# Patient Record
Sex: MALE | Race: WHITE | NOT HISPANIC OR LATINO | Employment: OTHER | URBAN - METROPOLITAN AREA
[De-identification: names, ages, dates, MRNs, and addresses within clinical notes are randomized per-mention and may not be internally consistent; named-entity substitution may affect disease eponyms.]

---

## 2017-05-12 ENCOUNTER — LAB VISIT (OUTPATIENT)
Dept: LAB | Facility: OTHER | Age: 62
End: 2017-05-12
Attending: FAMILY MEDICINE
Payer: COMMERCIAL

## 2017-05-12 ENCOUNTER — OFFICE VISIT (OUTPATIENT)
Dept: INTERNAL MEDICINE | Facility: CLINIC | Age: 62
End: 2017-05-12
Attending: FAMILY MEDICINE
Payer: COMMERCIAL

## 2017-05-12 VITALS
SYSTOLIC BLOOD PRESSURE: 124 MMHG | HEART RATE: 59 BPM | BODY MASS INDEX: 30.46 KG/M2 | DIASTOLIC BLOOD PRESSURE: 74 MMHG | WEIGHT: 224.88 LBS | HEIGHT: 72 IN

## 2017-05-12 DIAGNOSIS — Z00.00 PREVENTATIVE HEALTH CARE: ICD-10-CM

## 2017-05-12 DIAGNOSIS — Z00.00 PREVENTATIVE HEALTH CARE: Primary | ICD-10-CM

## 2017-05-12 DIAGNOSIS — R97.20 ELEVATED PSA: ICD-10-CM

## 2017-05-12 LAB
ALBUMIN SERPL BCP-MCNC: 4.1 G/DL
ALP SERPL-CCNC: 58 U/L
ALT SERPL W/O P-5'-P-CCNC: 24 U/L
ANION GAP SERPL CALC-SCNC: 7 MMOL/L
AST SERPL-CCNC: 15 U/L
BASOPHILS # BLD AUTO: 0.04 K/UL
BASOPHILS NFR BLD: 0.5 %
BILIRUB SERPL-MCNC: 0.4 MG/DL
BUN SERPL-MCNC: 17 MG/DL
CALCIUM SERPL-MCNC: 9.3 MG/DL
CHLORIDE SERPL-SCNC: 107 MMOL/L
CHOLEST/HDLC SERPL: 4.4 {RATIO}
CO2 SERPL-SCNC: 26 MMOL/L
COMPLEXED PSA SERPL-MCNC: 3.1 NG/ML
CREAT SERPL-MCNC: 0.8 MG/DL
DIFFERENTIAL METHOD: ABNORMAL
EOSINOPHIL # BLD AUTO: 0.5 K/UL
EOSINOPHIL NFR BLD: 6 %
ERYTHROCYTE [DISTWIDTH] IN BLOOD BY AUTOMATED COUNT: 14 %
EST. GFR  (AFRICAN AMERICAN): >60 ML/MIN/1.73 M^2
EST. GFR  (NON AFRICAN AMERICAN): >60 ML/MIN/1.73 M^2
GLUCOSE SERPL-MCNC: 88 MG/DL
HCT VFR BLD AUTO: 39.8 %
HDL/CHOLESTEROL RATIO: 22.7 %
HDLC SERPL-MCNC: 207 MG/DL
HDLC SERPL-MCNC: 47 MG/DL
HGB BLD-MCNC: 13 G/DL
LDLC SERPL CALC-MCNC: 131.2 MG/DL
LYMPHOCYTES # BLD AUTO: 3.2 K/UL
LYMPHOCYTES NFR BLD: 38.3 %
MCH RBC QN AUTO: 30 PG
MCHC RBC AUTO-ENTMCNC: 32.7 %
MCV RBC AUTO: 92 FL
MONOCYTES # BLD AUTO: 0.5 K/UL
MONOCYTES NFR BLD: 6.1 %
NEUTROPHILS # BLD AUTO: 4.1 K/UL
NEUTROPHILS NFR BLD: 48.6 %
NONHDLC SERPL-MCNC: 160 MG/DL
PLATELET # BLD AUTO: 272 K/UL
PMV BLD AUTO: 11.7 FL
POTASSIUM SERPL-SCNC: 4 MMOL/L
PROT SERPL-MCNC: 7.6 G/DL
RBC # BLD AUTO: 4.33 M/UL
SODIUM SERPL-SCNC: 140 MMOL/L
TRIGL SERPL-MCNC: 144 MG/DL
TSH SERPL DL<=0.005 MIU/L-ACNC: 1.28 UIU/ML
WBC # BLD AUTO: 8.36 K/UL

## 2017-05-12 PROCEDURE — 85025 COMPLETE CBC W/AUTO DIFF WBC: CPT

## 2017-05-12 PROCEDURE — 36415 COLL VENOUS BLD VENIPUNCTURE: CPT

## 2017-05-12 PROCEDURE — 84443 ASSAY THYROID STIM HORMONE: CPT

## 2017-05-12 PROCEDURE — 84153 ASSAY OF PSA TOTAL: CPT

## 2017-05-12 PROCEDURE — 80061 LIPID PANEL: CPT

## 2017-05-12 PROCEDURE — 83036 HEMOGLOBIN GLYCOSYLATED A1C: CPT

## 2017-05-12 PROCEDURE — 80053 COMPREHEN METABOLIC PANEL: CPT

## 2017-05-12 PROCEDURE — 99999 PR PBB SHADOW E&M-EST. PATIENT-LVL III: CPT | Mod: PBBFAC,,, | Performed by: FAMILY MEDICINE

## 2017-05-12 PROCEDURE — 99396 PREV VISIT EST AGE 40-64: CPT | Mod: S$GLB,,, | Performed by: FAMILY MEDICINE

## 2017-05-12 NOTE — PROGRESS NOTES
CHIEF COMPLAINT: Annual exam    HISTORY OF PRESENT ILLNESS: The patient is a perfectly healthy 61 year-old WM.  The patient has no specific complaints today.  It has been a while since the patient has been seen a primary care physician.  The patient wishes to establish a primary care physician.  The patient would also like to get some basic blood work done.    The patient used to see Dr. Tai.      REVIEW OF SYSTEMS:  GENERAL: No fever, chills, fatigability or weight loss.  SKIN: No rashes, itching or changes in color or texture of skin.  HEAD: No headaches or recent head trauma.  EYES: Visual acuity fine. No photophobia, ocular pain or diplopia.  EARS: Denies ear pain, discharge or vertigo.  NOSE: No loss of smell, no epistaxis or postnasal drip.  MOUTH & THROAT: No hoarseness or change in voice. No excessive gum bleeding.  NODES: Denies swollen glands.  CHEST: Denies JACKSON, cyanosis, wheezing, cough and sputum production.  CARDIOVASCULAR: Denies chest pain, PND, orthopnea or reduced exercise tolerance.  ABDOMEN: Appetite fine. No weight loss. Denies diarrhea, abdominal pain, hematemesis or blood in stool.  URINARY: No flank pain, dysuria or hematuria.  PERIPHERAL VASCULAR: No claudication or cyanosis.  MUSCULOSKELETAL: No joint stiffness or swelling. Denies back pain.  NEUROLOGIC: No history of seizures, paralysis, alteration of gait or coordination.    SOCIAL HISTORY: The patient does not smoke.  The patient consumes alcohol socially.  The patient is .  He works for the [employer].    PHYSICAL EXAMINATION:     Blood pressure 124/74, pulse (!) 59, height 6' (1.829 m), weight 102 kg (224 lb 13.9 oz).    APPEARANCE: Well nourished, well developed, in no acute distress.    HEAD: Normocephalic, atraumatic.  EYES: PERRL. EOMI.  Conjunctivae without injection and  anicteric  EARS: TM's intact. Light reflex normal. No retraction or perforation.    NOSE: Mucosa pink. Airway clear.  MOUTH & THROAT: No  tonsillar enlargement. No pharyngeal erythema or exudate. No stridor.  NECK: Supple.   NODES: No cervical, axillary or inguinal lymph node enlargement.  CHEST: Lungs clear to auscultation.  No retractions are noted.  No rales or rhonchi are present.  CARDIOVASCULAR: Normal S1, S2. No rubs, murmurs or gallops.  ABDOMEN: Bowel sounds normal. Not distended. Soft. No tenderness or masses.  No ascites is noted.  MUSCULOSKELETAL:  There is no clubbing, cyanosis, or edema of the extremities x4.  There is full range of motion of the lumbar spine.  There is full range of motion of the extremities x4.  There is no deformity noted.    NEUROLOGIC:       Normal speech development.      Hearing normal.      Normal gait.      Motor and sensory exams grossly normal.      DTR's normal.  PSYCHIATRIC: Patient is alert and oriented x3.  Thought processes are all normal.  There is no homicidality.  There is no suicidality.  There is no evidence of psychosis.    LABORATORY/RADIOLOGY:   Chart reviewed.  We will update blood work today.    ASSESSMENT:   Normal physical exam in an apparently healthy individual  Elevated PSA     PLAN:  Update HM:  Stress echo  Cscope due in 2019  We will follow-up blood work which we expect to be normal.    Follow up with urology  Return to clinic in one year.

## 2017-05-12 NOTE — MR AVS SNAPSHOT
[facility]  [address]  [place] [address]  Phone: [phone]  Fax: [phone]                  Med Palma   2017 1:40 PM   Office Visit    Description:  Male : 1955   Provider:  Jah Wilson MD   Department:  [facility]           Reason for Visit     Annual Exam           Diagnoses this Visit        Comments    Preventative health care    -  Primary     Elevated PSA                To Do List           Future Appointments        Provider Department Dept Phone    2017 2:15 PM LAB, SAME DAY [facility] [phone]    2017 7:00 AM CARDIOLOGY, TESTS [facility] [phone]    2017 1:00 PM Cecilia Campbell MD [facility] [phone]      Goals (5 Years of Data)     None      [facility]     [facility] Nurse Care Line -  Assistance  Unless otherwise directed by your provider, please contact [facility], our nurse care line that is available for  assistance.     Registered nurses in the [facility] Center provide: appointment scheduling, clinical advisement, health education, and other advisory services.  Call: [phone] (toll free)               Medications           Message regarding Medications     Verify the changes and/or additions to your medication regime listed below are the same as discussed with your clinician today.  If any of these changes or additions are incorrect, please notify your healthcare provider.             Verify that the below list of medications is an accurate representation of the medications you are currently taking.  If none reported, the list may be blank. If incorrect, please contact your healthcare provider. Carry this list with you in case of emergency.                Clinical Reference Information           Your Vitals Were     BP Pulse Height Weight BMI    124/74 59 6' (1.829 m) 102 kg (224  lb 13.9 oz) 30.5 kg/m2      Blood Pressure          Most Recent Value    BP  124/74      Allergies as of 5/12/2017     No Known Allergies      Immunizations Administered on Date of Encounter - 5/12/2017     None      Orders Placed During Today's Visit     Future Labs/Procedures Expected by Expires    CBC auto differential  5/12/2017 8/10/2017    Comprehensive metabolic panel  5/12/2017 7/11/2017    Hemoglobin A1c  5/12/2017 8/10/2017    Lipid panel  5/12/2017 7/11/2017    PROSTATE SPECIFIC ANTIGEN, DIAGNOSTIC  5/12/2017 7/11/2018    TSH  5/12/2017 8/10/2017    Exercise stress echo  As directed 5/12/2018      [facility] Sign-Up     Activating your [facility] account is as easy as 1-2-3!     1) Visit [email or web address], select Sign Up Now, enter this activation code and your date of birth, then select Next.  Activation code not generated  Current Patient Portal Status: Account disabled      2) Create a username and password to use when you visit [facility] in the future and select a security question in case you lose your password and select Next.    3) Enter your e-mail address and click Sign Up!    Additional Information  If you have questions, please e-mail [email or web address] or call [phone] to talk to our [facility] staff. Remember, [facility] is NOT to be used for urgent needs. For medical emergencies, dial 911.         Instructions    Your test results will be communicated to you via: [facility], Telephone or Letter.  If you have not received your test results within one week. Please contact the clinic.           Smoking Cessation     If you would like to quit smoking:   You may be eligible for free services if you are a [place] resident and started smoking cigarettes before September 1, 1988.  Call the Smoking Cessation Trust (SCT) toll free at (810) 560-9830 or (907) 866-8263.   Call 4-800-QUIT-NOW if you do not meet the above criteria.   Contact us via email: [email or web address]   View our website  for more information: [email or web address]        Language Assistance Services     ATTENTION: Language assistance services are available, free of charge. Please call [phone].      ATENCIÓN: Si habstuart marte, tiene a huntley disposición servicios gratuitos de asistencia lingüística. Llame al [phone].     CHÚ Ý: N?u b?n nói Ti?ng Vi?t, có các d?ch v? h? tr? ngôn ng? mi?n phí dành cho b?n. G?i s? [phone].         [facility] complies with applicable Federal civil rights laws and does not discriminate on the basis of race, color, national origin, age, disability, or sex.

## 2017-05-13 LAB
ESTIMATED AVG GLUCOSE: 123 MG/DL
HBA1C MFR BLD HPLC: 5.9 %

## 2017-05-16 ENCOUNTER — TELEPHONE (OUTPATIENT)
Dept: INTERNAL MEDICINE | Facility: CLINIC | Age: 62
End: 2017-05-16

## 2017-05-16 ENCOUNTER — HOSPITAL ENCOUNTER (OUTPATIENT)
Dept: CARDIOLOGY | Facility: OTHER | Age: 62
Discharge: HOME | End: 2017-05-16
Attending: FAMILY MEDICINE
Payer: COMMERCIAL

## 2017-05-16 DIAGNOSIS — Z00.00 PREVENTATIVE HEALTH CARE: ICD-10-CM

## 2017-05-16 LAB
DIASTOLIC DYSFUNCTION: NO
RETIRED EF AND QEF - SEE NOTES: 68 (ref 55–65)

## 2017-05-16 PROCEDURE — 93351 STRESS TTE COMPLETE: CPT

## 2017-05-16 NOTE — TELEPHONE ENCOUNTER
----- Message from Jah Wilson MD sent at 5/16/2017  1:21 PM CDT -----  Laboratory is normal.  PSA down to 3.1.  Stress echo looked good.  Followup as scheduled in about a year.

## 2017-06-21 ENCOUNTER — OFFICE VISIT (OUTPATIENT)
Dept: UROLOGY | Facility: CLINIC | Age: 62
End: 2017-06-21
Attending: UROLOGY
Payer: COMMERCIAL

## 2017-06-21 VITALS
WEIGHT: 224 LBS | HEART RATE: 73 BPM | SYSTOLIC BLOOD PRESSURE: 133 MMHG | BODY MASS INDEX: 30.34 KG/M2 | HEIGHT: 72 IN | DIASTOLIC BLOOD PRESSURE: 80 MMHG

## 2017-06-21 DIAGNOSIS — R97.20 ELEVATED PSA: Primary | ICD-10-CM

## 2017-06-21 LAB
BILIRUB SERPL-MCNC: NORMAL MG/DL
BLOOD URINE, POC: NORMAL
COLOR, POC UA: YELLOW
GLUCOSE UR QL STRIP: NORMAL
KETONES UR QL STRIP: NORMAL
LEUKOCYTE ESTERASE URINE, POC: NORMAL
NITRITE, POC UA: NORMAL
PH, POC UA: 5
POC RESIDUAL URINE VOLUME: 31 ML (ref 0–100)
PROTEIN, POC: NORMAL
SPECIFIC GRAVITY, POC UA: 1
UROBILINOGEN, POC UA: NORMAL

## 2017-06-21 PROCEDURE — 81002 URINALYSIS NONAUTO W/O SCOPE: CPT | Mod: S$GLB,,, | Performed by: UROLOGY

## 2017-06-21 PROCEDURE — 51798 US URINE CAPACITY MEASURE: CPT | Mod: S$GLB,,, | Performed by: UROLOGY

## 2017-06-21 PROCEDURE — 99999 PR PBB SHADOW E&M-EST. PATIENT-LVL III: CPT | Mod: PBBFAC,,, | Performed by: UROLOGY

## 2017-06-21 PROCEDURE — 99214 OFFICE O/P EST MOD 30 MIN: CPT | Mod: 25,S$GLB,, | Performed by: UROLOGY

## 2017-06-21 NOTE — PROGRESS NOTES
Subjective:       Med Palma is a 61 y.o. male who is an established pt who was referred by Dr Wilson for evaluation of elevated PSA.      He was seen previously with a elevated PSA of 8.4 but had a UTI at the time of PSA check. For this reason, we decided to hold on PBx and plan for recheck of PSA when no UTI present. His recheck showed a PSA of 5.9 in 11/14. Previous free PSA was 7%. He was counseled on PBx but he declined and opted for another recheck of his prostate.     No prostate cancer in the family. Brother with possible BPH or prostatitis. Denies LUTS at this time, rare nocturia. Not other UTI symptoms.     He is now s/p TRUS PBx on 1/26/15. All 12 cores from the biopsy were negative for malignancy. He denied any post-procedure issues such as fever or bleeding. Prostate volume 29g.     TRUS also revealed a large median lobe that could be causing issues with voiding that may have led to UTI. Due to imaging, he would be a candidate for TURP in the future if he desired. He denies current LUTS. He had not been on any treatment for BPH in the past. He was given rx for Flomax but did not like SE of retrograde ejaculation therefore he was switched to Uroxatral. He stopped this medication and has been doing well.    He does note some weakened stream. He has nocturia x 1-2 only after drinking a beer.    PVR (bladder scan) last visits - 135cc, 0cc, 26cc, 14cc. PVR (bladder scan) today - 31cc.     PSA (9/24/14) - 8.4   PSA (1/12/15) - 5.3, free 10.75%  PSA (6/15/15) - 5.4   PSA (12/11/15) - 5.9  PSA (6/3/16) - 3.2, free 15%  PSA (5/17) - 3.1      The following portions of the patient's history were reviewed and updated as appropriate: allergies, current medications, past family history, past medical history, past social history, past surgical history and problem list.    Review of Systems  Constitutional: no fever or chills  ENT: no nasal congestion or sore throat  Respiratory: no cough or shortness of  breath  Cardiovascular: no chest pain or palpitations  Gastrointestinal: no nausea or vomiting, tolerating diet  Genitourinary: as per HPI  Hematologic/Lymphatic: no easy bruising or lymphadenopathy  Musculoskeletal: no arthralgias or myalgias  Skin: no rashes or lesions  Neurological: no seizures or tremors  Behavioral/Psych: no auditory or visual hallucinations       Objective:    Vitals:   /80 (BP Location: Right arm, Patient Position: Sitting, BP Method: Automatic)   Pulse 73   Ht 6' (1.829 m)   Wt 101.6 kg (224 lb)   BMI 30.38 kg/m²     Physical Exam   General: well developed, well nourished in no acute distress  Head: normocephalic, atraumatic  Neck: supple, trachea midline  HEENT: EOMI, mucus membranes moist, sclera anicteric, no hearing impairment  Lungs: symmetric expansion, non-labored breathing  Cardiovascular: regular rate and rhythm, normal pulses  Abdomen: soft, non tender, non distended, no palpable masses, no hernias, bladder nonpalpable  Musculoskeletal: no peripheral edema, normal ROM  Lymphatics: no cervical or inguinal lymphadenopathy  Skin: no rashes or lesions  Neuro: alert and oriented x 3, no gross deficits  Psych: normal judgment and insight, normal mood/affect and non-anxious  Genitourinary:   patient declined exam   ELSA: symmetric 45g prostate without nodularity or tenderness, seminal vesicles non palpable, normal sphincter tone without hemorrhoids or rectal masses      Lab Review   Urine analysis shows  - negative    Lab Results   Component Value Date    WBC 8.36 05/12/2017    HGB 13.0 (L) 05/12/2017    HCT 39.8 (L) 05/12/2017    MCV 92 05/12/2017     05/12/2017     Lab Results   Component Value Date    CREATININE 0.8 05/12/2017    BUN 17 05/12/2017     Lab Results   Component Value Date    PSA 0.96 10/10/2011        Imaging  TRUS images reviewed       Assessment/Plan:      1. Elevated PSA    - Negative PBx on 1/26/15.    - PSA actually now decreasing without  intervention   - Thoroughly discussed option related to his elevated PSA with negative PBx. Discussed continued PSA monitoring, repeat standard 12-core biopsy, saturation biopsy, and prostatic MRI. He would like to continue with PSA surveillance.   - Continue vitamin D supplementation     2. BPH   - Large median lobe noted on TRUS   - May be the reason for UTI due to LUCINA   - Consider for TURP if symptomatic/recurrent UTI   - Alpha-blockers may help with LUCINA if present though median lobe will not be dramatically improved by pharmacotherapy   - PVR acceptable   - Doing well without medication for BPH       Follow up in 12 months with free and total PSA & PVR - may f/u at [facility].

## 2017-08-10 ENCOUNTER — CLINICAL SUPPORT (OUTPATIENT)
Dept: OCCUPATIONAL MEDICINE | Facility: CLINIC | Age: 62
End: 2017-08-10

## 2017-08-10 DIAGNOSIS — Z02.1 PRE-EMPLOYMENT EXAMINATION: ICD-10-CM

## 2017-08-10 LAB
BILIRUB UR QL STRIP: NORMAL
GLUCOSE UR QL STRIP: NORMAL
KETONES UR QL STRIP: NORMAL
LEUKOCYTE ESTERASE UR QL STRIP: NORMAL
PH, POC UA: NORMAL (ref 5–8)
POC BLOOD, URINE: NORMAL
POC BREATH ALCOHOL: NEGATIVE
POC NITRATES, URINE: NORMAL
PROT UR QL STRIP: NORMAL
SP GR UR STRIP: NORMAL (ref 1–1.03)
UROBILINOGEN UR STRIP-ACNC: NORMAL (ref 0.3–2.2)

## 2017-08-10 PROCEDURE — 92552 PURE TONE AUDIOMETRY AIR: CPT | Mod: S$GLB,,, | Performed by: PREVENTIVE MEDICINE

## 2017-08-10 PROCEDURE — 82075 ASSAY OF BREATH ETHANOL: CPT | Mod: S$GLB,,, | Performed by: PREVENTIVE MEDICINE

## 2017-08-10 PROCEDURE — 80306 DRUG TEST PRSMV INSTRMNT: CPT | Mod: S$GLB,,, | Performed by: PREVENTIVE MEDICINE

## 2017-08-10 PROCEDURE — 99172 OCULAR FUNCTION SCREEN: CPT | Mod: S$GLB,,, | Performed by: PREVENTIVE MEDICINE

## 2017-08-10 PROCEDURE — 99499 UNLISTED E&M SERVICE: CPT | Mod: S$GLB,,, | Performed by: PREVENTIVE MEDICINE

## 2018-06-27 ENCOUNTER — OFFICE VISIT (OUTPATIENT)
Dept: UROLOGY | Facility: CLINIC | Age: 63
End: 2018-06-27
Attending: UROLOGY
Payer: COMMERCIAL

## 2018-06-27 VITALS
DIASTOLIC BLOOD PRESSURE: 68 MMHG | HEART RATE: 79 BPM | SYSTOLIC BLOOD PRESSURE: 104 MMHG | BODY MASS INDEX: 30.34 KG/M2 | HEIGHT: 72 IN | WEIGHT: 224 LBS

## 2018-06-27 DIAGNOSIS — N40.1 BENIGN NON-NODULAR PROSTATIC HYPERPLASIA WITH LOWER URINARY TRACT SYMPTOMS: ICD-10-CM

## 2018-06-27 DIAGNOSIS — R97.20 ELEVATED PSA: Primary | ICD-10-CM

## 2018-06-27 LAB
BILIRUB SERPL-MCNC: ABNORMAL MG/DL
BLOOD URINE, POC: ABNORMAL
COLOR, POC UA: YELLOW
GLUCOSE UR QL STRIP: ABNORMAL
KETONES UR QL STRIP: ABNORMAL
LEUKOCYTE ESTERASE URINE, POC: ABNORMAL
NITRITE, POC UA: ABNORMAL
PH, POC UA: 5
PROTEIN, POC: ABNORMAL
SPECIFIC GRAVITY, POC UA: 1.01
UROBILINOGEN, POC UA: ABNORMAL

## 2018-06-27 PROCEDURE — 3008F BODY MASS INDEX DOCD: CPT | Mod: CPTII,S$GLB,, | Performed by: UROLOGY

## 2018-06-27 PROCEDURE — 81002 URINALYSIS NONAUTO W/O SCOPE: CPT | Mod: S$GLB,,, | Performed by: UROLOGY

## 2018-06-27 PROCEDURE — 99214 OFFICE O/P EST MOD 30 MIN: CPT | Mod: 25,S$GLB,, | Performed by: UROLOGY

## 2018-06-27 NOTE — PROGRESS NOTES
Subjective:       Med Palma is a 62 y.o. male who is an established pt who was referred by Dr Wilson for evaluation of elevated PSA.      He was seen previously with a elevated PSA of 8.4 but had a UTI at the time of PSA check. For this reason, we decided to hold on PBx and plan for recheck of PSA when no UTI present. His recheck showed a PSA of 5.9 in 11/14. Previous free PSA was 7%. He was counseled on PBx but he declined and opted for another recheck of his prostate.     No prostate cancer in the family. Brother with possible BPH or prostatitis. Denies LUTS at this time, rare nocturia. Not other UTI symptoms.     He is now s/p TRUS PBx on 1/26/15. All 12 cores from the biopsy were negative for malignancy. He denied any post-procedure issues such as fever or bleeding. Prostate volume 29g.     TRUS also revealed a large median lobe that could be causing issues with voiding that may have led to UTI. Due to imaging, he would be a candidate for TURP in the future if he desired. He denies current LUTS. He had not been on any treatment for BPH in the past. He was given rx for Flomax but did not like SE of retrograde ejaculation therefore he was switched to Uroxatral. He stopped this medication and has been doing well.    He does note some weakened stream. He has nocturia x 1-2 only after drinking a beer. He continues to decline meds or surgery.     PVR (bladder scan) last visits - 135cc, 0cc, 26cc, 14cc, 31cc.     PSA (9/24/14) - 8.4   PSA (1/12/15) - 5.3, free 10.75%  PSA (6/15/15) - 5.4   PSA (12/11/15) - 5.9  PSA (6/3/16) - 3.2, free 15%  PSA (5/17) - 3.1      The following portions of the patient's history were reviewed and updated as appropriate: allergies, current medications, past family history, past medical history, past social history, past surgical history and problem list.    Review of Systems  Constitutional: no fever or chills  ENT: no nasal congestion or sore throat  Respiratory: no cough or  shortness of breath  Cardiovascular: no chest pain or palpitations  Gastrointestinal: no nausea or vomiting, tolerating diet  Genitourinary: as per HPI  Hematologic/Lymphatic: no easy bruising or lymphadenopathy  Musculoskeletal: no arthralgias or myalgias  Skin: no rashes or lesions  Neurological: no seizures or tremors  Behavioral/Psych: no auditory or visual hallucinations       Objective:    Vitals:   /68 (BP Location: Left arm, Patient Position: Sitting, BP Method: Large (Automatic))   Pulse 79   Ht 6' (1.829 m)   Wt 101.6 kg (223 lb 15.8 oz)   BMI 30.38 kg/m²     Physical Exam   General: well developed, well nourished in no acute distress  Head: normocephalic, atraumatic  Neck: supple, trachea midline  HEENT: EOMI, mucus membranes moist, sclera anicteric, no hearing impairment  Lungs: symmetric expansion, non-labored breathing  Cardiovascular: regular rate and rhythm, normal pulses  Abdomen: soft, non tender, non distended, no palpable masses, no hernias, bladder nonpalpable  Musculoskeletal: no peripheral edema, normal ROM  Lymphatics: no cervical or inguinal lymphadenopathy  Skin: no rashes or lesions  Neuro: alert and oriented x 3, no gross deficits  Psych: normal judgment and insight, normal mood/affect and non-anxious  Genitourinary:   patient declined exam   ELSA: symmetric 55g prostate without nodularity or tenderness, seminal vesicles non palpable, normal sphincter tone without hemorrhoids or rectal masses      Lab Review   Urine analysis shows  - negative    Lab Results   Component Value Date    WBC 8.36 05/12/2017    HGB 13.0 (L) 05/12/2017    HCT 39.8 (L) 05/12/2017    MCV 92 05/12/2017     05/12/2017     Lab Results   Component Value Date    CREATININE 0.8 05/12/2017    BUN 17 05/12/2017     Lab Results   Component Value Date    PSA 0.96 10/10/2011      Imaging  TRUS images reviewed       Assessment/Plan:      1. Elevated PSA    - Negative PBx on 1/26/15.    - PSA actually now  decreasing without intervention   - Thoroughly discussed option related to his elevated PSA with negative PBx. Discussed continued PSA monitoring, repeat standard 12-core biopsy, saturation biopsy, and prostatic MRI. He would like to continue with PSA surveillance.   - Continue vitamin D supplementation   - PSA now and 1 year     2. BPH   - Large median lobe noted on TRUS   - May be the reason for UTI due to LUCINA   - Consider for TURP if symptomatic/recurrent UTI   - Alpha-blockers may help with LUCINA if present though median lobe will not be dramatically improved by pharmacotherapy   - PVR acceptable   - Doing well without medication for BPH - again discussed Flomax and Proscar as options.      Follow up in 12 months with free and total PSA & PVR

## 2019-05-09 ENCOUNTER — PATIENT MESSAGE (OUTPATIENT)
Dept: UROLOGY | Facility: CLINIC | Age: 64
End: 2019-05-09

## 2019-06-11 ENCOUNTER — LAB VISIT (OUTPATIENT)
Dept: LAB | Facility: OTHER | Age: 64
End: 2019-06-11
Attending: UROLOGY
Payer: COMMERCIAL

## 2019-06-11 DIAGNOSIS — R97.20 ELEVATED PSA: ICD-10-CM

## 2019-06-11 LAB
PROSTATE SPECIFIC ANTIGEN, TOTAL: 2.3 NG/ML (ref 0–4)
PSA FREE MFR SERPL: 20 %
PSA FREE SERPL-MCNC: 0.46 NG/ML (ref 0.01–1.5)

## 2019-06-11 PROCEDURE — 36415 COLL VENOUS BLD VENIPUNCTURE: CPT

## 2019-06-11 PROCEDURE — 84154 ASSAY OF PSA FREE: CPT

## 2019-06-12 ENCOUNTER — OFFICE VISIT (OUTPATIENT)
Dept: UROLOGY | Facility: CLINIC | Age: 64
End: 2019-06-12
Attending: UROLOGY
Payer: COMMERCIAL

## 2019-06-12 VITALS
HEIGHT: 72 IN | BODY MASS INDEX: 28.16 KG/M2 | WEIGHT: 207.88 LBS | SYSTOLIC BLOOD PRESSURE: 115 MMHG | DIASTOLIC BLOOD PRESSURE: 70 MMHG | HEART RATE: 68 BPM

## 2019-06-12 DIAGNOSIS — N40.1 BENIGN NON-NODULAR PROSTATIC HYPERPLASIA WITH LOWER URINARY TRACT SYMPTOMS: ICD-10-CM

## 2019-06-12 DIAGNOSIS — R97.20 ELEVATED PSA: Primary | ICD-10-CM

## 2019-06-12 LAB
BILIRUB SERPL-MCNC: ABNORMAL MG/DL
BLOOD URINE, POC: ABNORMAL
COLOR, POC UA: ABNORMAL
GLUCOSE UR QL STRIP: NORMAL
KETONES UR QL STRIP: ABNORMAL
LEUKOCYTE ESTERASE URINE, POC: ABNORMAL
NITRITE, POC UA: ABNORMAL
PH, POC UA: 5
POC RESIDUAL URINE VOLUME: 100 ML (ref 0–100)
PROTEIN, POC: ABNORMAL
SPECIFIC GRAVITY, POC UA: 1.01
UROBILINOGEN, POC UA: NORMAL

## 2019-06-12 PROCEDURE — 81002 POCT URINE DIPSTICK WITHOUT MICROSCOPE: ICD-10-PCS | Mod: S$GLB,,, | Performed by: UROLOGY

## 2019-06-12 PROCEDURE — 51798 POCT BLADDER SCAN: ICD-10-PCS | Mod: S$GLB,,, | Performed by: UROLOGY

## 2019-06-12 PROCEDURE — 99214 PR OFFICE/OUTPT VISIT, EST, LEVL IV, 30-39 MIN: ICD-10-PCS | Mod: 25,S$GLB,, | Performed by: UROLOGY

## 2019-06-12 PROCEDURE — 99214 OFFICE O/P EST MOD 30 MIN: CPT | Mod: 25,S$GLB,, | Performed by: UROLOGY

## 2019-06-12 PROCEDURE — 51798 US URINE CAPACITY MEASURE: CPT | Mod: S$GLB,,, | Performed by: UROLOGY

## 2019-06-12 PROCEDURE — 3008F BODY MASS INDEX DOCD: CPT | Mod: CPTII,S$GLB,, | Performed by: UROLOGY

## 2019-06-12 PROCEDURE — 81002 URINALYSIS NONAUTO W/O SCOPE: CPT | Mod: S$GLB,,, | Performed by: UROLOGY

## 2019-06-12 PROCEDURE — 3008F PR BODY MASS INDEX (BMI) DOCUMENTED: ICD-10-PCS | Mod: CPTII,S$GLB,, | Performed by: UROLOGY

## 2019-06-12 NOTE — PROGRESS NOTES
Subjective:       Med Palma is a 63 y.o. male who is an established pt who was referred by Dr Wilson for evaluation of elevated PSA.      He was seen previously with a elevated PSA of 8.4 but had a UTI at the time of PSA check. For this reason, we decided to hold on PBx and plan for recheck of PSA when no UTI present. His recheck showed a PSA of 5.9 in 11/14. Previous free PSA was 7%. He was counseled on PBx but he declined and opted for another recheck of his prostate.     No prostate cancer in the family. Brother with possible BPH or prostatitis. Denies LUTS at this time, rare nocturia. Not other UTI symptoms.     He is now s/p TRUS PBx on 1/26/15. All 12 cores from the biopsy were negative for malignancy. He denied any post-procedure issues such as fever or bleeding. Prostate volume 29g.     TRUS also revealed a large median lobe that could be causing issues with voiding that may have led to UTI. Due to imaging, he would be a candidate for TURP in the future if he desired. He denies current LUTS. He had not been on any treatment for BPH in the past. He was given rx for Flomax but did not like SE of retrograde ejaculation therefore he was switched to Uroxatral. He stopped this medication and has been doing well.    He does note some weakened stream. He has nocturia x 1-2 only after drinking a beer. He continues to decline meds or surgery.     PVR (bladder scan) last visits - 135cc, 0cc, 26cc, 14cc, 31cc.     PSA (9/24/14) - 8.4   PSA (1/12/15) - 5.3, free 10.75%  PSA (6/15/15) - 5.4   PSA (12/11/15) - 5.9  PSA (6/3/16) - 3.2, free 15%  PSA (5/17) - 3.1   PSA (6/18) - 2.2  PSA (6/19) - 2.3, 20%    6/12/2019  Doing well - still with slow stream and nocturia. Known median lobe from TRUS. PSA stable.   PVR (bladder scan) today - 100cc      The following portions of the patient's history were reviewed and updated as appropriate: allergies, current medications, past family history, past medical history,  past social history, past surgical history and problem list.    Review of Systems  Constitutional: no fever or chills  ENT: no nasal congestion or sore throat  Respiratory: no cough or shortness of breath  Cardiovascular: no chest pain or palpitations  Gastrointestinal: no nausea or vomiting, tolerating diet  Genitourinary: as per HPI  Hematologic/Lymphatic: no easy bruising or lymphadenopathy  Musculoskeletal: no arthralgias or myalgias  Skin: no rashes or lesions  Neurological: no seizures or tremors  Behavioral/Psych: no auditory or visual hallucinations       Objective:    Vitals:   /70 (BP Location: Right arm, Patient Position: Sitting, BP Method: Medium (Automatic))   Pulse 68   Ht 6' (1.829 m)   Wt 94.3 kg (207 lb 14.3 oz)   BMI 28.20 kg/m²     Physical Exam   General: well developed, well nourished in no acute distress  Head: normocephalic, atraumatic  Neck: supple, trachea midline  HEENT: EOMI, mucus membranes moist, sclera anicteric, no hearing impairment  Lungs: symmetric expansion, non-labored breathing  Cardiovascular: regular rate and rhythm, normal pulses  Abdomen: soft, non tender, non distended, no palpable masses, no hernias, bladder nonpalpable  Musculoskeletal: no peripheral edema, normal ROM  Lymphatics: no cervical or inguinal lymphadenopathy  Skin: no rashes or lesions  Neuro: alert and oriented x 3, no gross deficits  Psych: normal judgment and insight, normal mood/affect and non-anxious  Genitourinary:   patient declined exam   ELSA: symmetric 60g prostate without nodularity or tenderness, seminal vesicles non palpable, normal sphincter tone without hemorrhoids or rectal masses      Lab Review   Urine analysis shows  - negative    Lab Results   Component Value Date    WBC 8.36 05/12/2017    HGB 13.0 (L) 05/12/2017    HCT 39.8 (L) 05/12/2017    MCV 92 05/12/2017     05/12/2017     Lab Results   Component Value Date    CREATININE 0.8 05/12/2017    BUN 17 05/12/2017     Lab  Results   Component Value Date    PSA 0.96 10/10/2011      Imaging  TRUS images reviewed       Assessment/Plan:      1. Elevated PSA    - Negative PBx on 1/26/15.    - PSA actually now decreasing without intervention   - Thoroughly discussed option related to his elevated PSA with negative PBx. Discussed continued PSA monitoring, repeat standard 12-core biopsy, saturation biopsy, and prostatic MRI. He would like to continue with PSA surveillance.   - Continue vitamin D supplementation   - PSA 1 year     2. BPH   - Large median lobe noted on TRUS   - May be the reason for UTI due to LUCINA   - Consider for TURP if symptomatic/recurrent UTI   - Alpha-blockers may help with LUCINA if present though median lobe will not be dramatically improved by pharmacotherapy   - PVR acceptable   - Doing well without medication for BPH - again discussed Flomax and Proscar as options.      Follow up in 12 months with free and total PSA & PVR / ELSA

## 2019-06-17 ENCOUNTER — PATIENT MESSAGE (OUTPATIENT)
Dept: UROLOGY | Facility: CLINIC | Age: 64
End: 2019-06-17

## 2019-06-17 ENCOUNTER — PATIENT MESSAGE (OUTPATIENT)
Dept: INTERNAL MEDICINE | Facility: CLINIC | Age: 64
End: 2019-06-17

## 2019-06-18 ENCOUNTER — PATIENT MESSAGE (OUTPATIENT)
Dept: UROLOGY | Facility: CLINIC | Age: 64
End: 2019-06-18

## 2019-06-19 ENCOUNTER — PATIENT MESSAGE (OUTPATIENT)
Dept: INTERNAL MEDICINE | Facility: CLINIC | Age: 64
End: 2019-06-19

## 2019-06-20 ENCOUNTER — PATIENT MESSAGE (OUTPATIENT)
Dept: INTERNAL MEDICINE | Facility: CLINIC | Age: 64
End: 2019-06-20

## 2019-06-24 ENCOUNTER — OFFICE VISIT (OUTPATIENT)
Dept: INTERNAL MEDICINE | Facility: CLINIC | Age: 64
End: 2019-06-24
Attending: FAMILY MEDICINE
Payer: COMMERCIAL

## 2019-06-24 VITALS
DIASTOLIC BLOOD PRESSURE: 76 MMHG | HEART RATE: 61 BPM | BODY MASS INDEX: 28.4 KG/M2 | OXYGEN SATURATION: 95 % | HEIGHT: 72 IN | WEIGHT: 209.69 LBS | SYSTOLIC BLOOD PRESSURE: 112 MMHG

## 2019-06-24 DIAGNOSIS — M10.9 GOUTY ARTHRITIS OF RIGHT GREAT TOE: Primary | ICD-10-CM

## 2019-06-24 DIAGNOSIS — F40.243 ANXIETY WITH FLYING: ICD-10-CM

## 2019-06-24 PROCEDURE — 3008F PR BODY MASS INDEX (BMI) DOCUMENTED: ICD-10-PCS | Mod: CPTII,S$GLB,, | Performed by: FAMILY MEDICINE

## 2019-06-24 PROCEDURE — 3008F BODY MASS INDEX DOCD: CPT | Mod: CPTII,S$GLB,, | Performed by: FAMILY MEDICINE

## 2019-06-24 PROCEDURE — 99999 PR PBB SHADOW E&M-EST. PATIENT-LVL III: ICD-10-PCS | Mod: PBBFAC,,, | Performed by: FAMILY MEDICINE

## 2019-06-24 PROCEDURE — 99214 OFFICE O/P EST MOD 30 MIN: CPT | Mod: S$GLB,,, | Performed by: FAMILY MEDICINE

## 2019-06-24 PROCEDURE — 99999 PR PBB SHADOW E&M-EST. PATIENT-LVL III: CPT | Mod: PBBFAC,,, | Performed by: FAMILY MEDICINE

## 2019-06-24 PROCEDURE — 99214 PR OFFICE/OUTPT VISIT, EST, LEVL IV, 30-39 MIN: ICD-10-PCS | Mod: S$GLB,,, | Performed by: FAMILY MEDICINE

## 2019-06-24 RX ORDER — ALPRAZOLAM 1 MG/1
1 TABLET ORAL 2 TIMES DAILY PRN
Qty: 10 TABLET | Refills: 0 | Status: SHIPPED | OUTPATIENT
Start: 2019-06-24 | End: 2021-03-25

## 2019-06-24 RX ORDER — COLCHICINE 0.6 MG/1
0.6 CAPSULE ORAL 2 TIMES DAILY
Qty: 30 CAPSULE | Refills: 2 | Status: SHIPPED | OUTPATIENT
Start: 2019-06-24 | End: 2019-07-09

## 2019-06-24 RX ORDER — INDOMETHACIN 50 MG/1
50 CAPSULE ORAL 2 TIMES DAILY PRN
Qty: 20 CAPSULE | Refills: 1 | Status: SHIPPED | OUTPATIENT
Start: 2019-06-24 | End: 2021-03-25

## 2019-06-24 NOTE — PROGRESS NOTES
CHIEF COMPLAINT: Great toe pain    HISTORY OF PRESENT ILLNESS: The patient is a perfectly healthy 63-year-old white male presents with several day history of right-sided great toe pain.  The patient has a long history of gout episodes although he has not previously been formally diagnosed.  He typically takes large doses of ibuprofen and the symptoms go away.  There is no fever or chills associated with this.  There is no history of trauma.    He is about to travel to [place].  This is the 1st time that he will be outside of the country.  He is afflicted by flight anxiety.    REVIEW OF SYSTEMS:  GENERAL: No fever, chills, fatigability or weight loss.  SKIN: No rashes, itching or changes in color or texture of skin.  HEAD: No headaches or recent head trauma.  EYES: Visual acuity fine. No photophobia, ocular pain or diplopia.  EARS: Denies ear pain, discharge or vertigo.  NOSE: No loss of smell, no epistaxis or postnasal drip.  MOUTH & THROAT: No hoarseness or change in voice. No excessive gum bleeding.  NODES: Denies swollen glands.  CHEST: Denies JACKSON, cyanosis, wheezing, cough and sputum production.  CARDIOVASCULAR: Denies chest pain, PND, orthopnea or reduced exercise tolerance.  ABDOMEN: Appetite fine. No weight loss. Denies diarrhea, abdominal pain, hematemesis or blood in stool.  URINARY: No flank pain, dysuria or hematuria.  PERIPHERAL VASCULAR: No claudication or cyanosis.  MUSCULOSKELETAL: There is great toe joint stiffness and swelling. Denies back pain.  NEUROLOGIC: No history of seizures, paralysis, alteration of gait or coordination.    MEDICATIONS: The patient's MedCard has been reviewed and reconciled.     ALLERGIES: The patients' Allergy Card has been reviewed and reconciled.    PAST MEDICAL HISTORY: The patient has a history of gout.    SOCIAL HISTORY: Unchanged since recent note    PHYSICAL EXAMINATION:   Blood pressure 112/76, pulse 61, height 6' (1.829 m), weight 95.1 kg (209 lb 10.5 oz), SpO2 95  %.    APPEARANCE: Well nourished, well developed, in no acute distress.    HEAD: Normocephalic, atraumatic.  EYES: PERRL. EOMI.  Conjunctivae without injection and  anicteric  EARS: TM's intact. Light reflex normal. No retraction or perforation.    NOSE: Mucosa pink. Airway clear.  MOUTH & THROAT: No tonsillar enlargement. No pharyngeal erythema or exudate. No stridor.  NECK: Supple.   NODES: No cervical, axillary or inguinal lymph node enlargement.  MUSCULOSKELETAL:  There is no clubbing, cyanosis, or edema of the extremities x4.  There is full range of motion of the lumbar spine.  There is full range of motion of the extremities x4.  There is marked tenderness of the right great toe.  There is erythema and edema of the great toe as well.    NEUROLOGIC:       Normal speech development.      Hearing normal.      Antalgic gait.      Motor and sensory exams grossly normal.  PSYCHIATRIC: Patient is alert and oriented x3.  Thought processes are all normal.  There is no homicidality.  There is no suicidality.  There is no evidence of psychosis.    LABORATORY/RADIOLOGY: Chart reviewed.    ASSESSMENT:   Acute gouty arthritic flare  Flight anxiety    PLAN:  Indocin, colchicine  Medrol or prednisone if symptoms do not improve.  Small amount of Xanax prescribed for Flight anxiety  Patient will call with failure to improve.    Answers for HPI/ROS submitted by the patient on 6/23/2019   activity change: No  unexpected weight change: No  neck pain: No  hearing loss: No  rhinorrhea: No  trouble swallowing: No  eye discharge: No  visual disturbance: No  chest tightness: No  wheezing: No  chest pain: No  palpitations: No  blood in stool: No  constipation: No  vomiting: No  diarrhea: No  polydipsia: No  polyuria: No  difficulty urinating: No  urgency: No  hematuria: No  joint swelling: Yes  arthralgias: Yes  headaches: No  weakness: No  confusion: No  dysphoric mood: No

## 2019-12-12 ENCOUNTER — OFFICE VISIT (OUTPATIENT)
Dept: OPTOMETRY | Facility: CLINIC | Age: 64
End: 2019-12-12
Payer: COMMERCIAL

## 2019-12-12 DIAGNOSIS — Z01.00 EYE EXAM, ROUTINE: Primary | ICD-10-CM

## 2019-12-12 DIAGNOSIS — H52.4 HYPEROPIA OF BOTH EYES WITH REGULAR ASTIGMATISM AND PRESBYOPIA: ICD-10-CM

## 2019-12-12 DIAGNOSIS — H52.223 HYPEROPIA OF BOTH EYES WITH REGULAR ASTIGMATISM AND PRESBYOPIA: ICD-10-CM

## 2019-12-12 DIAGNOSIS — H52.03 HYPEROPIA OF BOTH EYES WITH REGULAR ASTIGMATISM AND PRESBYOPIA: ICD-10-CM

## 2019-12-12 PROCEDURE — 92015 PR REFRACTION: ICD-10-PCS | Mod: S$GLB,,, | Performed by: OPTOMETRIST

## 2019-12-12 PROCEDURE — 92015 DETERMINE REFRACTIVE STATE: CPT | Mod: S$GLB,,, | Performed by: OPTOMETRIST

## 2019-12-12 PROCEDURE — 92004 COMPRE OPH EXAM NEW PT 1/>: CPT | Mod: S$GLB,,, | Performed by: OPTOMETRIST

## 2019-12-12 PROCEDURE — 99999 PR PBB SHADOW E&M-EST. PATIENT-LVL II: ICD-10-PCS | Mod: PBBFAC,,, | Performed by: OPTOMETRIST

## 2019-12-12 PROCEDURE — 92004 PR EYE EXAM, NEW PATIENT,COMPREHESV: ICD-10-PCS | Mod: S$GLB,,, | Performed by: OPTOMETRIST

## 2019-12-12 PROCEDURE — 99999 PR PBB SHADOW E&M-EST. PATIENT-LVL II: CPT | Mod: PBBFAC,,, | Performed by: OPTOMETRIST

## 2019-12-12 NOTE — PROGRESS NOTES
HPI     Annual [facility] eye exam Blurry vision distance and near  OTC Readers +2.00    Last edited by Taras Shine, OD on 12/12/2019  8:13 AM. (History)            Assessment /Plan     For exam results, see Encounter Report.    Eye exam, routine  -[facility]  -DED, Cataracts    Hyperopia of both eyes with regular astigmatism and presbyopia  Eyeglass Final Rx     Eyeglass Final Rx       Sphere Cylinder Axis Dist VA Add    Right +0.75 Sphere  20/20 +2.25    Left +0.50 +0.75 180 20/30- +2.25    Type:  PAL    Expiration Date:  12/12/2020                  RTC 1 yr

## 2020-08-19 ENCOUNTER — OFFICE VISIT (OUTPATIENT)
Dept: UROLOGY | Facility: CLINIC | Age: 65
End: 2020-08-19
Attending: UROLOGY
Payer: COMMERCIAL

## 2020-08-19 VITALS
HEIGHT: 72 IN | WEIGHT: 209 LBS | SYSTOLIC BLOOD PRESSURE: 120 MMHG | BODY MASS INDEX: 28.31 KG/M2 | HEART RATE: 64 BPM | DIASTOLIC BLOOD PRESSURE: 68 MMHG

## 2020-08-19 DIAGNOSIS — R97.20 ELEVATED PSA: Primary | ICD-10-CM

## 2020-08-19 DIAGNOSIS — N40.1 BENIGN NON-NODULAR PROSTATIC HYPERPLASIA WITH LOWER URINARY TRACT SYMPTOMS: ICD-10-CM

## 2020-08-19 DIAGNOSIS — R31.0 GROSS HEMATURIA: ICD-10-CM

## 2020-08-19 LAB
BILIRUB SERPL-MCNC: ABNORMAL MG/DL
BLOOD URINE, POC: ABNORMAL
CLARITY, POC UA: CLEAR
COLOR, POC UA: YELLOW
GLUCOSE UR QL STRIP: NORMAL
KETONES UR QL STRIP: ABNORMAL
LEUKOCYTE ESTERASE URINE, POC: ABNORMAL
NITRITE, POC UA: ABNORMAL
PH, POC UA: 6
POC RESIDUAL URINE VOLUME: 11 ML (ref 0–100)
PROTEIN, POC: ABNORMAL
SPECIFIC GRAVITY, POC UA: 1.01
UROBILINOGEN, POC UA: NORMAL

## 2020-08-19 PROCEDURE — 51798 US URINE CAPACITY MEASURE: CPT | Mod: S$GLB,,, | Performed by: UROLOGY

## 2020-08-19 PROCEDURE — 99214 PR OFFICE/OUTPT VISIT, EST, LEVL IV, 30-39 MIN: ICD-10-PCS | Mod: 25,S$GLB,, | Performed by: UROLOGY

## 2020-08-19 PROCEDURE — 3008F PR BODY MASS INDEX (BMI) DOCUMENTED: ICD-10-PCS | Mod: CPTII,S$GLB,, | Performed by: UROLOGY

## 2020-08-19 PROCEDURE — 81002 POCT URINE DIPSTICK WITHOUT MICROSCOPE: ICD-10-PCS | Mod: S$GLB,,, | Performed by: UROLOGY

## 2020-08-19 PROCEDURE — 3008F BODY MASS INDEX DOCD: CPT | Mod: CPTII,S$GLB,, | Performed by: UROLOGY

## 2020-08-19 PROCEDURE — 99214 OFFICE O/P EST MOD 30 MIN: CPT | Mod: 25,S$GLB,, | Performed by: UROLOGY

## 2020-08-19 PROCEDURE — 51798 POCT BLADDER SCAN: ICD-10-PCS | Mod: S$GLB,,, | Performed by: UROLOGY

## 2020-08-19 PROCEDURE — 81002 URINALYSIS NONAUTO W/O SCOPE: CPT | Mod: S$GLB,,, | Performed by: UROLOGY

## 2020-08-19 NOTE — PROGRESS NOTES
Subjective:       Med Palma is a 65 y.o. male who is an established pt who was referred by Dr Wilson for evaluation of elevated PSA.      He was seen previously with a elevated PSA of 8.4 but had a UTI at the time of PSA check. For this reason, we decided to hold on PBx and plan for recheck of PSA when no UTI present. His recheck showed a PSA of 5.9 in 11/14. Previous free PSA was 7%. He was counseled on PBx but he declined and opted for another recheck of his prostate.     No prostate cancer in the family. Brother with possible BPH or prostatitis. Denies LUTS at this time, rare nocturia. Not other UTI symptoms.     He is now s/p TRUS PBx on 1/26/15. All 12 cores from the biopsy were negative for malignancy. He denied any post-procedure issues such as fever or bleeding. Prostate volume 29g.     TRUS also revealed a large median lobe that could be causing issues with voiding that may have led to UTI. Due to imaging, he would be a candidate for TURP in the future if he desired. He denies current LUTS. He had not been on any treatment for BPH in the past. He was given rx for Flomax but did not like SE of retrograde ejaculation therefore he was switched to Uroxatral. He stopped this medication and has been doing well.    He does note some weakened stream. He has nocturia x 1-2 only after drinking a beer. He continues to decline meds or surgery.     PVR (bladder scan) last visits - 135cc, 0cc, 26cc, 14cc, 31cc.     PSA (9/24/14) - 8.4   PSA (1/12/15) - 5.3, free 10.75%  PSA (6/15/15) - 5.4   PSA (12/11/15) - 5.9  PSA (6/3/16) - 3.2, free 15%  PSA (5/17) - 3.1   PSA (6/18) - 2.2  PSA (6/19) - 2.3, 20%    6/12/2019  Doing well - still with slow stream and nocturia. Known median lobe from TRUS. PSA stable.   PVR (bladder scan) today - 100cc    8/19/2020  PSA not done. LUTS worse with rare times drinking beer (nocturia x 3-4). Weak stream in AM occasionally. Stream improves throughout day. Minimally bothersome.  Mild gross hematuria with heavy exercise once - UA clear today.   PVR (bladder scan) today - 11cc      The following portions of the patient's history were reviewed and updated as appropriate: allergies, current medications, past family history, past medical history, past social history, past surgical history and problem list.    Review of Systems  Constitutional: no fever or chills  ENT: no nasal congestion or sore throat  Respiratory: no cough or shortness of breath  Cardiovascular: no chest pain or palpitations  Gastrointestinal: no nausea or vomiting, tolerating diet  Genitourinary: as per HPI  Hematologic/Lymphatic: no easy bruising or lymphadenopathy  Musculoskeletal: no arthralgias or myalgias  Skin: no rashes or lesions  Neurological: no seizures or tremors  Behavioral/Psych: no auditory or visual hallucinations       Objective:    Vitals:   /68 (BP Location: Right arm, Patient Position: Sitting, BP Method: Large (Automatic))   Pulse 64   Ht 6' (1.829 m)   Wt 94.8 kg (209 lb)   BMI 28.35 kg/m²     Physical Exam   General: well developed, well nourished in no acute distress  Head: normocephalic, atraumatic  Neck: supple, trachea midline  HEENT: EOMI, mucus membranes moist, sclera anicteric, no hearing impairment  Lungs: symmetric expansion, non-labored breathing  Cardiovascular: regular rate and rhythm, normal pulses  Abdomen: soft, non tender, non distended, no palpable masses, no hernias, bladder nonpalpable  Musculoskeletal: no peripheral edema, normal ROM  Lymphatics: no cervical or inguinal lymphadenopathy  Skin: no rashes or lesions  Neuro: alert and oriented x 3, no gross deficits  Psych: normal judgment and insight, normal mood/affect and non-anxious  Genitourinary:   patient declined exam   ELSA: symmetric 60g prostate without nodularity or tenderness, seminal vesicles non palpable, normal sphincter tone without hemorrhoids or rectal masses. Rubbery gland.       Lab Review   Urine analysis  shows  - negative    Lab Results   Component Value Date    WBC 8.36 05/12/2017    HGB 13.0 (L) 05/12/2017    HCT 39.8 (L) 05/12/2017    MCV 92 05/12/2017     05/12/2017     Lab Results   Component Value Date    CREATININE 0.8 05/12/2017    BUN 17 05/12/2017     Lab Results   Component Value Date    PSA 0.96 10/10/2011      Imaging  TRUS images reviewed       Assessment/Plan:      1. Elevated PSA    - Negative PBx on 1/26/15.    - PSA actually now decreasing without intervention   - Thoroughly discussed option related to his elevated PSA with negative PBx. Discussed continued PSA monitoring, repeat standard 12-core biopsy, saturation biopsy, and prostatic MRI. He would like to continue with PSA surveillance.   - Continue vitamin D supplementation   - PSA now, 1 year     2. BPH   - Large median lobe noted on TRUS   - May be the reason for UTI due to LUCINA   - Consider for TURP if symptomatic/recurrent UTI   - Alpha-blockers may help with LUICNA if present though median lobe will not be dramatically improved by pharmacotherapy   - PVR acceptable   - Doing well without medication for BPH - again discussed Flomax and Proscar as options.    3. Gross hematuria   - One episode at time of heavy strenuous exercise   - Discussed workup. Will hold for now as probable etiology known. If recurrent, will need full workup   - UA clear today, reassuring      Follow up in 12 months with free and total PSA & PVR / ELSA

## 2020-10-05 ENCOUNTER — PATIENT MESSAGE (OUTPATIENT)
Dept: ADMINISTRATIVE | Facility: HOSPITAL | Age: 65
End: 2020-10-05

## 2021-01-05 ENCOUNTER — PATIENT MESSAGE (OUTPATIENT)
Dept: ADMINISTRATIVE | Facility: HOSPITAL | Age: 66
End: 2021-01-05

## 2021-03-25 ENCOUNTER — NURSE TRIAGE (OUTPATIENT)
Dept: ADMINISTRATIVE | Facility: CLINIC | Age: 66
End: 2021-03-25

## 2021-03-25 ENCOUNTER — OFFICE VISIT (OUTPATIENT)
Dept: INTERNAL MEDICINE | Facility: CLINIC | Age: 66
End: 2021-03-25
Attending: FAMILY MEDICINE
Payer: COMMERCIAL

## 2021-03-25 VITALS
BODY MASS INDEX: 27 KG/M2 | DIASTOLIC BLOOD PRESSURE: 80 MMHG | HEART RATE: 63 BPM | OXYGEN SATURATION: 96 % | HEIGHT: 72 IN | SYSTOLIC BLOOD PRESSURE: 110 MMHG | WEIGHT: 199.31 LBS

## 2021-03-25 DIAGNOSIS — Z12.11 SCREEN FOR COLON CANCER: ICD-10-CM

## 2021-03-25 DIAGNOSIS — R07.89 ATYPICAL CHEST PAIN: ICD-10-CM

## 2021-03-25 DIAGNOSIS — Z00.00 PREVENTATIVE HEALTH CARE: Primary | ICD-10-CM

## 2021-03-25 PROCEDURE — 99396 PR PREVENTIVE VISIT,EST,40-64: ICD-10-PCS | Mod: S$GLB,,, | Performed by: FAMILY MEDICINE

## 2021-03-25 PROCEDURE — 99999 PR PBB SHADOW E&M-EST. PATIENT-LVL III: CPT | Mod: PBBFAC,,, | Performed by: FAMILY MEDICINE

## 2021-03-25 PROCEDURE — 99396 PREV VISIT EST AGE 40-64: CPT | Mod: S$GLB,,, | Performed by: FAMILY MEDICINE

## 2021-03-25 PROCEDURE — 99999 PR PBB SHADOW E&M-EST. PATIENT-LVL III: ICD-10-PCS | Mod: PBBFAC,,, | Performed by: FAMILY MEDICINE

## 2021-03-25 PROCEDURE — 1126F AMNT PAIN NOTED NONE PRSNT: CPT | Mod: S$GLB,,, | Performed by: FAMILY MEDICINE

## 2021-03-25 PROCEDURE — 1126F PR PAIN SEVERITY QUANTIFIED, NO PAIN PRESENT: ICD-10-PCS | Mod: S$GLB,,, | Performed by: FAMILY MEDICINE

## 2021-03-25 PROCEDURE — 3008F BODY MASS INDEX DOCD: CPT | Mod: CPTII,S$GLB,, | Performed by: FAMILY MEDICINE

## 2021-03-25 PROCEDURE — 3008F PR BODY MASS INDEX (BMI) DOCUMENTED: ICD-10-PCS | Mod: CPTII,S$GLB,, | Performed by: FAMILY MEDICINE

## 2021-03-25 RX ORDER — FLUTICASONE PROPIONATE 50 MCG
1 SPRAY, SUSPENSION (ML) NASAL
COMMUNITY
End: 2023-09-21 | Stop reason: SDUPTHER

## 2021-03-25 RX ORDER — GUAIFENESIN 200 MG/1
400 TABLET ORAL EVERY 4 HOURS PRN
COMMUNITY
End: 2023-10-24

## 2021-03-29 ENCOUNTER — HOSPITAL ENCOUNTER (OUTPATIENT)
Dept: CARDIOLOGY | Facility: HOSPITAL | Age: 66
Discharge: HOME | End: 2021-03-29
Attending: FAMILY MEDICINE
Payer: COMMERCIAL

## 2021-03-29 VITALS — WEIGHT: 193 LBS | HEIGHT: 72 IN | BODY MASS INDEX: 26.14 KG/M2

## 2021-03-29 DIAGNOSIS — R07.89 ATYPICAL CHEST PAIN: ICD-10-CM

## 2021-03-29 LAB
ASCENDING AORTA: 2.94 CM
BSA FOR ECHO PROCEDURE: 2.11 M2
CV ECHO LV RWT: 0.28 CM
CV STRESS BASE HR: 61 BPM
DIASTOLIC BLOOD PRESSURE: 62 MMHG
DOP CALC LVOT AREA: 3.9 CM2
DOP CALC LVOT DIAMETER: 2.23 CM
DOP CALC LVOT PEAK VEL: 1.31 M/S
DOP CALC LVOT STROKE VOLUME: 89.94 CM3
DOP CALCLVOT PEAK VEL VTI: 23.04 CM
E WAVE DECELERATION TIME: 315.57 MSEC
E/A RATIO: 1.42
E/E' RATIO: 8.84 M/S
ECHO LV POSTERIOR WALL: 0.75 CM (ref 0.6–1.1)
FRACTIONAL SHORTENING: 34 % (ref 28–44)
INTERVENTRICULAR SEPTUM: 0.72 CM (ref 0.6–1.1)
IVRT: 94.2 MSEC
LA MAJOR: 4.62 CM
LA MINOR: 4.78 CM
LA WIDTH: 3.76 CM
LEFT ATRIUM SIZE: 3.29 CM
LEFT ATRIUM VOLUME INDEX: 23.5 ML/M2
LEFT ATRIUM VOLUME: 49.41 CM3
LEFT INTERNAL DIMENSION IN SYSTOLE: 3.61 CM (ref 2.1–4)
LEFT VENTRICLE DIASTOLIC VOLUME INDEX: 68.1 ML/M2
LEFT VENTRICLE DIASTOLIC VOLUME: 143.01 ML
LEFT VENTRICLE MASS INDEX: 67 G/M2
LEFT VENTRICLE SYSTOLIC VOLUME INDEX: 26.1 ML/M2
LEFT VENTRICLE SYSTOLIC VOLUME: 54.73 ML
LEFT VENTRICULAR INTERNAL DIMENSION IN DIASTOLE: 5.43 CM (ref 3.5–6)
LEFT VENTRICULAR MASS: 140.71 G
LV LATERAL E/E' RATIO: 7.64 M/S
LV SEPTAL E/E' RATIO: 10.5 M/S
MV A" WAVE DURATION": 11.99 MSEC
MV PEAK A VEL: 0.59 M/S
MV PEAK E VEL: 0.84 M/S
MV STENOSIS PRESSURE HALF TIME: 91.51 MS
MV VALVE AREA P 1/2 METHOD: 2.4 CM2
OHS CV CPX 1 MINUTE RECOVERY HEART RATE: 116 BPM
OHS CV CPX 85 PERCENT MAX PREDICTED HEART RATE MALE: 133
OHS CV CPX ESTIMATED METS: 15
OHS CV CPX MAX PREDICTED HEART RATE: 156
OHS CV CPX PATIENT IS FEMALE: 0
OHS CV CPX PATIENT IS MALE: 1
OHS CV CPX PEAK DIASTOLIC BLOOD PRESSURE: 66 MMHG
OHS CV CPX PEAK HEAR RATE: 157 BPM
OHS CV CPX PEAK RATE PRESSURE PRODUCT: NORMAL
OHS CV CPX PEAK SYSTOLIC BLOOD PRESSURE: 167 MMHG
OHS CV CPX PERCENT MAX PREDICTED HEART RATE ACHIEVED: 101
OHS CV CPX RATE PRESSURE PRODUCT PRESENTING: 6283
PISA TR MAX VEL: 1.97 M/S
PULM VEIN S/D RATIO: 1.15
PV PEAK D VEL: 0.62 M/S
PV PEAK S VEL: 0.71 M/S
RA MAJOR: 4.57 CM
RA PRESSURE: 3 MMHG
RA WIDTH: 3.33 CM
RIGHT VENTRICULAR END-DIASTOLIC DIMENSION: 4.52 CM
RV TISSUE DOPPLER FREE WALL SYSTOLIC VELOCITY 1 (APICAL 4 CHAMBER VIEW): 11.65 CM/S
SINUS: 3.19 CM
STJ: 2.58 CM
STRESS ECHO POST EXERCISE DUR MIN: 9 MINUTES
STRESS ECHO POST EXERCISE DUR SEC: 0 SECONDS
SYSTOLIC BLOOD PRESSURE: 103 MMHG
TDI LATERAL: 0.11 M/S
TDI SEPTAL: 0.08 M/S
TDI: 0.1 M/S
TR MAX PG: 16 MMHG
TRICUSPID ANNULAR PLANE SYSTOLIC EXCURSION: 2.34 CM
TV REST PULMONARY ARTERY PRESSURE: 19 MMHG

## 2021-03-29 PROCEDURE — 93351 STRESS TTE COMPLETE: CPT

## 2021-03-29 PROCEDURE — 93351 STRESS TTE COMPLETE: CPT | Mod: 26,,, | Performed by: INTERNAL MEDICINE

## 2021-03-29 PROCEDURE — 93351 STRESS ECHO (CUPID ONLY): ICD-10-PCS | Mod: 26,,, | Performed by: INTERNAL MEDICINE

## 2021-03-31 ENCOUNTER — PATIENT MESSAGE (OUTPATIENT)
Dept: INTERNAL MEDICINE | Facility: CLINIC | Age: 66
End: 2021-03-31

## 2021-04-16 ENCOUNTER — PATIENT MESSAGE (OUTPATIENT)
Dept: RESEARCH | Facility: HOSPITAL | Age: 66
End: 2021-04-16

## 2021-07-07 ENCOUNTER — OFFICE VISIT (OUTPATIENT)
Dept: UROLOGY | Facility: CLINIC | Age: 66
End: 2021-07-07
Attending: UROLOGY
Payer: MEDICARE

## 2021-07-07 ENCOUNTER — LAB VISIT (OUTPATIENT)
Dept: LAB | Facility: OTHER | Age: 66
End: 2021-07-07
Attending: UROLOGY
Payer: COMMERCIAL

## 2021-07-07 ENCOUNTER — TELEPHONE (OUTPATIENT)
Dept: UROLOGY | Facility: CLINIC | Age: 66
End: 2021-07-07

## 2021-07-07 VITALS
BODY MASS INDEX: 26.12 KG/M2 | DIASTOLIC BLOOD PRESSURE: 72 MMHG | HEIGHT: 72 IN | SYSTOLIC BLOOD PRESSURE: 106 MMHG | WEIGHT: 192.88 LBS | HEART RATE: 74 BPM

## 2021-07-07 DIAGNOSIS — R97.20 ELEVATED PSA: Primary | ICD-10-CM

## 2021-07-07 DIAGNOSIS — N40.1 BENIGN NON-NODULAR PROSTATIC HYPERPLASIA WITH LOWER URINARY TRACT SYMPTOMS: ICD-10-CM

## 2021-07-07 DIAGNOSIS — N52.01 ERECTILE DYSFUNCTION DUE TO ARTERIAL INSUFFICIENCY: ICD-10-CM

## 2021-07-07 DIAGNOSIS — R97.20 ELEVATED PSA: ICD-10-CM

## 2021-07-07 DIAGNOSIS — R31.0 GROSS HEMATURIA: ICD-10-CM

## 2021-07-07 LAB — COMPLEXED PSA SERPL-MCNC: 5.9 NG/ML (ref 0–4)

## 2021-07-07 PROCEDURE — 99214 PR OFFICE/OUTPT VISIT, EST, LEVL IV, 30-39 MIN: ICD-10-PCS | Mod: S$GLB,,, | Performed by: UROLOGY

## 2021-07-07 PROCEDURE — 99214 OFFICE O/P EST MOD 30 MIN: CPT | Mod: S$GLB,,, | Performed by: UROLOGY

## 2021-07-07 PROCEDURE — 3008F PR BODY MASS INDEX (BMI) DOCUMENTED: ICD-10-PCS | Mod: CPTII,S$GLB,, | Performed by: UROLOGY

## 2021-07-07 PROCEDURE — 3008F BODY MASS INDEX DOCD: CPT | Mod: CPTII,S$GLB,, | Performed by: UROLOGY

## 2021-07-07 PROCEDURE — 36415 COLL VENOUS BLD VENIPUNCTURE: CPT | Performed by: UROLOGY

## 2021-07-07 PROCEDURE — 1126F AMNT PAIN NOTED NONE PRSNT: CPT | Mod: S$GLB,,, | Performed by: UROLOGY

## 2021-07-07 PROCEDURE — 1126F PR PAIN SEVERITY QUANTIFIED, NO PAIN PRESENT: ICD-10-PCS | Mod: S$GLB,,, | Performed by: UROLOGY

## 2021-07-07 PROCEDURE — 84153 ASSAY OF PSA TOTAL: CPT | Performed by: UROLOGY

## 2021-07-07 RX ORDER — TADALAFIL 20 MG/1
20 TABLET ORAL DAILY PRN
Qty: 30 TABLET | Refills: 11 | Status: SHIPPED | OUTPATIENT
Start: 2021-07-07 | End: 2022-02-16 | Stop reason: SDUPTHER

## 2021-08-02 ENCOUNTER — PATIENT MESSAGE (OUTPATIENT)
Dept: INTERNAL MEDICINE | Facility: CLINIC | Age: 66
End: 2021-08-02

## 2021-08-03 ENCOUNTER — PATIENT MESSAGE (OUTPATIENT)
Dept: INTERNAL MEDICINE | Facility: CLINIC | Age: 66
End: 2021-08-03

## 2021-08-03 ENCOUNTER — LAB VISIT (OUTPATIENT)
Dept: INTERNAL MEDICINE | Facility: CLINIC | Age: 66
End: 2021-08-03
Payer: COMMERCIAL

## 2021-08-03 ENCOUNTER — OFFICE VISIT (OUTPATIENT)
Dept: INTERNAL MEDICINE | Facility: CLINIC | Age: 66
End: 2021-08-03
Attending: FAMILY MEDICINE
Payer: MEDICARE

## 2021-08-03 VITALS — WEIGHT: 192.88 LBS | BODY MASS INDEX: 26.12 KG/M2 | HEIGHT: 72 IN

## 2021-08-03 DIAGNOSIS — M79.10 MUSCLE PAIN: ICD-10-CM

## 2021-08-03 DIAGNOSIS — J06.9 VIRAL UPPER RESPIRATORY TRACT INFECTION: Primary | ICD-10-CM

## 2021-08-03 DIAGNOSIS — Z20.822 CLOSE EXPOSURE TO COVID-19 VIRUS: ICD-10-CM

## 2021-08-03 DIAGNOSIS — R50.9 FEVER: ICD-10-CM

## 2021-08-03 DIAGNOSIS — J06.9 VIRAL UPPER RESPIRATORY TRACT INFECTION: ICD-10-CM

## 2021-08-03 PROCEDURE — 99214 OFFICE O/P EST MOD 30 MIN: CPT | Mod: 95,,, | Performed by: FAMILY MEDICINE

## 2021-08-03 PROCEDURE — 1101F PT FALLS ASSESS-DOCD LE1/YR: CPT | Mod: CPTII,95,, | Performed by: FAMILY MEDICINE

## 2021-08-03 PROCEDURE — 1101F PR PT FALLS ASSESS DOC 0-1 FALLS W/OUT INJ PAST YR: ICD-10-PCS | Mod: CPTII,95,, | Performed by: FAMILY MEDICINE

## 2021-08-03 PROCEDURE — 1126F PR PAIN SEVERITY QUANTIFIED, NO PAIN PRESENT: ICD-10-PCS | Mod: CPTII,95,, | Performed by: FAMILY MEDICINE

## 2021-08-03 PROCEDURE — U0005 INFEC AGEN DETEC AMPLI PROBE: HCPCS | Performed by: FAMILY MEDICINE

## 2021-08-03 PROCEDURE — 3288F FALL RISK ASSESSMENT DOCD: CPT | Mod: CPTII,95,, | Performed by: FAMILY MEDICINE

## 2021-08-03 PROCEDURE — 1159F PR MEDICATION LIST DOCUMENTED IN MEDICAL RECORD: ICD-10-PCS | Mod: CPTII,95,, | Performed by: FAMILY MEDICINE

## 2021-08-03 PROCEDURE — 1160F RVW MEDS BY RX/DR IN RCRD: CPT | Mod: CPTII,95,, | Performed by: FAMILY MEDICINE

## 2021-08-03 PROCEDURE — 3008F PR BODY MASS INDEX (BMI) DOCUMENTED: ICD-10-PCS | Mod: CPTII,95,, | Performed by: FAMILY MEDICINE

## 2021-08-03 PROCEDURE — U0003 INFECTIOUS AGENT DETECTION BY NUCLEIC ACID (DNA OR RNA); SEVERE ACUTE RESPIRATORY SYNDROME CORONAVIRUS 2 (SARS-COV-2) (CORONAVIRUS DISEASE [COVID-19]), AMPLIFIED PROBE TECHNIQUE, MAKING USE OF HIGH THROUGHPUT TECHNOLOGIES AS DESCRIBED BY CMS-2020-01-R: HCPCS | Performed by: FAMILY MEDICINE

## 2021-08-03 PROCEDURE — 3008F BODY MASS INDEX DOCD: CPT | Mod: CPTII,95,, | Performed by: FAMILY MEDICINE

## 2021-08-03 PROCEDURE — 99214 PR OFFICE/OUTPT VISIT, EST, LEVL IV, 30-39 MIN: ICD-10-PCS | Mod: 95,,, | Performed by: FAMILY MEDICINE

## 2021-08-03 PROCEDURE — 1159F MED LIST DOCD IN RCRD: CPT | Mod: CPTII,95,, | Performed by: FAMILY MEDICINE

## 2021-08-03 PROCEDURE — 1126F AMNT PAIN NOTED NONE PRSNT: CPT | Mod: CPTII,95,, | Performed by: FAMILY MEDICINE

## 2021-08-03 PROCEDURE — 1160F PR REVIEW ALL MEDS BY PRESCRIBER/CLIN PHARMACIST DOCUMENTED: ICD-10-PCS | Mod: CPTII,95,, | Performed by: FAMILY MEDICINE

## 2021-08-03 PROCEDURE — 3288F PR FALLS RISK ASSESSMENT DOCUMENTED: ICD-10-PCS | Mod: CPTII,95,, | Performed by: FAMILY MEDICINE

## 2021-08-04 DIAGNOSIS — U07.1 COVID-19 VIRUS DETECTED: ICD-10-CM

## 2021-08-04 LAB
SARS-COV-2 RNA RESP QL NAA+PROBE: DETECTED
SARS-COV-2- CYCLE NUMBER: 23.77

## 2021-08-05 ENCOUNTER — TELEPHONE (OUTPATIENT)
Dept: INTERNAL MEDICINE | Facility: CLINIC | Age: 66
End: 2021-08-05

## 2021-08-06 ENCOUNTER — PATIENT MESSAGE (OUTPATIENT)
Dept: INTERNAL MEDICINE | Facility: CLINIC | Age: 66
End: 2021-08-06

## 2021-08-06 ENCOUNTER — NURSE TRIAGE (OUTPATIENT)
Dept: ADMINISTRATIVE | Facility: CLINIC | Age: 66
End: 2021-08-06

## 2021-08-06 RX ORDER — BENZONATATE 200 MG/1
200 CAPSULE ORAL 3 TIMES DAILY PRN
Qty: 30 CAPSULE | Refills: 0 | Status: SHIPPED | OUTPATIENT
Start: 2021-08-06 | End: 2021-08-06

## 2021-08-06 RX ORDER — ALBUTEROL SULFATE 90 UG/1
2 AEROSOL, METERED RESPIRATORY (INHALATION) EVERY 6 HOURS PRN
Qty: 8 G | Refills: 0 | Status: ON HOLD | OUTPATIENT
Start: 2021-08-06 | End: 2021-08-09

## 2021-08-06 RX ORDER — BENZONATATE 200 MG/1
200 CAPSULE ORAL 3 TIMES DAILY PRN
Qty: 30 CAPSULE | Refills: 0 | Status: SHIPPED | OUTPATIENT
Start: 2021-08-06 | End: 2021-08-16

## 2021-08-06 RX ORDER — ALBUTEROL SULFATE 90 UG/1
2 AEROSOL, METERED RESPIRATORY (INHALATION) EVERY 6 HOURS PRN
Qty: 8 G | Refills: 0 | Status: SHIPPED | OUTPATIENT
Start: 2021-08-06 | End: 2021-08-06

## 2021-08-08 ENCOUNTER — HOSPITAL ENCOUNTER (INPATIENT)
Facility: HOSPITAL | Age: 66
LOS: 3 days | Discharge: HOME | DRG: 177 | End: 2021-08-11
Attending: EMERGENCY MEDICINE | Admitting: EMERGENCY MEDICINE
Payer: MEDICARE

## 2021-08-08 ENCOUNTER — NURSE TRIAGE (OUTPATIENT)
Dept: ADMINISTRATIVE | Facility: CLINIC | Age: 66
End: 2021-08-08

## 2021-08-08 DIAGNOSIS — U07.1 PNEUMONIA DUE TO COVID-19 VIRUS: Primary | ICD-10-CM

## 2021-08-08 DIAGNOSIS — Z20.822 SUSPECTED COVID-19 VIRUS INFECTION: ICD-10-CM

## 2021-08-08 DIAGNOSIS — J06.9 ACUTE RESPIRATORY DISEASE DUE TO COVID-19 VIRUS: ICD-10-CM

## 2021-08-08 DIAGNOSIS — U07.1 ACUTE RESPIRATORY DISEASE DUE TO COVID-19 VIRUS: ICD-10-CM

## 2021-08-08 DIAGNOSIS — J12.82 PNEUMONIA DUE TO COVID-19 VIRUS: Primary | ICD-10-CM

## 2021-08-08 PROBLEM — J18.9 MULTIFOCAL PNEUMONIA: Status: ACTIVE | Noted: 2021-08-08

## 2021-08-08 PROBLEM — E87.6 HYPOKALEMIA: Status: ACTIVE | Noted: 2021-08-08

## 2021-08-08 LAB
ALBUMIN SERPL BCP-MCNC: 2.1 G/DL (ref 3.5–5.2)
ALP SERPL-CCNC: 63 U/L (ref 55–135)
ALT SERPL W/O P-5'-P-CCNC: 22 U/L (ref 10–44)
ANION GAP SERPL CALC-SCNC: 13 MMOL/L (ref 8–16)
AST SERPL-CCNC: 27 U/L (ref 10–40)
BASOPHILS # BLD AUTO: 0.02 K/UL (ref 0–0.2)
BASOPHILS NFR BLD: 0.2 % (ref 0–1.9)
BILIRUB SERPL-MCNC: 0.8 MG/DL (ref 0.1–1)
BUN SERPL-MCNC: 12 MG/DL (ref 8–23)
CALCIUM SERPL-MCNC: 8.3 MG/DL (ref 8.7–10.5)
CHLORIDE SERPL-SCNC: 103 MMOL/L (ref 95–110)
CK SERPL-CCNC: 123 U/L (ref 20–200)
CO2 SERPL-SCNC: 22 MMOL/L (ref 23–29)
CREAT SERPL-MCNC: 0.8 MG/DL (ref 0.5–1.4)
CRP SERPL-MCNC: 272.7 MG/L (ref 0–8.2)
DIFFERENTIAL METHOD: ABNORMAL
EOSINOPHIL # BLD AUTO: 0 K/UL (ref 0–0.5)
EOSINOPHIL NFR BLD: 0.1 % (ref 0–8)
ERYTHROCYTE [DISTWIDTH] IN BLOOD BY AUTOMATED COUNT: 13.5 % (ref 11.5–14.5)
EST. GFR  (AFRICAN AMERICAN): >60 ML/MIN/1.73 M^2
EST. GFR  (NON AFRICAN AMERICAN): >60 ML/MIN/1.73 M^2
FERRITIN SERPL-MCNC: 1576 NG/ML (ref 20–300)
GLUCOSE SERPL-MCNC: 112 MG/DL (ref 70–110)
HCT VFR BLD AUTO: 32.9 % (ref 40–54)
HGB BLD-MCNC: 12.2 G/DL (ref 14–18)
IMM GRANULOCYTES # BLD AUTO: 0.09 K/UL (ref 0–0.04)
IMM GRANULOCYTES NFR BLD AUTO: 0.8 % (ref 0–0.5)
LACTATE SERPL-SCNC: 2.4 MMOL/L (ref 0.5–2.2)
LDH SERPL L TO P-CCNC: 637 U/L (ref 110–260)
LYMPHOCYTES # BLD AUTO: 0.9 K/UL (ref 1–4.8)
LYMPHOCYTES NFR BLD: 7.9 % (ref 18–48)
MCH RBC QN AUTO: 34.2 PG (ref 27–31)
MCHC RBC AUTO-ENTMCNC: 37.1 G/DL (ref 32–36)
MCV RBC AUTO: 92 FL (ref 82–98)
MONOCYTES # BLD AUTO: 0.4 K/UL (ref 0.3–1)
MONOCYTES NFR BLD: 3.4 % (ref 4–15)
NEUTROPHILS # BLD AUTO: 10.2 K/UL (ref 1.8–7.7)
NEUTROPHILS NFR BLD: 87.6 % (ref 38–73)
NRBC BLD-RTO: 0 /100 WBC
PLATELET # BLD AUTO: 344 K/UL (ref 150–450)
PLATELET BLD QL SMEAR: ABNORMAL
PMV BLD AUTO: 11.4 FL (ref 9.2–12.9)
POTASSIUM SERPL-SCNC: 3 MMOL/L (ref 3.5–5.1)
PROT SERPL-MCNC: 6.5 G/DL (ref 6–8.4)
RBC # BLD AUTO: 3.57 M/UL (ref 4.6–6.2)
SODIUM SERPL-SCNC: 138 MMOL/L (ref 136–145)
TROPONIN I SERPL DL<=0.01 NG/ML-MCNC: 0.01 NG/ML (ref 0–0.03)
WBC # BLD AUTO: 11.66 K/UL (ref 3.9–12.7)

## 2021-08-08 PROCEDURE — 99291 PR CRITICAL CARE, E/M 30-74 MINUTES: ICD-10-PCS | Mod: CR,,, | Performed by: EMERGENCY MEDICINE

## 2021-08-08 PROCEDURE — 25000003 PHARM REV CODE 250: Performed by: HOSPITALIST

## 2021-08-08 PROCEDURE — 99291 CRITICAL CARE FIRST HOUR: CPT | Mod: CR,,, | Performed by: EMERGENCY MEDICINE

## 2021-08-08 PROCEDURE — 27000221 HC OXYGEN, UP TO 24 HOURS

## 2021-08-08 PROCEDURE — 82728 ASSAY OF FERRITIN: CPT | Performed by: EMERGENCY MEDICINE

## 2021-08-08 PROCEDURE — 25000003 PHARM REV CODE 250: Performed by: NEUROLOGICAL SURGERY

## 2021-08-08 PROCEDURE — 85025 COMPLETE CBC W/AUTO DIFF WBC: CPT | Performed by: EMERGENCY MEDICINE

## 2021-08-08 PROCEDURE — 87070 CULTURE OTHR SPECIMN AEROBIC: CPT | Performed by: HOSPITALIST

## 2021-08-08 PROCEDURE — 99223 PR INITIAL HOSPITAL CARE,LEVL III: ICD-10-PCS | Mod: CR,AI,, | Performed by: HOSPITALIST

## 2021-08-08 PROCEDURE — 25000003 PHARM REV CODE 250: Performed by: EMERGENCY MEDICINE

## 2021-08-08 PROCEDURE — 84484 ASSAY OF TROPONIN QUANT: CPT | Performed by: EMERGENCY MEDICINE

## 2021-08-08 PROCEDURE — 99223 1ST HOSP IP/OBS HIGH 75: CPT | Mod: CR,AI,, | Performed by: HOSPITALIST

## 2021-08-08 PROCEDURE — 63600175 PHARM REV CODE 636 W HCPCS: Performed by: EMERGENCY MEDICINE

## 2021-08-08 PROCEDURE — 93010 ELECTROCARDIOGRAM REPORT: CPT | Mod: ,,, | Performed by: INTERNAL MEDICINE

## 2021-08-08 PROCEDURE — 82550 ASSAY OF CK (CPK): CPT | Performed by: EMERGENCY MEDICINE

## 2021-08-08 PROCEDURE — 27100171 HC OXYGEN HIGH FLOW UP TO 24 HOURS

## 2021-08-08 PROCEDURE — 93010 EKG 12-LEAD: ICD-10-PCS | Mod: ,,, | Performed by: INTERNAL MEDICINE

## 2021-08-08 PROCEDURE — 83605 ASSAY OF LACTIC ACID: CPT | Performed by: EMERGENCY MEDICINE

## 2021-08-08 PROCEDURE — 96374 THER/PROPH/DIAG INJ IV PUSH: CPT

## 2021-08-08 PROCEDURE — 99291 CRITICAL CARE FIRST HOUR: CPT | Mod: 25

## 2021-08-08 PROCEDURE — 87205 SMEAR GRAM STAIN: CPT | Performed by: HOSPITALIST

## 2021-08-08 PROCEDURE — 86803 HEPATITIS C AB TEST: CPT | Performed by: EMERGENCY MEDICINE

## 2021-08-08 PROCEDURE — 93005 ELECTROCARDIOGRAM TRACING: CPT

## 2021-08-08 PROCEDURE — 63600175 PHARM REV CODE 636 W HCPCS: Performed by: HOSPITALIST

## 2021-08-08 PROCEDURE — 94761 N-INVAS EAR/PLS OXIMETRY MLT: CPT

## 2021-08-08 PROCEDURE — 86140 C-REACTIVE PROTEIN: CPT | Performed by: EMERGENCY MEDICINE

## 2021-08-08 PROCEDURE — 27000207 HC ISOLATION

## 2021-08-08 PROCEDURE — 25000003 PHARM REV CODE 250: Performed by: INTERNAL MEDICINE

## 2021-08-08 PROCEDURE — 83615 LACTATE (LD) (LDH) ENZYME: CPT | Performed by: EMERGENCY MEDICINE

## 2021-08-08 PROCEDURE — 80053 COMPREHEN METABOLIC PANEL: CPT | Performed by: EMERGENCY MEDICINE

## 2021-08-08 PROCEDURE — 20600001 HC STEP DOWN PRIVATE ROOM

## 2021-08-08 RX ORDER — CHOLECALCIFEROL (VITAMIN D3) 25 MCG
1000 TABLET ORAL DAILY
Status: DISCONTINUED | OUTPATIENT
Start: 2021-08-09 | End: 2021-08-11 | Stop reason: HOSPADM

## 2021-08-08 RX ORDER — ACETAMINOPHEN 325 MG/1
650 TABLET ORAL EVERY 4 HOURS PRN
Status: DISCONTINUED | OUTPATIENT
Start: 2021-08-08 | End: 2021-08-11 | Stop reason: HOSPADM

## 2021-08-08 RX ORDER — MUPIROCIN 20 MG/G
OINTMENT TOPICAL 2 TIMES DAILY
Status: DISCONTINUED | OUTPATIENT
Start: 2021-08-08 | End: 2021-08-11 | Stop reason: HOSPADM

## 2021-08-08 RX ORDER — GUAIFENESIN/DEXTROMETHORPHAN 100-10MG/5
10 SYRUP ORAL EVERY 4 HOURS PRN
Status: DISCONTINUED | OUTPATIENT
Start: 2021-08-08 | End: 2021-08-11 | Stop reason: HOSPADM

## 2021-08-08 RX ORDER — DEXAMETHASONE SODIUM PHOSPHATE 4 MG/ML
6 INJECTION, SOLUTION INTRA-ARTICULAR; INTRALESIONAL; INTRAMUSCULAR; INTRAVENOUS; SOFT TISSUE
Status: COMPLETED | OUTPATIENT
Start: 2021-08-08 | End: 2021-08-08

## 2021-08-08 RX ORDER — ACETAMINOPHEN 500 MG
1000 TABLET ORAL
Status: COMPLETED | OUTPATIENT
Start: 2021-08-08 | End: 2021-08-08

## 2021-08-08 RX ORDER — ONDANSETRON 2 MG/ML
4 INJECTION INTRAMUSCULAR; INTRAVENOUS EVERY 8 HOURS PRN
Status: DISCONTINUED | OUTPATIENT
Start: 2021-08-08 | End: 2021-08-11 | Stop reason: HOSPADM

## 2021-08-08 RX ORDER — ALBUTEROL SULFATE 90 UG/1
2 AEROSOL, METERED RESPIRATORY (INHALATION) EVERY 6 HOURS
Status: DISCONTINUED | OUTPATIENT
Start: 2021-08-09 | End: 2021-08-09

## 2021-08-08 RX ORDER — ASCORBIC ACID 500 MG
500 TABLET ORAL 2 TIMES DAILY
Status: DISCONTINUED | OUTPATIENT
Start: 2021-08-08 | End: 2021-08-11 | Stop reason: HOSPADM

## 2021-08-08 RX ORDER — LOPERAMIDE HYDROCHLORIDE 2 MG/1
2 CAPSULE ORAL EVERY 6 HOURS PRN
Status: DISCONTINUED | OUTPATIENT
Start: 2021-08-08 | End: 2021-08-11 | Stop reason: HOSPADM

## 2021-08-08 RX ORDER — SODIUM CHLORIDE 0.9 % (FLUSH) 0.9 %
10 SYRINGE (ML) INJECTION EVERY 8 HOURS PRN
Status: DISCONTINUED | OUTPATIENT
Start: 2021-08-08 | End: 2021-08-11 | Stop reason: HOSPADM

## 2021-08-08 RX ORDER — GLUCAGON 1 MG
1 KIT INJECTION
Status: DISCONTINUED | OUTPATIENT
Start: 2021-08-08 | End: 2021-08-11 | Stop reason: HOSPADM

## 2021-08-08 RX ORDER — IBUPROFEN 200 MG
16 TABLET ORAL
Status: DISCONTINUED | OUTPATIENT
Start: 2021-08-08 | End: 2021-08-11 | Stop reason: HOSPADM

## 2021-08-08 RX ORDER — ENOXAPARIN SODIUM 100 MG/ML
1 INJECTION SUBCUTANEOUS EVERY 12 HOURS
Status: DISCONTINUED | OUTPATIENT
Start: 2021-08-08 | End: 2021-08-11 | Stop reason: HOSPADM

## 2021-08-08 RX ORDER — TALC
6 POWDER (GRAM) TOPICAL NIGHTLY PRN
Status: DISCONTINUED | OUTPATIENT
Start: 2021-08-08 | End: 2021-08-11 | Stop reason: HOSPADM

## 2021-08-08 RX ORDER — GUAIFENESIN 100 MG/5ML
200 SOLUTION ORAL EVERY 4 HOURS PRN
Status: DISCONTINUED | OUTPATIENT
Start: 2021-08-08 | End: 2021-08-11 | Stop reason: HOSPADM

## 2021-08-08 RX ORDER — POTASSIUM CHLORIDE 20 MEQ/1
40 TABLET, EXTENDED RELEASE ORAL ONCE
Status: COMPLETED | OUTPATIENT
Start: 2021-08-08 | End: 2021-08-08

## 2021-08-08 RX ORDER — IBUPROFEN 200 MG
24 TABLET ORAL
Status: DISCONTINUED | OUTPATIENT
Start: 2021-08-08 | End: 2021-08-11 | Stop reason: HOSPADM

## 2021-08-08 RX ADMIN — DEXAMETHASONE SODIUM PHOSPHATE 6 MG: 4 INJECTION INTRA-ARTICULAR; INTRALESIONAL; INTRAMUSCULAR; INTRAVENOUS; SOFT TISSUE at 10:08

## 2021-08-08 RX ADMIN — GUAIFENESIN 200 MG: 200 SOLUTION ORAL at 06:08

## 2021-08-08 RX ADMIN — REMDESIVIR 200 MG: 100 INJECTION, POWDER, LYOPHILIZED, FOR SOLUTION INTRAVENOUS at 09:08

## 2021-08-08 RX ADMIN — ACETAMINOPHEN 1000 MG: 500 TABLET ORAL at 10:08

## 2021-08-08 RX ADMIN — MUPIROCIN: 20 OINTMENT TOPICAL at 09:08

## 2021-08-08 RX ADMIN — ENOXAPARIN SODIUM 80 MG: 100 INJECTION SUBCUTANEOUS at 09:08

## 2021-08-08 RX ADMIN — POTASSIUM CHLORIDE 40 MEQ: 1500 TABLET, EXTENDED RELEASE ORAL at 09:08

## 2021-08-08 RX ADMIN — OXYCODONE HYDROCHLORIDE AND ACETAMINOPHEN 500 MG: 500 TABLET ORAL at 09:08

## 2021-08-09 LAB
ALBUMIN SERPL BCP-MCNC: 2.1 G/DL (ref 3.5–5.2)
ALBUMIN SERPL BCP-MCNC: 2.2 G/DL (ref 3.5–5.2)
ALP SERPL-CCNC: 65 U/L (ref 55–135)
ALP SERPL-CCNC: 68 U/L (ref 55–135)
ALT SERPL W/O P-5'-P-CCNC: 21 U/L (ref 10–44)
ALT SERPL W/O P-5'-P-CCNC: 22 U/L (ref 10–44)
ANION GAP SERPL CALC-SCNC: 12 MMOL/L (ref 8–16)
ANION GAP SERPL CALC-SCNC: 13 MMOL/L (ref 8–16)
AST SERPL-CCNC: 22 U/L (ref 10–40)
AST SERPL-CCNC: 23 U/L (ref 10–40)
BASOPHILS # BLD AUTO: 0 K/UL (ref 0–0.2)
BASOPHILS # BLD AUTO: 0.02 K/UL (ref 0–0.2)
BASOPHILS NFR BLD: 0 % (ref 0–1.9)
BASOPHILS NFR BLD: 0.2 % (ref 0–1.9)
BILIRUB SERPL-MCNC: 0.4 MG/DL (ref 0.1–1)
BILIRUB SERPL-MCNC: 0.4 MG/DL (ref 0.1–1)
BUN SERPL-MCNC: 19 MG/DL (ref 8–23)
BUN SERPL-MCNC: 20 MG/DL (ref 8–23)
CALCIUM SERPL-MCNC: 9.2 MG/DL (ref 8.7–10.5)
CALCIUM SERPL-MCNC: 9.2 MG/DL (ref 8.7–10.5)
CHLORIDE SERPL-SCNC: 102 MMOL/L (ref 95–110)
CHLORIDE SERPL-SCNC: 103 MMOL/L (ref 95–110)
CO2 SERPL-SCNC: 23 MMOL/L (ref 23–29)
CO2 SERPL-SCNC: 25 MMOL/L (ref 23–29)
CREAT SERPL-MCNC: 0.7 MG/DL (ref 0.5–1.4)
CREAT SERPL-MCNC: 0.7 MG/DL (ref 0.5–1.4)
DIFFERENTIAL METHOD: ABNORMAL
DIFFERENTIAL METHOD: ABNORMAL
EOSINOPHIL # BLD AUTO: 0 K/UL (ref 0–0.5)
EOSINOPHIL # BLD AUTO: 0 K/UL (ref 0–0.5)
EOSINOPHIL NFR BLD: 0 % (ref 0–8)
EOSINOPHIL NFR BLD: 0 % (ref 0–8)
ERYTHROCYTE [DISTWIDTH] IN BLOOD BY AUTOMATED COUNT: 13.2 % (ref 11.5–14.5)
ERYTHROCYTE [DISTWIDTH] IN BLOOD BY AUTOMATED COUNT: 13.2 % (ref 11.5–14.5)
EST. GFR  (AFRICAN AMERICAN): >60 ML/MIN/1.73 M^2
EST. GFR  (AFRICAN AMERICAN): >60 ML/MIN/1.73 M^2
EST. GFR  (NON AFRICAN AMERICAN): >60 ML/MIN/1.73 M^2
EST. GFR  (NON AFRICAN AMERICAN): >60 ML/MIN/1.73 M^2
GLUCOSE SERPL-MCNC: 148 MG/DL (ref 70–110)
GLUCOSE SERPL-MCNC: 148 MG/DL (ref 70–110)
HCT VFR BLD AUTO: 32 % (ref 40–54)
HCT VFR BLD AUTO: 32.7 % (ref 40–54)
HCV AB SERPL QL IA: NEGATIVE
HGB BLD-MCNC: 11.2 G/DL (ref 14–18)
HGB BLD-MCNC: 11.5 G/DL (ref 14–18)
IMM GRANULOCYTES # BLD AUTO: 0.1 K/UL (ref 0–0.04)
IMM GRANULOCYTES # BLD AUTO: 0.15 K/UL (ref 0–0.04)
IMM GRANULOCYTES NFR BLD AUTO: 0.8 % (ref 0–0.5)
IMM GRANULOCYTES NFR BLD AUTO: 1.2 % (ref 0–0.5)
LYMPHOCYTES # BLD AUTO: 0.8 K/UL (ref 1–4.8)
LYMPHOCYTES # BLD AUTO: 0.9 K/UL (ref 1–4.8)
LYMPHOCYTES NFR BLD: 6.5 % (ref 18–48)
LYMPHOCYTES NFR BLD: 6.9 % (ref 18–48)
MAGNESIUM SERPL-MCNC: 2.1 MG/DL (ref 1.6–2.6)
MAGNESIUM SERPL-MCNC: 2.2 MG/DL (ref 1.6–2.6)
MCH RBC QN AUTO: 31.9 PG (ref 27–31)
MCH RBC QN AUTO: 31.9 PG (ref 27–31)
MCHC RBC AUTO-ENTMCNC: 35 G/DL (ref 32–36)
MCHC RBC AUTO-ENTMCNC: 35.2 G/DL (ref 32–36)
MCV RBC AUTO: 91 FL (ref 82–98)
MCV RBC AUTO: 91 FL (ref 82–98)
MONOCYTES # BLD AUTO: 0.3 K/UL (ref 0.3–1)
MONOCYTES # BLD AUTO: 0.3 K/UL (ref 0.3–1)
MONOCYTES NFR BLD: 2.2 % (ref 4–15)
MONOCYTES NFR BLD: 2.2 % (ref 4–15)
NEUTROPHILS # BLD AUTO: 11.6 K/UL (ref 1.8–7.7)
NEUTROPHILS # BLD AUTO: 11.7 K/UL (ref 1.8–7.7)
NEUTROPHILS NFR BLD: 89.7 % (ref 38–73)
NEUTROPHILS NFR BLD: 90.3 % (ref 38–73)
NRBC BLD-RTO: 0 /100 WBC
NRBC BLD-RTO: 0 /100 WBC
PHOSPHATE SERPL-MCNC: 1.6 MG/DL (ref 2.7–4.5)
PHOSPHATE SERPL-MCNC: 1.6 MG/DL (ref 2.7–4.5)
PLATELET # BLD AUTO: 431 K/UL (ref 150–450)
PLATELET # BLD AUTO: 447 K/UL (ref 150–450)
PMV BLD AUTO: 11.7 FL (ref 9.2–12.9)
PMV BLD AUTO: 11.8 FL (ref 9.2–12.9)
POTASSIUM SERPL-SCNC: 3.5 MMOL/L (ref 3.5–5.1)
POTASSIUM SERPL-SCNC: 3.5 MMOL/L (ref 3.5–5.1)
PROT SERPL-MCNC: 6.9 G/DL (ref 6–8.4)
PROT SERPL-MCNC: 6.9 G/DL (ref 6–8.4)
RBC # BLD AUTO: 3.51 M/UL (ref 4.6–6.2)
RBC # BLD AUTO: 3.61 M/UL (ref 4.6–6.2)
SODIUM SERPL-SCNC: 139 MMOL/L (ref 136–145)
SODIUM SERPL-SCNC: 139 MMOL/L (ref 136–145)
WBC # BLD AUTO: 12.9 K/UL (ref 3.9–12.7)
WBC # BLD AUTO: 12.92 K/UL (ref 3.9–12.7)

## 2021-08-09 PROCEDURE — 94761 N-INVAS EAR/PLS OXIMETRY MLT: CPT

## 2021-08-09 PROCEDURE — 99900035 HC TECH TIME PER 15 MIN (STAT)

## 2021-08-09 PROCEDURE — 83735 ASSAY OF MAGNESIUM: CPT | Mod: 91 | Performed by: HOSPITALIST

## 2021-08-09 PROCEDURE — 80053 COMPREHEN METABOLIC PANEL: CPT | Mod: 91 | Performed by: HOSPITALIST

## 2021-08-09 PROCEDURE — 25000003 PHARM REV CODE 250: Performed by: HOSPITALIST

## 2021-08-09 PROCEDURE — 84100 ASSAY OF PHOSPHORUS: CPT | Mod: 91 | Performed by: HOSPITALIST

## 2021-08-09 PROCEDURE — 94640 AIRWAY INHALATION TREATMENT: CPT

## 2021-08-09 PROCEDURE — 63600175 PHARM REV CODE 636 W HCPCS: Performed by: HOSPITALIST

## 2021-08-09 PROCEDURE — 25000242 PHARM REV CODE 250 ALT 637 W/ HCPCS: Performed by: HOSPITALIST

## 2021-08-09 PROCEDURE — 85025 COMPLETE CBC W/AUTO DIFF WBC: CPT | Performed by: HOSPITALIST

## 2021-08-09 PROCEDURE — 27000207 HC ISOLATION

## 2021-08-09 PROCEDURE — 27000221 HC OXYGEN, UP TO 24 HOURS

## 2021-08-09 PROCEDURE — 20600001 HC STEP DOWN PRIVATE ROOM

## 2021-08-09 PROCEDURE — 36415 COLL VENOUS BLD VENIPUNCTURE: CPT | Performed by: HOSPITALIST

## 2021-08-09 RX ORDER — ALBUTEROL SULFATE 90 UG/1
2 AEROSOL, METERED RESPIRATORY (INHALATION) EVERY 6 HOURS
Status: DISCONTINUED | OUTPATIENT
Start: 2021-08-09 | End: 2021-08-11 | Stop reason: HOSPADM

## 2021-08-09 RX ORDER — ALBUTEROL SULFATE 90 UG/1
AEROSOL, METERED RESPIRATORY (INHALATION)
Qty: 25.5 G | Refills: 2 | Status: SHIPPED | OUTPATIENT
Start: 2021-08-09 | End: 2023-07-05

## 2021-08-09 RX ADMIN — OXYCODONE HYDROCHLORIDE AND ACETAMINOPHEN 500 MG: 500 TABLET ORAL at 09:08

## 2021-08-09 RX ADMIN — ALBUTEROL SULFATE 2 PUFF: 90 AEROSOL, METERED RESPIRATORY (INHALATION) at 07:08

## 2021-08-09 RX ADMIN — ALBUTEROL SULFATE 2 PUFF: 90 AEROSOL, METERED RESPIRATORY (INHALATION) at 06:08

## 2021-08-09 RX ADMIN — GUAIFENESIN SYRUP AND DEXTROMETHORPHAN 10 ML: 100; 10 SYRUP ORAL at 09:08

## 2021-08-09 RX ADMIN — MUPIROCIN: 20 OINTMENT TOPICAL at 09:08

## 2021-08-09 RX ADMIN — ALBUTEROL SULFATE 2 PUFF: 90 AEROSOL, METERED RESPIRATORY (INHALATION) at 01:08

## 2021-08-09 RX ADMIN — ENOXAPARIN SODIUM 80 MG: 100 INJECTION SUBCUTANEOUS at 09:08

## 2021-08-09 RX ADMIN — Medication 1 TABLET: at 09:08

## 2021-08-09 RX ADMIN — Medication 1000 UNITS: at 09:08

## 2021-08-09 RX ADMIN — REMDESIVIR 100 MG: 100 INJECTION, POWDER, LYOPHILIZED, FOR SOLUTION INTRAVENOUS at 09:08

## 2021-08-09 RX ADMIN — DEXAMETHASONE 6 MG: 4 TABLET ORAL at 09:08

## 2021-08-09 RX ADMIN — ALBUTEROL SULFATE 2 PUFF: 108 INHALANT RESPIRATORY (INHALATION) at 12:08

## 2021-08-10 LAB
ALBUMIN SERPL BCP-MCNC: 2.1 G/DL (ref 3.5–5.2)
ALP SERPL-CCNC: 82 U/L (ref 55–135)
ALT SERPL W/O P-5'-P-CCNC: 30 U/L (ref 10–44)
ANION GAP SERPL CALC-SCNC: 10 MMOL/L (ref 8–16)
ANISOCYTOSIS BLD QL SMEAR: SLIGHT
AST SERPL-CCNC: 26 U/L (ref 10–40)
BASOPHILS # BLD AUTO: 0.06 K/UL (ref 0–0.2)
BASOPHILS NFR BLD: 0.2 % (ref 0–1.9)
BILIRUB SERPL-MCNC: 0.3 MG/DL (ref 0.1–1)
BUN SERPL-MCNC: 22 MG/DL (ref 8–23)
CALCIUM SERPL-MCNC: 9.2 MG/DL (ref 8.7–10.5)
CHLORIDE SERPL-SCNC: 105 MMOL/L (ref 95–110)
CO2 SERPL-SCNC: 25 MMOL/L (ref 23–29)
CREAT SERPL-MCNC: 0.7 MG/DL (ref 0.5–1.4)
CRP SERPL-MCNC: 140.1 MG/L (ref 0–8.2)
D DIMER PPP IA.FEU-MCNC: 2.27 MG/L FEU
DIFFERENTIAL METHOD: ABNORMAL
EOSINOPHIL # BLD AUTO: 0 K/UL (ref 0–0.5)
EOSINOPHIL NFR BLD: 0 % (ref 0–8)
ERYTHROCYTE [DISTWIDTH] IN BLOOD BY AUTOMATED COUNT: 13.4 % (ref 11.5–14.5)
EST. GFR  (AFRICAN AMERICAN): >60 ML/MIN/1.73 M^2
EST. GFR  (NON AFRICAN AMERICAN): >60 ML/MIN/1.73 M^2
FERRITIN SERPL-MCNC: 1872 NG/ML (ref 20–300)
GLUCOSE SERPL-MCNC: 147 MG/DL (ref 70–110)
HCT VFR BLD AUTO: 32.1 % (ref 40–54)
HGB BLD-MCNC: 10.9 G/DL (ref 14–18)
IMM GRANULOCYTES # BLD AUTO: 0.69 K/UL (ref 0–0.04)
IMM GRANULOCYTES NFR BLD AUTO: 2.8 % (ref 0–0.5)
LDH SERPL L TO P-CCNC: 476 U/L (ref 110–260)
LYMPHOCYTES # BLD AUTO: 1.4 K/UL (ref 1–4.8)
LYMPHOCYTES NFR BLD: 5.8 % (ref 18–48)
MAGNESIUM SERPL-MCNC: 2.2 MG/DL (ref 1.6–2.6)
MCH RBC QN AUTO: 30.5 PG (ref 27–31)
MCHC RBC AUTO-ENTMCNC: 34 G/DL (ref 32–36)
MCV RBC AUTO: 90 FL (ref 82–98)
MONOCYTES # BLD AUTO: 0.8 K/UL (ref 0.3–1)
MONOCYTES NFR BLD: 3.3 % (ref 4–15)
NEUTROPHILS # BLD AUTO: 21.6 K/UL (ref 1.8–7.7)
NEUTROPHILS NFR BLD: 87.9 % (ref 38–73)
NRBC BLD-RTO: 0 /100 WBC
PHOSPHATE SERPL-MCNC: 2.5 MG/DL (ref 2.7–4.5)
PLATELET # BLD AUTO: 527 K/UL (ref 150–450)
PLATELET BLD QL SMEAR: ABNORMAL
PMV BLD AUTO: 11.5 FL (ref 9.2–12.9)
POIKILOCYTOSIS BLD QL SMEAR: SLIGHT
POTASSIUM SERPL-SCNC: 3.4 MMOL/L (ref 3.5–5.1)
PROT SERPL-MCNC: 6.5 G/DL (ref 6–8.4)
RBC # BLD AUTO: 3.57 M/UL (ref 4.6–6.2)
SODIUM SERPL-SCNC: 140 MMOL/L (ref 136–145)
TOXIC GRANULES BLD QL SMEAR: PRESENT
WBC # BLD AUTO: 24.59 K/UL (ref 3.9–12.7)

## 2021-08-10 PROCEDURE — 83615 LACTATE (LD) (LDH) ENZYME: CPT | Performed by: HOSPITALIST

## 2021-08-10 PROCEDURE — 80053 COMPREHEN METABOLIC PANEL: CPT | Performed by: HOSPITALIST

## 2021-08-10 PROCEDURE — 94761 N-INVAS EAR/PLS OXIMETRY MLT: CPT

## 2021-08-10 PROCEDURE — 20600001 HC STEP DOWN PRIVATE ROOM

## 2021-08-10 PROCEDURE — 94640 AIRWAY INHALATION TREATMENT: CPT

## 2021-08-10 PROCEDURE — 84100 ASSAY OF PHOSPHORUS: CPT | Performed by: HOSPITALIST

## 2021-08-10 PROCEDURE — 25000003 PHARM REV CODE 250: Performed by: HOSPITALIST

## 2021-08-10 PROCEDURE — 85379 FIBRIN DEGRADATION QUANT: CPT | Performed by: HOSPITALIST

## 2021-08-10 PROCEDURE — 36415 COLL VENOUS BLD VENIPUNCTURE: CPT | Performed by: HOSPITALIST

## 2021-08-10 PROCEDURE — 63600175 PHARM REV CODE 636 W HCPCS: Performed by: HOSPITALIST

## 2021-08-10 PROCEDURE — 27000207 HC ISOLATION

## 2021-08-10 PROCEDURE — 86140 C-REACTIVE PROTEIN: CPT | Performed by: HOSPITALIST

## 2021-08-10 PROCEDURE — 82728 ASSAY OF FERRITIN: CPT | Performed by: HOSPITALIST

## 2021-08-10 PROCEDURE — 83735 ASSAY OF MAGNESIUM: CPT | Performed by: HOSPITALIST

## 2021-08-10 PROCEDURE — 99900035 HC TECH TIME PER 15 MIN (STAT)

## 2021-08-10 PROCEDURE — 85025 COMPLETE CBC W/AUTO DIFF WBC: CPT | Performed by: HOSPITALIST

## 2021-08-10 PROCEDURE — 27000221 HC OXYGEN, UP TO 24 HOURS

## 2021-08-10 RX ORDER — SODIUM CHLORIDE 0.9 % (FLUSH) 0.9 %
.1-1 SYRINGE (ML) INJECTION
Status: DISCONTINUED | OUTPATIENT
Start: 2021-08-10 | End: 2021-08-11 | Stop reason: HOSPADM

## 2021-08-10 RX ORDER — SODIUM CHLORIDE 0.9 % (FLUSH) 0.9 %
3 SYRINGE (ML) INJECTION
Status: DISCONTINUED | OUTPATIENT
Start: 2021-08-10 | End: 2021-08-11 | Stop reason: HOSPADM

## 2021-08-10 RX ORDER — SODIUM CHLORIDE 9 MG/ML
INJECTION, SOLUTION INTRAVENOUS
Status: DISCONTINUED | OUTPATIENT
Start: 2021-08-10 | End: 2021-08-11 | Stop reason: HOSPADM

## 2021-08-10 RX ADMIN — ALBUTEROL SULFATE 2 PUFF: 90 AEROSOL, METERED RESPIRATORY (INHALATION) at 01:08

## 2021-08-10 RX ADMIN — MUPIROCIN: 20 OINTMENT TOPICAL at 10:08

## 2021-08-10 RX ADMIN — OXYCODONE HYDROCHLORIDE AND ACETAMINOPHEN 500 MG: 500 TABLET ORAL at 09:08

## 2021-08-10 RX ADMIN — OXYCODONE HYDROCHLORIDE AND ACETAMINOPHEN 500 MG: 500 TABLET ORAL at 10:08

## 2021-08-10 RX ADMIN — ENOXAPARIN SODIUM 80 MG: 100 INJECTION SUBCUTANEOUS at 09:08

## 2021-08-10 RX ADMIN — GUAIFENESIN SYRUP AND DEXTROMETHORPHAN 10 ML: 100; 10 SYRUP ORAL at 09:08

## 2021-08-10 RX ADMIN — DEXAMETHASONE 6 MG: 4 TABLET ORAL at 10:08

## 2021-08-10 RX ADMIN — REMDESIVIR 100 MG: 100 INJECTION, POWDER, LYOPHILIZED, FOR SOLUTION INTRAVENOUS at 10:08

## 2021-08-10 RX ADMIN — MUPIROCIN: 20 OINTMENT TOPICAL at 09:08

## 2021-08-10 RX ADMIN — Medication 1 TABLET: at 10:08

## 2021-08-10 RX ADMIN — ALBUTEROL SULFATE 2 PUFF: 90 AEROSOL, METERED RESPIRATORY (INHALATION) at 07:08

## 2021-08-10 RX ADMIN — ENOXAPARIN SODIUM 80 MG: 100 INJECTION SUBCUTANEOUS at 10:08

## 2021-08-10 RX ADMIN — Medication 1000 UNITS: at 10:08

## 2021-08-11 VITALS
TEMPERATURE: 98 F | HEIGHT: 72 IN | WEIGHT: 185.63 LBS | DIASTOLIC BLOOD PRESSURE: 58 MMHG | OXYGEN SATURATION: 97 % | RESPIRATION RATE: 18 BRPM | SYSTOLIC BLOOD PRESSURE: 111 MMHG | BODY MASS INDEX: 25.14 KG/M2 | HEART RATE: 65 BPM

## 2021-08-11 LAB
ALBUMIN SERPL BCP-MCNC: 2 G/DL (ref 3.5–5.2)
ALP SERPL-CCNC: 71 U/L (ref 55–135)
ALT SERPL W/O P-5'-P-CCNC: 30 U/L (ref 10–44)
ANION GAP SERPL CALC-SCNC: 8 MMOL/L (ref 8–16)
AST SERPL-CCNC: 27 U/L (ref 10–40)
BACTERIA SPEC AEROBE CULT: NORMAL
BACTERIA SPEC AEROBE CULT: NORMAL
BASOPHILS # BLD AUTO: 0.04 K/UL (ref 0–0.2)
BASOPHILS NFR BLD: 0.2 % (ref 0–1.9)
BILIRUB SERPL-MCNC: 0.3 MG/DL (ref 0.1–1)
BUN SERPL-MCNC: 18 MG/DL (ref 8–23)
CALCIUM SERPL-MCNC: 8.5 MG/DL (ref 8.7–10.5)
CHLORIDE SERPL-SCNC: 105 MMOL/L (ref 95–110)
CO2 SERPL-SCNC: 26 MMOL/L (ref 23–29)
CREAT SERPL-MCNC: 0.6 MG/DL (ref 0.5–1.4)
DIFFERENTIAL METHOD: ABNORMAL
EOSINOPHIL # BLD AUTO: 0 K/UL (ref 0–0.5)
EOSINOPHIL NFR BLD: 0 % (ref 0–8)
ERYTHROCYTE [DISTWIDTH] IN BLOOD BY AUTOMATED COUNT: 13.5 % (ref 11.5–14.5)
EST. GFR  (AFRICAN AMERICAN): >60 ML/MIN/1.73 M^2
EST. GFR  (NON AFRICAN AMERICAN): >60 ML/MIN/1.73 M^2
GLUCOSE SERPL-MCNC: 123 MG/DL (ref 70–110)
GRAM STN SPEC: NORMAL
HCT VFR BLD AUTO: 29.5 % (ref 40–54)
HGB BLD-MCNC: 10.1 G/DL (ref 14–18)
IMM GRANULOCYTES # BLD AUTO: 0.24 K/UL (ref 0–0.04)
IMM GRANULOCYTES NFR BLD AUTO: 1.3 % (ref 0–0.5)
LYMPHOCYTES # BLD AUTO: 1.6 K/UL (ref 1–4.8)
LYMPHOCYTES NFR BLD: 8.4 % (ref 18–48)
MAGNESIUM SERPL-MCNC: 2.1 MG/DL (ref 1.6–2.6)
MCH RBC QN AUTO: 31.3 PG (ref 27–31)
MCHC RBC AUTO-ENTMCNC: 34.2 G/DL (ref 32–36)
MCV RBC AUTO: 91 FL (ref 82–98)
MONOCYTES # BLD AUTO: 0.9 K/UL (ref 0.3–1)
MONOCYTES NFR BLD: 4.6 % (ref 4–15)
NEUTROPHILS # BLD AUTO: 16.4 K/UL (ref 1.8–7.7)
NEUTROPHILS NFR BLD: 85.5 % (ref 38–73)
NRBC BLD-RTO: 0 /100 WBC
PHOSPHATE SERPL-MCNC: 3.6 MG/DL (ref 2.7–4.5)
PLATELET # BLD AUTO: 538 K/UL (ref 150–450)
PMV BLD AUTO: 11.6 FL (ref 9.2–12.9)
POTASSIUM SERPL-SCNC: 3.2 MMOL/L (ref 3.5–5.1)
PROT SERPL-MCNC: 5.7 G/DL (ref 6–8.4)
RBC # BLD AUTO: 3.23 M/UL (ref 4.6–6.2)
SODIUM SERPL-SCNC: 139 MMOL/L (ref 136–145)
WBC # BLD AUTO: 19.18 K/UL (ref 3.9–12.7)

## 2021-08-11 PROCEDURE — 63600175 PHARM REV CODE 636 W HCPCS: Performed by: HOSPITALIST

## 2021-08-11 PROCEDURE — 99900035 HC TECH TIME PER 15 MIN (STAT)

## 2021-08-11 PROCEDURE — 25000003 PHARM REV CODE 250: Performed by: HOSPITALIST

## 2021-08-11 PROCEDURE — 94761 N-INVAS EAR/PLS OXIMETRY MLT: CPT

## 2021-08-11 PROCEDURE — 80053 COMPREHEN METABOLIC PANEL: CPT | Performed by: HOSPITALIST

## 2021-08-11 PROCEDURE — 36415 COLL VENOUS BLD VENIPUNCTURE: CPT | Performed by: HOSPITALIST

## 2021-08-11 PROCEDURE — 94640 AIRWAY INHALATION TREATMENT: CPT

## 2021-08-11 PROCEDURE — 84100 ASSAY OF PHOSPHORUS: CPT | Performed by: HOSPITALIST

## 2021-08-11 PROCEDURE — 85025 COMPLETE CBC W/AUTO DIFF WBC: CPT | Performed by: HOSPITALIST

## 2021-08-11 PROCEDURE — 83735 ASSAY OF MAGNESIUM: CPT | Performed by: HOSPITALIST

## 2021-08-11 RX ADMIN — ALBUTEROL SULFATE 2 PUFF: 90 AEROSOL, METERED RESPIRATORY (INHALATION) at 12:08

## 2021-08-11 RX ADMIN — DEXAMETHASONE 6 MG: 4 TABLET ORAL at 09:08

## 2021-08-11 RX ADMIN — REMDESIVIR 100 MG: 100 INJECTION, POWDER, LYOPHILIZED, FOR SOLUTION INTRAVENOUS at 09:08

## 2021-08-11 RX ADMIN — Medication 1 TABLET: at 09:08

## 2021-08-11 RX ADMIN — Medication 1000 UNITS: at 09:08

## 2021-08-11 RX ADMIN — ALBUTEROL SULFATE 2 PUFF: 90 AEROSOL, METERED RESPIRATORY (INHALATION) at 08:08

## 2021-08-11 RX ADMIN — ALBUTEROL SULFATE 2 PUFF: 90 AEROSOL, METERED RESPIRATORY (INHALATION) at 01:08

## 2021-08-11 RX ADMIN — MUPIROCIN: 20 OINTMENT TOPICAL at 09:08

## 2021-08-11 RX ADMIN — OXYCODONE HYDROCHLORIDE AND ACETAMINOPHEN 500 MG: 500 TABLET ORAL at 09:08

## 2021-08-13 ENCOUNTER — PATIENT MESSAGE (OUTPATIENT)
Dept: ADMINISTRATIVE | Facility: OTHER | Age: 66
End: 2021-08-13

## 2021-08-13 ENCOUNTER — TELEPHONE (OUTPATIENT)
Dept: ADMINISTRATIVE | Facility: OTHER | Age: 66
End: 2021-08-13

## 2021-08-14 ENCOUNTER — PATIENT MESSAGE (OUTPATIENT)
Dept: ADMINISTRATIVE | Facility: OTHER | Age: 66
End: 2021-08-14

## 2021-08-15 ENCOUNTER — PATIENT MESSAGE (OUTPATIENT)
Dept: ADMINISTRATIVE | Facility: OTHER | Age: 66
End: 2021-08-15

## 2021-08-16 ENCOUNTER — PATIENT MESSAGE (OUTPATIENT)
Dept: ADMINISTRATIVE | Facility: OTHER | Age: 66
End: 2021-08-16

## 2021-08-17 ENCOUNTER — PATIENT MESSAGE (OUTPATIENT)
Dept: ADMINISTRATIVE | Facility: OTHER | Age: 66
End: 2021-08-17

## 2021-08-18 ENCOUNTER — PATIENT MESSAGE (OUTPATIENT)
Dept: ADMINISTRATIVE | Facility: OTHER | Age: 66
End: 2021-08-18

## 2021-08-19 ENCOUNTER — PATIENT MESSAGE (OUTPATIENT)
Dept: ADMINISTRATIVE | Facility: OTHER | Age: 66
End: 2021-08-19

## 2021-08-20 ENCOUNTER — PATIENT MESSAGE (OUTPATIENT)
Dept: ADMINISTRATIVE | Facility: OTHER | Age: 66
End: 2021-08-20

## 2021-08-21 ENCOUNTER — PATIENT MESSAGE (OUTPATIENT)
Dept: ADMINISTRATIVE | Facility: OTHER | Age: 66
End: 2021-08-21

## 2021-08-22 ENCOUNTER — PATIENT MESSAGE (OUTPATIENT)
Dept: ADMINISTRATIVE | Facility: OTHER | Age: 66
End: 2021-08-22

## 2021-08-23 ENCOUNTER — PATIENT MESSAGE (OUTPATIENT)
Dept: ADMINISTRATIVE | Facility: OTHER | Age: 66
End: 2021-08-23

## 2021-08-24 ENCOUNTER — LAB VISIT (OUTPATIENT)
Dept: LAB | Facility: OTHER | Age: 66
End: 2021-08-24
Attending: UROLOGY
Payer: MEDICARE

## 2021-08-24 ENCOUNTER — NURSE TRIAGE (OUTPATIENT)
Dept: ADMINISTRATIVE | Facility: CLINIC | Age: 66
End: 2021-08-24

## 2021-08-24 ENCOUNTER — PATIENT MESSAGE (OUTPATIENT)
Dept: UROLOGY | Facility: CLINIC | Age: 66
End: 2021-08-24

## 2021-08-24 ENCOUNTER — PATIENT MESSAGE (OUTPATIENT)
Dept: ADMINISTRATIVE | Facility: CLINIC | Age: 66
End: 2021-08-24

## 2021-08-24 ENCOUNTER — PATIENT MESSAGE (OUTPATIENT)
Dept: ADMINISTRATIVE | Facility: OTHER | Age: 66
End: 2021-08-24

## 2021-08-24 ENCOUNTER — OFFICE VISIT (OUTPATIENT)
Dept: INTERNAL MEDICINE | Facility: CLINIC | Age: 66
End: 2021-08-24
Attending: FAMILY MEDICINE
Payer: MEDICARE

## 2021-08-24 VITALS
HEART RATE: 66 BPM | BODY MASS INDEX: 25.41 KG/M2 | OXYGEN SATURATION: 98 % | HEIGHT: 72 IN | DIASTOLIC BLOOD PRESSURE: 74 MMHG | SYSTOLIC BLOOD PRESSURE: 110 MMHG | WEIGHT: 187.63 LBS

## 2021-08-24 DIAGNOSIS — U07.1 ACUTE RESPIRATORY DISEASE DUE TO COVID-19 VIRUS: Primary | ICD-10-CM

## 2021-08-24 DIAGNOSIS — J06.9 ACUTE RESPIRATORY DISEASE DUE TO COVID-19 VIRUS: Primary | ICD-10-CM

## 2021-08-24 DIAGNOSIS — J18.9 MULTIFOCAL PNEUMONIA: ICD-10-CM

## 2021-08-24 DIAGNOSIS — R97.20 ELEVATED PSA: ICD-10-CM

## 2021-08-24 LAB
PROSTATE SPECIFIC ANTIGEN, TOTAL: 8.4 NG/ML (ref 0–4)
PSA FREE MFR SERPL: 18.93 %
PSA FREE SERPL-MCNC: 1.59 NG/ML (ref 0–1.5)

## 2021-08-24 PROCEDURE — 1126F AMNT PAIN NOTED NONE PRSNT: CPT | Mod: CPTII,S$GLB,, | Performed by: FAMILY MEDICINE

## 2021-08-24 PROCEDURE — 99214 OFFICE O/P EST MOD 30 MIN: CPT | Mod: S$GLB,,, | Performed by: FAMILY MEDICINE

## 2021-08-24 PROCEDURE — 1159F PR MEDICATION LIST DOCUMENTED IN MEDICAL RECORD: ICD-10-PCS | Mod: CPTII,S$GLB,, | Performed by: FAMILY MEDICINE

## 2021-08-24 PROCEDURE — 1160F RVW MEDS BY RX/DR IN RCRD: CPT | Mod: CPTII,S$GLB,, | Performed by: FAMILY MEDICINE

## 2021-08-24 PROCEDURE — 1101F PT FALLS ASSESS-DOCD LE1/YR: CPT | Mod: CPTII,S$GLB,, | Performed by: FAMILY MEDICINE

## 2021-08-24 PROCEDURE — 1111F DSCHRG MED/CURRENT MED MERGE: CPT | Mod: CPTII,S$GLB,, | Performed by: FAMILY MEDICINE

## 2021-08-24 PROCEDURE — 3008F PR BODY MASS INDEX (BMI) DOCUMENTED: ICD-10-PCS | Mod: CPTII,S$GLB,, | Performed by: FAMILY MEDICINE

## 2021-08-24 PROCEDURE — 3288F FALL RISK ASSESSMENT DOCD: CPT | Mod: CPTII,S$GLB,, | Performed by: FAMILY MEDICINE

## 2021-08-24 PROCEDURE — 1159F MED LIST DOCD IN RCRD: CPT | Mod: CPTII,S$GLB,, | Performed by: FAMILY MEDICINE

## 2021-08-24 PROCEDURE — 1160F PR REVIEW ALL MEDS BY PRESCRIBER/CLIN PHARMACIST DOCUMENTED: ICD-10-PCS | Mod: CPTII,S$GLB,, | Performed by: FAMILY MEDICINE

## 2021-08-24 PROCEDURE — 1101F PR PT FALLS ASSESS DOC 0-1 FALLS W/OUT INJ PAST YR: ICD-10-PCS | Mod: CPTII,S$GLB,, | Performed by: FAMILY MEDICINE

## 2021-08-24 PROCEDURE — 99999 PR PBB SHADOW E&M-EST. PATIENT-LVL III: ICD-10-PCS | Mod: PBBFAC,,, | Performed by: FAMILY MEDICINE

## 2021-08-24 PROCEDURE — 3074F PR MOST RECENT SYSTOLIC BLOOD PRESSURE < 130 MM HG: ICD-10-PCS | Mod: CPTII,S$GLB,, | Performed by: FAMILY MEDICINE

## 2021-08-24 PROCEDURE — 84153 ASSAY OF PSA TOTAL: CPT | Performed by: UROLOGY

## 2021-08-24 PROCEDURE — 99214 PR OFFICE/OUTPT VISIT, EST, LEVL IV, 30-39 MIN: ICD-10-PCS | Mod: S$GLB,,, | Performed by: FAMILY MEDICINE

## 2021-08-24 PROCEDURE — 1111F PR DISCHARGE MEDS RECONCILED W/ CURRENT OUTPATIENT MED LIST: ICD-10-PCS | Mod: CPTII,S$GLB,, | Performed by: FAMILY MEDICINE

## 2021-08-24 PROCEDURE — 3078F DIAST BP <80 MM HG: CPT | Mod: CPTII,S$GLB,, | Performed by: FAMILY MEDICINE

## 2021-08-24 PROCEDURE — 3074F SYST BP LT 130 MM HG: CPT | Mod: CPTII,S$GLB,, | Performed by: FAMILY MEDICINE

## 2021-08-24 PROCEDURE — 3288F PR FALLS RISK ASSESSMENT DOCUMENTED: ICD-10-PCS | Mod: CPTII,S$GLB,, | Performed by: FAMILY MEDICINE

## 2021-08-24 PROCEDURE — 36415 COLL VENOUS BLD VENIPUNCTURE: CPT | Performed by: UROLOGY

## 2021-08-24 PROCEDURE — 3078F PR MOST RECENT DIASTOLIC BLOOD PRESSURE < 80 MM HG: ICD-10-PCS | Mod: CPTII,S$GLB,, | Performed by: FAMILY MEDICINE

## 2021-08-24 PROCEDURE — 1126F PR PAIN SEVERITY QUANTIFIED, NO PAIN PRESENT: ICD-10-PCS | Mod: CPTII,S$GLB,, | Performed by: FAMILY MEDICINE

## 2021-08-24 PROCEDURE — 3008F BODY MASS INDEX DOCD: CPT | Mod: CPTII,S$GLB,, | Performed by: FAMILY MEDICINE

## 2021-08-24 PROCEDURE — 99999 PR PBB SHADOW E&M-EST. PATIENT-LVL III: CPT | Mod: PBBFAC,,, | Performed by: FAMILY MEDICINE

## 2021-09-08 ENCOUNTER — TELEPHONE (OUTPATIENT)
Dept: UROLOGY | Facility: HOSPITAL | Age: 66
End: 2021-09-08

## 2021-09-08 ENCOUNTER — TELEPHONE (OUTPATIENT)
Dept: UROLOGY | Facility: CLINIC | Age: 66
End: 2021-09-08

## 2021-09-08 DIAGNOSIS — R97.20 ELEVATED PSA: Primary | ICD-10-CM

## 2021-09-09 ENCOUNTER — PATIENT MESSAGE (OUTPATIENT)
Dept: UROLOGY | Facility: CLINIC | Age: 66
End: 2021-09-09

## 2021-09-13 ENCOUNTER — TELEPHONE (OUTPATIENT)
Dept: UROLOGY | Facility: CLINIC | Age: 66
End: 2021-09-13

## 2021-09-14 ENCOUNTER — LAB VISIT (OUTPATIENT)
Dept: LAB | Facility: OTHER | Age: 66
End: 2021-09-14
Attending: UROLOGY
Payer: MEDICARE

## 2021-09-14 DIAGNOSIS — R97.20 ELEVATED PSA: ICD-10-CM

## 2021-09-14 PROCEDURE — 84153 ASSAY OF PSA TOTAL: CPT | Mod: HCNC | Performed by: UROLOGY

## 2021-09-14 PROCEDURE — 36415 COLL VENOUS BLD VENIPUNCTURE: CPT | Mod: HCNC | Performed by: UROLOGY

## 2021-09-15 ENCOUNTER — OFFICE VISIT (OUTPATIENT)
Dept: UROLOGY | Facility: CLINIC | Age: 66
End: 2021-09-15
Attending: UROLOGY
Payer: MEDICARE

## 2021-09-15 VITALS
DIASTOLIC BLOOD PRESSURE: 65 MMHG | SYSTOLIC BLOOD PRESSURE: 101 MMHG | HEART RATE: 76 BPM | HEIGHT: 72 IN | WEIGHT: 187 LBS | BODY MASS INDEX: 25.33 KG/M2

## 2021-09-15 DIAGNOSIS — N40.1 BENIGN NON-NODULAR PROSTATIC HYPERPLASIA WITH LOWER URINARY TRACT SYMPTOMS: ICD-10-CM

## 2021-09-15 DIAGNOSIS — R97.20 ELEVATED PSA: Primary | ICD-10-CM

## 2021-09-15 DIAGNOSIS — R31.0 GROSS HEMATURIA: ICD-10-CM

## 2021-09-15 LAB — COMPLEXED PSA SERPL-MCNC: 5.8 NG/ML (ref 0–4)

## 2021-09-15 PROCEDURE — 99214 PR OFFICE/OUTPT VISIT, EST, LEVL IV, 30-39 MIN: ICD-10-PCS | Mod: HCNC,S$GLB,, | Performed by: UROLOGY

## 2021-09-15 PROCEDURE — 81002 POCT URINE DIPSTICK WITHOUT MICROSCOPE: ICD-10-PCS | Mod: HCNC,S$GLB,, | Performed by: UROLOGY

## 2021-09-15 PROCEDURE — 99214 OFFICE O/P EST MOD 30 MIN: CPT | Mod: HCNC,S$GLB,, | Performed by: UROLOGY

## 2021-09-15 PROCEDURE — 3288F PR FALLS RISK ASSESSMENT DOCUMENTED: ICD-10-PCS | Mod: HCNC,CPTII,S$GLB, | Performed by: UROLOGY

## 2021-09-15 PROCEDURE — 1159F PR MEDICATION LIST DOCUMENTED IN MEDICAL RECORD: ICD-10-PCS | Mod: HCNC,CPTII,S$GLB, | Performed by: UROLOGY

## 2021-09-15 PROCEDURE — 1159F MED LIST DOCD IN RCRD: CPT | Mod: HCNC,CPTII,S$GLB, | Performed by: UROLOGY

## 2021-09-15 PROCEDURE — 3008F BODY MASS INDEX DOCD: CPT | Mod: HCNC,CPTII,S$GLB, | Performed by: UROLOGY

## 2021-09-15 PROCEDURE — 1160F RVW MEDS BY RX/DR IN RCRD: CPT | Mod: HCNC,CPTII,S$GLB, | Performed by: UROLOGY

## 2021-09-15 PROCEDURE — 3288F FALL RISK ASSESSMENT DOCD: CPT | Mod: HCNC,CPTII,S$GLB, | Performed by: UROLOGY

## 2021-09-15 PROCEDURE — 1160F PR REVIEW ALL MEDS BY PRESCRIBER/CLIN PHARMACIST DOCUMENTED: ICD-10-PCS | Mod: HCNC,CPTII,S$GLB, | Performed by: UROLOGY

## 2021-09-15 PROCEDURE — 1101F PR PT FALLS ASSESS DOC 0-1 FALLS W/OUT INJ PAST YR: ICD-10-PCS | Mod: HCNC,CPTII,S$GLB, | Performed by: UROLOGY

## 2021-09-15 PROCEDURE — 3078F DIAST BP <80 MM HG: CPT | Mod: HCNC,CPTII,S$GLB, | Performed by: UROLOGY

## 2021-09-15 PROCEDURE — 3074F PR MOST RECENT SYSTOLIC BLOOD PRESSURE < 130 MM HG: ICD-10-PCS | Mod: HCNC,CPTII,S$GLB, | Performed by: UROLOGY

## 2021-09-15 PROCEDURE — 1101F PT FALLS ASSESS-DOCD LE1/YR: CPT | Mod: HCNC,CPTII,S$GLB, | Performed by: UROLOGY

## 2021-09-15 PROCEDURE — 81002 URINALYSIS NONAUTO W/O SCOPE: CPT | Mod: HCNC,S$GLB,, | Performed by: UROLOGY

## 2021-09-15 PROCEDURE — 3008F PR BODY MASS INDEX (BMI) DOCUMENTED: ICD-10-PCS | Mod: HCNC,CPTII,S$GLB, | Performed by: UROLOGY

## 2021-09-15 PROCEDURE — 3078F PR MOST RECENT DIASTOLIC BLOOD PRESSURE < 80 MM HG: ICD-10-PCS | Mod: HCNC,CPTII,S$GLB, | Performed by: UROLOGY

## 2021-09-15 PROCEDURE — 3074F SYST BP LT 130 MM HG: CPT | Mod: HCNC,CPTII,S$GLB, | Performed by: UROLOGY

## 2021-10-07 ENCOUNTER — PATIENT MESSAGE (OUTPATIENT)
Dept: UROLOGY | Facility: CLINIC | Age: 66
End: 2021-10-07

## 2021-10-07 ENCOUNTER — LAB VISIT (OUTPATIENT)
Dept: LAB | Facility: HOSPITAL | Age: 66
End: 2021-10-07
Attending: UROLOGY
Payer: MEDICARE

## 2021-10-07 DIAGNOSIS — R30.0 DYSURIA: Primary | ICD-10-CM

## 2021-10-07 DIAGNOSIS — R30.0 DYSURIA: ICD-10-CM

## 2021-10-07 LAB
BACTERIA #/AREA URNS AUTO: ABNORMAL /HPF
BILIRUB UR QL STRIP: NEGATIVE
CLARITY UR REFRACT.AUTO: CLEAR
COLOR UR AUTO: YELLOW
GLUCOSE UR QL STRIP: NEGATIVE
HGB UR QL STRIP: ABNORMAL
HYALINE CASTS UR QL AUTO: 0 /LPF
KETONES UR QL STRIP: NEGATIVE
LEUKOCYTE ESTERASE UR QL STRIP: NEGATIVE
MICROSCOPIC COMMENT: ABNORMAL
NITRITE UR QL STRIP: NEGATIVE
PH UR STRIP: 6 [PH] (ref 5–8)
PROT UR QL STRIP: ABNORMAL
RBC #/AREA URNS AUTO: 26 /HPF (ref 0–4)
SP GR UR STRIP: 1.03 (ref 1–1.03)
URN SPEC COLLECT METH UR: ABNORMAL
WBC #/AREA URNS AUTO: >100 /HPF (ref 0–5)

## 2021-10-07 PROCEDURE — 87086 URINE CULTURE/COLONY COUNT: CPT | Mod: HCNC | Performed by: UROLOGY

## 2021-10-07 PROCEDURE — 81001 URINALYSIS AUTO W/SCOPE: CPT | Mod: HCNC | Performed by: UROLOGY

## 2021-10-09 LAB — BACTERIA UR CULT: NO GROWTH

## 2021-10-19 LAB
BILIRUB SERPL-MCNC: NEGATIVE MG/DL
BLOOD URINE, POC: NEGATIVE
CLARITY, POC UA: CLEAR
COLOR, POC UA: YELLOW
GLUCOSE UR QL STRIP: NORMAL
KETONES UR QL STRIP: NEGATIVE
LEUKOCYTE ESTERASE URINE, POC: NEGATIVE
NITRITE, POC UA: NEGATIVE
PH, POC UA: 5
PROTEIN, POC: ABNORMAL
SPECIFIC GRAVITY, POC UA: 1.01
UROBILINOGEN, POC UA: NORMAL

## 2022-01-31 ENCOUNTER — PATIENT MESSAGE (OUTPATIENT)
Dept: UROLOGY | Facility: CLINIC | Age: 67
End: 2022-01-31
Payer: MEDICARE

## 2022-02-14 ENCOUNTER — PATIENT MESSAGE (OUTPATIENT)
Dept: UROLOGY | Facility: CLINIC | Age: 67
End: 2022-02-14
Payer: MEDICARE

## 2022-02-14 ENCOUNTER — LAB VISIT (OUTPATIENT)
Dept: LAB | Facility: OTHER | Age: 67
End: 2022-02-14
Attending: UROLOGY
Payer: MEDICARE

## 2022-02-14 DIAGNOSIS — R97.20 ELEVATED PSA: ICD-10-CM

## 2022-02-14 DIAGNOSIS — R97.20 ELEVATED PSA: Primary | ICD-10-CM

## 2022-02-14 LAB — COMPLEXED PSA SERPL-MCNC: 11 NG/ML (ref 0–4)

## 2022-02-14 PROCEDURE — 84153 ASSAY OF PSA TOTAL: CPT | Mod: HCNC | Performed by: UROLOGY

## 2022-02-14 PROCEDURE — 36415 COLL VENOUS BLD VENIPUNCTURE: CPT | Mod: HCNC | Performed by: UROLOGY

## 2022-02-15 ENCOUNTER — LAB VISIT (OUTPATIENT)
Dept: LAB | Facility: OTHER | Age: 67
End: 2022-02-15
Attending: UROLOGY
Payer: MEDICARE

## 2022-02-15 ENCOUNTER — PATIENT MESSAGE (OUTPATIENT)
Dept: UROLOGY | Facility: CLINIC | Age: 67
End: 2022-02-15
Payer: MEDICARE

## 2022-02-15 DIAGNOSIS — R97.20 ELEVATED PSA: ICD-10-CM

## 2022-02-15 LAB — COMPLEXED PSA SERPL-MCNC: 10 NG/ML (ref 0–4)

## 2022-02-15 PROCEDURE — 84153 ASSAY OF PSA TOTAL: CPT | Mod: HCNC | Performed by: UROLOGY

## 2022-02-15 PROCEDURE — 36415 COLL VENOUS BLD VENIPUNCTURE: CPT | Mod: HCNC | Performed by: UROLOGY

## 2022-02-16 ENCOUNTER — OFFICE VISIT (OUTPATIENT)
Dept: UROLOGY | Facility: CLINIC | Age: 67
End: 2022-02-16
Attending: UROLOGY
Payer: MEDICARE

## 2022-02-16 VITALS
WEIGHT: 186.94 LBS | SYSTOLIC BLOOD PRESSURE: 100 MMHG | HEIGHT: 72 IN | BODY MASS INDEX: 25.32 KG/M2 | HEART RATE: 77 BPM | DIASTOLIC BLOOD PRESSURE: 69 MMHG

## 2022-02-16 DIAGNOSIS — N52.01 ERECTILE DYSFUNCTION DUE TO ARTERIAL INSUFFICIENCY: ICD-10-CM

## 2022-02-16 DIAGNOSIS — R97.20 ELEVATED PSA: Primary | ICD-10-CM

## 2022-02-16 DIAGNOSIS — R30.0 DYSURIA: ICD-10-CM

## 2022-02-16 LAB
BILIRUB SERPL-MCNC: NEGATIVE MG/DL
BLOOD URINE, POC: NEGATIVE
CLARITY, POC UA: CLEAR
COLOR, POC UA: YELLOW
GLUCOSE UR QL STRIP: NEGATIVE
KETONES UR QL STRIP: NEGATIVE
LEUKOCYTE ESTERASE URINE, POC: NEGATIVE
NITRITE, POC UA: NEGATIVE
PH, POC UA: 5
PROTEIN, POC: POSITIVE
SPECIFIC GRAVITY, POC UA: 1.02
UROBILINOGEN, POC UA: NEGATIVE

## 2022-02-16 PROCEDURE — 3008F PR BODY MASS INDEX (BMI) DOCUMENTED: ICD-10-PCS | Mod: HCNC,CPTII,S$GLB, | Performed by: UROLOGY

## 2022-02-16 PROCEDURE — 81002 URINALYSIS NONAUTO W/O SCOPE: CPT | Mod: HCNC,S$GLB,, | Performed by: UROLOGY

## 2022-02-16 PROCEDURE — 81002 POCT URINE DIPSTICK WITHOUT MICROSCOPE: ICD-10-PCS | Mod: HCNC,S$GLB,, | Performed by: UROLOGY

## 2022-02-16 PROCEDURE — 1160F PR REVIEW ALL MEDS BY PRESCRIBER/CLIN PHARMACIST DOCUMENTED: ICD-10-PCS | Mod: HCNC,CPTII,S$GLB, | Performed by: UROLOGY

## 2022-02-16 PROCEDURE — 1159F PR MEDICATION LIST DOCUMENTED IN MEDICAL RECORD: ICD-10-PCS | Mod: HCNC,CPTII,S$GLB, | Performed by: UROLOGY

## 2022-02-16 PROCEDURE — 1159F MED LIST DOCD IN RCRD: CPT | Mod: HCNC,CPTII,S$GLB, | Performed by: UROLOGY

## 2022-02-16 PROCEDURE — 3074F PR MOST RECENT SYSTOLIC BLOOD PRESSURE < 130 MM HG: ICD-10-PCS | Mod: HCNC,CPTII,S$GLB, | Performed by: UROLOGY

## 2022-02-16 PROCEDURE — 1126F AMNT PAIN NOTED NONE PRSNT: CPT | Mod: HCNC,CPTII,S$GLB, | Performed by: UROLOGY

## 2022-02-16 PROCEDURE — 1160F RVW MEDS BY RX/DR IN RCRD: CPT | Mod: HCNC,CPTII,S$GLB, | Performed by: UROLOGY

## 2022-02-16 PROCEDURE — 1101F PT FALLS ASSESS-DOCD LE1/YR: CPT | Mod: HCNC,CPTII,S$GLB, | Performed by: UROLOGY

## 2022-02-16 PROCEDURE — 99214 OFFICE O/P EST MOD 30 MIN: CPT | Mod: HCNC,S$GLB,, | Performed by: UROLOGY

## 2022-02-16 PROCEDURE — 3288F PR FALLS RISK ASSESSMENT DOCUMENTED: ICD-10-PCS | Mod: HCNC,CPTII,S$GLB, | Performed by: UROLOGY

## 2022-02-16 PROCEDURE — 3078F DIAST BP <80 MM HG: CPT | Mod: HCNC,CPTII,S$GLB, | Performed by: UROLOGY

## 2022-02-16 PROCEDURE — 1101F PR PT FALLS ASSESS DOC 0-1 FALLS W/OUT INJ PAST YR: ICD-10-PCS | Mod: HCNC,CPTII,S$GLB, | Performed by: UROLOGY

## 2022-02-16 PROCEDURE — 3078F PR MOST RECENT DIASTOLIC BLOOD PRESSURE < 80 MM HG: ICD-10-PCS | Mod: HCNC,CPTII,S$GLB, | Performed by: UROLOGY

## 2022-02-16 PROCEDURE — 99214 PR OFFICE/OUTPT VISIT, EST, LEVL IV, 30-39 MIN: ICD-10-PCS | Mod: HCNC,S$GLB,, | Performed by: UROLOGY

## 2022-02-16 PROCEDURE — 1126F PR PAIN SEVERITY QUANTIFIED, NO PAIN PRESENT: ICD-10-PCS | Mod: HCNC,CPTII,S$GLB, | Performed by: UROLOGY

## 2022-02-16 PROCEDURE — 3288F FALL RISK ASSESSMENT DOCD: CPT | Mod: HCNC,CPTII,S$GLB, | Performed by: UROLOGY

## 2022-02-16 PROCEDURE — 3074F SYST BP LT 130 MM HG: CPT | Mod: HCNC,CPTII,S$GLB, | Performed by: UROLOGY

## 2022-02-16 PROCEDURE — 3008F BODY MASS INDEX DOCD: CPT | Mod: HCNC,CPTII,S$GLB, | Performed by: UROLOGY

## 2022-02-16 RX ORDER — TADALAFIL 20 MG/1
20 TABLET ORAL DAILY PRN
Qty: 30 TABLET | Refills: 11 | Status: SHIPPED | OUTPATIENT
Start: 2022-02-16 | End: 2023-10-12 | Stop reason: SDUPTHER

## 2022-02-16 NOTE — PROGRESS NOTES
Subjective:       Med Palma is a 66 y.o. male who is an established pt who was referred by Dr Wilson for evaluation of elevated PSA.      He was seen previously with a elevated PSA of 8.4 but had a UTI at the time of PSA check. For this reason, we decided to hold on PBx and plan for recheck of PSA when no UTI present. His recheck showed a PSA of 5.9 in 11/14. Previous free PSA was 7%. He was counseled on PBx but he declined and opted for another recheck of his prostate.     No prostate cancer in the family. Brother with possible BPH or prostatitis. Denies LUTS at this time, rare nocturia. Not other UTI symptoms.     He is now s/p TRUS PBx on 1/26/15. All 12 cores from the biopsy were negative for malignancy. He denied any post-procedure issues such as fever or bleeding. Prostate volume 29g.     TRUS also revealed a large median lobe that could be causing issues with voiding that may have led to UTI. Due to imaging, he would be a candidate for TURP in the future if he desired. He denies current LUTS. He had not been on any treatment for BPH in the past. He was given rx for Flomax but did not like SE of retrograde ejaculation therefore he was switched to Uroxatral. He stopped this medication and has been doing well.    He does note some weakened stream. He has nocturia x 1-2 only after drinking a beer. He continues to decline meds or surgery.     PVR (bladder scan) last visits - 135cc, 0cc, 26cc, 14cc, 31cc.     PSA (9/24/14) - 8.4   PSA (1/12/15) - 5.3, free 10.75%  PSA (6/15/15) - 5.4   PSA (12/11/15) - 5.9  PSA (6/3/16) - 3.2, free 15%  PSA (5/17) - 3.1   PSA (6/18) - 2.2  PSA (6/19) - 2.3, 20%  PSA (8/20) - 2.5, 31%  PSA (7/21) - 5.9  PSA (8/21) - 8.4, 18% (done after hospitalized for COVID PNA)  PSA (9/21) - 5.8  PSA (2/22) - 11.0, recheck 2 days later 10.0      6/12/2019  Doing well - still with slow stream and nocturia. Known median lobe from TRUS. PSA stable.   PVR (bladder scan) today -  100cc    8/19/2020  PSA not done. LUTS worse with rare times drinking beer (nocturia x 3-4). Weak stream in AM occasionally. Stream improves throughout day. Minimally bothersome. Mild gross hematuria with heavy exercise once - UA clear today.   PVR (bladder scan) today - 11cc    7/7/2021  PSA not done for today. LUTS still present but stable and not bothersome. Now with some ED issues x 6-12 months. Problems both achieving and maintaining. No prior attempts at treatment. Normal libido.      9/15/2021  PSA done after last visit more elevated (similar to values in 2014). Recommended to recheck in several weeks. Rechecked yesterday, value decreased. Recently hospitalized for COVID PNA, improving from that. Slow urinary stream in AM.     2/16/2022  Noted mild dysuria a few weeks ago, self-resolved. PSA again elevated. Rechecked 2 days later due to concern for elevation after ejaculation. Remains elevated.       The following portions of the patient's history were reviewed and updated as appropriate: allergies, current medications, past family history, past medical history, past social history, past surgical history and problem list.    Review of Systems  Constitutional: no fever or chills  ENT: no nasal congestion or sore throat  Respiratory: no cough or shortness of breath  Cardiovascular: no chest pain or palpitations  Gastrointestinal: no nausea or vomiting, tolerating diet  Genitourinary: as per HPI  Hematologic/Lymphatic: no easy bruising or lymphadenopathy  Musculoskeletal: no arthralgias or myalgias  Skin: no rashes or lesions  Neurological: no seizures or tremors  Behavioral/Psych: no auditory or visual hallucinations       Objective:    Vitals:   /69 (BP Location: Left arm, Patient Position: Sitting, BP Method: X-Large (Automatic))   Pulse 77   Ht 6' (1.829 m)   Wt 84.8 kg (186 lb 15.2 oz)   BMI 25.35 kg/m²     Physical Exam   General: well developed, well nourished in no acute distress  Head:  normocephalic, atraumatic  Neck: supple, trachea midline  HEENT: EOMI, mucus membranes moist, sclera anicteric, no hearing impairment  Lungs: symmetric expansion, non-labored breathing  Cardiovascular: regular rate and rhythm, normal pulses  Psych: normal judgment and insight, normal mood/affect and non-anxious  Genitourinary: (last visit)  patient declined exam   ELSA: symmetric 60g prostate without nodularity or tenderness, seminal vesicles non palpable, normal sphincter tone without hemorrhoids or rectal masses. Rubbery gland.       Lab Review   Urine analysis shows - 30 protein    Lab Results   Component Value Date    WBC 19.18 (H) 08/11/2021    HGB 10.1 (L) 08/11/2021    HCT 29.5 (L) 08/11/2021    MCV 91 08/11/2021     (H) 08/11/2021     Lab Results   Component Value Date    CREATININE 0.6 08/11/2021    BUN 18 08/11/2021     Lab Results   Component Value Date    PSA 0.96 10/10/2011      Imaging  TRUS images reviewed       Assessment/Plan:      1. Elevated PSA    - Negative PBx on 1/26/15.    - PSA actually now decreasing without intervention   - Thoroughly discussed option related to his elevated PSA with negative PBx. Discussed continued PSA monitoring, repeat standard 12-core biopsy, saturation biopsy, and prostatic MRI. He would like to continue with PSA surveillance.   - Vitamin D supplementation   - PSA again elevated   - Consider MRI in future if PSA rises again      2. BPH   - Large median lobe noted on TRUS   - May be the reason for UTI due to LUCINA   - Consider for TURP if symptomatic/recurrent UTI   - Alpha-blockers may help with LUCINA if present though median lobe will not be dramatically improved by pharmacotherapy   - PVR acceptable   - Doing well without medication for BPH - again discussed Flomax and Proscar as options.    3. Gross hematuria   - One episode at time of heavy strenuous exercise   - Discussed workup. Will hold for now as probable etiology known. If recurrent, will need full  workup   - UA clear today, reassuring    4. ED   - Cialis 10-20mg trial. Goodrx coupon given. Discussed SE. Refilled today.       Follow up in 6 weeks with MRI and PSA

## 2022-03-03 ENCOUNTER — HOSPITAL ENCOUNTER (OUTPATIENT)
Dept: RADIOLOGY | Facility: HOSPITAL | Age: 67
Discharge: HOME | End: 2022-03-03
Attending: UROLOGY
Payer: MEDICARE

## 2022-03-03 DIAGNOSIS — R97.20 ELEVATED PSA: ICD-10-CM

## 2022-03-03 PROCEDURE — 72197 MRI PROSTATE W W/O CONTRAST: ICD-10-PCS | Mod: 26,,, | Performed by: RADIOLOGY

## 2022-03-03 PROCEDURE — 72197 MRI PELVIS W/O & W/DYE: CPT | Mod: 26,,, | Performed by: RADIOLOGY

## 2022-03-03 PROCEDURE — A9585 GADOBUTROL INJECTION: HCPCS | Performed by: UROLOGY

## 2022-03-03 PROCEDURE — 72197 MRI PELVIS W/O & W/DYE: CPT | Mod: TC

## 2022-03-03 PROCEDURE — 25500020 PHARM REV CODE 255: Performed by: UROLOGY

## 2022-03-03 RX ORDER — GADOBUTROL 604.72 MG/ML
10 INJECTION INTRAVENOUS
Status: COMPLETED | OUTPATIENT
Start: 2022-03-03 | End: 2022-03-03

## 2022-03-03 RX ADMIN — GADOBUTROL 10 ML: 604.72 INJECTION INTRAVENOUS at 11:03

## 2022-03-23 ENCOUNTER — LAB VISIT (OUTPATIENT)
Dept: LAB | Facility: OTHER | Age: 67
End: 2022-03-23
Attending: UROLOGY
Payer: MEDICARE

## 2022-03-23 DIAGNOSIS — R97.20 ELEVATED PSA: ICD-10-CM

## 2022-03-23 LAB — COMPLEXED PSA SERPL-MCNC: 10.4 NG/ML (ref 0–4)

## 2022-03-23 PROCEDURE — 36415 COLL VENOUS BLD VENIPUNCTURE: CPT | Performed by: UROLOGY

## 2022-03-23 PROCEDURE — 84153 ASSAY OF PSA TOTAL: CPT | Performed by: UROLOGY

## 2022-03-30 ENCOUNTER — OFFICE VISIT (OUTPATIENT)
Dept: UROLOGY | Facility: CLINIC | Age: 67
End: 2022-03-30
Attending: UROLOGY
Payer: MEDICARE

## 2022-03-30 VITALS
DIASTOLIC BLOOD PRESSURE: 74 MMHG | WEIGHT: 186 LBS | HEART RATE: 75 BPM | BODY MASS INDEX: 25.19 KG/M2 | SYSTOLIC BLOOD PRESSURE: 108 MMHG | HEIGHT: 72 IN

## 2022-03-30 DIAGNOSIS — R30.0 DYSURIA: ICD-10-CM

## 2022-03-30 DIAGNOSIS — R31.0 GROSS HEMATURIA: ICD-10-CM

## 2022-03-30 DIAGNOSIS — R97.20 ELEVATED PSA: Primary | ICD-10-CM

## 2022-03-30 DIAGNOSIS — N52.01 ERECTILE DYSFUNCTION DUE TO ARTERIAL INSUFFICIENCY: ICD-10-CM

## 2022-03-30 DIAGNOSIS — N40.1 BENIGN NON-NODULAR PROSTATIC HYPERPLASIA WITH LOWER URINARY TRACT SYMPTOMS: ICD-10-CM

## 2022-03-30 DIAGNOSIS — R82.71 BACTERIURIA: ICD-10-CM

## 2022-03-30 PROCEDURE — 99214 PR OFFICE/OUTPT VISIT, EST, LEVL IV, 30-39 MIN: ICD-10-PCS | Mod: S$GLB,,, | Performed by: UROLOGY

## 2022-03-30 PROCEDURE — 3008F PR BODY MASS INDEX (BMI) DOCUMENTED: ICD-10-PCS | Mod: CPTII,S$GLB,, | Performed by: UROLOGY

## 2022-03-30 PROCEDURE — 3078F DIAST BP <80 MM HG: CPT | Mod: CPTII,S$GLB,, | Performed by: UROLOGY

## 2022-03-30 PROCEDURE — 1126F AMNT PAIN NOTED NONE PRSNT: CPT | Mod: CPTII,S$GLB,, | Performed by: UROLOGY

## 2022-03-30 PROCEDURE — 87086 URINE CULTURE/COLONY COUNT: CPT | Performed by: UROLOGY

## 2022-03-30 PROCEDURE — 1101F PR PT FALLS ASSESS DOC 0-1 FALLS W/OUT INJ PAST YR: ICD-10-PCS | Mod: CPTII,S$GLB,, | Performed by: UROLOGY

## 2022-03-30 PROCEDURE — 1101F PT FALLS ASSESS-DOCD LE1/YR: CPT | Mod: CPTII,S$GLB,, | Performed by: UROLOGY

## 2022-03-30 PROCEDURE — 3074F SYST BP LT 130 MM HG: CPT | Mod: CPTII,S$GLB,, | Performed by: UROLOGY

## 2022-03-30 PROCEDURE — 99214 OFFICE O/P EST MOD 30 MIN: CPT | Mod: S$GLB,,, | Performed by: UROLOGY

## 2022-03-30 PROCEDURE — 3078F PR MOST RECENT DIASTOLIC BLOOD PRESSURE < 80 MM HG: ICD-10-PCS | Mod: CPTII,S$GLB,, | Performed by: UROLOGY

## 2022-03-30 PROCEDURE — 1160F RVW MEDS BY RX/DR IN RCRD: CPT | Mod: CPTII,S$GLB,, | Performed by: UROLOGY

## 2022-03-30 PROCEDURE — 3288F PR FALLS RISK ASSESSMENT DOCUMENTED: ICD-10-PCS | Mod: CPTII,S$GLB,, | Performed by: UROLOGY

## 2022-03-30 PROCEDURE — 1160F PR REVIEW ALL MEDS BY PRESCRIBER/CLIN PHARMACIST DOCUMENTED: ICD-10-PCS | Mod: CPTII,S$GLB,, | Performed by: UROLOGY

## 2022-03-30 PROCEDURE — 1159F MED LIST DOCD IN RCRD: CPT | Mod: CPTII,S$GLB,, | Performed by: UROLOGY

## 2022-03-30 PROCEDURE — 3074F PR MOST RECENT SYSTOLIC BLOOD PRESSURE < 130 MM HG: ICD-10-PCS | Mod: CPTII,S$GLB,, | Performed by: UROLOGY

## 2022-03-30 PROCEDURE — 1126F PR PAIN SEVERITY QUANTIFIED, NO PAIN PRESENT: ICD-10-PCS | Mod: CPTII,S$GLB,, | Performed by: UROLOGY

## 2022-03-30 PROCEDURE — 3008F BODY MASS INDEX DOCD: CPT | Mod: CPTII,S$GLB,, | Performed by: UROLOGY

## 2022-03-30 PROCEDURE — 1159F PR MEDICATION LIST DOCUMENTED IN MEDICAL RECORD: ICD-10-PCS | Mod: CPTII,S$GLB,, | Performed by: UROLOGY

## 2022-03-30 PROCEDURE — 3288F FALL RISK ASSESSMENT DOCD: CPT | Mod: CPTII,S$GLB,, | Performed by: UROLOGY

## 2022-03-30 NOTE — PROGRESS NOTES
Subjective:       Med Palma is a 66 y.o. male who is an established pt who was referred by Dr Wilson for evaluation of elevated PSA.      He was seen previously with a elevated PSA of 8.4 but had a UTI at the time of PSA check. For this reason, we decided to hold on PBx and plan for recheck of PSA when no UTI present. His recheck showed a PSA of 5.9 in 11/14. Previous free PSA was 7%. He was counseled on PBx but he declined and opted for another recheck of his prostate.      No prostate cancer in the family. Brother with possible BPH or prostatitis. Denies LUTS at this time, rare nocturia. Not other UTI symptoms.     He is now s/p TRUS PBx on 1/26/15. All 12 cores from the biopsy were negative for malignancy. He denied any post-procedure issues such as fever or bleeding. Prostate volume 29g.     TRUS also revealed a large median lobe that could be causing issues with voiding that may have led to UTI. Due to imaging, he would be a candidate for TURP in the future if he desired. He denies current LUTS. He had not been on any treatment for BPH in the past. He was given rx for Flomax but did not like SE of retrograde ejaculation therefore he was switched to Uroxatral. He stopped this medication and has been doing well.    He does note some weakened stream. He has nocturia x 1-2 only after drinking a beer. He continues to decline meds or surgery.     PVR (bladder scan) last visits - 135cc, 0cc, 26cc, 14cc, 31cc.     PSA (9/24/14) - 8.4   PSA (1/12/15) - 5.3, free 10.75%  PSA (6/15/15) - 5.4   PSA (12/11/15) - 5.9  PSA (6/3/16) - 3.2, free 15%  PSA (5/17) - 3.1   PSA (6/18) - 2.2  PSA (6/19) - 2.3, 20%  PSA (8/20) - 2.5, 31%  PSA (7/21) - 5.9  PSA (8/21) - 8.4, 18% (done after hospitalized for COVID PNA)  PSA (9/21) - 5.8  PSA (2/22) - 11.0, recheck 2 days later 10.0  PSA (3/22) - 10.4      6/12/2019  Doing well - still with slow stream and nocturia. Known median lobe from TRUS. PSA stable.   PVR (bladder  scan) today - 100cc    8/19/2020  PSA not done. LUTS worse with rare times drinking beer (nocturia x 3-4). Weak stream in AM occasionally. Stream improves throughout day. Minimally bothersome. Mild gross hematuria with heavy exercise once - UA clear today.   PVR (bladder scan) today - 11cc    7/7/2021  PSA not done for today. LUTS still present but stable and not bothersome. Now with some ED issues x 6-12 months. Problems both achieving and maintaining. No prior attempts at treatment. Normal libido.      9/15/2021  PSA done after last visit more elevated (similar to values in 2014). Recommended to recheck in several weeks. Rechecked yesterday, value decreased. Recently hospitalized for COVID PNA, improving from that. Slow urinary stream in AM.     2/16/2022  Noted mild dysuria a few weeks ago, self-resolved. PSA again elevated. Rechecked 2 days later due to concern for elevation after ejaculation. Remains elevated.     mpMRI 3/22 - 65cc. Three PIRADS 3 lesions. Prominent BPH with large median lobe.     3/30/2022  PSA remains elevated. MRI with three PIRADS 3 lesions.       The following portions of the patient's history were reviewed and updated as appropriate: allergies, current medications, past family history, past medical history, past social history, past surgical history and problem list.    Review of Systems  Constitutional: no fever or chills  ENT: no nasal congestion or sore throat  Respiratory: no cough or shortness of breath  Cardiovascular: no chest pain or palpitations  Gastrointestinal: no nausea or vomiting, tolerating diet  Genitourinary: as per HPI  Hematologic/Lymphatic: no easy bruising or lymphadenopathy  Musculoskeletal: no arthralgias or myalgias  Skin: no rashes or lesions  Neurological: no seizures or tremors  Behavioral/Psych: no auditory or visual hallucinations       Objective:    Vitals:   /74   Pulse 75   Ht 6' (1.829 m)   Wt 84.4 kg (186 lb)   BMI 25.23 kg/m²     Physical Exam    General: well developed, well nourished in no acute distress  Head: normocephalic, atraumatic  Neck: supple, trachea midline  HEENT: EOMI, mucus membranes moist, sclera anicteric, no hearing impairment  Lungs: symmetric expansion, non-labored breathing  Cardiovascular: regular rate and rhythm, normal pulses  Psych: normal judgment and insight, normal mood/affect and non-anxious  Genitourinary: (last visit)  patient declined exam   ELSA: symmetric 60g prostate without nodularity or tenderness, seminal vesicles non palpable, normal sphincter tone without hemorrhoids or rectal masses. Rubbery gland.       Lab Review   Urine analysis shows - trace protein, +LE     Lab Results   Component Value Date    WBC 19.18 (H) 08/11/2021    HGB 10.1 (L) 08/11/2021    HCT 29.5 (L) 08/11/2021    MCV 91 08/11/2021     (H) 08/11/2021     Lab Results   Component Value Date    CREATININE 0.6 08/11/2021    BUN 18 08/11/2021     Lab Results   Component Value Date    PSA 0.96 10/10/2011      Imaging  TRUS images reviewed       Assessment/Plan:      1. Elevated PSA    - Negative PBx on 1/26/15.    - PSA actually now decreasing without intervention   - Thoroughly discussed option related to his elevated PSA with negative PBx. Discussed continued PSA monitoring, repeat standard 12-core biopsy, saturation biopsy, and prostatic MRI. He would like to continue with PSA surveillance.   - Vitamin D supplementation   - PSA again elevated   - MRI 3/22 - three PIRADS 3 lesions   - Recommend UroNav PBx. UCx today.       2. BPH   - Large median lobe noted on TRUS   - May be the reason for UTI due to LUCINA   - Consider for TURP if symptomatic/recurrent UTI   - Alpha-blockers may help with LUCINA if present though median lobe will not be dramatically improved by pharmacotherapy   - PVR acceptable   - Doing well without medication for BPH - again discussed Flomax and Proscar as options.    3. Gross hematuria   - One episode at time of heavy strenuous  exercise   - Discussed workup. Will hold for now as probable etiology known. If recurrent, will need full workup   - UA clear, reassuring    4. ED   - Cialis 10-20mg trial. Goodrx coupon given. Discussed SE. Refilled today.       Follow up 2 weeks after PBx

## 2022-03-31 LAB — BACTERIA UR CULT: NO GROWTH

## 2022-04-11 ENCOUNTER — TELEPHONE (OUTPATIENT)
Dept: FAMILY MEDICINE | Facility: CLINIC | Age: 67
End: 2022-04-11
Payer: MEDICARE

## 2022-04-11 RX ORDER — ENEMA 19; 7 G/133ML; G/133ML
1 ENEMA RECTAL ONCE
Qty: 133 ML | Refills: 0 | Status: SHIPPED | OUTPATIENT
Start: 2022-04-11 | End: 2022-04-11

## 2022-04-11 RX ORDER — CIPROFLOXACIN 500 MG/1
500 TABLET ORAL 2 TIMES DAILY
Qty: 6 TABLET | Refills: 0 | Status: SHIPPED | OUTPATIENT
Start: 2022-04-11 | End: 2022-04-14

## 2022-04-11 NOTE — TELEPHONE ENCOUNTER
Spoke with pt he was added to wait list, he need to be reschedule for any date prior to 5/18.  Please call pt in reschedule him for another date in June.  I'm unsure if he can get his results from someone else p/t his biopsy.

## 2022-04-11 NOTE — TELEPHONE ENCOUNTER
----- Message from Alexandra Boyd sent at 4/11/2022 11:36 AM CDT -----  Type: Patient Call Back       What is the request in detail:  pt calling to speak to a nurse regarding sooner appt for follow up appt with .      Can the clinic reply by [facility]? No       Would the patient rather a call back or a response via [facility]? Call back       Best call back number: [phone]        Thank you.

## 2022-04-11 NOTE — TELEPHONE ENCOUNTER
I do not have availability the week prior on 5/11/22. If he needs to reschedule, would have to be for 5/25/22.

## 2022-04-11 NOTE — TELEPHONE ENCOUNTER
Pt need rx sent before the biopsy is done.      Also need to reshedule his f/u on the 5/18.  He can come in any time prior to that date.   Attempted to reschedule his appointment but nothing is available.

## 2022-04-29 ENCOUNTER — TELEPHONE (OUTPATIENT)
Dept: UROLOGY | Facility: CLINIC | Age: 67
End: 2022-04-29
Payer: MEDICARE

## 2022-04-29 ENCOUNTER — TELEPHONE (OUTPATIENT)
Dept: UROLOGY | Facility: HOSPITAL | Age: 67
End: 2022-04-29
Payer: MEDICARE

## 2022-04-29 DIAGNOSIS — R97.20 ELEVATED PSA: Primary | ICD-10-CM

## 2022-04-29 RX ORDER — CIPROFLOXACIN 500 MG/1
500 TABLET ORAL 2 TIMES DAILY
Qty: 4 TABLET | Refills: 0 | Status: SHIPPED | OUTPATIENT
Start: 2022-04-29 | End: 2022-05-01

## 2022-04-29 NOTE — TELEPHONE ENCOUNTER
----- Message from Niru Richardson MA sent at 4/29/2022 10:08 AM CDT -----  Contact: @[phone]    ----- Message -----  From: Marisol Perez  Sent: 4/29/2022   9:39 AM CDT  To: [facility] Staff    PT, is suppose to receive an antibiotic bf his procedure on 05/02 and he has not received it, pls call

## 2022-05-02 ENCOUNTER — PROCEDURE VISIT (OUTPATIENT)
Dept: UROLOGY | Facility: CLINIC | Age: 67
End: 2022-05-02
Payer: MEDICARE

## 2022-05-02 VITALS
TEMPERATURE: 98 F | WEIGHT: 193.13 LBS | SYSTOLIC BLOOD PRESSURE: 114 MMHG | BODY MASS INDEX: 26.16 KG/M2 | RESPIRATION RATE: 16 BRPM | HEART RATE: 55 BPM | DIASTOLIC BLOOD PRESSURE: 70 MMHG | HEIGHT: 72 IN

## 2022-05-02 DIAGNOSIS — R97.20 ELEVATED PSA: Primary | ICD-10-CM

## 2022-05-02 PROCEDURE — 88344 IMHCHEM/IMCYTCHM EA MLT ANTB: CPT | Mod: 26,,, | Performed by: PATHOLOGY

## 2022-05-02 PROCEDURE — 88344 PR IHC OR ICC EACH MULTIPLEX ANTIBODY STAIN PROCEDURE: ICD-10-PCS | Mod: 26,,, | Performed by: PATHOLOGY

## 2022-05-02 PROCEDURE — 88305 TISSUE EXAM BY PATHOLOGIST: CPT | Mod: 59 | Performed by: PATHOLOGY

## 2022-05-02 PROCEDURE — 55700 TRANSRECTAL ULTRASOUND W/ BIOPSY: CPT | Mod: S$GLB,,, | Performed by: UROLOGY

## 2022-05-02 PROCEDURE — G0416 PROSTATE BIOPSY, ANY MTHD: ICD-10-PCS | Mod: 26,,, | Performed by: PATHOLOGY

## 2022-05-02 PROCEDURE — 88344 IMHCHEM/IMCYTCHM EA MLT ANTB: CPT | Mod: 59 | Performed by: PATHOLOGY

## 2022-05-02 PROCEDURE — 96372 THER/PROPH/DIAG INJ SC/IM: CPT | Mod: 59,S$GLB,, | Performed by: UROLOGY

## 2022-05-02 PROCEDURE — G0416 PROSTATE BIOPSY, ANY MTHD: HCPCS | Mod: 26,,, | Performed by: PATHOLOGY

## 2022-05-02 PROCEDURE — 76872 TRANSRECTAL ULTRASOUND W/ BIOPSY: ICD-10-PCS | Mod: 26,S$GLB,, | Performed by: UROLOGY

## 2022-05-02 PROCEDURE — 96372 PR INJECTION,THERAP/PROPH/DIAG2ST, IM OR SUBCUT: ICD-10-PCS | Mod: 59,S$GLB,, | Performed by: UROLOGY

## 2022-05-02 PROCEDURE — 55700 TRANSRECTAL ULTRASOUND W/ BIOPSY: ICD-10-PCS | Mod: S$GLB,,, | Performed by: UROLOGY

## 2022-05-02 PROCEDURE — 76872 US TRANSRECTAL: CPT | Mod: 26,S$GLB,, | Performed by: UROLOGY

## 2022-05-02 RX ORDER — LIDOCAINE HYDROCHLORIDE 10 MG/ML
20 INJECTION INFILTRATION; PERINEURAL
Status: COMPLETED | OUTPATIENT
Start: 2022-05-02 | End: 2022-05-02

## 2022-05-02 RX ORDER — LIDOCAINE HYDROCHLORIDE 20 MG/ML
JELLY TOPICAL
Status: COMPLETED | OUTPATIENT
Start: 2022-05-02 | End: 2022-05-02

## 2022-05-02 RX ORDER — CEFTRIAXONE 1 G/1
1 INJECTION, POWDER, FOR SOLUTION INTRAMUSCULAR; INTRAVENOUS
Status: COMPLETED | OUTPATIENT
Start: 2022-05-02 | End: 2022-05-02

## 2022-05-02 RX ADMIN — LIDOCAINE HYDROCHLORIDE: 20 JELLY TOPICAL at 01:05

## 2022-05-02 RX ADMIN — CEFTRIAXONE 1 G: 1 INJECTION, POWDER, FOR SOLUTION INTRAMUSCULAR; INTRAVENOUS at 01:05

## 2022-05-02 RX ADMIN — LIDOCAINE HYDROCHLORIDE 20 ML: 10 INJECTION INFILTRATION; PERINEURAL at 01:05

## 2022-05-02 NOTE — PROCEDURES
"Transrectal Ultrasound w/ Biopsy    Date/Time: 5/2/2022 1:23 PM  Performed by: Yinka Jimenez MD  Authorized by: Cecilia Campbell MD     Consent Done?:  Yes (Written)  Time out: Immediately prior to procedure a "time out" was called to verify the correct patient, procedure, equipment, support staff and site/side marked as required.    Indications: Prostate Nodules and Elevated PSA    Preparation: Patient was prepped and draped in usual sterile fashion    Position:  Left lateral  Anesthesia:  Lidocaine jelly and 20cc's 1% Lidocaine  Patient sedated: No    Prostate Size:  60.9  Lesions:: No    Left Base Biopsies: 2  Left Mid Biopsies: 2  Left Nichols Biopsies: 2  Right Base Biopsies: 2  Right Mid Biopsies: 2  Right Nichols Biopsies: 2  Transitional zone: Yes    Total Biopsies:  12    Patient tolerance:  Patient tolerated the procedure well with no immediate complications     MR and US imaged segmented and co-registered with uronav    6 additional cores taken from target for total of 18 cores    Urine cs neg  cipro and enemas and gent done      Standard instructions given  RTC 2 weeks to discuss path in person at [facility] 5/17      "

## 2022-05-02 NOTE — Clinical Note
Pt is out of town 5/18 so he cannot see Dr Campbell; please cancel that appt  Please make appt for him to see Dr Jimenez at [place] on 5/17 to discuss path; OK to use any slot.  thanks

## 2022-05-02 NOTE — PATIENT INSTRUCTIONS
What to Expect After a Prostate Biopsy    Please be sure to finish your pre-procedure antibiotics as instructed.    You may have mild bleeding from the rectum or urine for about 1 week to 1 month, or in your ejaculate for several months. This bleeding is normal and expected, and it will stop. You may have mild discomfort in your rectal or urethral area for 24-48 hours.    You cannot do any strenuous lifting, straining, or exercising for 24 hours. You may return to full activity the day after the biopsy.    You may continue to take all your regular medications after the procedure except for the blood thinners.    You may resume all blood-thinning medications once you no longer see any bleeding or whenever your physician prescribing the medication says it is all right to do so. You may take Tylenol if you have a fever and your temperature is less than 100° F or if you have some discomfort.    You will receive a call from the Urology Department at [facility] with the results of your prostate biopsy within one week.    Signs and Symptoms to Report    Call your [facility] urologist at [phone] if you develop any of the following:  Temperature greater than 101°  F  Inability to urinate  A large amount of bleeding from the rectum or in the urine  Persistent or severe pain    After hours or on weekends, you may reach a urology resident on call at this number: [phone].

## 2022-05-13 LAB
FINAL PATHOLOGIC DIAGNOSIS: NORMAL
Lab: NORMAL

## 2022-05-16 ENCOUNTER — PATIENT OUTREACH (OUTPATIENT)
Dept: ADMINISTRATIVE | Facility: OTHER | Age: 67
End: 2022-05-16
Payer: MEDICARE

## 2022-05-16 DIAGNOSIS — Z12.11 COLON CANCER SCREENING: Primary | ICD-10-CM

## 2022-05-16 NOTE — PROGRESS NOTES
Health Maintenance Due   Topic Date Due    HIV Screening  Never done    TETANUS VACCINE  Never done    Shingles Vaccine (2 of 3) 12/18/2017    Pneumococcal Vaccines (Age 65+) (1 - PCV) Never done    Abdominal Aortic Aneurysm Screening  Never done    Colorectal Cancer Screening  11/14/2021    COVID-19 Vaccine (3 - Booster for Moderna series) 03/25/2022    Lipid Panel  05/12/2022     Updates were requested from care everywhere.  Chart was reviewed for overdue Proactive [facility] Encounters (ANGELLA) topics (CRS, Breast Cancer Screening, Eye exam)  Health Maintenance has been updated.  LINKS immunization registry triggered.  Immunizations were reconciled.  Orders entered:  Fit kit

## 2022-05-16 NOTE — PROGRESS NOTES
Subjective:      Med Palma is a 66 y.o. male who returns today regarding his prostate biopsy results.    Patient has a history of elevated PSA. Subsequent prostate MRI showed PIRADAS 3 lesions right posterolateral peripheral zone and 2 within the left anterior transition zone of the mid to base of the prostate. Targeted prostate biospy on 5/2/22 was benign with chronic inflammation.    After the prostate biopsy, he had a minimal amount of blood in the urine and ejaculate. Minimal pain. He continues to have voiding complaints that he had prior to the biopsy.    Prostate is 65 cc on mri. Prior TRUS revealed a large median lobe that could be causing issues with voiding that may have led to his prior UTI. He has tried both Flomax and Uroxatral, but had side effects of retrograde ejaculation and stopped the medication.    He has ED. Currently taking Cialis 20mg and it works well.    The following portions of the patient's history were reviewed and updated as appropriate: allergies, current medications, past family history, past medical history, past social history, past surgical history and problem list.    Review of Systems  Pertinent items are noted in HPI.  A comprehensive multipoint review of systems was negative except as otherwise stated in the HPI.    Past Medical History:   Diagnosis Date    Allergy     Arthritis     Elevated PSA     Hypertension      No past surgical history on file.    Review of patient's allergies indicates:  No Known Allergies       Objective:   Vitals: There were no vitals taken for this visit.    Physical Exam   General: alert and oriented, no acute distress  Respiratory: Symmetric expansion, non-labored breathing  Cardiovascular: no peripheral edema  Abdomen: soft, non distended  Skin: normal coloration and turgor, no rashes, no suspicious skin lesions noted  Neuro: no gross deficits  Psych: normal judgment and insight, normal mood/affect and non-anxious    Lab Review    Urinalysis demonstrates negative for all components  Lab Results   Component Value Date    WBC 19.18 (H) 08/11/2021    HGB 10.1 (L) 08/11/2021    HCT 29.5 (L) 08/11/2021    MCV 91 08/11/2021     (H) 08/11/2021     Lab Results   Component Value Date    CREATININE 0.6 08/11/2021    BUN 18 08/11/2021     Lab Results   Component Value Date    PSA 0.96 10/10/2011    PSADIAG 10.4 (H) 03/23/2022    PSADIAG 10.0 (H) 02/15/2022    PSADIAG 11.0 (H) 02/14/2022    PSATOTAL 8.4 (H) 08/24/2021    PSATOTAL 2.5 08/19/2020    PSATOTAL 2.3 06/11/2019    PSAFREE 1.59 (H) 08/24/2021    PSAFREE 0.78 08/19/2020    PSAFREE 0.46 06/11/2019    PSAFREEPCT 18.93 08/24/2021    PSAFREEPCT 31.20 08/19/2020    PSAFREEPCT 20.00 06/11/2019       Final Pathologic Diagnosis RELIAPATH DIAGNOSIS:   A.   PROSTATE, LEFT APEX, NEEDLE BIOPSY:          Benign prostatic tissue with chronic inflammation, see comment.   (R97.20)   B.   PROSTATE, LEFT MID, NEEDLE BIOPSY:         Benign prostatic tissue with chronic inflammation, see comment.   (R97.20)   C.   PROSTATE, LEFT BASE, NEEDLE BIOPSY:         Benign prostatic tissue.  (R97.20)   D.   PROSTATE, RIGHT APEX, NEEDLE BIOPSY:          Benign prostatic tissue with chronic inflammation, see comment.   (R97.20)   E.   PROSTATE, RIGHT MID, NEEDLE BIOPSY:          Benign prostatic tissue with chronic inflammation, see comment.   (R97.20)   F.   PROSTATE, RIGHT BASE, NEEDLE BIOPSY:          Benign prostatic tissue with chronic inflammation, see comment.   (R97.20)   G.   TISSUE1          PROSTATE, TARGET LESION #1, BIOPSY:          -Benign prostatic tissue with chronic inflammation, see comment.   (R97.20)   H.   TISSUE2          PROSTATE, TARGET LESION #2, BIOPSY:          -Benign prostatic tissue .  (R97.20)   I.     TISSUE3          PROSTATE, TARGET LESION #3, BIOPSY:          -Benign prostatic tissue with chronic inflammation .  (R97.20)   COMMENT:   A, B, H, I).   The *prostate triple stain supports  "the above diagnoses.   Kim Lara M.D.   Report attached.   Performing site:   [address]   [address] [place] [address]   "Disclaimer:  This case diagnosis was rendered completely by the outside   consultation pathologist and the case is electronically signed by an [facility]   pathologist listed below solely to release the report into the medical   record."    Comment: Interp By Harman Gonzalez MD, Signed on 05/13/2022 at 14:59         Imaging  MRI PIRADS3  65G    Assessment and Plan:   Benign non-nodular prostatic hyperplasia with lower urinary tract symptoms; 65g    See Dr Campbell 6 months with psa    "

## 2022-05-17 ENCOUNTER — OFFICE VISIT (OUTPATIENT)
Dept: UROLOGY | Facility: CLINIC | Age: 67
End: 2022-05-17
Attending: UROLOGY
Payer: MEDICARE

## 2022-05-17 VITALS — DIASTOLIC BLOOD PRESSURE: 79 MMHG | SYSTOLIC BLOOD PRESSURE: 113 MMHG | HEART RATE: 69 BPM

## 2022-05-17 DIAGNOSIS — N40.1 BENIGN NON-NODULAR PROSTATIC HYPERPLASIA WITH LOWER URINARY TRACT SYMPTOMS: Primary | ICD-10-CM

## 2022-05-17 PROCEDURE — 1160F PR REVIEW ALL MEDS BY PRESCRIBER/CLIN PHARMACIST DOCUMENTED: ICD-10-PCS | Mod: CPTII,S$GLB,, | Performed by: UROLOGY

## 2022-05-17 PROCEDURE — 1126F AMNT PAIN NOTED NONE PRSNT: CPT | Mod: CPTII,S$GLB,, | Performed by: UROLOGY

## 2022-05-17 PROCEDURE — 3078F PR MOST RECENT DIASTOLIC BLOOD PRESSURE < 80 MM HG: ICD-10-PCS | Mod: CPTII,S$GLB,, | Performed by: UROLOGY

## 2022-05-17 PROCEDURE — 1126F PR PAIN SEVERITY QUANTIFIED, NO PAIN PRESENT: ICD-10-PCS | Mod: CPTII,S$GLB,, | Performed by: UROLOGY

## 2022-05-17 PROCEDURE — 99999 PR PBB SHADOW E&M-EST. PATIENT-LVL III: CPT | Mod: PBBFAC,,, | Performed by: UROLOGY

## 2022-05-17 PROCEDURE — 1101F PT FALLS ASSESS-DOCD LE1/YR: CPT | Mod: CPTII,S$GLB,, | Performed by: UROLOGY

## 2022-05-17 PROCEDURE — 99214 OFFICE O/P EST MOD 30 MIN: CPT | Mod: S$GLB,,, | Performed by: UROLOGY

## 2022-05-17 PROCEDURE — 3288F FALL RISK ASSESSMENT DOCD: CPT | Mod: CPTII,S$GLB,, | Performed by: UROLOGY

## 2022-05-17 PROCEDURE — 3074F PR MOST RECENT SYSTOLIC BLOOD PRESSURE < 130 MM HG: ICD-10-PCS | Mod: CPTII,S$GLB,, | Performed by: UROLOGY

## 2022-05-17 PROCEDURE — 3074F SYST BP LT 130 MM HG: CPT | Mod: CPTII,S$GLB,, | Performed by: UROLOGY

## 2022-05-17 PROCEDURE — 3288F PR FALLS RISK ASSESSMENT DOCUMENTED: ICD-10-PCS | Mod: CPTII,S$GLB,, | Performed by: UROLOGY

## 2022-05-17 PROCEDURE — 3078F DIAST BP <80 MM HG: CPT | Mod: CPTII,S$GLB,, | Performed by: UROLOGY

## 2022-05-17 PROCEDURE — 1160F RVW MEDS BY RX/DR IN RCRD: CPT | Mod: CPTII,S$GLB,, | Performed by: UROLOGY

## 2022-05-17 PROCEDURE — 1159F MED LIST DOCD IN RCRD: CPT | Mod: CPTII,S$GLB,, | Performed by: UROLOGY

## 2022-05-17 PROCEDURE — 1159F PR MEDICATION LIST DOCUMENTED IN MEDICAL RECORD: ICD-10-PCS | Mod: CPTII,S$GLB,, | Performed by: UROLOGY

## 2022-05-17 PROCEDURE — 1101F PR PT FALLS ASSESS DOC 0-1 FALLS W/OUT INJ PAST YR: ICD-10-PCS | Mod: CPTII,S$GLB,, | Performed by: UROLOGY

## 2022-05-17 PROCEDURE — 99214 PR OFFICE/OUTPT VISIT, EST, LEVL IV, 30-39 MIN: ICD-10-PCS | Mod: S$GLB,,, | Performed by: UROLOGY

## 2022-05-17 PROCEDURE — 99999 PR PBB SHADOW E&M-EST. PATIENT-LVL III: ICD-10-PCS | Mod: PBBFAC,,, | Performed by: UROLOGY

## 2022-05-18 ENCOUNTER — TELEPHONE (OUTPATIENT)
Dept: UROLOGY | Facility: CLINIC | Age: 67
End: 2022-05-18
Payer: MEDICARE

## 2022-05-18 NOTE — TELEPHONE ENCOUNTER
Pt is scheduled to see me in November. I will be out Oct through Dec. Can he be rescheduled either with me in January or with Dr Jimenez in November for PSA follow up? (whatever the pt prefers) Thank you.

## 2022-05-30 ENCOUNTER — PATIENT MESSAGE (OUTPATIENT)
Dept: ADMINISTRATIVE | Facility: HOSPITAL | Age: 67
End: 2022-05-30
Payer: MEDICARE

## 2022-10-24 ENCOUNTER — OFFICE VISIT (OUTPATIENT)
Dept: INTERNAL MEDICINE | Facility: CLINIC | Age: 67
End: 2022-10-24
Attending: FAMILY MEDICINE
Payer: MEDICARE

## 2022-10-24 ENCOUNTER — TELEPHONE (OUTPATIENT)
Dept: INTERNAL MEDICINE | Facility: CLINIC | Age: 67
End: 2022-10-24
Payer: MEDICARE

## 2022-10-24 VITALS
SYSTOLIC BLOOD PRESSURE: 98 MMHG | OXYGEN SATURATION: 98 % | DIASTOLIC BLOOD PRESSURE: 80 MMHG | BODY MASS INDEX: 26.84 KG/M2 | HEIGHT: 72 IN | WEIGHT: 198.19 LBS | HEART RATE: 66 BPM

## 2022-10-24 DIAGNOSIS — Z12.11 ENCOUNTER FOR SCREENING FOR MALIGNANT NEOPLASM OF COLON: ICD-10-CM

## 2022-10-24 DIAGNOSIS — Z00.00 PREVENTATIVE HEALTH CARE: ICD-10-CM

## 2022-10-24 DIAGNOSIS — M10.9 GOUTY ARTHRITIS: Primary | ICD-10-CM

## 2022-10-24 PROCEDURE — 3079F DIAST BP 80-89 MM HG: CPT | Mod: CPTII,S$GLB,, | Performed by: FAMILY MEDICINE

## 2022-10-24 PROCEDURE — 99397 PR PREVENTIVE VISIT,EST,65 & OVER: ICD-10-PCS | Mod: S$GLB,,, | Performed by: FAMILY MEDICINE

## 2022-10-24 PROCEDURE — 1159F MED LIST DOCD IN RCRD: CPT | Mod: CPTII,S$GLB,, | Performed by: FAMILY MEDICINE

## 2022-10-24 PROCEDURE — 3079F PR MOST RECENT DIASTOLIC BLOOD PRESSURE 80-89 MM HG: ICD-10-PCS | Mod: CPTII,S$GLB,, | Performed by: FAMILY MEDICINE

## 2022-10-24 PROCEDURE — 1101F PR PT FALLS ASSESS DOC 0-1 FALLS W/OUT INJ PAST YR: ICD-10-PCS | Mod: CPTII,S$GLB,, | Performed by: FAMILY MEDICINE

## 2022-10-24 PROCEDURE — 1159F PR MEDICATION LIST DOCUMENTED IN MEDICAL RECORD: ICD-10-PCS | Mod: CPTII,S$GLB,, | Performed by: FAMILY MEDICINE

## 2022-10-24 PROCEDURE — 99999 PR PBB SHADOW E&M-EST. PATIENT-LVL III: CPT | Mod: PBBFAC,,, | Performed by: FAMILY MEDICINE

## 2022-10-24 PROCEDURE — 3074F SYST BP LT 130 MM HG: CPT | Mod: CPTII,S$GLB,, | Performed by: FAMILY MEDICINE

## 2022-10-24 PROCEDURE — 3074F PR MOST RECENT SYSTOLIC BLOOD PRESSURE < 130 MM HG: ICD-10-PCS | Mod: CPTII,S$GLB,, | Performed by: FAMILY MEDICINE

## 2022-10-24 PROCEDURE — 1101F PT FALLS ASSESS-DOCD LE1/YR: CPT | Mod: CPTII,S$GLB,, | Performed by: FAMILY MEDICINE

## 2022-10-24 PROCEDURE — 1126F AMNT PAIN NOTED NONE PRSNT: CPT | Mod: CPTII,S$GLB,, | Performed by: FAMILY MEDICINE

## 2022-10-24 PROCEDURE — 1160F PR REVIEW ALL MEDS BY PRESCRIBER/CLIN PHARMACIST DOCUMENTED: ICD-10-PCS | Mod: CPTII,S$GLB,, | Performed by: FAMILY MEDICINE

## 2022-10-24 PROCEDURE — 3288F PR FALLS RISK ASSESSMENT DOCUMENTED: ICD-10-PCS | Mod: CPTII,S$GLB,, | Performed by: FAMILY MEDICINE

## 2022-10-24 PROCEDURE — 1126F PR PAIN SEVERITY QUANTIFIED, NO PAIN PRESENT: ICD-10-PCS | Mod: CPTII,S$GLB,, | Performed by: FAMILY MEDICINE

## 2022-10-24 PROCEDURE — 99999 PR PBB SHADOW E&M-EST. PATIENT-LVL III: ICD-10-PCS | Mod: PBBFAC,,, | Performed by: FAMILY MEDICINE

## 2022-10-24 PROCEDURE — 3288F FALL RISK ASSESSMENT DOCD: CPT | Mod: CPTII,S$GLB,, | Performed by: FAMILY MEDICINE

## 2022-10-24 PROCEDURE — 1160F RVW MEDS BY RX/DR IN RCRD: CPT | Mod: CPTII,S$GLB,, | Performed by: FAMILY MEDICINE

## 2022-10-24 PROCEDURE — 99397 PER PM REEVAL EST PAT 65+ YR: CPT | Mod: S$GLB,,, | Performed by: FAMILY MEDICINE

## 2022-10-24 NOTE — PROGRESS NOTES
CHIEF COMPLAINT:  Annual    HISTORY OF PRESENT ILLNESS: The patient is a perfectly healthy 67-year-old white male presents for annual.    He has a long history of gout.  He typically takes large doses of ibuprofen and the symptoms go away.    There is no history of trauma.    He is afflicted by flight anxiety.    REVIEW OF SYSTEMS:  GENERAL: No fever, chills, fatigability or weight loss.  SKIN: No rashes, itching or changes in color or texture of skin.  HEAD: No headaches or recent head trauma.  EYES: Visual acuity fine. No photophobia, ocular pain or diplopia.  EARS: Denies ear pain, discharge or vertigo.  NOSE: No loss of smell, no epistaxis or postnasal drip.  MOUTH & THROAT: No hoarseness or change in voice. No excessive gum bleeding.  NODES: Denies swollen glands.  CHEST: Denies JACKSON, cyanosis, wheezing, cough and sputum production.  CARDIOVASCULAR: Denies chest pain, PND, orthopnea or reduced exercise tolerance.  ABDOMEN: Appetite fine. No weight loss. Denies diarrhea, abdominal pain, hematemesis or blood in stool.  URINARY: No flank pain, dysuria or hematuria.  PERIPHERAL VASCULAR: No claudication or cyanosis.  MUSCULOSKELETAL: There is no joint pain, stiffness and swelling. Denies back pain.  NEUROLOGIC: No history of seizures, paralysis, alteration of gait or coordination.    SOCIAL HISTORY:  Does not smoke.  Does consume EtOH.      PHYSICAL EXAMINATION:   Blood pressure 98/80, pulse 66, height 6' (1.829 m), weight 89.9 kg (198 lb 3.1 oz), SpO2 98 %.    APPEARANCE: Well nourished, well developed, in no acute distress.    HEAD: Normocephalic, atraumatic.  EYES: PERRL. EOMI.  Conjunctivae without injection and  anicteric  NOSE: Mucosa pink. Airway clear.  MOUTH & THROAT: No tonsillar enlargement. No pharyngeal erythema or exudate. No stridor.  NECK: Supple.   NODES: No cervical, axillary or inguinal lymph node enlargement.  MUSCULOSKELETAL:  There is no clubbing, cyanosis, or edema of the extremities  x4.  There is full range of motion of the lumbar spine.  There is full range of motion of the extremities x4.  There is marked tenderness of the right great toe.  There is erythema and edema of the great toe as well.    NEUROLOGIC:       Normal speech development.      Hearing normal.      Antalgic gait.      Motor and sensory exams grossly normal.  PSYCHIATRIC: Patient is alert and oriented x3.  Thought processes are all normal.  There is no homicidality.  There is no suicidality.  There is no evidence of psychosis.    LABORATORY/RADIOLOGY: Chart reviewed.    ASSESSMENT:   Annual  Atypical CP  Gout  Flight anxiety    PLAN:      Patient will RTC 1 year.

## 2022-10-26 ENCOUNTER — LAB VISIT (OUTPATIENT)
Dept: LAB | Facility: HOSPITAL | Age: 67
End: 2022-10-26
Attending: FAMILY MEDICINE
Payer: MEDICARE

## 2022-10-26 DIAGNOSIS — Z12.11 ENCOUNTER FOR SCREENING FOR MALIGNANT NEOPLASM OF COLON: ICD-10-CM

## 2022-10-26 PROCEDURE — 82274 ASSAY TEST FOR BLOOD FECAL: CPT | Performed by: FAMILY MEDICINE

## 2022-11-01 DIAGNOSIS — R19.5 HEME POSITIVE STOOL: Primary | ICD-10-CM

## 2022-11-01 LAB — HEMOCCULT STL QL IA: POSITIVE

## 2022-11-02 ENCOUNTER — TELEPHONE (OUTPATIENT)
Dept: INTERNAL MEDICINE | Facility: CLINIC | Age: 67
End: 2022-11-02
Payer: MEDICARE

## 2022-11-02 ENCOUNTER — PATIENT MESSAGE (OUTPATIENT)
Dept: INTERNAL MEDICINE | Facility: CLINIC | Age: 67
End: 2022-11-02
Payer: MEDICARE

## 2022-11-02 NOTE — TELEPHONE ENCOUNTER
----- Message from Jah Wilson MD sent at 11/1/2022  5:04 PM CDT -----  Fit kit is positive.  Will refer to [facility]

## 2022-11-15 ENCOUNTER — LAB VISIT (OUTPATIENT)
Dept: LAB | Facility: OTHER | Age: 67
End: 2022-11-15
Attending: UROLOGY
Payer: MEDICARE

## 2022-11-15 DIAGNOSIS — N40.1 BENIGN NON-NODULAR PROSTATIC HYPERPLASIA WITH LOWER URINARY TRACT SYMPTOMS: ICD-10-CM

## 2022-11-15 LAB — COMPLEXED PSA SERPL-MCNC: 6.2 NG/ML (ref 0–4)

## 2022-11-15 PROCEDURE — 36415 COLL VENOUS BLD VENIPUNCTURE: CPT | Performed by: UROLOGY

## 2022-11-15 PROCEDURE — 84153 ASSAY OF PSA TOTAL: CPT | Performed by: UROLOGY

## 2022-11-16 ENCOUNTER — PATIENT MESSAGE (OUTPATIENT)
Dept: UROLOGY | Facility: CLINIC | Age: 67
End: 2022-11-16
Payer: MEDICARE

## 2022-12-08 ENCOUNTER — PATIENT OUTREACH (OUTPATIENT)
Dept: INTERNAL MEDICINE | Facility: CLINIC | Age: 67
End: 2022-12-08
Payer: MEDICARE

## 2022-12-19 NOTE — PROGRESS NOTES
Subjective:      Med Palma is a 67 y.o. male who returns today regarding his     No  complaints.    The following portions of the patient's history were reviewed and updated as appropriate: allergies, current medications, past family history, past medical history, past social history, past surgical history and problem list.    Review of Systems  Pertinent items are noted in HPI.  A comprehensive multipoint review of systems was negative except as otherwise stated in the HPI.    Past Medical History:   Diagnosis Date    Allergy     Arthritis     Elevated PSA     Hypertension      No past surgical history on file.    Review of patient's allergies indicates:  No Known Allergies       Objective:   Vitals: There were no vitals taken for this visit.    Physical Exam   General: alert and oriented, no acute distress  Respiratory: Symmetric expansion, non-labored breathing  Cardiovascular: no peripheral edema  Abdomen: soft, non distended  Skin: normal coloration and turgor, no rashes, no suspicious skin lesions noted  Neuro: no gross deficits  Psych: normal judgment and insight, normal mood/affect, and non-anxious    Physical Exam    Lab Review   Urinalysis demonstrates trace blood otherwise neg    Lab Results   Component Value Date    WBC 19.18 (H) 08/11/2021    HGB 10.1 (L) 08/11/2021    HCT 29.5 (L) 08/11/2021    MCV 91 08/11/2021     (H) 08/11/2021     Lab Results   Component Value Date    CREATININE 0.6 08/11/2021    BUN 18 08/11/2021     Lab Results   Component Value Date    PSA 0.96 10/10/2011    PSADIAG 6.2 (H) 11/15/2022    PSADIAG 10.4 (H) 03/23/2022    PSADIAG 10.0 (H) 02/15/2022    PSATOTAL 8.4 (H) 08/24/2021    PSATOTAL 2.5 08/19/2020    PSATOTAL 2.3 06/11/2019    PSAFREE 1.59 (H) 08/24/2021    PSAFREE 0.78 08/19/2020    PSAFREE 0.46 06/11/2019    PSAFREEPCT 18.93 08/24/2021    PSAFREEPCT 31.20 08/19/2020    PSAFREEPCT 20.00 06/11/2019        Final Pathologic Diagnosis RELIAPATH DIAGNOSIS:  "  A.   PROSTATE, LEFT APEX, NEEDLE BIOPSY:          Benign prostatic tissue with chronic inflammation, see comment.   (R97.20)   B.   PROSTATE, LEFT MID, NEEDLE BIOPSY:         Benign prostatic tissue with chronic inflammation, see comment.   (R97.20)   C.   PROSTATE, LEFT BASE, NEEDLE BIOPSY:         Benign prostatic tissue.  (R97.20)   D.   PROSTATE, RIGHT APEX, NEEDLE BIOPSY:          Benign prostatic tissue with chronic inflammation, see comment.   (R97.20)   E.   PROSTATE, RIGHT MID, NEEDLE BIOPSY:          Benign prostatic tissue with chronic inflammation, see comment.   (R97.20)   F.   PROSTATE, RIGHT BASE, NEEDLE BIOPSY:          Benign prostatic tissue with chronic inflammation, see comment.   (R97.20)   G.   TISSUE1          PROSTATE, TARGET LESION #1, BIOPSY:          -Benign prostatic tissue with chronic inflammation, see comment.   (R97.20)   H.   TISSUE2          PROSTATE, TARGET LESION #2, BIOPSY:          -Benign prostatic tissue .  (R97.20)   I.     TISSUE3          PROSTATE, TARGET LESION #3, BIOPSY:          -Benign prostatic tissue with chronic inflammation .  (R97.20)   COMMENT:   A, B, H, I).   The *prostate triple stain supports the above diagnoses.   Kim Lara M.D.   Report attached.   Performing site:   [place] [address]   "Disclaimer:  This case diagnosis was rendered completely by the outside   consultation pathologist and the case is electronically signed by an [facility]   pathologist listed below solely to release the report into the medical   record."    Comment: Interp By Harman Gonzalez MD, Signed on 05/13/2022 at 14:59            Imaging  MRI PIRADS3  65G      Imaging  -  Assessment and Plan:   Benign non-nodular prostatic hyperplasia with lower urinary tract symptoms; 65g    Erectile dysfunction due to arterial insufficiency      RTC 6 months with psa for repeat UA and ELSA    "

## 2022-12-20 ENCOUNTER — OFFICE VISIT (OUTPATIENT)
Dept: UROLOGY | Facility: CLINIC | Age: 67
End: 2022-12-20
Payer: MEDICARE

## 2022-12-20 VITALS
HEART RATE: 66 BPM | SYSTOLIC BLOOD PRESSURE: 126 MMHG | HEIGHT: 72 IN | RESPIRATION RATE: 16 BRPM | WEIGHT: 201.19 LBS | DIASTOLIC BLOOD PRESSURE: 78 MMHG | BODY MASS INDEX: 27.25 KG/M2

## 2022-12-20 DIAGNOSIS — N52.01 ERECTILE DYSFUNCTION DUE TO ARTERIAL INSUFFICIENCY: ICD-10-CM

## 2022-12-20 DIAGNOSIS — N40.1 BENIGN NON-NODULAR PROSTATIC HYPERPLASIA WITH LOWER URINARY TRACT SYMPTOMS: Primary | ICD-10-CM

## 2022-12-20 PROCEDURE — 3074F SYST BP LT 130 MM HG: CPT | Mod: CPTII,S$GLB,, | Performed by: UROLOGY

## 2022-12-20 PROCEDURE — 1159F MED LIST DOCD IN RCRD: CPT | Mod: CPTII,S$GLB,, | Performed by: UROLOGY

## 2022-12-20 PROCEDURE — 99999 PR PBB SHADOW E&M-EST. PATIENT-LVL III: ICD-10-PCS | Mod: PBBFAC,,, | Performed by: UROLOGY

## 2022-12-20 PROCEDURE — 1159F PR MEDICATION LIST DOCUMENTED IN MEDICAL RECORD: ICD-10-PCS | Mod: CPTII,S$GLB,, | Performed by: UROLOGY

## 2022-12-20 PROCEDURE — 3008F PR BODY MASS INDEX (BMI) DOCUMENTED: ICD-10-PCS | Mod: CPTII,S$GLB,, | Performed by: UROLOGY

## 2022-12-20 PROCEDURE — 1126F AMNT PAIN NOTED NONE PRSNT: CPT | Mod: CPTII,S$GLB,, | Performed by: UROLOGY

## 2022-12-20 PROCEDURE — 1101F PR PT FALLS ASSESS DOC 0-1 FALLS W/OUT INJ PAST YR: ICD-10-PCS | Mod: CPTII,S$GLB,, | Performed by: UROLOGY

## 2022-12-20 PROCEDURE — 3074F PR MOST RECENT SYSTOLIC BLOOD PRESSURE < 130 MM HG: ICD-10-PCS | Mod: CPTII,S$GLB,, | Performed by: UROLOGY

## 2022-12-20 PROCEDURE — 1101F PT FALLS ASSESS-DOCD LE1/YR: CPT | Mod: CPTII,S$GLB,, | Performed by: UROLOGY

## 2022-12-20 PROCEDURE — 3078F PR MOST RECENT DIASTOLIC BLOOD PRESSURE < 80 MM HG: ICD-10-PCS | Mod: CPTII,S$GLB,, | Performed by: UROLOGY

## 2022-12-20 PROCEDURE — 3008F BODY MASS INDEX DOCD: CPT | Mod: CPTII,S$GLB,, | Performed by: UROLOGY

## 2022-12-20 PROCEDURE — 99214 PR OFFICE/OUTPT VISIT, EST, LEVL IV, 30-39 MIN: ICD-10-PCS | Mod: S$GLB,,, | Performed by: UROLOGY

## 2022-12-20 PROCEDURE — 3288F FALL RISK ASSESSMENT DOCD: CPT | Mod: CPTII,S$GLB,, | Performed by: UROLOGY

## 2022-12-20 PROCEDURE — 1126F PR PAIN SEVERITY QUANTIFIED, NO PAIN PRESENT: ICD-10-PCS | Mod: CPTII,S$GLB,, | Performed by: UROLOGY

## 2022-12-20 PROCEDURE — 99999 PR PBB SHADOW E&M-EST. PATIENT-LVL III: CPT | Mod: PBBFAC,,, | Performed by: UROLOGY

## 2022-12-20 PROCEDURE — 3078F DIAST BP <80 MM HG: CPT | Mod: CPTII,S$GLB,, | Performed by: UROLOGY

## 2022-12-20 PROCEDURE — 3288F PR FALLS RISK ASSESSMENT DOCUMENTED: ICD-10-PCS | Mod: CPTII,S$GLB,, | Performed by: UROLOGY

## 2022-12-20 PROCEDURE — 99214 OFFICE O/P EST MOD 30 MIN: CPT | Mod: S$GLB,,, | Performed by: UROLOGY

## 2023-02-07 DIAGNOSIS — Z00.00 ENCOUNTER FOR MEDICARE ANNUAL WELLNESS EXAM: ICD-10-CM

## 2023-02-09 DIAGNOSIS — Z00.00 ENCOUNTER FOR MEDICARE ANNUAL WELLNESS EXAM: ICD-10-CM

## 2023-06-19 ENCOUNTER — LAB VISIT (OUTPATIENT)
Dept: LAB | Facility: OTHER | Age: 68
End: 2023-06-19
Attending: UROLOGY
Payer: MEDICARE

## 2023-06-19 DIAGNOSIS — N40.1 BENIGN NON-NODULAR PROSTATIC HYPERPLASIA WITH LOWER URINARY TRACT SYMPTOMS: ICD-10-CM

## 2023-06-19 DIAGNOSIS — N52.01 ERECTILE DYSFUNCTION DUE TO ARTERIAL INSUFFICIENCY: ICD-10-CM

## 2023-06-19 LAB — COMPLEXED PSA SERPL-MCNC: 6.3 NG/ML (ref 0–4)

## 2023-06-19 PROCEDURE — 84153 ASSAY OF PSA TOTAL: CPT | Performed by: UROLOGY

## 2023-06-19 PROCEDURE — 36415 COLL VENOUS BLD VENIPUNCTURE: CPT | Performed by: UROLOGY

## 2023-06-20 ENCOUNTER — OFFICE VISIT (OUTPATIENT)
Dept: UROLOGY | Facility: CLINIC | Age: 68
End: 2023-06-20
Payer: MEDICARE

## 2023-06-20 VITALS
RESPIRATION RATE: 16 BRPM | SYSTOLIC BLOOD PRESSURE: 125 MMHG | OXYGEN SATURATION: 100 % | BODY MASS INDEX: 27.13 KG/M2 | HEIGHT: 72 IN | DIASTOLIC BLOOD PRESSURE: 79 MMHG | WEIGHT: 200.31 LBS | HEART RATE: 63 BPM

## 2023-06-20 DIAGNOSIS — R97.20 ELEVATED PSA: ICD-10-CM

## 2023-06-20 DIAGNOSIS — N40.1 BENIGN NON-NODULAR PROSTATIC HYPERPLASIA WITH LOWER URINARY TRACT SYMPTOMS: Primary | ICD-10-CM

## 2023-06-20 DIAGNOSIS — R31.0 GROSS HEMATURIA: ICD-10-CM

## 2023-06-20 DIAGNOSIS — N52.01 ERECTILE DYSFUNCTION DUE TO ARTERIAL INSUFFICIENCY: ICD-10-CM

## 2023-06-20 PROCEDURE — 3074F PR MOST RECENT SYSTOLIC BLOOD PRESSURE < 130 MM HG: ICD-10-PCS | Mod: CPTII,S$GLB,, | Performed by: UROLOGY

## 2023-06-20 PROCEDURE — 1126F PR PAIN SEVERITY QUANTIFIED, NO PAIN PRESENT: ICD-10-PCS | Mod: CPTII,S$GLB,, | Performed by: UROLOGY

## 2023-06-20 PROCEDURE — 99999 PR PBB SHADOW E&M-EST. PATIENT-LVL III: ICD-10-PCS | Mod: PBBFAC,,, | Performed by: UROLOGY

## 2023-06-20 PROCEDURE — 3008F BODY MASS INDEX DOCD: CPT | Mod: CPTII,S$GLB,, | Performed by: UROLOGY

## 2023-06-20 PROCEDURE — 99214 PR OFFICE/OUTPT VISIT, EST, LEVL IV, 30-39 MIN: ICD-10-PCS | Mod: S$GLB,,, | Performed by: UROLOGY

## 2023-06-20 PROCEDURE — 1101F PT FALLS ASSESS-DOCD LE1/YR: CPT | Mod: CPTII,S$GLB,, | Performed by: UROLOGY

## 2023-06-20 PROCEDURE — 1101F PR PT FALLS ASSESS DOC 0-1 FALLS W/OUT INJ PAST YR: ICD-10-PCS | Mod: CPTII,S$GLB,, | Performed by: UROLOGY

## 2023-06-20 PROCEDURE — 3008F PR BODY MASS INDEX (BMI) DOCUMENTED: ICD-10-PCS | Mod: CPTII,S$GLB,, | Performed by: UROLOGY

## 2023-06-20 PROCEDURE — 3288F PR FALLS RISK ASSESSMENT DOCUMENTED: ICD-10-PCS | Mod: CPTII,S$GLB,, | Performed by: UROLOGY

## 2023-06-20 PROCEDURE — 1159F MED LIST DOCD IN RCRD: CPT | Mod: CPTII,S$GLB,, | Performed by: UROLOGY

## 2023-06-20 PROCEDURE — 1159F PR MEDICATION LIST DOCUMENTED IN MEDICAL RECORD: ICD-10-PCS | Mod: CPTII,S$GLB,, | Performed by: UROLOGY

## 2023-06-20 PROCEDURE — 3078F PR MOST RECENT DIASTOLIC BLOOD PRESSURE < 80 MM HG: ICD-10-PCS | Mod: CPTII,S$GLB,, | Performed by: UROLOGY

## 2023-06-20 PROCEDURE — 1126F AMNT PAIN NOTED NONE PRSNT: CPT | Mod: CPTII,S$GLB,, | Performed by: UROLOGY

## 2023-06-20 PROCEDURE — 3078F DIAST BP <80 MM HG: CPT | Mod: CPTII,S$GLB,, | Performed by: UROLOGY

## 2023-06-20 PROCEDURE — 99999 PR PBB SHADOW E&M-EST. PATIENT-LVL III: CPT | Mod: PBBFAC,,, | Performed by: UROLOGY

## 2023-06-20 PROCEDURE — 3288F FALL RISK ASSESSMENT DOCD: CPT | Mod: CPTII,S$GLB,, | Performed by: UROLOGY

## 2023-06-20 PROCEDURE — 3074F SYST BP LT 130 MM HG: CPT | Mod: CPTII,S$GLB,, | Performed by: UROLOGY

## 2023-06-20 PROCEDURE — 99214 OFFICE O/P EST MOD 30 MIN: CPT | Mod: S$GLB,,, | Performed by: UROLOGY

## 2023-06-20 RX ORDER — TAMSULOSIN HYDROCHLORIDE 0.4 MG/1
0.4 CAPSULE ORAL DAILY
Qty: 30 CAPSULE | Refills: 11 | Status: SHIPPED | OUTPATIENT
Start: 2023-06-20 | End: 2023-07-21 | Stop reason: SDUPTHER

## 2023-06-20 NOTE — PROGRESS NOTES
Subjective:      Med Palma is a 67 y.o. male who returns today regarding his     +frequency when he drinks alcohol.    Slow stream    Uses maneuver to crede to void    He stopped flomax due to retrograde ejaculation    AUASS 8  Bother 1        The following portions of the patient's history were reviewed and updated as appropriate: allergies, current medications, past family history, past medical history, past social history, past surgical history and problem list.    Review of Systems  Pertinent items are noted in HPI.  A comprehensive multipoint review of systems was negative except as otherwise stated in the HPI.    Past Medical History:   Diagnosis Date    Allergy     Arthritis     Elevated PSA     Hypertension      No past surgical history on file.    Review of patient's allergies indicates:  No Known Allergies       Objective:   Vitals: There were no vitals taken for this visit.    Physical Exam   General: alert and oriented, no acute distress  Respiratory: Symmetric expansion, non-labored breathing  Cardiovascular: no peripheral edema  Abdomen: soft, non distended  Skin: normal coloration and turgor, no rashes, no suspicious skin lesions noted  Neuro: no gross deficits  Psych: normal judgment and insight, normal mood/affect, and non-anxious    Physical Exam    Lab Review   Urinalysis demonstrates no specimen    Lab Results   Component Value Date    WBC 19.18 (H) 08/11/2021    HGB 10.1 (L) 08/11/2021    HCT 29.5 (L) 08/11/2021    MCV 91 08/11/2021     (H) 08/11/2021     Lab Results   Component Value Date    CREATININE 0.6 08/11/2021    BUN 18 08/11/2021     Lab Results   Component Value Date    PSA 0.96 10/10/2011    PSADIAG 6.3 (H) 06/19/2023 PSAD 0.1    PSADIAG 6.2 (H) 11/15/2022    PSADIAG 10.4 (H) 03/23/2022    PSATOTAL 8.4 (H) 08/24/2021    PSATOTAL 2.5 08/19/2020    PSATOTAL 2.3 06/11/2019    PSAFREE 1.59 (H) 08/24/2021    PSAFREE 0.78 08/19/2020    PSAFREE 0.46 06/11/2019     "PSAFREEPCT 18.93 08/24/2021    PSAFREEPCT 31.20 08/19/2020    PSAFREEPCT 20.00 06/11/2019     Final Pathologic Diagnosis [facility] DIAGNOSIS:   A.   PROSTATE, LEFT APEX, NEEDLE BIOPSY:          Benign prostatic tissue with chronic inflammation, see comment.   (R97.20)   B.   PROSTATE, LEFT MID, NEEDLE BIOPSY:         Benign prostatic tissue with chronic inflammation, see comment.   (R97.20)   C.   PROSTATE, LEFT BASE, NEEDLE BIOPSY:         Benign prostatic tissue.  (R97.20)   D.   PROSTATE, RIGHT APEX, NEEDLE BIOPSY:          Benign prostatic tissue with chronic inflammation, see comment.   (R97.20)   E.   PROSTATE, RIGHT MID, NEEDLE BIOPSY:          Benign prostatic tissue with chronic inflammation, see comment.   (R97.20)   F.   PROSTATE, RIGHT BASE, NEEDLE BIOPSY:          Benign prostatic tissue with chronic inflammation, see comment.   (R97.20)   G.   TISSUE1          PROSTATE, TARGET LESION #1, BIOPSY:          -Benign prostatic tissue with chronic inflammation, see comment.   (R97.20)   H.   TISSUE2          PROSTATE, TARGET LESION #2, BIOPSY:          -Benign prostatic tissue .  (R97.20)   I.     TISSUE3          PROSTATE, TARGET LESION #3, BIOPSY:          -Benign prostatic tissue with chronic inflammation .  (R97.20)   COMMENT:   A, B, H, I).   The *prostate triple stain supports the above diagnoses.   Kim Lara M.D.   Report attached.   Performing site:   [address]   [address] [place] [address]   "Disclaimer:  This case diagnosis was rendered completely by the outside   consultation pathologist and the case is electronically signed by an [facility]   pathologist listed below solely to release the report into the medical   record."    Comment: Interp By Harman Gonzalez MD, Signed on 05/13/2022 at 14:59       Imaging  MRI   PIRADS3  65g    PVR 483cc    Assessment and Plan:   Benign non-nodular prostatic hyperplasia with lower urinary tract symptoms; 65g    Erectile " dysfunction due to arterial insufficiency  Cialis    Elevated PSA  Normal PSA density and neg biopsy    Incomplete emptying  Restart Flomax  Avoid cold meds and antihistamines      See Dr Campbell for UA and repeat PVR ASAP  Consider outlet procedure if PVR does not improve

## 2023-06-28 ENCOUNTER — PATIENT MESSAGE (OUTPATIENT)
Dept: UROLOGY | Facility: CLINIC | Age: 68
End: 2023-06-28
Payer: MEDICARE

## 2023-07-05 ENCOUNTER — OFFICE VISIT (OUTPATIENT)
Dept: UROLOGY | Facility: CLINIC | Age: 68
End: 2023-07-05
Attending: UROLOGY
Payer: MEDICARE

## 2023-07-05 VITALS
DIASTOLIC BLOOD PRESSURE: 77 MMHG | WEIGHT: 198.94 LBS | OXYGEN SATURATION: 99 % | SYSTOLIC BLOOD PRESSURE: 118 MMHG | HEIGHT: 72 IN | HEART RATE: 72 BPM | BODY MASS INDEX: 26.95 KG/M2

## 2023-07-05 DIAGNOSIS — R31.0 GROSS HEMATURIA: ICD-10-CM

## 2023-07-05 DIAGNOSIS — N40.1 BENIGN NON-NODULAR PROSTATIC HYPERPLASIA WITH LOWER URINARY TRACT SYMPTOMS: ICD-10-CM

## 2023-07-05 DIAGNOSIS — R33.9 INCOMPLETE BLADDER EMPTYING: Primary | ICD-10-CM

## 2023-07-05 DIAGNOSIS — R97.20 ELEVATED PSA: ICD-10-CM

## 2023-07-05 DIAGNOSIS — N52.01 ERECTILE DYSFUNCTION DUE TO ARTERIAL INSUFFICIENCY: ICD-10-CM

## 2023-07-05 PROCEDURE — 1126F AMNT PAIN NOTED NONE PRSNT: CPT | Mod: CPTII,S$GLB,, | Performed by: UROLOGY

## 2023-07-05 PROCEDURE — 3078F PR MOST RECENT DIASTOLIC BLOOD PRESSURE < 80 MM HG: ICD-10-PCS | Mod: CPTII,S$GLB,, | Performed by: UROLOGY

## 2023-07-05 PROCEDURE — 3074F SYST BP LT 130 MM HG: CPT | Mod: CPTII,S$GLB,, | Performed by: UROLOGY

## 2023-07-05 PROCEDURE — 1159F PR MEDICATION LIST DOCUMENTED IN MEDICAL RECORD: ICD-10-PCS | Mod: CPTII,S$GLB,, | Performed by: UROLOGY

## 2023-07-05 PROCEDURE — 3008F PR BODY MASS INDEX (BMI) DOCUMENTED: ICD-10-PCS | Mod: CPTII,S$GLB,, | Performed by: UROLOGY

## 2023-07-05 PROCEDURE — 1159F MED LIST DOCD IN RCRD: CPT | Mod: CPTII,S$GLB,, | Performed by: UROLOGY

## 2023-07-05 PROCEDURE — 3078F DIAST BP <80 MM HG: CPT | Mod: CPTII,S$GLB,, | Performed by: UROLOGY

## 2023-07-05 PROCEDURE — 99214 OFFICE O/P EST MOD 30 MIN: CPT | Mod: S$GLB,,, | Performed by: UROLOGY

## 2023-07-05 PROCEDURE — 1160F RVW MEDS BY RX/DR IN RCRD: CPT | Mod: CPTII,S$GLB,, | Performed by: UROLOGY

## 2023-07-05 PROCEDURE — 3074F PR MOST RECENT SYSTOLIC BLOOD PRESSURE < 130 MM HG: ICD-10-PCS | Mod: CPTII,S$GLB,, | Performed by: UROLOGY

## 2023-07-05 PROCEDURE — 3008F BODY MASS INDEX DOCD: CPT | Mod: CPTII,S$GLB,, | Performed by: UROLOGY

## 2023-07-05 PROCEDURE — 1160F PR REVIEW ALL MEDS BY PRESCRIBER/CLIN PHARMACIST DOCUMENTED: ICD-10-PCS | Mod: CPTII,S$GLB,, | Performed by: UROLOGY

## 2023-07-05 PROCEDURE — 1126F PR PAIN SEVERITY QUANTIFIED, NO PAIN PRESENT: ICD-10-PCS | Mod: CPTII,S$GLB,, | Performed by: UROLOGY

## 2023-07-05 PROCEDURE — 99214 PR OFFICE/OUTPT VISIT, EST, LEVL IV, 30-39 MIN: ICD-10-PCS | Mod: S$GLB,,, | Performed by: UROLOGY

## 2023-07-05 NOTE — PROGRESS NOTES
Subjective:       Med Palma is a 67 y.o. male who is an established pt who was referred by Dr Wilson  for evaluation of elevated PSA.      He was seen previously with a elevated PSA of 8.4 but had a UTI at the time of PSA check. For this reason, we decided to hold on PBx and plan for recheck of PSA when no UTI present. His recheck showed a PSA of 5.9 in 11/14. Previous free PSA was 7%. He was counseled on PBx but he declined and opted for another recheck of his prostate.      No prostate cancer in the family. Brother with possible BPH or prostatitis. Denies LUTS at this time, rare nocturia. Not other UTI symptoms.     He is now s/p TRUS PBx on 1/26/15. All 12 cores from the biopsy were negative for malignancy. He denied any post-procedure issues such as fever or bleeding. Prostate volume 29g.     TRUS also revealed a large median lobe that could be causing issues with voiding that may have led to UTI. Due to imaging, he would be a candidate for TURP in the future if he desired. He denies current LUTS. He had not been on any treatment for BPH in the past. He was given rx for Flomax but did not like SE of retrograde ejaculation therefore he was switched to Uroxatral. He stopped this medication and has been doing well.    He does note some weakened stream. He has nocturia x 1-2 only after drinking a beer. He continues to decline meds or surgery.     PVR (bladder scan) last visits - 135cc, 0cc, 26cc, 14cc, 31cc.     PSA (9/24/14) - 8.4   PSA (1/12/15) - 5.3, free 10.75%  PSA (6/15/15) - 5.4   PSA (12/11/15) - 5.9  PSA (6/3/16) - 3.2, free 15%  PSA (5/17) - 3.1   PSA (6/18) - 2.2  PSA (6/19) - 2.3, 20%  PSA (8/20) - 2.5, 31%  PSA (7/21) - 5.9  PSA (8/21) - 8.4, 18% (done after hospitalized for COVID PNA)  PSA (9/21) - 5.8  PSA (2/22) - 11.0, recheck 2 days later 10.0  PSA (3/22) - 10.4  PSA (11/22) - 6.2  PSA (6/23) - 6.3      6/12/2019  Doing well - still with slow stream and nocturia. Known median lobe  from TRUS. PSA stable.   PVR (bladder scan) today - 100cc    8/19/2020  PSA not done. LUTS worse with rare times drinking beer (nocturia x 3-4). Weak stream in AM occasionally. Stream improves throughout day. Minimally bothersome. Mild gross hematuria with heavy exercise once - UA clear today.   PVR (bladder scan) today - 11cc    7/7/2021  PSA not done for today. LUTS still present but stable and not bothersome. Now with some ED issues x 6-12 months. Problems both achieving and maintaining. No prior attempts at treatment. Normal libido.      9/15/2021  PSA done after last visit more elevated (similar to values in 2014). Recommended to recheck in several weeks. Rechecked yesterday, value decreased. Recently hospitalized for COVID PNA, improving from that. Slow urinary stream in AM.     2/16/2022  Noted mild dysuria a few weeks ago, self-resolved. PSA again elevated. Rechecked 2 days later due to concern for elevation after ejaculation. Remains elevated.     mpMRI 3/22 - 65cc. Three PIRADS 3 lesions. Prominent BPH with large median lobe.     UroNav 5/22 ([facility])- negative    LUTS worsened with drinking EtOH/beer (causes LUCINA). Slower stream with need for Crede. Flomax was self-stopped previously due to RE. Noted to have high PVR (483cc) on last visit with Dr Jimenez. Recommended to restart Flomax. Known large median lobe. Notes Flomax has helped his symptoms.   AUASS - 8/1 previously  PVR (bladder scan) today - 387cc        The following portions of the patient's history were reviewed and updated as appropriate: allergies, current medications, past family history, past medical history, past social history, past surgical history and problem list.    Review of Systems  Constitutional: no fever or chills  ENT: no nasal congestion or sore throat  Respiratory: no cough or shortness of breath  Cardiovascular: no chest pain or palpitations  Gastrointestinal: no nausea or vomiting, tolerating diet  Genitourinary: as per  HPI  Hematologic/Lymphatic: no easy bruising or lymphadenopathy  Musculoskeletal: no arthralgias or myalgias  Skin: no rashes or lesions  Neurological: no seizures or tremors  Behavioral/Psych: no auditory or visual hallucinations       Objective:    Vitals:   /77 (BP Location: Right arm, Patient Position: Sitting, BP Method: Large (Automatic))   Pulse 72   Ht 6' (1.829 m)   Wt 90.2 kg (198 lb 15.4 oz)   SpO2 99%   BMI 26.98 kg/m²     Physical Exam   General: well developed, well nourished in no acute distress  Head: normocephalic, atraumatic  Neck: supple, trachea midline  HEENT: EOMI, mucus membranes moist, sclera anicteric, no hearing impairment  Lungs: symmetric expansion, non-labored breathing  Psych: normal judgment and insight, normal mood/affect and non-anxious  Genitourinary: (last visit)  patient declined exam   ELSA: symmetric 60g prostate without nodularity or tenderness, seminal vesicles non palpable, normal sphincter tone without hemorrhoids or rectal masses. Rubbery gland.       Lab Review   Urine analysis shows - trace protein    Lab Results   Component Value Date    WBC 19.18 (H) 08/11/2021    HGB 10.1 (L) 08/11/2021    HCT 29.5 (L) 08/11/2021    MCV 91 08/11/2021     (H) 08/11/2021     Lab Results   Component Value Date    CREATININE 0.6 08/11/2021    BUN 18 08/11/2021     Lab Results   Component Value Date    PSA 0.96 10/10/2011      Imaging  TRUS images reviewed       Assessment/Plan:      1. Elevated PSA    - Negative PBx on 1/26/15.    - PSA actually now decreasing without intervention   - Thoroughly discussed option related to his elevated PSA with negative PBx. Discussed continued PSA monitoring, repeat standard 12-core biopsy, saturation biopsy, and prostatic MRI. He would like to continue with PSA surveillance.   - Vitamin D supplementation   - PSA again elevated   - MRI 3/22 - three PIRADS 3 lesions   - UroNav 5/22 - negative   - PSA stably elevated      2. BPH   - Large  median lobe noted on TRUS   - May be the reason for UTI due to LUCINA   - Consider for TURP if symptomatic/recurrent UTI   - Alpha-blockers may help with LUCINA if present though median lobe will not be dramatically improved by pharmacotherapy   - PVR more elevated   - Flomax    3. Gross hematuria   - One episode at time of heavy strenuous exercise   - Discussed workup. Will hold for now as probable etiology known. If recurrent, will need full workup   - UA clear, reassuring    4. ED   - Cialis 10-20mg. Goodrx coupon given. Discussed SE.     5.  Incomplete bladder emptying   - Known large median lobe   - Symptoms improved with Flomax. Discussed limitations of Flomax in setting of median lobe.    - PVR mildly improved but still elevated. Discussed long term SALAS and possible UR/catheter reliance over time.    - TURP would be beneficial in reducing SALAS. Declines for now. Discussed r/b/a.    - Cont Flomax      Follow up 6 months with PSA/PVR

## 2023-08-23 RX ORDER — TAMSULOSIN HYDROCHLORIDE 0.4 MG/1
0.4 CAPSULE ORAL DAILY
Qty: 90 CAPSULE | Refills: 3 | Status: SHIPPED | OUTPATIENT
Start: 2023-08-23 | End: 2023-11-25 | Stop reason: SDUPTHER

## 2023-09-21 ENCOUNTER — LAB VISIT (OUTPATIENT)
Dept: LAB | Facility: OTHER | Age: 68
End: 2023-09-21
Attending: FAMILY MEDICINE
Payer: MEDICARE

## 2023-09-21 ENCOUNTER — OFFICE VISIT (OUTPATIENT)
Dept: INTERNAL MEDICINE | Facility: CLINIC | Age: 68
End: 2023-09-21
Attending: FAMILY MEDICINE
Payer: MEDICARE

## 2023-09-21 VITALS
DIASTOLIC BLOOD PRESSURE: 66 MMHG | WEIGHT: 197.75 LBS | BODY MASS INDEX: 26.78 KG/M2 | HEIGHT: 72 IN | HEART RATE: 62 BPM | OXYGEN SATURATION: 100 % | SYSTOLIC BLOOD PRESSURE: 102 MMHG

## 2023-09-21 DIAGNOSIS — N40.1 BENIGN NON-NODULAR PROSTATIC HYPERPLASIA WITH LOWER URINARY TRACT SYMPTOMS: ICD-10-CM

## 2023-09-21 DIAGNOSIS — R79.9 ABNORMAL FINDING OF BLOOD CHEMISTRY, UNSPECIFIED: ICD-10-CM

## 2023-09-21 DIAGNOSIS — M10.9 GOUTY ARTHRITIS: ICD-10-CM

## 2023-09-21 DIAGNOSIS — Z00.00 PREVENTATIVE HEALTH CARE: Primary | ICD-10-CM

## 2023-09-21 DIAGNOSIS — R97.20 ELEVATED PSA: ICD-10-CM

## 2023-09-21 DIAGNOSIS — Z00.00 PREVENTATIVE HEALTH CARE: ICD-10-CM

## 2023-09-21 LAB
ALBUMIN SERPL BCP-MCNC: 3.9 G/DL (ref 3.5–5.2)
ALP SERPL-CCNC: 72 U/L (ref 55–135)
ALT SERPL W/O P-5'-P-CCNC: 12 U/L (ref 10–44)
ANION GAP SERPL CALC-SCNC: 9 MMOL/L (ref 8–16)
AST SERPL-CCNC: 12 U/L (ref 10–40)
BILIRUB SERPL-MCNC: 0.6 MG/DL (ref 0.1–1)
BUN SERPL-MCNC: 17 MG/DL (ref 8–23)
CALCIUM SERPL-MCNC: 9.4 MG/DL (ref 8.7–10.5)
CHLORIDE SERPL-SCNC: 108 MMOL/L (ref 95–110)
CHOLEST SERPL-MCNC: 177 MG/DL (ref 120–199)
CHOLEST/HDLC SERPL: 3.8 {RATIO} (ref 2–5)
CO2 SERPL-SCNC: 22 MMOL/L (ref 23–29)
CREAT SERPL-MCNC: 1.1 MG/DL (ref 0.5–1.4)
EST. GFR  (NO RACE VARIABLE): >60 ML/MIN/1.73 M^2
ESTIMATED AVG GLUCOSE: 108 MG/DL (ref 68–131)
GLUCOSE SERPL-MCNC: 76 MG/DL (ref 70–110)
HBA1C MFR BLD: 5.4 % (ref 4–5.6)
HDLC SERPL-MCNC: 46 MG/DL (ref 40–75)
HDLC SERPL: 26 % (ref 20–50)
LDLC SERPL CALC-MCNC: 100.8 MG/DL (ref 63–159)
NONHDLC SERPL-MCNC: 131 MG/DL
POTASSIUM SERPL-SCNC: 4.5 MMOL/L (ref 3.5–5.1)
PROT SERPL-MCNC: 6.9 G/DL (ref 6–8.4)
SODIUM SERPL-SCNC: 139 MMOL/L (ref 136–145)
TRIGL SERPL-MCNC: 151 MG/DL (ref 30–150)

## 2023-09-21 PROCEDURE — 3044F HG A1C LEVEL LT 7.0%: CPT | Mod: HCNC,CPTII,S$GLB, | Performed by: FAMILY MEDICINE

## 2023-09-21 PROCEDURE — 1159F PR MEDICATION LIST DOCUMENTED IN MEDICAL RECORD: ICD-10-PCS | Mod: HCNC,CPTII,S$GLB, | Performed by: FAMILY MEDICINE

## 2023-09-21 PROCEDURE — 1159F MED LIST DOCD IN RCRD: CPT | Mod: HCNC,CPTII,S$GLB, | Performed by: FAMILY MEDICINE

## 2023-09-21 PROCEDURE — 1160F RVW MEDS BY RX/DR IN RCRD: CPT | Mod: HCNC,CPTII,S$GLB, | Performed by: FAMILY MEDICINE

## 2023-09-21 PROCEDURE — 3078F DIAST BP <80 MM HG: CPT | Mod: HCNC,CPTII,S$GLB, | Performed by: FAMILY MEDICINE

## 2023-09-21 PROCEDURE — 1101F PT FALLS ASSESS-DOCD LE1/YR: CPT | Mod: HCNC,CPTII,S$GLB, | Performed by: FAMILY MEDICINE

## 2023-09-21 PROCEDURE — 83036 HEMOGLOBIN GLYCOSYLATED A1C: CPT | Mod: HCNC | Performed by: FAMILY MEDICINE

## 2023-09-21 PROCEDURE — 99999 PR PBB SHADOW E&M-EST. PATIENT-LVL III: CPT | Mod: PBBFAC,HCNC,, | Performed by: FAMILY MEDICINE

## 2023-09-21 PROCEDURE — 80053 COMPREHEN METABOLIC PANEL: CPT | Mod: HCNC | Performed by: FAMILY MEDICINE

## 2023-09-21 PROCEDURE — 3008F PR BODY MASS INDEX (BMI) DOCUMENTED: ICD-10-PCS | Mod: HCNC,CPTII,S$GLB, | Performed by: FAMILY MEDICINE

## 2023-09-21 PROCEDURE — 3288F FALL RISK ASSESSMENT DOCD: CPT | Mod: HCNC,CPTII,S$GLB, | Performed by: FAMILY MEDICINE

## 2023-09-21 PROCEDURE — 99397 PR PREVENTIVE VISIT,EST,65 & OVER: ICD-10-PCS | Mod: HCNC,S$GLB,, | Performed by: FAMILY MEDICINE

## 2023-09-21 PROCEDURE — 3074F PR MOST RECENT SYSTOLIC BLOOD PRESSURE < 130 MM HG: ICD-10-PCS | Mod: HCNC,CPTII,S$GLB, | Performed by: FAMILY MEDICINE

## 2023-09-21 PROCEDURE — 99999 PR PBB SHADOW E&M-EST. PATIENT-LVL III: ICD-10-PCS | Mod: PBBFAC,HCNC,, | Performed by: FAMILY MEDICINE

## 2023-09-21 PROCEDURE — 80061 LIPID PANEL: CPT | Mod: HCNC | Performed by: FAMILY MEDICINE

## 2023-09-21 PROCEDURE — 1126F AMNT PAIN NOTED NONE PRSNT: CPT | Mod: HCNC,CPTII,S$GLB, | Performed by: FAMILY MEDICINE

## 2023-09-21 PROCEDURE — 3288F PR FALLS RISK ASSESSMENT DOCUMENTED: ICD-10-PCS | Mod: HCNC,CPTII,S$GLB, | Performed by: FAMILY MEDICINE

## 2023-09-21 PROCEDURE — 1160F PR REVIEW ALL MEDS BY PRESCRIBER/CLIN PHARMACIST DOCUMENTED: ICD-10-PCS | Mod: HCNC,CPTII,S$GLB, | Performed by: FAMILY MEDICINE

## 2023-09-21 PROCEDURE — 3008F BODY MASS INDEX DOCD: CPT | Mod: HCNC,CPTII,S$GLB, | Performed by: FAMILY MEDICINE

## 2023-09-21 PROCEDURE — 3078F PR MOST RECENT DIASTOLIC BLOOD PRESSURE < 80 MM HG: ICD-10-PCS | Mod: HCNC,CPTII,S$GLB, | Performed by: FAMILY MEDICINE

## 2023-09-21 PROCEDURE — 3044F PR MOST RECENT HEMOGLOBIN A1C LEVEL <7.0%: ICD-10-PCS | Mod: HCNC,CPTII,S$GLB, | Performed by: FAMILY MEDICINE

## 2023-09-21 PROCEDURE — 99397 PER PM REEVAL EST PAT 65+ YR: CPT | Mod: HCNC,S$GLB,, | Performed by: FAMILY MEDICINE

## 2023-09-21 PROCEDURE — 1126F PR PAIN SEVERITY QUANTIFIED, NO PAIN PRESENT: ICD-10-PCS | Mod: HCNC,CPTII,S$GLB, | Performed by: FAMILY MEDICINE

## 2023-09-21 PROCEDURE — 3074F SYST BP LT 130 MM HG: CPT | Mod: HCNC,CPTII,S$GLB, | Performed by: FAMILY MEDICINE

## 2023-09-21 PROCEDURE — 1101F PR PT FALLS ASSESS DOC 0-1 FALLS W/OUT INJ PAST YR: ICD-10-PCS | Mod: HCNC,CPTII,S$GLB, | Performed by: FAMILY MEDICINE

## 2023-09-21 PROCEDURE — 36415 COLL VENOUS BLD VENIPUNCTURE: CPT | Mod: HCNC | Performed by: FAMILY MEDICINE

## 2023-09-21 RX ORDER — FLUTICASONE PROPIONATE 50 MCG
1 SPRAY, SUSPENSION (ML) NASAL
Qty: 16 G | Refills: 3 | Status: SHIPPED | OUTPATIENT
Start: 2023-09-21 | End: 2024-03-08 | Stop reason: SDUPTHER

## 2023-09-21 NOTE — PROGRESS NOTES
CHIEF COMPLAINT:  Annual    HISTORY OF PRESENT ILLNESS: The patient is a perfectly healthy 68-year-old white male presents for annual.    He has a long history of gout.  He typically takes large doses of ibuprofen and the symptoms go away.    There is no history of trauma.    He is afflicted by flight anxiety.    REVIEW OF SYSTEMS:  GENERAL: No fever, chills, fatigability or weight loss.  SKIN: No rashes, itching or changes in color or texture of skin.  HEAD: No headaches or recent head trauma.  EYES: Visual acuity fine. No photophobia, ocular pain or diplopia.  EARS: Denies ear pain, discharge or vertigo.  NOSE: No loss of smell, no epistaxis or postnasal drip.  MOUTH & THROAT: No hoarseness or change in voice. No excessive gum bleeding.  NODES: Denies swollen glands.  CHEST: Denies JACKSON, cyanosis, wheezing, cough and sputum production.  CARDIOVASCULAR: Denies chest pain, PND, orthopnea or reduced exercise tolerance.  ABDOMEN: Appetite fine. No weight loss. Denies diarrhea, abdominal pain, hematemesis or blood in stool.  URINARY: No flank pain, dysuria or hematuria.  PERIPHERAL VASCULAR: No claudication or cyanosis.  MUSCULOSKELETAL: There is no joint pain, stiffness and swelling. Denies back pain.  NEUROLOGIC: No history of seizures, paralysis, alteration of gait or coordination.    SOCIAL HISTORY:  Does not smoke.  Does consume EtOH.      PHYSICAL EXAMINATION:   Blood pressure 102/66, pulse 62, height 6' (1.829 m), weight 89.7 kg (197 lb 12 oz), SpO2 100 %.    APPEARANCE: Well nourished, well developed, in no acute distress.    HEAD: Normocephalic, atraumatic.  EYES: PERRL. EOMI.  Conjunctivae without injection and  anicteric  NOSE: Mucosa pink. Airway clear.  MOUTH & THROAT: No tonsillar enlargement. No pharyngeal erythema or exudate. No stridor.  NECK: Supple.   NODES: No cervical, axillary or inguinal lymph node enlargement.  MUSCULOSKELETAL:  There is no clubbing, cyanosis, or edema of the extremities  x4.  There is full range of motion of the lumbar spine.  There is full range of motion of the extremities x4.  There is marked tenderness of the right great toe.  There is erythema and edema of the great toe as well.    NEUROLOGIC:       Normal speech development.      Hearing normal.      Antalgic gait.      Motor and sensory exams grossly normal.  PSYCHIATRIC: Patient is alert and oriented x3.  Thought processes are all normal.  There is no homicidality.  There is no suicidality.  There is no evidence of psychosis.    LABORATORY/RADIOLOGY: Chart reviewed.    ASSESSMENT:   Annual  Gout  Flight anxiety    PLAN:  We will follow-up blood work which we expect to be normal.    Patient will RTC 1 year.

## 2023-09-22 ENCOUNTER — PATIENT MESSAGE (OUTPATIENT)
Dept: INTERNAL MEDICINE | Facility: CLINIC | Age: 68
End: 2023-09-22
Payer: MEDICARE

## 2023-09-23 ENCOUNTER — OFFICE VISIT (OUTPATIENT)
Dept: URGENT CARE | Facility: CLINIC | Age: 68
End: 2023-09-23
Payer: MEDICARE

## 2023-09-23 VITALS
OXYGEN SATURATION: 98 % | WEIGHT: 198.44 LBS | DIASTOLIC BLOOD PRESSURE: 82 MMHG | SYSTOLIC BLOOD PRESSURE: 125 MMHG | RESPIRATION RATE: 18 BRPM | BODY MASS INDEX: 26.91 KG/M2 | HEART RATE: 80 BPM | TEMPERATURE: 97 F

## 2023-09-23 DIAGNOSIS — R30.0 DYSURIA: Primary | ICD-10-CM

## 2023-09-23 LAB
BILIRUB UR QL STRIP: NEGATIVE
GLUCOSE UR QL STRIP: NEGATIVE
KETONES UR QL STRIP: NEGATIVE
LEUKOCYTE ESTERASE UR QL STRIP: NEGATIVE
PH, POC UA: 5 (ref 5–8)
POC BLOOD, URINE: POSITIVE
POC NITRATES, URINE: NEGATIVE
PROT UR QL STRIP: NEGATIVE
SP GR UR STRIP: 1.02 (ref 1–1.03)
UROBILINOGEN UR STRIP-ACNC: NORMAL (ref 0.3–2.2)

## 2023-09-23 PROCEDURE — 99203 PR OFFICE/OUTPT VISIT, NEW, LEVL III, 30-44 MIN: ICD-10-PCS | Mod: S$GLB,,, | Performed by: NURSE PRACTITIONER

## 2023-09-23 PROCEDURE — 87088 URINE BACTERIA CULTURE: CPT | Mod: HCNC | Performed by: NURSE PRACTITIONER

## 2023-09-23 PROCEDURE — 99203 OFFICE O/P NEW LOW 30 MIN: CPT | Mod: S$GLB,,, | Performed by: NURSE PRACTITIONER

## 2023-09-23 PROCEDURE — 81003 POCT URINALYSIS, DIPSTICK, AUTOMATED, W/O SCOPE: ICD-10-PCS | Mod: QW,S$GLB,, | Performed by: NURSE PRACTITIONER

## 2023-09-23 PROCEDURE — 81003 URINALYSIS AUTO W/O SCOPE: CPT | Mod: QW,S$GLB,, | Performed by: NURSE PRACTITIONER

## 2023-09-23 PROCEDURE — 87086 URINE CULTURE/COLONY COUNT: CPT | Mod: HCNC | Performed by: NURSE PRACTITIONER

## 2023-09-23 RX ORDER — SULFAMETHOXAZOLE AND TRIMETHOPRIM 800; 160 MG/1; MG/1
1 TABLET ORAL 2 TIMES DAILY
Qty: 20 TABLET | Refills: 0 | Status: SHIPPED | OUTPATIENT
Start: 2023-09-23 | End: 2023-10-03

## 2023-09-23 NOTE — PATIENT INSTRUCTIONS
Increase water intake  Wipe front to gretchen  Take time to completely empty bladder, while voiding  Follow up with urology with worsening symptoms

## 2023-09-23 NOTE — PROGRESS NOTES
Subjective:      Patient ID: Med Palma is a 68 y.o. male.    Vitals:  weight is 90 kg (198 lb 6.6 oz). His oral temperature is 97.1 °F (36.2 °C). His blood pressure is 125/82 and his pulse is 80. His respiration is 18 and oxygen saturation is 98%.     Chief Complaint: Dysuria    67 y/o male presents to  today with c/o dysuria and gross hematuria. Also has h/o BPH with improved PSA levels after starting flomax. Denies fever, but felt feverish last night. Denies flank pain (but has h/o lower mid-back pain and is experiencing this). Pt states he was recently in [place], when the gross hematuria began, after he was being active and not drinking, so well. Dysuria began later. Within the last few days, he states that he passed a blood clot while voiding. He is having urinary frequency and urgency, with suprapubic pressure. Reports h/o significant UTI years ago, and this does not feel as bad, but does feel somewhat similar. Denies h/o kidney stone.     Dysuria   The current episode started in the past 7 days. The problem has been gradually worsening. The quality of the pain is described as burning. The pain is at a severity of 2/10. The pain is mild. There has been no fever. He is Sexually active. There is No history of pyelonephritis. Associated symptoms include frequency, hematuria and urgency. Pertinent negatives include no flank pain. He has tried nothing for the symptoms.       Genitourinary:  Positive for dysuria, frequency, urgency and hematuria. Negative for flank pain, history of kidney stones and penile discharge.   Musculoskeletal:  Positive for back pain.      Objective:     Physical Exam   Constitutional: He is oriented to person, place, and time.  Non-toxic appearance. He does not appear ill. No distress.   HENT:   Head: Normocephalic.   Nose: Nose normal.   Mouth/Throat: Mucous membranes are moist.   Cardiovascular: Normal rate.   Pulmonary/Chest: Effort normal.   Abdominal: Normal appearance.  He exhibits no distension. There is no abdominal tenderness. There is no left CVA tenderness and no right CVA tenderness.   Musculoskeletal: Normal range of motion.         General: Normal range of motion.   Neurological: He is alert and oriented to person, place, and time.   Skin: Skin is warm, dry and not diaphoretic.   Psychiatric: His behavior is normal. Mood normal.   Nursing note and vitals reviewed.    Results for orders placed or performed in visit on 09/23/23   POCT Urinalysis, Dipstick, Automated, W/O Scope   Result Value Ref Range    POC Blood, Urine Positive (A) Negative    POC Bilirubin, Urine Negative Negative    POC Urobilinogen, Urine normal 0.3 - 2.2    POC Ketones, Urine Negative Negative    POC Protein, Urine Negative Negative    POC Nitrates, Urine Negative Negative    POC Glucose, Urine Negative Negative    pH, UA 5.0 5 - 8    POC Specific Gravity, Urine 1.020 1.003 - 1.029    POC Leukocytes, Urine Negative Negative        Assessment:     1. Dysuria        Plan:       Dysuria  -     CULTURE, URINE  -     POCT Urinalysis, Dipstick, Automated, W/O Scope  -     sulfamethoxazole-trimethoprim 800-160mg (BACTRIM DS) 800-160 mg Tab; Take 1 tablet by mouth 2 (two) times daily. for 10 days  Dispense: 20 tablet; Refill: 0      Patient Instructions   Increase water intake  Wipe front to gretchen  Take time to completely empty bladder, while voiding  Follow up with urology with worsening symptoms

## 2023-09-26 LAB — BACTERIA UR CULT: ABNORMAL

## 2023-09-27 ENCOUNTER — TELEPHONE (OUTPATIENT)
Dept: UROLOGY | Facility: CLINIC | Age: 68
End: 2023-09-27
Payer: MEDICARE

## 2023-09-27 ENCOUNTER — TELEPHONE (OUTPATIENT)
Dept: URGENT CARE | Facility: CLINIC | Age: 68
End: 2023-09-27
Payer: MEDICARE

## 2023-09-27 NOTE — TELEPHONE ENCOUNTER
Message left for pt to call back re: positive urine culture. Pt placed on bactrim on date of visit.

## 2023-09-27 NOTE — TELEPHONE ENCOUNTER
Spoke with patient about getting an appointment to see Dr. Campbell for blood in urine. Pt stated that on Thursday he started passing blood and it stopped on Friday, started back on Saturday but no burning this time. Then hs started bleeding again on today 9/27 and still no burning. Pt has been scheduled on 10/18 at 8:40 am. Pt agreed to date and time.

## 2023-10-01 ENCOUNTER — PATIENT MESSAGE (OUTPATIENT)
Dept: UROLOGY | Facility: CLINIC | Age: 68
End: 2023-10-01
Payer: MEDICARE

## 2023-10-03 ENCOUNTER — OFFICE VISIT (OUTPATIENT)
Dept: UROLOGY | Facility: CLINIC | Age: 68
End: 2023-10-03
Attending: UROLOGY
Payer: MEDICARE

## 2023-10-03 VITALS — WEIGHT: 198.44 LBS | BODY MASS INDEX: 26.91 KG/M2

## 2023-10-03 DIAGNOSIS — R33.9 INCOMPLETE BLADDER EMPTYING: ICD-10-CM

## 2023-10-03 DIAGNOSIS — N52.01 ERECTILE DYSFUNCTION DUE TO ARTERIAL INSUFFICIENCY: ICD-10-CM

## 2023-10-03 DIAGNOSIS — R97.20 ELEVATED PSA: ICD-10-CM

## 2023-10-03 DIAGNOSIS — R82.71 BACTERIURIA: ICD-10-CM

## 2023-10-03 DIAGNOSIS — N40.1 BENIGN NON-NODULAR PROSTATIC HYPERPLASIA WITH LOWER URINARY TRACT SYMPTOMS: ICD-10-CM

## 2023-10-03 DIAGNOSIS — R31.0 GROSS HEMATURIA: Primary | ICD-10-CM

## 2023-10-03 PROCEDURE — 3044F PR MOST RECENT HEMOGLOBIN A1C LEVEL <7.0%: ICD-10-PCS | Mod: HCNC,CPTII,S$GLB, | Performed by: UROLOGY

## 2023-10-03 PROCEDURE — 1160F RVW MEDS BY RX/DR IN RCRD: CPT | Mod: HCNC,CPTII,S$GLB, | Performed by: UROLOGY

## 2023-10-03 PROCEDURE — 99999 PR PBB SHADOW E&M-EST. PATIENT-LVL III: ICD-10-PCS | Mod: PBBFAC,HCNC,, | Performed by: UROLOGY

## 2023-10-03 PROCEDURE — 99214 PR OFFICE/OUTPT VISIT, EST, LEVL IV, 30-39 MIN: ICD-10-PCS | Mod: HCNC,S$GLB,, | Performed by: UROLOGY

## 2023-10-03 PROCEDURE — 1101F PR PT FALLS ASSESS DOC 0-1 FALLS W/OUT INJ PAST YR: ICD-10-PCS | Mod: HCNC,CPTII,S$GLB, | Performed by: UROLOGY

## 2023-10-03 PROCEDURE — 3288F PR FALLS RISK ASSESSMENT DOCUMENTED: ICD-10-PCS | Mod: HCNC,CPTII,S$GLB, | Performed by: UROLOGY

## 2023-10-03 PROCEDURE — 3008F BODY MASS INDEX DOCD: CPT | Mod: HCNC,CPTII,S$GLB, | Performed by: UROLOGY

## 2023-10-03 PROCEDURE — 99999 PR PBB SHADOW E&M-EST. PATIENT-LVL III: CPT | Mod: PBBFAC,HCNC,, | Performed by: UROLOGY

## 2023-10-03 PROCEDURE — 3288F FALL RISK ASSESSMENT DOCD: CPT | Mod: HCNC,CPTII,S$GLB, | Performed by: UROLOGY

## 2023-10-03 PROCEDURE — 1159F MED LIST DOCD IN RCRD: CPT | Mod: HCNC,CPTII,S$GLB, | Performed by: UROLOGY

## 2023-10-03 PROCEDURE — 1159F PR MEDICATION LIST DOCUMENTED IN MEDICAL RECORD: ICD-10-PCS | Mod: HCNC,CPTII,S$GLB, | Performed by: UROLOGY

## 2023-10-03 PROCEDURE — 3044F HG A1C LEVEL LT 7.0%: CPT | Mod: HCNC,CPTII,S$GLB, | Performed by: UROLOGY

## 2023-10-03 PROCEDURE — 3008F PR BODY MASS INDEX (BMI) DOCUMENTED: ICD-10-PCS | Mod: HCNC,CPTII,S$GLB, | Performed by: UROLOGY

## 2023-10-03 PROCEDURE — 99214 OFFICE O/P EST MOD 30 MIN: CPT | Mod: HCNC,S$GLB,, | Performed by: UROLOGY

## 2023-10-03 PROCEDURE — 1160F PR REVIEW ALL MEDS BY PRESCRIBER/CLIN PHARMACIST DOCUMENTED: ICD-10-PCS | Mod: HCNC,CPTII,S$GLB, | Performed by: UROLOGY

## 2023-10-03 PROCEDURE — 1101F PT FALLS ASSESS-DOCD LE1/YR: CPT | Mod: HCNC,CPTII,S$GLB, | Performed by: UROLOGY

## 2023-10-03 NOTE — PROGRESS NOTES
Subjective:       Med Palma is a 68 y.o. male who is an established pt who was referred by Dr Wilson  for evaluation of elevated PSA.      He was seen previously with a elevated PSA of 8.4 but had a UTI at the time of PSA check. For this reason, we decided to hold on PBx and plan for recheck of PSA when no UTI present. His recheck showed a PSA of 5.9 in 11/14. Previous free PSA was 7%. He was counseled on PBx but he declined and opted for another recheck of his prostate.      No prostate cancer in the family. Brother with possible BPH or prostatitis. Denies LUTS at this time, rare nocturia. Not other UTI symptoms.     He is now s/p TRUS PBx on 1/26/15. All 12 cores from the biopsy were negative for malignancy. He denied any post-procedure issues such as fever or bleeding. Prostate volume 29g.     TRUS also revealed a large median lobe that could be causing issues with voiding that may have led to UTI. Due to imaging, he would be a candidate for TURP in the future if he desired. He denies current LUTS. He had not been on any treatment for BPH in the past. He was given rx for Flomax but did not like SE of retrograde ejaculation therefore he was switched to Uroxatral. He stopped this medication and has been doing well.    He does note some weakened stream. He has nocturia x 1-2 only after drinking a beer. He continues to decline meds or surgery.     PVR (bladder scan) last visits - 135cc, 0cc, 26cc, 14cc, 31cc.     PSA (9/24/14) - 8.4   PSA (1/12/15) - 5.3, free 10.75%  PSA (6/15/15) - 5.4   PSA (12/11/15) - 5.9  PSA (6/3/16) - 3.2, free 15%  PSA (5/17) - 3.1   PSA (6/18) - 2.2  PSA (6/19) - 2.3, 20%  PSA (8/20) - 2.5, 31%  PSA (7/21) - 5.9  PSA (8/21) - 8.4, 18% (done after hospitalized for COVID PNA)  PSA (9/21) - 5.8  PSA (2/22) - 11.0, recheck 2 days later 10.0  PSA (3/22) - 10.4  PSA (11/22) - 6.2  PSA (6/23) - 6.3      6/12/2019  Doing well - still with slow stream and nocturia. Known median lobe  from TRUS. PSA stable.   PVR (bladder scan) today - 100cc    8/19/2020  PSA not done. LUTS worse with rare times drinking beer (nocturia x 3-4). Weak stream in AM occasionally. Stream improves throughout day. Minimally bothersome. Mild gross hematuria with heavy exercise once - UA clear today.   PVR (bladder scan) today - 11cc    7/7/2021  PSA not done for today. LUTS still present but stable and not bothersome. Now with some ED issues x 6-12 months. Problems both achieving and maintaining. No prior attempts at treatment. Normal libido.      9/15/2021  PSA done after last visit more elevated (similar to values in 2014). Recommended to recheck in several weeks. Rechecked yesterday, value decreased. Recently hospitalized for COVID PNA, improving from that. Slow urinary stream in AM.     2/16/2022  Noted mild dysuria a few weeks ago, self-resolved. PSA again elevated. Rechecked 2 days later due to concern for elevation after ejaculation. Remains elevated.     mpMRI 3/22 - 65cc. Three PIRADS 3 lesions. Prominent BPH with large median lobe.     UroNav 5/22 ([facility]) - negative    LUTS worsened with drinking EtOH/beer (causes LUCINA). Slower stream with need for Crede. Flomax was self-stopped previously due to RE. Noted to have high PVR (483cc) on last visit with Dr Jimenez. Recommended to restart Flomax. Known large median lobe. Notes Flomax has helped his symptoms.   AUASS - 8/1 previously  PVR (bladder scan) today - 387cc      10/3/2023  Returns with c/o gross hematuria. Noted while in [place] 9/6/23 - noted significant physical exertion at that time with minimal hydration. Noted red urine - no pain. Occurred x 1 day then cleared, occurred again 2-3 weeks later. Now occurred 4x, resolves quickly. Was seen in urgent care, given Bactrim x 10d - UCx showed >100k coag neg staph. +dysuria at that time. Passed one small clot.       The following portions of the patient's history were reviewed and updated as appropriate:  allergies, current medications, past family history, past medical history, past social history, past surgical history and problem list.    Review of Systems  Constitutional: no fever or chills  ENT: no nasal congestion or sore throat  Respiratory: no cough or shortness of breath  Cardiovascular: no chest pain or palpitations  Gastrointestinal: no nausea or vomiting, tolerating diet  Genitourinary: as per HPI  Hematologic/Lymphatic: no easy bruising or lymphadenopathy  Musculoskeletal: no arthralgias or myalgias  Skin: no rashes or lesions  Neurological: no seizures or tremors  Behavioral/Psych: no auditory or visual hallucinations       Objective:    Vitals:   Wt 90 kg (198 lb 6.6 oz)   BMI 26.91 kg/m²     Physical Exam   General: well developed, well nourished in no acute distress  Head: normocephalic, atraumatic  Neck: supple, trachea midline  HEENT: EOMI, mucus membranes moist, sclera anicteric, no hearing impairment  Lungs: symmetric expansion, non-labored breathing  Psych: normal judgment and insight, normal mood/affect and non-anxious  Genitourinary: (last visit)  patient declined exam   ELSA: symmetric 60g prostate without nodularity or tenderness, seminal vesicles non palpable, normal sphincter tone without hemorrhoids or rectal masses. Rubbery gland.       Lab Review   Urine analysis shows - trace protein, 5-10 RBCs, trace protein    Lab Results   Component Value Date    WBC 19.18 (H) 08/11/2021    HGB 10.1 (L) 08/11/2021    HCT 29.5 (L) 08/11/2021    MCV 91 08/11/2021     (H) 08/11/2021     Lab Results   Component Value Date    CREATININE 1.1 09/21/2023    BUN 17 09/21/2023     Lab Results   Component Value Date    PSA 0.96 10/10/2011      Imaging  TRUS images reviewed       Assessment/Plan:      1. Elevated PSA    - Negative PBx on 1/26/15.    - PSA actually now decreasing without intervention   - Thoroughly discussed option related to his elevated PSA with negative PBx. Discussed continued PSA  monitoring, repeat standard 12-core biopsy, saturation biopsy, and prostatic MRI. He would like to continue with PSA surveillance.   - Vitamin D supplementation   - PSA again elevated   - MRI 3/22 - three PIRADS 3 lesions   - UroNav 5/22 - negative   - PSA stably elevated      2. BPH   - Large median lobe noted on TRUS   - May be the reason for UTI due to LUCINA   - Consider for TURP if symptomatic/recurrent UTI   - Alpha-blockers may help with LUCINA if present though median lobe will not be dramatically improved by pharmacotherapy   - PVR more elevated   - Flomax    3. Gross hematuria   - One episode at time of heavy strenuous exercise in the past   - Now with 4 occurrences recently.    - Discussed etiology and workup of hematuria   - UCx   - Cytology   - CT urogram   - Office cystoscopy      4. ED   - Cialis 10-20mg. Goodrx coupon given. Discussed SE.     5.  Incomplete bladder emptying   - Known large median lobe   - Symptoms improved with Flomax. Discussed limitations of Flomax in setting of median lobe.    - PVR mildly improved but still elevated. Discussed long term SALAS and possible UR/catheter reliance over time.    - TURP would be beneficial in reducing SALAS. Declines for now. Discussed r/b/a.    - Cont Flomax      Follow up for cysto

## 2023-10-12 DIAGNOSIS — R30.0 DYSURIA: ICD-10-CM

## 2023-10-12 RX ORDER — TADALAFIL 20 MG/1
20 TABLET ORAL DAILY PRN
Qty: 30 TABLET | Refills: 11 | Status: SHIPPED | OUTPATIENT
Start: 2023-10-12 | End: 2023-11-16 | Stop reason: SDUPTHER

## 2023-10-12 RX ORDER — SULFAMETHOXAZOLE AND TRIMETHOPRIM 800; 160 MG/1; MG/1
1 TABLET ORAL 2 TIMES DAILY
Qty: 20 TABLET | Refills: 0 | OUTPATIENT
Start: 2023-10-12 | End: 2023-10-22

## 2023-10-12 NOTE — TELEPHONE ENCOUNTER
Patient comment: Experiencing burning sensation again. No hematuria. Seems like infection.    Should the pt be seen by urology again to follow up?

## 2023-10-16 ENCOUNTER — PATIENT MESSAGE (OUTPATIENT)
Dept: UROLOGY | Facility: CLINIC | Age: 68
End: 2023-10-16
Payer: MEDICARE

## 2023-10-17 ENCOUNTER — PATIENT MESSAGE (OUTPATIENT)
Dept: UROLOGY | Facility: CLINIC | Age: 68
End: 2023-10-17
Payer: MEDICARE

## 2023-10-18 ENCOUNTER — TELEPHONE (OUTPATIENT)
Dept: UROLOGY | Facility: CLINIC | Age: 68
End: 2023-10-18
Payer: MEDICARE

## 2023-10-21 ENCOUNTER — HOSPITAL ENCOUNTER (OUTPATIENT)
Dept: RADIOLOGY | Facility: OTHER | Age: 68
Discharge: HOME | End: 2023-10-21
Attending: UROLOGY
Payer: MEDICARE

## 2023-10-21 DIAGNOSIS — R31.0 GROSS HEMATURIA: ICD-10-CM

## 2023-10-21 PROCEDURE — 74178 CT UROGRAM ABD PELVIS W WO: ICD-10-PCS | Mod: 26,HCNC,, | Performed by: RADIOLOGY

## 2023-10-21 PROCEDURE — 74178 CT ABD&PLV WO CNTR FLWD CNTR: CPT | Mod: TC,HCNC

## 2023-10-21 PROCEDURE — 25500020 PHARM REV CODE 255: Mod: HCNC | Performed by: UROLOGY

## 2023-10-21 PROCEDURE — 74178 CT ABD&PLV WO CNTR FLWD CNTR: CPT | Mod: 26,HCNC,, | Performed by: RADIOLOGY

## 2023-10-21 RX ADMIN — IOHEXOL 125 ML: 350 INJECTION, SOLUTION INTRAVENOUS at 09:10

## 2023-10-23 ENCOUNTER — OFFICE VISIT (OUTPATIENT)
Dept: UROLOGY | Facility: CLINIC | Age: 68
End: 2023-10-23
Payer: MEDICARE

## 2023-10-23 ENCOUNTER — PATIENT MESSAGE (OUTPATIENT)
Dept: UROLOGY | Facility: CLINIC | Age: 68
End: 2023-10-23

## 2023-10-23 VITALS
HEIGHT: 72 IN | OXYGEN SATURATION: 99 % | BODY MASS INDEX: 26.88 KG/M2 | WEIGHT: 198.44 LBS | HEART RATE: 63 BPM | SYSTOLIC BLOOD PRESSURE: 124 MMHG | DIASTOLIC BLOOD PRESSURE: 73 MMHG

## 2023-10-23 DIAGNOSIS — R31.9 HEMATURIA, UNSPECIFIED TYPE: ICD-10-CM

## 2023-10-23 DIAGNOSIS — R59.1 LYMPHADENOPATHY: ICD-10-CM

## 2023-10-23 DIAGNOSIS — N32.89 BLADDER MASS: Primary | ICD-10-CM

## 2023-10-23 LAB
BACTERIA #/AREA URNS AUTO: ABNORMAL /HPF
BILIRUB UR QL STRIP: NEGATIVE
CLARITY UR REFRACT.AUTO: CLEAR
COLOR UR AUTO: COLORLESS
GLUCOSE UR QL STRIP: NEGATIVE
HGB UR QL STRIP: ABNORMAL
KETONES UR QL STRIP: NEGATIVE
LEUKOCYTE ESTERASE UR QL STRIP: ABNORMAL
MICROSCOPIC COMMENT: ABNORMAL
NITRITE UR QL STRIP: NEGATIVE
PH UR STRIP: 7 [PH] (ref 5–8)
PROT UR QL STRIP: NEGATIVE
RBC #/AREA URNS AUTO: 29 /HPF (ref 0–4)
SP GR UR STRIP: 1.01 (ref 1–1.03)
SQUAMOUS #/AREA URNS AUTO: 0 /HPF
URN SPEC COLLECT METH UR: ABNORMAL
WBC #/AREA URNS AUTO: 75 /HPF (ref 0–5)

## 2023-10-23 PROCEDURE — 3044F HG A1C LEVEL LT 7.0%: CPT | Mod: HCNC,CPTII,S$GLB, | Performed by: UROLOGY

## 2023-10-23 PROCEDURE — 1126F AMNT PAIN NOTED NONE PRSNT: CPT | Mod: HCNC,CPTII,S$GLB, | Performed by: UROLOGY

## 2023-10-23 PROCEDURE — 1159F MED LIST DOCD IN RCRD: CPT | Mod: HCNC,CPTII,S$GLB, | Performed by: UROLOGY

## 2023-10-23 PROCEDURE — 3044F PR MOST RECENT HEMOGLOBIN A1C LEVEL <7.0%: ICD-10-PCS | Mod: HCNC,CPTII,S$GLB, | Performed by: UROLOGY

## 2023-10-23 PROCEDURE — 3008F PR BODY MASS INDEX (BMI) DOCUMENTED: ICD-10-PCS | Mod: HCNC,CPTII,S$GLB, | Performed by: UROLOGY

## 2023-10-23 PROCEDURE — 3074F SYST BP LT 130 MM HG: CPT | Mod: HCNC,CPTII,S$GLB, | Performed by: UROLOGY

## 2023-10-23 PROCEDURE — 3078F DIAST BP <80 MM HG: CPT | Mod: HCNC,CPTII,S$GLB, | Performed by: UROLOGY

## 2023-10-23 PROCEDURE — 87088 URINE BACTERIA CULTURE: CPT | Mod: HCNC | Performed by: UROLOGY

## 2023-10-23 PROCEDURE — 99215 PR OFFICE/OUTPT VISIT, EST, LEVL V, 40-54 MIN: ICD-10-PCS | Mod: HCNC,S$GLB,, | Performed by: UROLOGY

## 2023-10-23 PROCEDURE — 87086 URINE CULTURE/COLONY COUNT: CPT | Mod: HCNC | Performed by: UROLOGY

## 2023-10-23 PROCEDURE — 1159F PR MEDICATION LIST DOCUMENTED IN MEDICAL RECORD: ICD-10-PCS | Mod: HCNC,CPTII,S$GLB, | Performed by: UROLOGY

## 2023-10-23 PROCEDURE — 3008F BODY MASS INDEX DOCD: CPT | Mod: HCNC,CPTII,S$GLB, | Performed by: UROLOGY

## 2023-10-23 PROCEDURE — 1126F PR PAIN SEVERITY QUANTIFIED, NO PAIN PRESENT: ICD-10-PCS | Mod: HCNC,CPTII,S$GLB, | Performed by: UROLOGY

## 2023-10-23 PROCEDURE — 3074F PR MOST RECENT SYSTOLIC BLOOD PRESSURE < 130 MM HG: ICD-10-PCS | Mod: HCNC,CPTII,S$GLB, | Performed by: UROLOGY

## 2023-10-23 PROCEDURE — 99215 OFFICE O/P EST HI 40 MIN: CPT | Mod: HCNC,S$GLB,, | Performed by: UROLOGY

## 2023-10-23 PROCEDURE — 81001 URINALYSIS AUTO W/SCOPE: CPT | Mod: HCNC | Performed by: UROLOGY

## 2023-10-23 PROCEDURE — 3078F PR MOST RECENT DIASTOLIC BLOOD PRESSURE < 80 MM HG: ICD-10-PCS | Mod: HCNC,CPTII,S$GLB, | Performed by: UROLOGY

## 2023-10-23 RX ORDER — LIDOCAINE HYDROCHLORIDE 20 MG/ML
JELLY TOPICAL ONCE
Status: CANCELLED | OUTPATIENT
Start: 2023-10-23 | End: 2023-10-23

## 2023-10-23 NOTE — H&P (VIEW-ONLY)
Subjective:      Med Palma is a 68 y.o. male who returns today regarding his     Bladder mass    Gross hematuria while golfing 9/6  Cleared.    The following portions of the patient's history were reviewed and updated as appropriate: allergies, current medications, past family history, past medical history, past social history, past surgical history and problem list.    Review of Systems  Pertinent items are noted in HPI.  A comprehensive multipoint review of systems was negative except as otherwise stated in the HPI.    Past Medical History:   Diagnosis Date    Allergy     Arthritis     Elevated PSA     Hypertension      History reviewed. No pertinent surgical history.    Review of patient's allergies indicates:  No Known Allergies       Objective:   Vitals: /73 (BP Location: Left arm, Patient Position: Sitting, BP Method: Large (Automatic))   Pulse 63   Ht 6' (1.829 m)   Wt 90 kg (198 lb 6.6 oz)   SpO2 99%   BMI 26.91 kg/m²     Physical Exam   General: alert and oriented, no acute distress  Respiratory: Symmetric expansion, non-labored breathing  Cardiovascular: no peripheral edema  Abdomen: soft, non distended  Skin: normal coloration and turgor, no rashes, no suspicious skin lesions noted  Neuro: no gross deficits  Psych: normal judgment and insight, normal mood/affect, and non-anxious    Physical Exam    Lab Review   Urinalysis demonstrates pending  Lab Results   Component Value Date    WBC 19.18 (H) 08/11/2021    HGB 10.1 (L) 08/11/2021    HCT 29.5 (L) 08/11/2021    MCV 91 08/11/2021     (H) 08/11/2021     Lab Results   Component Value Date    CREATININE 1.1 09/21/2023    BUN 17 09/21/2023       Imaging  CT UROGRAM ABD PELVIS W WO     CLINICAL HISTORY:  gross hematuria;Gross hematuria     TECHNIQUE:  Low dose axial, sagittal and coronal reformations were obtained from the lung bases to the pubic symphysis before and following the IV administration of 125 mL of Omnipaque 350.   Timing was optimized for nephrogram and excretory renal phases.     COMPARISON:  Prostate MRI 03/03/2022     FINDINGS:  : Kidneys are symmetric in size.  Precontrast imaging shows no stones or hydronephrosis.  Kidneys concentrate and excrete contrast appropriately on postcontrast imaging.  No focal filling defects.  Lobulated margins along the lateral aspect of the left kidney has the appearance of scarring.     Heterogeneously enhancing mass along the dorsal lateral aspect of the urinary bladder on the left estimated at 3.8 x 3.6 cm.  Enlarged left pelvic lymph node measures 1.9 cm in short axis dimension.     Enlarged, lobulated prostate gland bulging into the bladder base, similar compared to prior MRI.     CT:     Lung bases show subsegmental atelectasis or scarring.     Liver is homogeneous.  Gallbladder is unremarkable.  Bile ducts, spleen, pancreas, and adrenal glands are unremarkable.     Stomach and loops of bowel are normal in caliber.  No free fluid in the pelvis.     Regional skeleton shows degenerative change.     Impression:     Heterogeneously enhancing mass along the dorsal lateral aspect of the urinary bladder on the left most concerning for primary bladder malignancy.  Enlarged left pelvic lymph node most concerning for metastatic disease.  Negative for obstructive uropathy.     Prostatomegaly with the prostate bulging into the bladder base, similar compared to prior MRI.     This report was flagged in Epic as abnormal.        Electronically signed by: Donya Villalobos  Date:                                            10/23/2023  Time:                                           08:33        BLADDER MASS WAS NOT PRESENT ON MR DONE 3/2022      Assessment and Plan:   Bladder mass  Cysto TURBT EUA 10/26  CONSENTS DONE     Hematuria, unspecified type  CBC  Type and screen    Lymphadenopathy  Heme onc referral 2 weeks after TURBT    UTI  Completed bactrim *10 days  Repeat UA and cs    See me and Dr Spicer  at [facility] 11/7      I spent 60 min on the day of this encounter preparing for, treating and managing the above

## 2023-10-23 NOTE — PROGRESS NOTES
Subjective:      Med Palma is a 68 y.o. male who returns today regarding his     Bladder mass    Gross hematuria while golfing 9/6  Cleared.    The following portions of the patient's history were reviewed and updated as appropriate: allergies, current medications, past family history, past medical history, past social history, past surgical history and problem list.    Review of Systems  Pertinent items are noted in HPI.  A comprehensive multipoint review of systems was negative except as otherwise stated in the HPI.    Past Medical History:   Diagnosis Date    Allergy     Arthritis     Elevated PSA     Hypertension      History reviewed. No pertinent surgical history.    Review of patient's allergies indicates:  No Known Allergies       Objective:   Vitals: /73 (BP Location: Left arm, Patient Position: Sitting, BP Method: Large (Automatic))   Pulse 63   Ht 6' (1.829 m)   Wt 90 kg (198 lb 6.6 oz)   SpO2 99%   BMI 26.91 kg/m²     Physical Exam   General: alert and oriented, no acute distress  Respiratory: Symmetric expansion, non-labored breathing  Cardiovascular: no peripheral edema  Abdomen: soft, non distended  Skin: normal coloration and turgor, no rashes, no suspicious skin lesions noted  Neuro: no gross deficits  Psych: normal judgment and insight, normal mood/affect, and non-anxious    Physical Exam    Lab Review   Urinalysis demonstrates pending  Lab Results   Component Value Date    WBC 19.18 (H) 08/11/2021    HGB 10.1 (L) 08/11/2021    HCT 29.5 (L) 08/11/2021    MCV 91 08/11/2021     (H) 08/11/2021     Lab Results   Component Value Date    CREATININE 1.1 09/21/2023    BUN 17 09/21/2023       Imaging  CT UROGRAM ABD PELVIS W WO     CLINICAL HISTORY:  gross hematuria;Gross hematuria     TECHNIQUE:  Low dose axial, sagittal and coronal reformations were obtained from the lung bases to the pubic symphysis before and following the IV administration of 125 mL of Omnipaque 350.   Timing was optimized for nephrogram and excretory renal phases.     COMPARISON:  Prostate MRI 03/03/2022     FINDINGS:  : Kidneys are symmetric in size.  Precontrast imaging shows no stones or hydronephrosis.  Kidneys concentrate and excrete contrast appropriately on postcontrast imaging.  No focal filling defects.  Lobulated margins along the lateral aspect of the left kidney has the appearance of scarring.     Heterogeneously enhancing mass along the dorsal lateral aspect of the urinary bladder on the left estimated at 3.8 x 3.6 cm.  Enlarged left pelvic lymph node measures 1.9 cm in short axis dimension.     Enlarged, lobulated prostate gland bulging into the bladder base, similar compared to prior MRI.     CT:     Lung bases show subsegmental atelectasis or scarring.     Liver is homogeneous.  Gallbladder is unremarkable.  Bile ducts, spleen, pancreas, and adrenal glands are unremarkable.     Stomach and loops of bowel are normal in caliber.  No free fluid in the pelvis.     Regional skeleton shows degenerative change.     Impression:     Heterogeneously enhancing mass along the dorsal lateral aspect of the urinary bladder on the left most concerning for primary bladder malignancy.  Enlarged left pelvic lymph node most concerning for metastatic disease.  Negative for obstructive uropathy.     Prostatomegaly with the prostate bulging into the bladder base, similar compared to prior MRI.     This report was flagged in Epic as abnormal.        Electronically signed by: Donya Villalobos  Date:                                            10/23/2023  Time:                                           08:33        BLADDER MASS WAS NOT PRESENT ON MR DONE 3/2022      Assessment and Plan:   Bladder mass  Cysto TURBT EUA 10/26  CONSENTS DONE     Hematuria, unspecified type  CBC  Type and screen    Lymphadenopathy  Heme onc referral 2 weeks after TURBT    UTI  Completed bactrim *10 days  Repeat UA and cs    See me and Dr Spicer  at [facility] 11/7      I spent 60 min on the day of this encounter preparing for, treating and managing the above

## 2023-10-23 NOTE — Clinical Note
[facility] team, please schedule TURBT 10/26  Fátima, can this pt see me and Dr Spicer on 11/7 at [place] please?  He has a bladder mass with adenopathy.  We should have path back by then. Thanks!

## 2023-10-24 ENCOUNTER — PATIENT MESSAGE (OUTPATIENT)
Dept: HEMATOLOGY/ONCOLOGY | Facility: CLINIC | Age: 68
End: 2023-10-24
Payer: MEDICARE

## 2023-10-24 ENCOUNTER — HOSPITAL ENCOUNTER (OUTPATIENT)
Dept: PREADMISSION TESTING | Facility: OTHER | Age: 68
Discharge: HOME | End: 2023-10-24
Attending: PHYSICIAN ASSISTANT
Payer: MEDICARE

## 2023-10-24 ENCOUNTER — ANESTHESIA EVENT (OUTPATIENT)
Dept: SURGERY | Facility: OTHER | Age: 68
End: 2023-10-24
Payer: MEDICARE

## 2023-10-24 VITALS
BODY MASS INDEX: 26.82 KG/M2 | SYSTOLIC BLOOD PRESSURE: 107 MMHG | HEART RATE: 63 BPM | HEIGHT: 72 IN | DIASTOLIC BLOOD PRESSURE: 66 MMHG | OXYGEN SATURATION: 97 % | WEIGHT: 198 LBS | RESPIRATION RATE: 18 BRPM | TEMPERATURE: 98 F

## 2023-10-24 DIAGNOSIS — Z01.818 PREOP TESTING: Primary | ICD-10-CM

## 2023-10-24 LAB
ABO + RH BLD: NORMAL
ALBUMIN SERPL BCP-MCNC: 4 G/DL (ref 3.5–5.2)
ALP SERPL-CCNC: 54 U/L (ref 55–135)
ALT SERPL W/O P-5'-P-CCNC: 16 U/L (ref 10–44)
ANION GAP SERPL CALC-SCNC: 7 MMOL/L (ref 8–16)
AST SERPL-CCNC: 12 U/L (ref 10–40)
BASOPHILS # BLD AUTO: 0.08 K/UL (ref 0–0.2)
BASOPHILS NFR BLD: 0.9 % (ref 0–1.9)
BILIRUB SERPL-MCNC: 0.5 MG/DL (ref 0.1–1)
BLD GP AB SCN CELLS X3 SERPL QL: NORMAL
BUN SERPL-MCNC: 14 MG/DL (ref 8–23)
CALCIUM SERPL-MCNC: 9.3 MG/DL (ref 8.7–10.5)
CHLORIDE SERPL-SCNC: 106 MMOL/L (ref 95–110)
CO2 SERPL-SCNC: 27 MMOL/L (ref 23–29)
CREAT SERPL-MCNC: 1 MG/DL (ref 0.5–1.4)
DIFFERENTIAL METHOD: ABNORMAL
EOSINOPHIL # BLD AUTO: 0 K/UL (ref 0–0.5)
EOSINOPHIL NFR BLD: 0.1 % (ref 0–8)
ERYTHROCYTE [DISTWIDTH] IN BLOOD BY AUTOMATED COUNT: 13.7 % (ref 11.5–14.5)
EST. GFR  (NO RACE VARIABLE): >60 ML/MIN/1.73 M^2
GLUCOSE SERPL-MCNC: 95 MG/DL (ref 70–110)
HCT VFR BLD AUTO: 38.1 % (ref 40–54)
HGB BLD-MCNC: 12.5 G/DL (ref 14–18)
IMM GRANULOCYTES # BLD AUTO: 0.05 K/UL (ref 0–0.04)
IMM GRANULOCYTES NFR BLD AUTO: 0.6 % (ref 0–0.5)
LYMPHOCYTES # BLD AUTO: 2.7 K/UL (ref 1–4.8)
LYMPHOCYTES NFR BLD: 31.3 % (ref 18–48)
MCH RBC QN AUTO: 30 PG (ref 27–31)
MCHC RBC AUTO-ENTMCNC: 32.8 G/DL (ref 32–36)
MCV RBC AUTO: 91 FL (ref 82–98)
MONOCYTES # BLD AUTO: 0.7 K/UL (ref 0.3–1)
MONOCYTES NFR BLD: 7.4 % (ref 4–15)
NEUTROPHILS # BLD AUTO: 5.2 K/UL (ref 1.8–7.7)
NEUTROPHILS NFR BLD: 59.7 % (ref 38–73)
NRBC BLD-RTO: 0 /100 WBC
PLATELET # BLD AUTO: 282 K/UL (ref 150–450)
PMV BLD AUTO: 11 FL (ref 9.2–12.9)
POTASSIUM SERPL-SCNC: 4.6 MMOL/L (ref 3.5–5.1)
PROT SERPL-MCNC: 6.8 G/DL (ref 6–8.4)
RBC # BLD AUTO: 4.17 M/UL (ref 4.6–6.2)
SODIUM SERPL-SCNC: 140 MMOL/L (ref 136–145)
SPECIMEN OUTDATE: NORMAL
WBC # BLD AUTO: 8.73 K/UL (ref 3.9–12.7)

## 2023-10-24 PROCEDURE — 86901 BLOOD TYPING SEROLOGIC RH(D): CPT | Mod: HCNC | Performed by: UROLOGY

## 2023-10-24 PROCEDURE — 85025 COMPLETE CBC W/AUTO DIFF WBC: CPT | Mod: HCNC | Performed by: UROLOGY

## 2023-10-24 PROCEDURE — 80053 COMPREHEN METABOLIC PANEL: CPT | Mod: HCNC | Performed by: UROLOGY

## 2023-10-24 PROCEDURE — 36415 COLL VENOUS BLD VENIPUNCTURE: CPT | Mod: HCNC | Performed by: UROLOGY

## 2023-10-24 RX ORDER — LIDOCAINE HYDROCHLORIDE 10 MG/ML
0.5 INJECTION, SOLUTION EPIDURAL; INFILTRATION; INTRACAUDAL; PERINEURAL ONCE
Status: CANCELLED | OUTPATIENT
Start: 2023-10-24 | End: 2023-10-24

## 2023-10-24 RX ORDER — SODIUM CHLORIDE, SODIUM LACTATE, POTASSIUM CHLORIDE, CALCIUM CHLORIDE 600; 310; 30; 20 MG/100ML; MG/100ML; MG/100ML; MG/100ML
INJECTION, SOLUTION INTRAVENOUS CONTINUOUS
Status: CANCELLED | OUTPATIENT
Start: 2023-10-24

## 2023-10-24 NOTE — DISCHARGE INSTRUCTIONS
Information to Prepare you for your Surgery    PRE-ADMIT TESTING -  [phone]    [address] [facility]        Your surgery has been scheduled at [facility]. We are pleased to have the opportunity to serve you. For Further Information please call [phone].    On the day of surgery please report to the Information Desk on the 1st floor.    CONTACT YOUR PHYSICIAN'S OFFICE THE DAY PRIOR TO YOUR SURGERY TO OBTAIN YOUR ARRIVAL TIME.     The evening before surgery do not eat anything after 9 p.m. ( this includes hard candy, chewing gum and mints).  You may only have GATORADE, POWERADE AND WATER  from 9 p.m. until you leave your home.   DO NOT DRINK ANY LIQUIDS ON THE WAY TO THE HOSPITAL.      Why does your anesthesiologist allow you to drink Gatorade/Powerade before surgery?  Gatorade/Powerade helps to increase your comfort before surgery and to decrease your nausea after surgery. The carbohydrates in Gatorade/Powerade help reduce your body's stress response to surgery.  If you are a diabetic-drink only water prior to surgery.       Patients may have 2 visitors pre and post procedure. Only 2 visitors will be allowed in the Surgical building with the patient. No one under the age of 12 will be allowed into the facility.    SPECIAL MEDICATION INSTRUCTIONS: TAKE medications checked off by the Anesthesiologist on your Medication List.    Surgery Patients:  If you take ASPIRIN - Your PHYSICIAN/SURGEON will need to inform you IF/OR when you need to stop taking aspirin prior to your surgery.     The week prior to surgery do not ot take any medications containing IBUPROFEN or NSAIDS ( Advil, Motrin, Goodys, BC, Aleve, Naproxen etc) If you are not sure if you should take a medicine please call your surgeon's office.  You may take Tylenol.     Do Not Wear any make-up (especially eye make-up) to surgery. Please remove any false eyelashes or eyelash extensions. If you arrive the day of  surgery with makeup/eyelashes on you will be required to remove prior to surgery. (There is a risk of corneal abrasions if eye makeup/eyelash extensions are not removed)      Leave all valuables at home.   Do Not wear any jewelry or watches, including any metal in body piercings. Jewelry must be removed prior to coming to the hospital.  There is a possibility that rings that are unable to be removed may be cut off if they are on the surgical extremity.    Please remove all hair extensions, wigs, clips and any other metal accessories/ ornaments from your hair.  These items may pose a flammable/fire risk in Surgery and must be removed.    Do not shave your surgical area at least 5 days prior to your surgery. The surgical prep will be performed at the hospital according to Infection Control regulations.    Contact Lens must be removed before surgery. Either do not wear the contact lens or bring a case and solution for storage.  Please bring a container for eyeglasses or dentures as required.  Bring any paperwork your physician has provided, such as consent forms,  history and physicals, doctor's orders, etc.   Bring comfortable clothes that are loose fitting to wear upon discharge. Take into consideration the type of surgery being performed.  Maintain your diet as advised per your physician the day prior to surgery.      Adequate rest the night before surgery is advised.   Park in the Parking lot behind the hospital or in the [facility] across the street from the parking lot. Parking is complimentary.  If you will be discharged the same day as your procedure, please arrange for a responsible adult to drive you home or to accompany you if traveling by taxi.   YOU WILL NOT BE PERMITTED TO DRIVE OR TO LEAVE THE HOSPITAL ALONE AFTER SURGERY.   If you are being discharged the same day, it is strongly recommended that you arrange for someone to remain with you for the first 24 hrs following your surgery.    The  Surgeon will speak to your family/visitor after your surgery regarding the outcome of your surgery and post op care.  The Surgeon may speak to you after your surgery, but there is a possibility you may not remember the details.  Please check with your family members regarding the conversation with the Surgeon.    We strongly recommend whoever is bringing you home be present for discharge instructions.  This will ensure a thorough understanding for your post op home care.    ALL CHILDREN MUST ALWAYS BE ACCOMPANIED BY AN ADULT.    Visitors-Refer to current Visitor policy handouts.    Thank you for your cooperation.  The Staff of [facility].            Bathing Instructions with Hibiclens    Shower the evening before and morning of your procedure with Chlorhexidine (Hibiclens)  do not use Chlorhexidine on your face or genitals. Do not get in your eyes.  Wash your face with water and your regular face wash/soap  Use your regular shampoo  Apply Chlorhexidine (Hibiclens) directly on your skin or on a wet washcloth and wash gently. When showering: Move away from the shower stream when applying Chlorhexidine (Hibiclens) to avoid rinsing off too soon.  Rinse thoroughly with warm water  Do not dilute Chlorhexidine (Hibiclens)   Dry off as usual, do not use any deodorant, powder, body lotions, perfume, after shave or cologne.

## 2023-10-24 NOTE — ANESTHESIA PREPROCEDURE EVALUATION
10/24/2023  Med Palma is a 68 y.o., male.      Pre-op Assessment    I have reviewed the Patient Summary Reports.     I have reviewed the Nursing Notes. I have reviewed the NPO Status.   I have reviewed the Medications.     Review of Systems  Anesthesia Hx:  No previous Anesthesia  Denies Family Hx of Anesthesia complications.   Denies Personal Hx of Anesthesia complications.   Social:  Smoker, Alcohol Use    Hematology/Oncology:  Hematology Normal   Oncology Normal     EENT/Dental:EENT/Dental Normal   Cardiovascular:   Hypertension    Pulmonary:  Pulmonary Normal    Renal/:   BPH    Hepatic/GI:  Hepatic/GI Normal    Musculoskeletal:   Arthritis     Neurological:  Neurology Normal    Endocrine:  Endocrine Normal    Dermatological:  Skin Normal    Psych:  Psychiatric Normal           Physical Exam  General: Well nourished, Cooperative, Alert and Oriented    Airway:  Mallampati: I   Mouth Opening: Normal  Neck ROM: Normal ROM    Dental:  Intact        Anesthesia Plan  Type of Anesthesia, risks & benefits discussed:    Anesthesia Type: Gen ETT, Gen Supraglottic Airway  Intra-op Monitoring Plan: Standard ASA Monitors  Post Op Pain Control Plan: multimodal analgesia  Induction:  IV  Informed Consent: Informed consent signed with the Patient and all parties understand the risks and agree with anesthesia plan.  All questions answered.   ASA Score: 2  Anesthesia Plan Notes: Labs today    Ready For Surgery From Anesthesia Perspective.     .

## 2023-10-25 LAB — BACTERIA UR CULT: ABNORMAL

## 2023-10-26 ENCOUNTER — HOSPITAL ENCOUNTER (OUTPATIENT)
Facility: OTHER | Age: 68
Discharge: HOME | End: 2023-10-27
Attending: UROLOGY | Admitting: UROLOGY
Payer: MEDICARE

## 2023-10-26 ENCOUNTER — ANESTHESIA (OUTPATIENT)
Dept: SURGERY | Facility: OTHER | Age: 68
End: 2023-10-26
Payer: MEDICARE

## 2023-10-26 DIAGNOSIS — N32.89 BLADDER MASS: ICD-10-CM

## 2023-10-26 DIAGNOSIS — R31.9 HEMATURIA, UNSPECIFIED TYPE: ICD-10-CM

## 2023-10-26 DIAGNOSIS — R59.1 LYMPHADENOPATHY: ICD-10-CM

## 2023-10-26 PROCEDURE — D9220A PRA ANESTHESIA: ICD-10-PCS | Mod: HCNC,,, | Performed by: ANESTHESIOLOGY

## 2023-10-26 PROCEDURE — 25000003 PHARM REV CODE 250: Mod: HCNC | Performed by: STUDENT IN AN ORGANIZED HEALTH CARE EDUCATION/TRAINING PROGRAM

## 2023-10-26 PROCEDURE — 52240 CYSTOSCOPY AND TREATMENT: CPT | Mod: ,,, | Performed by: UROLOGY

## 2023-10-26 PROCEDURE — 27201423 OPTIME MED/SURG SUP & DEVICES STERILE SUPPLY: Mod: HCNC | Performed by: UROLOGY

## 2023-10-26 PROCEDURE — 88341 IMHCHEM/IMCYTCHM EA ADD ANTB: CPT | Mod: 26,HCNC,, | Performed by: PATHOLOGY

## 2023-10-26 PROCEDURE — 25000003 PHARM REV CODE 250: Mod: HCNC | Performed by: UROLOGY

## 2023-10-26 PROCEDURE — 88342 IMHCHEM/IMCYTCHM 1ST ANTB: CPT | Mod: HCNC | Performed by: PATHOLOGY

## 2023-10-26 PROCEDURE — 88307 TISSUE EXAM BY PATHOLOGIST: CPT | Mod: 26,HCNC,, | Performed by: PATHOLOGY

## 2023-10-26 PROCEDURE — 63600175 PHARM REV CODE 636 W HCPCS: Mod: HCNC | Performed by: NURSE ANESTHETIST, CERTIFIED REGISTERED

## 2023-10-26 PROCEDURE — 88341 PR IHC OR ICC EACH ADD'L SINGLE ANTIBODY  STAINPR: ICD-10-PCS | Mod: 26,HCNC,, | Performed by: PATHOLOGY

## 2023-10-26 PROCEDURE — 88342 CHG IMMUNOCYTOCHEMISTRY: ICD-10-PCS | Mod: 26,HCNC,, | Performed by: PATHOLOGY

## 2023-10-26 PROCEDURE — 37000008 HC ANESTHESIA 1ST 15 MINUTES: Mod: HCNC | Performed by: UROLOGY

## 2023-10-26 PROCEDURE — 36000706: Mod: HCNC | Performed by: UROLOGY

## 2023-10-26 PROCEDURE — 25000003 PHARM REV CODE 250: Mod: HCNC | Performed by: NURSE ANESTHETIST, CERTIFIED REGISTERED

## 2023-10-26 PROCEDURE — 71000033 HC RECOVERY, INTIAL HOUR: Mod: HCNC | Performed by: UROLOGY

## 2023-10-26 PROCEDURE — 36000707: Mod: HCNC | Performed by: UROLOGY

## 2023-10-26 PROCEDURE — D9220A PRA ANESTHESIA: Mod: HCNC,,, | Performed by: ANESTHESIOLOGY

## 2023-10-26 PROCEDURE — 37000009 HC ANESTHESIA EA ADD 15 MINS: Mod: HCNC | Performed by: UROLOGY

## 2023-10-26 PROCEDURE — 88307 PR  SURG PATH,LEVEL V: ICD-10-PCS | Mod: 26,HCNC,, | Performed by: PATHOLOGY

## 2023-10-26 PROCEDURE — 88307 TISSUE EXAM BY PATHOLOGIST: CPT | Mod: HCNC | Performed by: PATHOLOGY

## 2023-10-26 PROCEDURE — 52240 PR CYSTOURETHROSCOPY,FULGUR >5 CM LESN: ICD-10-PCS | Mod: ,,, | Performed by: UROLOGY

## 2023-10-26 PROCEDURE — 88341 IMHCHEM/IMCYTCHM EA ADD ANTB: CPT | Mod: HCNC | Performed by: PATHOLOGY

## 2023-10-26 PROCEDURE — 71000039 HC RECOVERY, EACH ADD'L HOUR: Mod: HCNC | Performed by: UROLOGY

## 2023-10-26 PROCEDURE — 63600175 PHARM REV CODE 636 W HCPCS: Mod: HCNC | Performed by: STUDENT IN AN ORGANIZED HEALTH CARE EDUCATION/TRAINING PROGRAM

## 2023-10-26 PROCEDURE — 88342 IMHCHEM/IMCYTCHM 1ST ANTB: CPT | Mod: 26,HCNC,, | Performed by: PATHOLOGY

## 2023-10-26 RX ORDER — PROCHLORPERAZINE EDISYLATE 5 MG/ML
5 INJECTION INTRAMUSCULAR; INTRAVENOUS EVERY 30 MIN PRN
Status: DISCONTINUED | OUTPATIENT
Start: 2023-10-26 | End: 2023-10-26 | Stop reason: HOSPADM

## 2023-10-26 RX ORDER — PHENYLEPHRINE HYDROCHLORIDE 10 MG/ML
INJECTION INTRAVENOUS
Status: DISCONTINUED | OUTPATIENT
Start: 2023-10-26 | End: 2023-10-26

## 2023-10-26 RX ORDER — PROPOFOL 10 MG/ML
VIAL (ML) INTRAVENOUS
Status: DISCONTINUED | OUTPATIENT
Start: 2023-10-26 | End: 2023-10-26

## 2023-10-26 RX ORDER — FLUTICASONE PROPIONATE 50 MCG
1 SPRAY, SUSPENSION (ML) NASAL
Status: DISCONTINUED | OUTPATIENT
Start: 2023-10-26 | End: 2023-10-27 | Stop reason: HOSPADM

## 2023-10-26 RX ORDER — DEXAMETHASONE SODIUM PHOSPHATE 4 MG/ML
INJECTION, SOLUTION INTRA-ARTICULAR; INTRALESIONAL; INTRAMUSCULAR; INTRAVENOUS; SOFT TISSUE
Status: DISCONTINUED | OUTPATIENT
Start: 2023-10-26 | End: 2023-10-26

## 2023-10-26 RX ORDER — TALC
6 POWDER (GRAM) TOPICAL NIGHTLY PRN
Status: DISCONTINUED | OUTPATIENT
Start: 2023-10-26 | End: 2023-10-27 | Stop reason: HOSPADM

## 2023-10-26 RX ORDER — OXYCODONE HYDROCHLORIDE 5 MG/1
5 TABLET ORAL EVERY 4 HOURS PRN
Status: DISCONTINUED | OUTPATIENT
Start: 2023-10-26 | End: 2023-10-27 | Stop reason: HOSPADM

## 2023-10-26 RX ORDER — LIDOCAINE HYDROCHLORIDE 20 MG/ML
INJECTION INTRAVENOUS
Status: DISCONTINUED | OUTPATIENT
Start: 2023-10-26 | End: 2023-10-26

## 2023-10-26 RX ORDER — ONDANSETRON 2 MG/ML
INJECTION INTRAMUSCULAR; INTRAVENOUS
Status: DISCONTINUED | OUTPATIENT
Start: 2023-10-26 | End: 2023-10-26

## 2023-10-26 RX ORDER — ACETAMINOPHEN 325 MG/1
650 TABLET ORAL EVERY 6 HOURS PRN
Status: DISCONTINUED | OUTPATIENT
Start: 2023-10-26 | End: 2023-10-27 | Stop reason: HOSPADM

## 2023-10-26 RX ORDER — MEPERIDINE HYDROCHLORIDE 25 MG/ML
12.5 INJECTION INTRAMUSCULAR; INTRAVENOUS; SUBCUTANEOUS ONCE AS NEEDED
Status: DISCONTINUED | OUTPATIENT
Start: 2023-10-26 | End: 2023-10-26 | Stop reason: HOSPADM

## 2023-10-26 RX ORDER — LIDOCAINE HYDROCHLORIDE 20 MG/ML
JELLY TOPICAL ONCE
Status: DISCONTINUED | OUTPATIENT
Start: 2023-10-26 | End: 2023-10-27 | Stop reason: HOSPADM

## 2023-10-26 RX ORDER — OXYBUTYNIN CHLORIDE 5 MG/1
5 TABLET ORAL 3 TIMES DAILY PRN
Status: DISCONTINUED | OUTPATIENT
Start: 2023-10-26 | End: 2023-10-27 | Stop reason: HOSPADM

## 2023-10-26 RX ORDER — ROCURONIUM BROMIDE 10 MG/ML
INJECTION, SOLUTION INTRAVENOUS
Status: DISCONTINUED | OUTPATIENT
Start: 2023-10-26 | End: 2023-10-26

## 2023-10-26 RX ORDER — OXYCODONE HYDROCHLORIDE 10 MG/1
10 TABLET ORAL EVERY 4 HOURS PRN
Status: DISCONTINUED | OUTPATIENT
Start: 2023-10-26 | End: 2023-10-27 | Stop reason: HOSPADM

## 2023-10-26 RX ORDER — SODIUM CHLORIDE 9 MG/ML
INJECTION, SOLUTION INTRAVENOUS CONTINUOUS
Status: DISCONTINUED | OUTPATIENT
Start: 2023-10-26 | End: 2023-10-27 | Stop reason: HOSPADM

## 2023-10-26 RX ORDER — ONDANSETRON 2 MG/ML
4 INJECTION INTRAMUSCULAR; INTRAVENOUS EVERY 6 HOURS PRN
Status: DISCONTINUED | OUTPATIENT
Start: 2023-10-26 | End: 2023-10-27 | Stop reason: HOSPADM

## 2023-10-26 RX ORDER — PHENAZOPYRIDINE HYDROCHLORIDE 100 MG/1
200 TABLET, FILM COATED ORAL 3 TIMES DAILY PRN
Status: DISCONTINUED | OUTPATIENT
Start: 2023-10-26 | End: 2023-10-27 | Stop reason: HOSPADM

## 2023-10-26 RX ORDER — LIDOCAINE HYDROCHLORIDE 20 MG/ML
JELLY TOPICAL
Status: DISCONTINUED | OUTPATIENT
Start: 2023-10-26 | End: 2023-10-26 | Stop reason: HOSPADM

## 2023-10-26 RX ORDER — LIDOCAINE HYDROCHLORIDE 10 MG/ML
0.5 INJECTION, SOLUTION EPIDURAL; INFILTRATION; INTRACAUDAL; PERINEURAL ONCE
Status: DISCONTINUED | OUTPATIENT
Start: 2023-10-26 | End: 2023-10-26 | Stop reason: HOSPADM

## 2023-10-26 RX ORDER — SODIUM CHLORIDE 0.9 % (FLUSH) 0.9 %
3 SYRINGE (ML) INJECTION
Status: DISCONTINUED | OUTPATIENT
Start: 2023-10-26 | End: 2023-10-26 | Stop reason: HOSPADM

## 2023-10-26 RX ORDER — TAMSULOSIN HYDROCHLORIDE 0.4 MG/1
0.4 CAPSULE ORAL NIGHTLY
Status: DISCONTINUED | OUTPATIENT
Start: 2023-10-26 | End: 2023-10-27 | Stop reason: HOSPADM

## 2023-10-26 RX ORDER — DIPHENHYDRAMINE HCL 25 MG
25 CAPSULE ORAL EVERY 12 HOURS PRN
Status: DISCONTINUED | OUTPATIENT
Start: 2023-10-26 | End: 2023-10-27 | Stop reason: HOSPADM

## 2023-10-26 RX ORDER — FENTANYL CITRATE 50 UG/ML
INJECTION, SOLUTION INTRAMUSCULAR; INTRAVENOUS
Status: DISCONTINUED | OUTPATIENT
Start: 2023-10-26 | End: 2023-10-26

## 2023-10-26 RX ORDER — HYDROMORPHONE HYDROCHLORIDE 2 MG/ML
0.4 INJECTION, SOLUTION INTRAMUSCULAR; INTRAVENOUS; SUBCUTANEOUS EVERY 5 MIN PRN
Status: DISCONTINUED | OUTPATIENT
Start: 2023-10-26 | End: 2023-10-26 | Stop reason: HOSPADM

## 2023-10-26 RX ORDER — OXYCODONE HYDROCHLORIDE 5 MG/1
5 TABLET ORAL
Status: DISCONTINUED | OUTPATIENT
Start: 2023-10-26 | End: 2023-10-26 | Stop reason: HOSPADM

## 2023-10-26 RX ORDER — CEFAZOLIN SODIUM 1 G/3ML
INJECTION, POWDER, FOR SOLUTION INTRAMUSCULAR; INTRAVENOUS
Status: DISCONTINUED | OUTPATIENT
Start: 2023-10-26 | End: 2023-10-26

## 2023-10-26 RX ORDER — EPHEDRINE SULFATE 50 MG/ML
INJECTION, SOLUTION INTRAVENOUS
Status: DISCONTINUED | OUTPATIENT
Start: 2023-10-26 | End: 2023-10-26

## 2023-10-26 RX ORDER — SODIUM CHLORIDE, SODIUM LACTATE, POTASSIUM CHLORIDE, CALCIUM CHLORIDE 600; 310; 30; 20 MG/100ML; MG/100ML; MG/100ML; MG/100ML
INJECTION, SOLUTION INTRAVENOUS CONTINUOUS
Status: DISCONTINUED | OUTPATIENT
Start: 2023-10-26 | End: 2023-10-26

## 2023-10-26 RX ADMIN — CEFAZOLIN 2 G: 330 INJECTION, POWDER, FOR SOLUTION INTRAMUSCULAR; INTRAVENOUS at 11:10

## 2023-10-26 RX ADMIN — SUGAMMADEX 200 MG: 100 INJECTION, SOLUTION INTRAVENOUS at 01:10

## 2023-10-26 RX ADMIN — FENTANYL CITRATE 50 MCG: 0.05 INJECTION, SOLUTION INTRAMUSCULAR; INTRAVENOUS at 11:10

## 2023-10-26 RX ADMIN — SODIUM CHLORIDE, SODIUM LACTATE, POTASSIUM CHLORIDE, AND CALCIUM CHLORIDE: .6; .31; .03; .02 INJECTION, SOLUTION INTRAVENOUS at 11:10

## 2023-10-26 RX ADMIN — CEFTRIAXONE SODIUM 1 G: 1 INJECTION, POWDER, FOR SOLUTION INTRAMUSCULAR; INTRAVENOUS at 11:10

## 2023-10-26 RX ADMIN — EPHEDRINE SULFATE 10 MG: 50 INJECTION INTRAVENOUS at 12:10

## 2023-10-26 RX ADMIN — OXYCODONE HYDROCHLORIDE 10 MG: 5 TABLET ORAL at 01:10

## 2023-10-26 RX ADMIN — DEXAMETHASONE SODIUM PHOSPHATE 8 MG: 4 INJECTION, SOLUTION INTRAMUSCULAR; INTRAVENOUS at 11:10

## 2023-10-26 RX ADMIN — TAMSULOSIN HYDROCHLORIDE 0.4 MG: 0.4 CAPSULE ORAL at 09:10

## 2023-10-26 RX ADMIN — PHENYLEPHRINE HYDROCHLORIDE 200 MCG: 10 INJECTION INTRAVENOUS at 11:10

## 2023-10-26 RX ADMIN — ROCURONIUM BROMIDE 20 MG: 10 INJECTION INTRAVENOUS at 12:10

## 2023-10-26 RX ADMIN — PHENYLEPHRINE HYDROCHLORIDE 100 MCG: 10 INJECTION INTRAVENOUS at 12:10

## 2023-10-26 RX ADMIN — OXYCODONE HYDROCHLORIDE 10 MG: 5 TABLET ORAL at 11:10

## 2023-10-26 RX ADMIN — Medication 6 MG: at 10:10

## 2023-10-26 RX ADMIN — ROCURONIUM BROMIDE 50 MG: 10 INJECTION INTRAVENOUS at 11:10

## 2023-10-26 RX ADMIN — LIDOCAINE HYDROCHLORIDE 100 MG: 20 INJECTION, SOLUTION INTRAVENOUS at 11:10

## 2023-10-26 RX ADMIN — ONDANSETRON HYDROCHLORIDE 4 MG: 2 INJECTION INTRAMUSCULAR; INTRAVENOUS at 01:10

## 2023-10-26 RX ADMIN — SODIUM CHLORIDE, SODIUM LACTATE, POTASSIUM CHLORIDE, AND CALCIUM CHLORIDE: .6; .31; .03; .02 INJECTION, SOLUTION INTRAVENOUS at 12:10

## 2023-10-26 RX ADMIN — SODIUM CHLORIDE: 9 INJECTION, SOLUTION INTRAVENOUS at 02:10

## 2023-10-26 RX ADMIN — ACETAMINOPHEN 650 MG: 325 TABLET, FILM COATED ORAL at 10:10

## 2023-10-26 RX ADMIN — PROPOFOL 200 MG: 10 INJECTION, EMULSION INTRAVENOUS at 11:10

## 2023-10-26 NOTE — NURSING TRANSFER
Nursing Transfer Note      10/26/2023   5:03 PM    Nurse giving handoff:cullen basilio  Nurse receiving handoff:sav Gray    Reason patient is being transferred: post op  Transfer To: room 242    Transfer via bed    Transfer with on CBI    Transported by rn    Transfer Vital Signs:  Blood Pressure:seeflowsheet  Heart Rate:  O2:  Temperature:  Respirations:    Telemetry:   Order for Tele Monitor?     Additional Lines: Hernandez Catheter    4eyes on Skin: yes    Medicines sent: on elvia    Any special needs or follow-up needed:     Patient belongings transferred with patient:     Chart send with patient: Yes    Notified: spouse    Patient reassessed at:  (date, time)  1  Upon arrival to floor:

## 2023-10-26 NOTE — ANESTHESIA PROCEDURE NOTES
Intubation    Date/Time: 10/26/2023 11:38 AM    Performed by: Reyna Sibley CRNA  Authorized by: Esthela Doe MD    Intubation:     Induction:  Intravenous    Intubated:  Postinduction    Mask Ventilation:  Easy mask    Attempts:  1    Attempted By:  Student    Method of Intubation:  Video laryngoscopy    Blade:  Diaz 3    Laryngeal View Grade: Grade I - full view of cords      Difficult Airway Encountered?: No      Complications:  None    Airway Device:  Oral endotracheal tube    Airway Device Size:  7.5    Style/Cuff Inflation:  Cuffed (inflated to minimal occlusive pressure)    Inflation Amount (mL):  5    Tube secured:  22    Secured at:  The lips    Placement Verified By:  Capnometry    Complicating Factors:  None    Findings Post-Intubation:  BS equal bilateral

## 2023-10-26 NOTE — INTERVAL H&P NOTE
The patient has been examined and the H&P has been reviewed:    I concur with the findings and no changes have occurred since H&P was written.    Surgery risks, benefits and alternative options discussed and understood by patient/family.    Urine cs contaminant  Will give rocephin preop      There are no hospital problems to display for this patient.

## 2023-10-26 NOTE — OP NOTE
[facility]    Operative Note     DATE OF PROCEDURE:   10/26/2023    PRE-OP DIAGNOSES:   1) Bladder tumor     POST-OP DIAGNOSES AND OPERATIVE FINDINGS:   As above    PROCEDURES:  1) Transurethral resection of bladder tumor, white light    SURGEONS:   Surgeon(s) and Role:     * Yinka Jimenez MD - Primary    ANESTHESIOLOGY:   Anesthesiologist: Frank Dumont MD; Esthela Doe MD  CRNA: Randall Linn CRNA; Doug Bey CRNA; Reyna Sibley CRNA  Student Nurse Anesthetist: Davide López    STAFF:   Circulator: Antony Escudero RN; Laila Guan RN  Relief Circulator: Sid Ramirez RN  Relief Scrub: Cecilia Ervin  Scrub Person: Abigail Plaza ST    ANESTHESIA TYPE:  General anesthesia    ESTIMATED BLOOD LOSS:   Minimal    COMPLICATIONS:   None    ANTIBIOTICS:  1g Rocephin    INTRAOPERATIVE THROMBOEMBOLISM PROPHYLAXIS:  Pneumatic compression stockings    PROCEDURE SUMMARY  The patient was brought to the operating room and anesthesia obtained.  The patient was then placed in the lithotomy position and prepped and draped using standard sterile technique.  All pressure points were carefully padded.  A surgical time-out occurred, antibiotics were administered, and thromboembolism prophylaxis was given.      A 22 F cystoscope was inserted into the urethra. Dialation of the urethral meatus was needed using urethral sounds to 28F. The urethra and bladder were carefully inspected with the following findings. The cystoscope was then removed and the 26 F saline resectoscope was inserted into the bladder per urethra. Resection was completed of each tumor with details below:    Urethra: Normal   Prostate: BPH with significantly enlarged median lobe   Bladder: Single tumor   Visualization method: White light   Electrical element: Bipolar   Immediate postoperative instillation of chemotherapy Not applied.   Post-TUR bimanual exam: No palpable mass post-TUR   Ureteral orifices:       -Right Normal     -Left Normal   Other findings: Chronic obstructive cystopathy with diverticulum formation       Tumor #    Blue Light Avid NA   Specimen: Bladder tumor superficial and bladder tumor base sent separately     -Appearance Nodular     -Recurrent or Primary primary     -Number of foci 1     -Tumor location(s) Left posterior wall     -Aggregate tumor diameter 5 cm     -Largest individual tumor 5 cm   Resection technique: Standard   Resection completeness: Maximally debulked, residual tumor deep in  bladder wall   Bladder perforation: None   Detrusor muscle visualized Yes   Comments Large bladder tumor requiring deep resection with visible tumor deep in the bladder wall with fat visualized.        Random bladder biopsies: Not done   Targeted cold cup biopsies: Not done     CT Urogram recently performed so no retrograde pyelograms performed.     The bladder was emptied and a 24Fr 3-way F Hernandez catheter inserted into the bladder, and the case terminated.    DISPOSITION:   Admitted overnight for CBI.     ATTESTATION:   I Dr. Artis was present for the entire operation and agree with the note above.    SPECIMENS:   Specimens (From admission, onward)       Start     Ordered    10/26/23 1237  Specimen to Pathology, Surgery Urology  Once        Comments: Pre-op Diagnosis: Bladder mass [N32.89]Hematuria, unspecified type [R31.9]Lymphadenopathy [R59.1]Procedure(s):TURBT (TRANSURETHRAL RESECTION OF BLADDER TUMOR) Number of specimens: 2Name of specimens: 1. BLADDER TUMOR, SUPERFICIAL2. BASE OF TUMOR     References:    Click here for ordering Quick Tip   Question Answer Comment   Procedure Type: Urology    Specimen Class: Known or suspected malignancy    Which provider would you like to cc? GRISELDA ARTIS    Release to patient Immediate        10/26/23 1308                     IMPLANTS:  * No implants in log *

## 2023-10-26 NOTE — TRANSFER OF CARE
Anesthesia Transfer of Care Note    Patient: Med Palma    Procedure(s) Performed: Procedure(s) (LRB):  TURBT (TRANSURETHRAL RESECTION OF BLADDER TUMOR) (N/A)    Patient location: PACU    Anesthesia Type: general    Transport from OR: Transported from OR on 6-10 L/min O2 by face mask with adequate spontaneous ventilation    Post pain: adequate analgesia    Post assessment: no apparent anesthetic complications and tolerated procedure well    Post vital signs: stable    Level of consciousness: awake and alert    Nausea/Vomiting: no nausea/vomiting    Complications: none    Transfer of care protocol was followed      Last vitals:   Visit Vitals  /79 (BP Location: Left arm, Patient Position: Sitting)   Pulse 64   Temp 36.6 °C (97.9 °F) (Oral)   Resp 16   Ht 6' (1.829 m)   Wt 89.8 kg (198 lb)   SpO2 98%   BMI 26.85 kg/m²

## 2023-10-26 NOTE — ANESTHESIA POSTPROCEDURE EVALUATION
Anesthesia Post Evaluation    Patient: Med Palma    Procedure(s) Performed: Procedure(s) (LRB):  TURBT (TRANSURETHRAL RESECTION OF BLADDER TUMOR) (N/A)    Final Anesthesia Type: general      Patient location during evaluation: PACU  Patient participation: Yes- Able to Participate  Level of consciousness: awake and alert  Post-procedure vital signs: reviewed and stable  Pain management: adequate  Airway patency: patent    PONV status at discharge: No PONV  Anesthetic complications: no      Cardiovascular status: blood pressure returned to baseline  Respiratory status: unassisted  Hydration status: euvolemic  Follow-up not needed.          Vitals Value Taken Time   /72 10/26/23 1446   Temp 36.1 °C (97 °F) 10/26/23 1400   Pulse 64 10/26/23 1455   Resp 17 10/26/23 1415   SpO2 97 % 10/26/23 1455   Vitals shown include unvalidated device data.      No case tracking events are documented in the log.      Pain/Mirna Score: Pain Rating Prior to Med Admin: 7 (10/26/2023  1:40 PM)  Pain Rating Post Med Admin: 5 (10/26/2023  2:20 PM)  Mirna Score: 10 (10/26/2023  2:20 PM)

## 2023-10-27 VITALS
WEIGHT: 201.5 LBS | RESPIRATION RATE: 18 BRPM | DIASTOLIC BLOOD PRESSURE: 72 MMHG | TEMPERATURE: 98 F | BODY MASS INDEX: 27.29 KG/M2 | HEART RATE: 57 BPM | OXYGEN SATURATION: 100 % | SYSTOLIC BLOOD PRESSURE: 132 MMHG | HEIGHT: 72 IN

## 2023-10-27 PROBLEM — D49.4 BLADDER TUMOR: Status: ACTIVE | Noted: 2023-10-27

## 2023-10-27 PROCEDURE — 25000003 PHARM REV CODE 250: Mod: HCNC | Performed by: STUDENT IN AN ORGANIZED HEALTH CARE EDUCATION/TRAINING PROGRAM

## 2023-10-27 RX ORDER — IBUPROFEN 800 MG/1
800 TABLET ORAL 3 TIMES DAILY PRN
Qty: 9 TABLET | Refills: 0 | Status: SHIPPED | OUTPATIENT
Start: 2023-10-27 | End: 2023-10-30

## 2023-10-27 RX ORDER — OXYBUTYNIN CHLORIDE 5 MG/1
5 TABLET ORAL 3 TIMES DAILY PRN
Qty: 30 TABLET | Refills: 0 | Status: SHIPPED | OUTPATIENT
Start: 2023-10-27 | End: 2024-03-07 | Stop reason: CLARIF

## 2023-10-27 RX ORDER — PHENAZOPYRIDINE HYDROCHLORIDE 100 MG/1
100 TABLET, FILM COATED ORAL 3 TIMES DAILY PRN
Qty: 30 TABLET | Refills: 0 | Status: SHIPPED | OUTPATIENT
Start: 2023-10-27 | End: 2023-11-06

## 2023-10-27 RX ORDER — TAMSULOSIN HYDROCHLORIDE 0.4 MG/1
0.4 CAPSULE ORAL DAILY
Qty: 14 CAPSULE | Refills: 0 | Status: SHIPPED | OUTPATIENT
Start: 2023-10-27 | End: 2023-11-27 | Stop reason: SDUPTHER

## 2023-10-27 RX ADMIN — OXYBUTYNIN CHLORIDE 5 MG: 5 TABLET ORAL at 07:10

## 2023-10-27 RX ADMIN — OXYCODONE HYDROCHLORIDE 5 MG: 5 TABLET ORAL at 07:10

## 2023-10-27 NOTE — ASSESSMENT & PLAN NOTE
Med Palma is an 68 y.o. male that is s/p TURBT on 10/26/23. Doing well post-op.    - Pain - controlled  - Diet - tolerating regular diet  - OOB, ambulating  - Encourage IS  - DVT ppx   - CBI plugged    - Dispo: Ok for discharge home today with Hernandez indwelling

## 2023-10-27 NOTE — PLAN OF CARE
In agreement and eager for DC.  Tolerating good PO and ambulating welVoiding with good UOP.  To be DCd with mitchell, pt VU and RD of mitchell maintenance and hygeine-- extra leg and night bags supplied to pt as well as hygeine wipes.  VU of DC instructions; paperwork and prescriptions delivered. IV removed with cath tip intact, WNL.  To be DCd home with family.  Will be escorted downstairs via  transport team once dressed and ready.  Free from falls or skin breakdown this hospital admission.

## 2023-10-27 NOTE — PLAN OF CARE
Problem: Adult Inpatient Plan of Care  Goal: Plan of Care Review  Outcome: Ongoing, Progressing     Problem: Pain Acute  Goal: Acceptable Pain Control and Functional Ability  Outcome: Ongoing, Progressing  Intervention: Develop Pain Management Plan  Flowsheets (Taken 10/27/2023 0442)  Pain Management Interventions:   care clustered   medication offered   pain management plan reviewed with patient/caregiver   quiet environment facilitated     POC reviewed with patient this shift.  A/O x4.  Respirations unlabored on RA.  Skin w/d.  CBI therapy continues with urine increasingly more clear and yellow in color.  Pt ambulated in hallway with x1 standby/vigilance only.  Pain appears to be managed well.  PRN Oxy and Tylenol given x1 thus far in shift.  Tolerates meds whole with water without difficulty.  VSS.  See flowsheet for full assessment.  Able to verbalize wants/needs.  No s/s of distress.  Fall/safety precautions maintained.

## 2023-10-27 NOTE — DISCHARGE SUMMARY
[facility] Surg ([address])  Urology  Discharge Summary      Patient Name: Luanne Carroll  MRN: [id number]  Admission Date: 10/26/2023  Hospital Length of Stay: 0 days  Discharge Date and Time:  10/27/2023 7:20 AM  Attending Physician: Tomi Knox MD    Discharging Provider: Tomi Knox MD  Primary Care Physician: Jah Wilson MD    HPI:   No notes on file     Procedure(s) (LRB):  TURBT (TRANSURETHRAL RESECTION OF BLADDER TUMOR) (N/A)     Indwelling Lines/Drains at time of discharge:   Lines/Drains/Airways       Drain  Duration                  Urethral Catheter 10/26/23 1311 Latex;Triple-lumen 24 Fr. <1 day                    Hospital Course (synopsis of major diagnoses, care, treatment, and services provided during the course of the hospital stay):    LUANNE CARROLL 68 y.o.male underwent: Procedure(s) (LRB):  TURBT (TRANSURETHRAL RESECTION OF BLADDER TUMOR) (N/A). The patient tolerated the procedure well, was transferred to recovery post-op, and then transferred to the floor for continuation of medical care. The patient's clinical condition progressively improved. By the time of discharge, he was tolerating a diet without nausea or vomiting, pain was well controlled with oral medications, and he was ambulating without difficulty. On POD 1 the patient was discharged to home. CBI titrated to off, mitchell output remained clear yellow. Mitchell will remain indwelling on discharge. The patient will follow up in [facility] [place] clinic early next week for voiding trial.      Medications and instructions as below.  For more thorough information, please refer to the hospital record and operative report.    Consults:     Significant Diagnostic Studies:     Pending Diagnostic Studies:       Procedure Component Value Units Date/Time    Specimen to Pathology, Surgery Urology [[id number]] Collected: 10/26/23 1304    Order Status: Sent Lab Status: In process Updated: 10/26/23 3909     Specimen: Tissue             Final Active Diagnoses:    Diagnosis Date Noted POA    PRINCIPAL PROBLEM:  Bladder tumor [D49.4] 10/27/2023 Yes      Problems Resolved During this Admission:       Discharged Condition: good    Disposition: Home or Self Care    Follow Up:   Follow-up Information       Belle Ghosh NP Follow up.    Specialty: Urology  Why: Follow up early next week for voiding trial.  Contact information:  [address] [address]  [phone]                           Patient Instructions:   No discharge procedures on file.  Medications:  Reconciled Home Medications:      Medication List        START taking these medications      oxybutynin 5 MG Tab  Commonly known as: DITROPAN  Take 1 tablet (5 mg total) by mouth 3 (three) times daily as needed (bladder spasms).     phenazopyridine 100 MG tablet  Commonly known as: PYRIDIUM  Take 1 tablet (100 mg total) by mouth 3 (three) times daily as needed for Pain.            CHANGE how you take these medications      * IBUPROFEN ORAL  Take by mouth as needed.  What changed: Another medication with the same name was added. Make sure you understand how and when to take each.     * ibuprofen 800 MG tablet  Commonly known as: ADVIL,MOTRIN  Take 1 tablet (800 mg total) by mouth 3 (three) times daily as needed for Pain.  What changed: You were already taking a medication with the same name, and this prescription was added. Make sure you understand how and when to take each.     * tamsulosin 0.4 mg Cap  Commonly known as: FLOMAX  Take 1 capsule (0.4 mg total) by mouth once daily.  What changed: Another medication with the same name was added. Make sure you understand how and when to take each.     * tamsulosin 0.4 mg Cap  Commonly known as: FLOMAX  Take 1 capsule (0.4 mg total) by mouth once daily.  What changed: You were already taking a medication with the same name, and this prescription was added. Make sure you understand how and when to take  each.           * This list has 4 medication(s) that are the same as other medications prescribed for you. Read the directions carefully, and ask your doctor or other care provider to review them with you.                CONTINUE taking these medications      fluticasone propionate 50 mcg/actuation nasal spray  Commonly known as: FLONASE  1 spray (50 mcg total) by Each Nostril route as needed for Allergies.     tadalafiL 20 MG Tab  Commonly known as: CIALIS  Take 1 tablet (20 mg total) by mouth daily as needed (ED).              Time spent on the discharge of patient: 15 minutes    Tomi Knox MD  Urology  [facility] ([address])

## 2023-10-27 NOTE — DISCHARGE INSTRUCTIONS
Post Cystoscopy and Transurethral resection of Bladder Tumor Instructions  Do not strain to have a bowel movement  No strenuous exercise x 7 days  No driving while you are on narcotic pain medications or if your mitchell  catheter is in place    You can expect:  To see blood in your urine.    Go to the ER if:  Your catheter stops draining  You are having severe abdominal pain  Inability to void if you do not have a catheter    Call the doctor if:  Temperature is greater than 101F  Persistent vomiting and inability to keep food down

## 2023-10-27 NOTE — PROGRESS NOTES
[facility] ([address])  Urology  Progress Note    Patient Name: Med Palma  MRN: [id number]  Admission Date: 10/26/2023  Hospital Length of Stay: 0 days  Code Status: Full Code   Attending Provider: Yinka Jimenez MD   Primary Care Physician: Jah Wilson MD    Subjective:     HPI:  No notes on file    Interval History: NAEON. AFVSS. Ambulating. Tolerating PO. Pain controlled.       Objective:     Temp:  [97 °F (36.1 °C)-98.1 °F (36.7 °C)] 97.9 °F (36.6 °C)  Pulse:  [] 59  Resp:  [15-19] 18  SpO2:  [94 %-100 %] 97 %  BP: (111-142)/(53-84) 117/68     Body mass index is 27.33 kg/m².           Drains       Drain  Duration                  Urethral Catheter 10/26/23 1311 Latex;Triple-lumen 24 Fr. <1 day                     Physical Exam  Vitals reviewed.   Constitutional:       General: He is not in acute distress.     Appearance: He is not diaphoretic.   HENT:      Head: Normocephalic and atraumatic.      Nose: Nose normal.   Eyes:      Conjunctiva/sclera: Conjunctivae normal.   Cardiovascular:      Rate and Rhythm: Normal rate.   Pulmonary:      Effort: Pulmonary effort is normal. No respiratory distress.   Abdominal:      General: There is no distension.   Genitourinary:     Comments: 24Fr 3-way Hernandez catheter draining clear yellow urine on slow drip CBI. CBI plugged on rounds.   Musculoskeletal:         General: Normal range of motion.      Cervical back: Normal range of motion.   Skin:     General: Skin is dry.   Neurological:      Mental Status: He is alert.   Psychiatric:         Behavior: Behavior normal.         Thought Content: Thought content normal.           Significant Labs:    BMP:  Recent Labs   Lab 10/24/23  1352      K 4.6      CO2 27   BUN 14   CREATININE 1.0   CALCIUM 9.3       CBC:   Recent Labs   Lab 10/24/23  1352   WBC 8.73   HGB 12.5*   HCT 38.1*          All pertinent labs results from the past 24 hours have been reviewed.    Significant  Imaging:  All pertinent imaging results/findings from the past 24 hours have been reviewed.                    Assessment/Plan:     Bladder tumor  Med Palma is an 68 y.o. male that is s/p TURBT on 10/26/23. Doing well post-op.    - Pain - controlled  - Diet - tolerating regular diet  - OOB, ambulating  - Encourage IS  - DVT ppx   - CBI plugged    - Dispo: Ok for discharge home today with Hernandez indwelling          VTE Risk Mitigation (From admission, onward)    None          Tomi Knox MD  Urology  [facility] ([address])

## 2023-10-27 NOTE — PLAN OF CARE
Received to room from PACU, RN w/ family at side. VSS on RA & afebrile. AOx4. Oriented to room, POC, & all equipment. Surgical site CDI, CBI patent and draining clear Light pink. Irrigation slow. Bowel sounds active, diet initiated. Fall precautions implemented & call light in reach. Resting comfortably in low bed, in NAD.     To ambulate this evening shift.

## 2023-10-27 NOTE — SUBJECTIVE & OBJECTIVE
Interval History: NAEON. AFVSS. Ambulating. Tolerating PO. Pain controlled.       Objective:     Temp:  [97 °F (36.1 °C)-98.1 °F (36.7 °C)] 97.9 °F (36.6 °C)  Pulse:  [] 59  Resp:  [15-19] 18  SpO2:  [94 %-100 %] 97 %  BP: (111-142)/(53-84) 117/68     Body mass index is 27.33 kg/m².           Drains       Drain  Duration                  Urethral Catheter 10/26/23 1311 Latex;Triple-lumen 24 Fr. <1 day                     Physical Exam  Vitals reviewed.   Constitutional:       General: He is not in acute distress.     Appearance: He is not diaphoretic.   HENT:      Head: Normocephalic and atraumatic.      Nose: Nose normal.   Eyes:      Conjunctiva/sclera: Conjunctivae normal.   Cardiovascular:      Rate and Rhythm: Normal rate.   Pulmonary:      Effort: Pulmonary effort is normal. No respiratory distress.   Abdominal:      General: There is no distension.   Genitourinary:     Comments: 24Fr 3-way Hernandez catheter draining clear yellow urine on slow drip CBI. CBI plugged on rounds.   Musculoskeletal:         General: Normal range of motion.      Cervical back: Normal range of motion.   Skin:     General: Skin is dry.   Neurological:      Mental Status: He is alert.   Psychiatric:         Behavior: Behavior normal.         Thought Content: Thought content normal.           Significant Labs:    BMP:  Recent Labs   Lab 10/24/23  1352      K 4.6      CO2 27   BUN 14   CREATININE 1.0   CALCIUM 9.3       CBC:   Recent Labs   Lab 10/24/23  1352   WBC 8.73   HGB 12.5*   HCT 38.1*          All pertinent labs results from the past 24 hours have been reviewed.    Significant Imaging:  All pertinent imaging results/findings from the past 24 hours have been reviewed.

## 2023-10-30 ENCOUNTER — OFFICE VISIT (OUTPATIENT)
Dept: UROLOGY | Facility: CLINIC | Age: 68
End: 2023-10-30
Payer: MEDICARE

## 2023-10-30 VITALS
HEIGHT: 72 IN | BODY MASS INDEX: 26.55 KG/M2 | OXYGEN SATURATION: 97 % | DIASTOLIC BLOOD PRESSURE: 80 MMHG | HEART RATE: 58 BPM | SYSTOLIC BLOOD PRESSURE: 132 MMHG | WEIGHT: 196 LBS

## 2023-10-30 DIAGNOSIS — R31.9 HEMATURIA, UNSPECIFIED TYPE: ICD-10-CM

## 2023-10-30 DIAGNOSIS — N32.89 BLADDER MASS: Primary | ICD-10-CM

## 2023-10-30 DIAGNOSIS — R59.1 LYMPHADENOPATHY: ICD-10-CM

## 2023-10-30 PROCEDURE — 3079F PR MOST RECENT DIASTOLIC BLOOD PRESSURE 80-89 MM HG: ICD-10-PCS | Mod: HCNC,CPTII,S$GLB, | Performed by: UROLOGY

## 2023-10-30 PROCEDURE — 1159F PR MEDICATION LIST DOCUMENTED IN MEDICAL RECORD: ICD-10-PCS | Mod: HCNC,CPTII,S$GLB, | Performed by: UROLOGY

## 2023-10-30 PROCEDURE — 1125F PR PAIN SEVERITY QUANTIFIED, PAIN PRESENT: ICD-10-PCS | Mod: HCNC,CPTII,S$GLB, | Performed by: UROLOGY

## 2023-10-30 PROCEDURE — 3044F HG A1C LEVEL LT 7.0%: CPT | Mod: HCNC,CPTII,S$GLB, | Performed by: UROLOGY

## 2023-10-30 PROCEDURE — 1159F MED LIST DOCD IN RCRD: CPT | Mod: HCNC,CPTII,S$GLB, | Performed by: UROLOGY

## 2023-10-30 PROCEDURE — 3044F PR MOST RECENT HEMOGLOBIN A1C LEVEL <7.0%: ICD-10-PCS | Mod: HCNC,CPTII,S$GLB, | Performed by: UROLOGY

## 2023-10-30 PROCEDURE — 99214 OFFICE O/P EST MOD 30 MIN: CPT | Mod: HCNC,S$GLB,, | Performed by: UROLOGY

## 2023-10-30 PROCEDURE — 3008F BODY MASS INDEX DOCD: CPT | Mod: HCNC,CPTII,S$GLB, | Performed by: UROLOGY

## 2023-10-30 PROCEDURE — 3008F PR BODY MASS INDEX (BMI) DOCUMENTED: ICD-10-PCS | Mod: HCNC,CPTII,S$GLB, | Performed by: UROLOGY

## 2023-10-30 PROCEDURE — 3079F DIAST BP 80-89 MM HG: CPT | Mod: HCNC,CPTII,S$GLB, | Performed by: UROLOGY

## 2023-10-30 PROCEDURE — 99214 PR OFFICE/OUTPT VISIT, EST, LEVL IV, 30-39 MIN: ICD-10-PCS | Mod: HCNC,S$GLB,, | Performed by: UROLOGY

## 2023-10-30 PROCEDURE — 3075F PR MOST RECENT SYSTOLIC BLOOD PRESS GE 130-139MM HG: ICD-10-PCS | Mod: HCNC,CPTII,S$GLB, | Performed by: UROLOGY

## 2023-10-30 PROCEDURE — 3075F SYST BP GE 130 - 139MM HG: CPT | Mod: HCNC,CPTII,S$GLB, | Performed by: UROLOGY

## 2023-10-30 PROCEDURE — 1125F AMNT PAIN NOTED PAIN PRSNT: CPT | Mod: HCNC,CPTII,S$GLB, | Performed by: UROLOGY

## 2023-10-30 NOTE — PROGRESS NOTES
Subjective:      Med Palma is a 68 y.o. male who returns today regarding his     Voiding trial sp TURBT.    The following portions of the patient's history were reviewed and updated as appropriate: allergies, current medications, past family history, past medical history, past social history, past surgical history and problem list.    Review of Systems  Pertinent items are noted in HPI.  A comprehensive multipoint review of systems was negative except as otherwise stated in the HPI.    Past Medical History:   Diagnosis Date    Allergy     Arthritis     Elevated PSA     Hypertension      Past Surgical History:   Procedure Laterality Date    TURBT (TRANSURETHRAL RESECTION OF BLADDER TUMOR) N/A 10/26/2023    Procedure: TURBT (TRANSURETHRAL RESECTION OF BLADDER TUMOR);  Surgeon: Yinka Jimenez MD;  Location: [facility];  Service: Urology;  Laterality: N/A;       Review of patient's allergies indicates:  No Known Allergies       Objective:   Vitals: There were no vitals taken for this visit.    Physical Exam   General: alert and oriented, no acute distress  Respiratory: Symmetric expansion, non-labored breathing  Cardiovascular: no peripheral edema  Abdomen: soft, non distended  Skin: normal coloration and turgor, no rashes, no suspicious skin lesions noted  Neuro: no gross deficits  Psych: normal judgment and insight, normal mood/affect, and non-anxious    Physical Exam    Lab Review   Urinalysis demonstrates no specimen      Lab Results   Component Value Date    WBC 8.73 10/24/2023    HGB 12.5 (L) 10/24/2023    HCT 38.1 (L) 10/24/2023    MCV 91 10/24/2023     10/24/2023     Lab Results   Component Value Date    CREATININE 1.0 10/24/2023    BUN 14 10/24/2023       Imaging  PVR 0cc    Assessment and Plan:   Bladder mass    Hematuria, unspecified type    Lymphadenopathy      Path pending  Voiding trial successful  Instilled 150cc; voided 100cc; good stream; initially bloody then clear    See Dr Jimenez and  Dr Spicer at [facility] 11/7

## 2023-11-01 LAB
COMMENT: NORMAL
FINAL PATHOLOGIC DIAGNOSIS: NORMAL
GROSS: NORMAL
Lab: NORMAL
MICROSCOPIC EXAM: NORMAL

## 2023-11-06 NOTE — PROGRESS NOTES
[facility] / [facility]  Multidisciplinary Urologic Oncology Clinic  [facility]  Urology      Subjective:      Med Palma is a 68 y.o. male who returns today regarding his     Here to review path results from TURBT.    Tumor extended into the perivesical fat and there was residual tumor visible after maximal resection.    Urine has been clear    No Headache except for his usual sinus congestion  No double vision  No neurologic symptoms    No weight loss    No symptoms of met disease    ECOG0    The following portions of the patient's history were reviewed and updated as appropriate: allergies, current medications, past family history, past medical history, past social history, past surgical history and problem list.    Review of Systems  Pertinent items are noted in HPI.  A comprehensive multipoint review of systems was negative except as otherwise stated in the HPI.    Past Medical History:   Diagnosis Date    Allergy     Arthritis     Elevated PSA     Hypertension      Past Surgical History:   Procedure Laterality Date    TURBT (TRANSURETHRAL RESECTION OF BLADDER TUMOR) N/A 10/26/2023    Procedure: TURBT (TRANSURETHRAL RESECTION OF BLADDER TUMOR);  Surgeon: Yinka Jimenez MD;  Location: [facility];  Service: Urology;  Laterality: N/A;       Review of patient's allergies indicates:  No Known Allergies       Objective:   Vitals: There were no vitals taken for this visit.    Physical Exam   General: alert and oriented, no acute distress  Respiratory: Symmetric expansion, non-labored breathing  Cardiovascular: no peripheral edema  Abdomen:  non distended  Skin: normal coloration and turgor, no rashes, no suspicious skin lesions noted  Neuro: no gross deficits  Psych: normal judgment and insight, normal mood/affect, and non-anxious    Physical Exam    Lab Review   Urinalysis demonstrates no specimen    Lab Results   Component Value Date    WBC 8.73 10/24/2023    HGB 12.5 (L) 10/24/2023    HCT 38.1 (L)  10/24/2023    MCV 91 10/24/2023     10/24/2023     Lab Results   Component Value Date    CREATININE 1.0 10/24/2023    BUN 14 10/24/2023     Final Pathologic Diagnosis 1. Bladder, tumor, superficial resection:   Small cell carcinoma.   Muscularis propria is present and is positive for carcinoma.     2. Bladder, base of tumor, resection:Small cell carcinoma.   Muscularis propria is present and is positive for carcinoma.    Comment: Interp By Joey Bernal M.D., Signed on 11/01/2023 at 14:21       Imaging  CT UROGRAM ABD PELVIS W WO     CLINICAL HISTORY:  gross hematuria;Gross hematuria     TECHNIQUE:  Low dose axial, sagittal and coronal reformations were obtained from the lung bases to the pubic symphysis before and following the IV administration of 125 mL of Omnipaque 350.  Timing was optimized for nephrogram and excretory renal phases.     COMPARISON:  Prostate MRI 03/03/2022     FINDINGS:  : Kidneys are symmetric in size.  Precontrast imaging shows no stones or hydronephrosis.  Kidneys concentrate and excrete contrast appropriately on postcontrast imaging.  No focal filling defects.  Lobulated margins along the lateral aspect of the left kidney has the appearance of scarring.     Heterogeneously enhancing mass along the dorsal lateral aspect of the urinary bladder on the left estimated at 3.8 x 3.6 cm.  Enlarged left pelvic lymph node measures 1.9 cm in short axis dimension.     Enlarged, lobulated prostate gland bulging into the bladder base, similar compared to prior MRI.     CT:     Lung bases show subsegmental atelectasis or scarring.     Liver is homogeneous.  Gallbladder is unremarkable.  Bile ducts, spleen, pancreas, and adrenal glands are unremarkable.     Stomach and loops of bowel are normal in caliber.  No free fluid in the pelvis.     Regional skeleton shows degenerative change.     Impression:     Heterogeneously enhancing mass along the dorsal lateral aspect of the urinary bladder on the  left most concerning for primary bladder malignancy.  Enlarged left pelvic lymph node most concerning for metastatic disease.  Negative for obstructive uropathy.     Prostatomegaly with the prostate bulging into the bladder base, similar compared to prior MRI.     This report was flagged in Epic as abnormal.        Electronically signed by: Donya Villalobos  Date:                                            10/23/2023  Time:                                           08:33          BLADDER MASS WAS NOT PRESENT ON MR DONE 3/2022       Assessment and Plan:   Malignant neoplasm of urinary bladder, small cell  cT3 cN1 cMx    Seeing rad onc and heme onc today  Discussed with Dr Spicer, she will order CNS imaging and complete the metastastic evaluation then present case at [facility] conference      Lymphadenopathy  As above    Benign non-nodular prostatic hyperplasia with lower urinary tract symptoms    Erectile dysfunction due to arterial insufficiency            I spent 45 min on the day of this encounter preparing for, treating and managing the above

## 2023-11-07 ENCOUNTER — HOSPITAL ENCOUNTER (OUTPATIENT)
Dept: RADIOLOGY | Facility: HOSPITAL | Age: 68
Discharge: HOME | End: 2023-11-07
Attending: INTERNAL MEDICINE
Payer: MEDICARE

## 2023-11-07 ENCOUNTER — OFFICE VISIT (OUTPATIENT)
Dept: HEMATOLOGY/ONCOLOGY | Facility: CLINIC | Age: 68
End: 2023-11-07
Payer: MEDICARE

## 2023-11-07 ENCOUNTER — OFFICE VISIT (OUTPATIENT)
Dept: UROLOGY | Facility: CLINIC | Age: 68
End: 2023-11-07
Payer: MEDICARE

## 2023-11-07 VITALS
RESPIRATION RATE: 16 BRPM | WEIGHT: 194 LBS | HEIGHT: 72 IN | RESPIRATION RATE: 16 BRPM | HEART RATE: 69 BPM | HEIGHT: 72 IN | SYSTOLIC BLOOD PRESSURE: 137 MMHG | DIASTOLIC BLOOD PRESSURE: 80 MMHG | SYSTOLIC BLOOD PRESSURE: 137 MMHG | BODY MASS INDEX: 26.4 KG/M2 | HEART RATE: 69 BPM | WEIGHT: 194.88 LBS | OXYGEN SATURATION: 100 % | OXYGEN SATURATION: 100 % | DIASTOLIC BLOOD PRESSURE: 80 MMHG | BODY MASS INDEX: 26.28 KG/M2

## 2023-11-07 DIAGNOSIS — R31.9 HEMATURIA, UNSPECIFIED TYPE: ICD-10-CM

## 2023-11-07 DIAGNOSIS — N32.89 BLADDER MASS: ICD-10-CM

## 2023-11-07 DIAGNOSIS — N40.1 BENIGN NON-NODULAR PROSTATIC HYPERPLASIA WITH LOWER URINARY TRACT SYMPTOMS: ICD-10-CM

## 2023-11-07 DIAGNOSIS — D64.9 NORMOCYTIC ANEMIA: ICD-10-CM

## 2023-11-07 DIAGNOSIS — R59.1 LYMPHADENOPATHY: ICD-10-CM

## 2023-11-07 DIAGNOSIS — C67.9 MALIGNANT NEOPLASM OF URINARY BLADDER, UNSPECIFIED SITE: Primary | ICD-10-CM

## 2023-11-07 DIAGNOSIS — N52.01 ERECTILE DYSFUNCTION DUE TO ARTERIAL INSUFFICIENCY: ICD-10-CM

## 2023-11-07 DIAGNOSIS — D64.9 ANEMIA, NORMOCYTIC NORMOCHROMIC: Primary | ICD-10-CM

## 2023-11-07 DIAGNOSIS — C67.1 MALIGNANT NEOPLASM OF DOME OF URINARY BLADDER: ICD-10-CM

## 2023-11-07 PROCEDURE — 99205 OFFICE O/P NEW HI 60 MIN: CPT | Mod: HCNC,S$GLB,, | Performed by: INTERNAL MEDICINE

## 2023-11-07 PROCEDURE — 99999 PR PBB SHADOW E&M-EST. PATIENT-LVL III: ICD-10-PCS | Mod: PBBFAC,HCNC,, | Performed by: UROLOGY

## 2023-11-07 PROCEDURE — 99205 PR OFFICE/OUTPT VISIT, NEW, LEVL V, 60-74 MIN: ICD-10-PCS | Mod: HCNC,S$GLB,, | Performed by: INTERNAL MEDICINE

## 2023-11-07 PROCEDURE — 71270 CT THORAX DX C-/C+: CPT | Mod: TC,HCNC

## 2023-11-07 PROCEDURE — 3008F PR BODY MASS INDEX (BMI) DOCUMENTED: ICD-10-PCS | Mod: HCNC,CPTII,S$GLB, | Performed by: INTERNAL MEDICINE

## 2023-11-07 PROCEDURE — 70553 MRI BRAIN STEM W/O & W/DYE: CPT | Mod: TC,HCNC

## 2023-11-07 PROCEDURE — 3044F PR MOST RECENT HEMOGLOBIN A1C LEVEL <7.0%: ICD-10-PCS | Mod: HCNC,CPTII,S$GLB, | Performed by: UROLOGY

## 2023-11-07 PROCEDURE — 3044F HG A1C LEVEL LT 7.0%: CPT | Mod: HCNC,CPTII,S$GLB, | Performed by: INTERNAL MEDICINE

## 2023-11-07 PROCEDURE — 3288F PR FALLS RISK ASSESSMENT DOCUMENTED: ICD-10-PCS | Mod: HCNC,CPTII,S$GLB, | Performed by: UROLOGY

## 2023-11-07 PROCEDURE — 70553 MRI BRAIN W WO CONTRAST: ICD-10-PCS | Mod: 26,HCNC,, | Performed by: RADIOLOGY

## 2023-11-07 PROCEDURE — A9585 GADOBUTROL INJECTION: HCPCS | Mod: HCNC | Performed by: INTERNAL MEDICINE

## 2023-11-07 PROCEDURE — 1159F MED LIST DOCD IN RCRD: CPT | Mod: HCNC,CPTII,S$GLB, | Performed by: UROLOGY

## 2023-11-07 PROCEDURE — 3079F PR MOST RECENT DIASTOLIC BLOOD PRESSURE 80-89 MM HG: ICD-10-PCS | Mod: HCNC,CPTII,S$GLB, | Performed by: UROLOGY

## 2023-11-07 PROCEDURE — 3075F PR MOST RECENT SYSTOLIC BLOOD PRESS GE 130-139MM HG: ICD-10-PCS | Mod: HCNC,CPTII,S$GLB, | Performed by: INTERNAL MEDICINE

## 2023-11-07 PROCEDURE — 3079F DIAST BP 80-89 MM HG: CPT | Mod: HCNC,CPTII,S$GLB, | Performed by: UROLOGY

## 2023-11-07 PROCEDURE — 1126F PR PAIN SEVERITY QUANTIFIED, NO PAIN PRESENT: ICD-10-PCS | Mod: HCNC,CPTII,S$GLB, | Performed by: INTERNAL MEDICINE

## 2023-11-07 PROCEDURE — 99999 PR PBB SHADOW E&M-EST. PATIENT-LVL IV: CPT | Mod: PBBFAC,HCNC,, | Performed by: INTERNAL MEDICINE

## 2023-11-07 PROCEDURE — 3008F BODY MASS INDEX DOCD: CPT | Mod: HCNC,CPTII,S$GLB, | Performed by: UROLOGY

## 2023-11-07 PROCEDURE — 99215 PR OFFICE/OUTPT VISIT, EST, LEVL V, 40-54 MIN: ICD-10-PCS | Mod: HCNC,S$GLB,, | Performed by: UROLOGY

## 2023-11-07 PROCEDURE — 1126F AMNT PAIN NOTED NONE PRSNT: CPT | Mod: HCNC,CPTII,S$GLB, | Performed by: INTERNAL MEDICINE

## 2023-11-07 PROCEDURE — 1160F RVW MEDS BY RX/DR IN RCRD: CPT | Mod: HCNC,CPTII,S$GLB, | Performed by: INTERNAL MEDICINE

## 2023-11-07 PROCEDURE — 1159F MED LIST DOCD IN RCRD: CPT | Mod: HCNC,CPTII,S$GLB, | Performed by: INTERNAL MEDICINE

## 2023-11-07 PROCEDURE — 3008F BODY MASS INDEX DOCD: CPT | Mod: HCNC,CPTII,S$GLB, | Performed by: INTERNAL MEDICINE

## 2023-11-07 PROCEDURE — 25500020 PHARM REV CODE 255: Mod: HCNC | Performed by: INTERNAL MEDICINE

## 2023-11-07 PROCEDURE — 1101F PT FALLS ASSESS-DOCD LE1/YR: CPT | Mod: HCNC,CPTII,S$GLB, | Performed by: UROLOGY

## 2023-11-07 PROCEDURE — 1126F PR PAIN SEVERITY QUANTIFIED, NO PAIN PRESENT: ICD-10-PCS | Mod: HCNC,CPTII,S$GLB, | Performed by: UROLOGY

## 2023-11-07 PROCEDURE — 1160F PR REVIEW ALL MEDS BY PRESCRIBER/CLIN PHARMACIST DOCUMENTED: ICD-10-PCS | Mod: HCNC,CPTII,S$GLB, | Performed by: INTERNAL MEDICINE

## 2023-11-07 PROCEDURE — 1159F PR MEDICATION LIST DOCUMENTED IN MEDICAL RECORD: ICD-10-PCS | Mod: HCNC,CPTII,S$GLB, | Performed by: INTERNAL MEDICINE

## 2023-11-07 PROCEDURE — 99999 PR PBB SHADOW E&M-EST. PATIENT-LVL IV: ICD-10-PCS | Mod: PBBFAC,HCNC,, | Performed by: INTERNAL MEDICINE

## 2023-11-07 PROCEDURE — 1159F PR MEDICATION LIST DOCUMENTED IN MEDICAL RECORD: ICD-10-PCS | Mod: HCNC,CPTII,S$GLB, | Performed by: UROLOGY

## 2023-11-07 PROCEDURE — 1101F PR PT FALLS ASSESS DOC 0-1 FALLS W/OUT INJ PAST YR: ICD-10-PCS | Mod: HCNC,CPTII,S$GLB, | Performed by: UROLOGY

## 2023-11-07 PROCEDURE — 3075F PR MOST RECENT SYSTOLIC BLOOD PRESS GE 130-139MM HG: ICD-10-PCS | Mod: HCNC,CPTII,S$GLB, | Performed by: UROLOGY

## 2023-11-07 PROCEDURE — 71270 CT CHEST W WO CONTRAST: ICD-10-PCS | Mod: 26,HCNC,, | Performed by: RADIOLOGY

## 2023-11-07 PROCEDURE — 3044F PR MOST RECENT HEMOGLOBIN A1C LEVEL <7.0%: ICD-10-PCS | Mod: HCNC,CPTII,S$GLB, | Performed by: INTERNAL MEDICINE

## 2023-11-07 PROCEDURE — 3079F PR MOST RECENT DIASTOLIC BLOOD PRESSURE 80-89 MM HG: ICD-10-PCS | Mod: HCNC,CPTII,S$GLB, | Performed by: INTERNAL MEDICINE

## 2023-11-07 PROCEDURE — 99999 PR PBB SHADOW E&M-EST. PATIENT-LVL III: CPT | Mod: PBBFAC,HCNC,, | Performed by: UROLOGY

## 2023-11-07 PROCEDURE — 1126F AMNT PAIN NOTED NONE PRSNT: CPT | Mod: HCNC,CPTII,S$GLB, | Performed by: UROLOGY

## 2023-11-07 PROCEDURE — 3075F SYST BP GE 130 - 139MM HG: CPT | Mod: HCNC,CPTII,S$GLB, | Performed by: UROLOGY

## 2023-11-07 PROCEDURE — 71270 CT THORAX DX C-/C+: CPT | Mod: 26,HCNC,, | Performed by: RADIOLOGY

## 2023-11-07 PROCEDURE — 3079F DIAST BP 80-89 MM HG: CPT | Mod: HCNC,CPTII,S$GLB, | Performed by: INTERNAL MEDICINE

## 2023-11-07 PROCEDURE — 99215 OFFICE O/P EST HI 40 MIN: CPT | Mod: HCNC,S$GLB,, | Performed by: UROLOGY

## 2023-11-07 PROCEDURE — 3044F HG A1C LEVEL LT 7.0%: CPT | Mod: HCNC,CPTII,S$GLB, | Performed by: UROLOGY

## 2023-11-07 PROCEDURE — 3008F PR BODY MASS INDEX (BMI) DOCUMENTED: ICD-10-PCS | Mod: HCNC,CPTII,S$GLB, | Performed by: UROLOGY

## 2023-11-07 PROCEDURE — 70553 MRI BRAIN STEM W/O & W/DYE: CPT | Mod: 26,HCNC,, | Performed by: RADIOLOGY

## 2023-11-07 PROCEDURE — 3075F SYST BP GE 130 - 139MM HG: CPT | Mod: HCNC,CPTII,S$GLB, | Performed by: INTERNAL MEDICINE

## 2023-11-07 PROCEDURE — 3288F FALL RISK ASSESSMENT DOCD: CPT | Mod: HCNC,CPTII,S$GLB, | Performed by: UROLOGY

## 2023-11-07 RX ORDER — GADOBUTROL 604.72 MG/ML
10 INJECTION INTRAVENOUS
Status: COMPLETED | OUTPATIENT
Start: 2023-11-07 | End: 2023-11-07

## 2023-11-07 RX ADMIN — GADOBUTROL 10 ML: 604.72 INJECTION INTRAVENOUS at 07:11

## 2023-11-07 RX ADMIN — IOHEXOL 75 ML: 350 INJECTION, SOLUTION INTRAVENOUS at 07:11

## 2023-11-07 NOTE — PROGRESS NOTES
Subjective     Patient ID: Med Palma is a 68 y.o. male.    Chief Complaint: bladder mass    HPI    Diagnosis: Small cell bladder cancer  Referring MD: Dr. Jimenez    Oncology History:  - gross hematuria in September 2023 while traveling in [place] (initially wondered if related to exercise and poor hydration) and resolved on return home he was due for annual PCP visit and he recommended some additional blood work  That same night he had another episode and again resolved with hydration but after occurred several times over a few weeks he sought further evaluation  Minimal discomfort noted with this (states he had a prior bladder infection and it felt like this)  Over summer months had noted occasional mild dysuria preceding this   --Notes prior episode of possible hematuria in 2016 with heavy exercise and poor hydration resolved after 1 day (related to exercise)    - 10/21/2023 CT Urogram Abdomen/Pelvis:  FINDINGS:  : Kidneys are symmetric in size.  Precontrast imaging shows no stones or hydronephrosis.  Kidneys concentrate and excrete contrast appropriately on postcontrast imaging.  No focal filling defects.  Lobulated margins along the lateral aspect of the left kidney has the appearance of scarring.  Heterogeneously enhancing mass along the dorsal lateral aspect of the urinary bladder on the left estimated at 3.8 x 3.6 cm.  Enlarged left pelvic lymph node measures 1.9 cm in short axis dimension.  Enlarged, lobulated prostate gland bulging into the bladder base, similar compared to prior MRI.  CT:  Lung bases show subsegmental atelectasis or scarring.  Liver is homogeneous.  Gallbladder is unremarkable.  Bile ducts, spleen, pancreas, and adrenal glands are unremarkable.  Stomach and loops of bowel are normal in caliber.  No free fluid in the pelvis.  Regional skeleton shows degenerative change.  Impression:  Heterogeneously enhancing mass along the dorsal lateral aspect of the urinary bladder on the left  most concerning for primary bladder malignancy.  Enlarged left pelvic lymph node most concerning for metastatic disease. Negative for obstructive uropathy.  Prostatomegaly with the prostate bulging into the bladder base, similar compared to prior MRI.  This report was flagged in Epic as abnormal.    - 10/26/2023 TURBT:  Pathology:  1. Bladder, tumor, superficial resection:   Small cell carcinoma.   Muscularis propria is present and is positive for carcinoma.     2. Bladder, base of tumor, resection:Small cell carcinoma.   Muscularis propria is present and is positive for carcinoma.   Malignant cells are positive for AE1/AE3, TTF1, Synaptophysin, and Chromogranin (rare positive cells) immunostains, and negative for GATA3, CK7, CK20, and p63 immunostains. Ki67 immunostain shows a proliferative index of >95%. The morphology and immunophenotype are compatible with small cell carcinoma.     Prior evaluation for elevated PSA- dx with BPH  - 3/3/85079 MRI Prostate:  FINDINGS:  Previous biopsy: TRUS PBx on 1/26/15 - negative  PSA: 5.8 ng/mL 09/14/2021 (previously 5.9 on 07/07/2021, 3.1 on 05/12/2017, 5.9 on 12/11/2015)  Prior therapy: None  Prostate: 5.1 x 3.8 x 7.2 cm corresponding to a computed volume of 65 cc.  Peripheral zone: 1 focal area of signal abnormality as described below:  Lesion (ANDERSON) #3  Location: Side: Right; Region: Base; Zone: posterior peripheral zone laterally  Greatest dimension: 1.2 cm  T2-WI: T2 SI: Heterogeneous signal intensity or noncircumscribed, rounded, moderate hypointensity--score 3  DWI/ADC: DWI: Focal mild/moderate hypointense on ADC and isointense/mild hyperintense on high b-value DWI--score 3  DCE: No early enhancement  Extraprostatic extension: Abuts the adjacent right posterolateral capsular margin.  PI-RADS assessment category: 3  Transitional zone:  TZ abnormalities: Benign prostatic hyperplasia with at least 2 focal areas of signal abnormality do not completely conform to a BPH  nodule.  The 2 lesions are detailed below:  Lesion (ANDERSON) #1  Location: left; region: mid; zone: anterior (more medial compared to lesion (ANDERSON) #2.)  Greatest dimension: 1.3 cm  T2-WI/SI: Heterogeneous signal intensity with obscured margins; score 3.  DWI/ADC: Focal mildly/moderately hypointense on ADC and isointense/mildly hyperintense on high b-value DWI; score 3  DCE: Positive  Extraprostatic extension: No  PI-RADS assessment category: 3  Lesion (ANDERSON) #2  Location: Left; region: Mid; zone: Anterior (lateral and more posterior compared to lesion (ANDERSON) #1  Greatest dimension: 1.1 cm  T2-WI/SI: Heterogeneous signal intensity with obscured margins; score 3.  DWI/ADC: Focal mildly/moderately hypointense on ADC and isointense/mildly hyperintense on high b-value DWI; score 3.  DCE: Positive  Extraprostatic extension: Abuts the adjacent left lateral capsular margin.  PI-RADS assessment category: 3  Neurovascular bundle: Unremarkable  Seminal vesicles:  SV invasion: Negative.  Adjacent Organ Involvement: Prominent median lobe of the prostate protruding into the base the bladder.  No focal bladder wall thickening.  No rectal involvement.  Lymphadenopathy: No pathologically enlarged pelvic lymph nodes.  Other Findings: None  Impression:  1. There are three PI-RADS 3 lesions, 1 within the right posterolateral peripheral zone and 2 within the left anterior transition zone of the mid to base of the prostate.  Two lesions abut the capsular margin, as described above, without involvement of the seminal vesicles.  No pathologically enlarged pelvic lymph nodes.  2. BPH with a very prominent median lobe protruding into the base of the bladder.  Overall Assessment:  PIRADS 3 (the presence of clinically significant cancer is equivocal)  Number of targets created for potential MR/US fusion biopsy  Peripheral zone: 1  Transition zone: 2    - 5/2/2022 TRUS and biopsies:  Pathology:  RELIAPATH DIAGNOSIS:   A.   PROSTATE, LEFT APEX, NEEDLE  BIOPSY:          Benign prostatic tissue with chronic inflammation, see comment.   (R97.20)   B.   PROSTATE, LEFT MID, NEEDLE BIOPSY:         Benign prostatic tissue with chronic inflammation, see comment.   (R97.20)   C.   PROSTATE, LEFT BASE, NEEDLE BIOPSY:         Benign prostatic tissue.  (R97.20)   D.   PROSTATE, RIGHT APEX, NEEDLE BIOPSY:          Benign prostatic tissue with chronic inflammation, see comment.   (R97.20)   E.   PROSTATE, RIGHT MID, NEEDLE BIOPSY:          Benign prostatic tissue with chronic inflammation, see comment.   (R97.20)   F.   PROSTATE, RIGHT BASE, NEEDLE BIOPSY:          Benign prostatic tissue with chronic inflammation, see comment.   (R97.20)   G.   TISSUE1          PROSTATE, TARGET LESION #1, BIOPSY:          -Benign prostatic tissue with chronic inflammation, see comment.   (R97.20)   H.   TISSUE2          PROSTATE, TARGET LESION #2, BIOPSY:          -Benign prostatic tissue .  (R97.20)   I.     TISSUE3          PROSTATE, TARGET LESION #3, BIOPSY:          -Benign prostatic tissue with chronic inflammation .  (R97.20)     PMH:  Covid pneumonia- recovered  BPH  Arthritis- hips, low back, shoulders, knees- prior PT and dry needling helped significantly  Deviated septum surgery  Anemia    FH:  Dad-  of Multiple Myeloma at age 60 yo  Sister- breast cancer survivor (dx in her mid 70s)  Mother lived until age 94 yo-  from natural causes, dementia    SH:  Retired   [employer] management  Prior chemical and environmental exposures at work  + tobacco- last 20-25 years 1/2 pack in a weekend   Dips tobacco    + social EtOH    3 children    Review of Systems   Constitutional:  Negative for activity change, appetite change, chills, fatigue, fever and unexpected weight change.   HENT:  Positive for hearing loss. Negative for nasal congestion, dental problem, postnasal drip, rhinorrhea, sinus pressure/congestion and trouble swallowing.    Eyes:  Negative for visual disturbance  (wears bifocals).   Respiratory:  Negative for cough, shortness of breath and wheezing.    Cardiovascular:  Negative for chest pain, palpitations and leg swelling.   Gastrointestinal:  Negative for abdominal distention, abdominal pain, change in bowel habit, constipation, diarrhea, nausea, vomiting and reflux.   Genitourinary:  Positive for frequency and hematuria (resolved now). Negative for decreased urine volume, difficulty urinating, dysuria and urgency.   Musculoskeletal:  Negative for arthralgias, back pain, gait problem, myalgias and joint deformity.   Neurological:  Negative for dizziness, weakness, numbness and headaches.   Hematological:  Negative for adenopathy. Does not bruise/bleed easily.   Psychiatric/Behavioral:  Negative for dysphoric mood and sleep disturbance. The patient is not nervous/anxious.           Objective     Physical Exam  Vitals and nursing note reviewed.   Constitutional:       General: He is not in acute distress.     Appearance: Normal appearance. He is well-developed and normal weight. He is not ill-appearing.      Comments: Presents with his wife  Very pleasant  ECOG= 0   HENT:      Head: Normocephalic and atraumatic.      Mouth/Throat:      Mouth: Mucous membranes are dry.      Pharynx: Oropharynx is clear. No oropharyngeal exudate or posterior oropharyngeal erythema.   Eyes:      General: No scleral icterus.     Extraocular Movements: Extraocular movements intact.      Conjunctiva/sclera: Conjunctivae normal.      Pupils: Pupils are equal, round, and reactive to light.   Neck:      Thyroid: No thyromegaly.      Trachea: No tracheal deviation.   Cardiovascular:      Rate and Rhythm: Normal rate and regular rhythm.      Heart sounds: Normal heart sounds. No murmur heard.     No friction rub. No gallop.   Pulmonary:      Effort: Pulmonary effort is normal. No respiratory distress.      Breath sounds: Normal breath sounds. No wheezing, rhonchi or rales.   Chest:      Chest wall: No  tenderness.   Abdominal:      General: Abdomen is flat. Bowel sounds are normal. There is no distension.      Palpations: Abdomen is soft. There is no mass.      Tenderness: There is no abdominal tenderness. There is no guarding or rebound.      Comments: No organomegaly   Musculoskeletal:         General: No swelling or tenderness. Normal range of motion.      Cervical back: Normal range of motion and neck supple.      Right lower leg: No edema.      Left lower leg: No edema.   Lymphadenopathy:      Cervical: No cervical adenopathy.   Skin:     General: Skin is warm and dry.      Coloration: Skin is not jaundiced or pale.      Findings: No erythema, lesion or rash.   Neurological:      General: No focal deficit present.      Mental Status: He is alert and oriented to person, place, and time.      Sensory: No sensory deficit.      Motor: No weakness or abnormal muscle tone.      Coordination: Coordination normal.      Gait: Gait normal.      Deep Tendon Reflexes: Reflexes are normal and symmetric. Reflexes normal.   Psychiatric:         Mood and Affect: Mood normal.         Behavior: Behavior normal.         Thought Content: Thought content normal.         Judgment: Judgment normal.            Assessment and Plan     1. Anemia, normocytic normochromic  -     CBC W/ AUTO DIFFERENTIAL; Future; Expected date: 11/07/2023  -     Comprehensive Metabolic Panel; Future  -     Iron and TIBC; Future; Expected date: 11/07/2023  -     Ferritin; Future; Expected date: 11/07/2023  -     LACTATE DEHYDROGENASE; Future; Expected date: 11/07/2023    2. Bladder mass  -     Ambulatory referral/consult to Hematology / Oncology  -     NM Bone Scan Whole Body; Future; Expected date: 11/07/2023  -     CT Chest W Wo Contrast; Future; Expected date: 11/07/2023  -     MRI Brain W WO Contrast; Future; Expected date: 11/07/2023    3. Hematuria, unspecified type  -     Ambulatory referral/consult to Hematology / Oncology    4. Lymphadenopathy  -      Ambulatory referral/consult to Hematology / Oncology    5. Normocytic anemia    6. Malignant neoplasm of dome of urinary bladder    Anemia:  Reviewed chart and mild long standing anemia  Will repeat cbc and complete iron studies today    Small cell bladder cancer, regional LN  Complete staging with CT Chest, MRI Brain, bone scan  Reviewed if localized will proceed with neoadjuvant chemotherapy (Cisplatin based)  Will review if pre-operative clinical trials available (checkpoint inhibitors)  We reviewed rationale for systemic chemotherapy-- platinum based  We reviewed retrospective data with patient for the benefit of NA chemotherapy (Cisplatin/Etoposide) if no advanced metastatic disease and survival benefit.    Route Chart for Scheduling    Med Onc Chart Routing  Urgent    Follow up with physician . MRI brain, CT Chest, bone scan and virtual post   Follow up with [facility]    Infusion scheduling note    Injection scheduling note    Labs    Imaging    Pharmacy appointment    Other referrals

## 2023-11-08 DIAGNOSIS — J32.9 SINUSITIS, UNSPECIFIED CHRONICITY, UNSPECIFIED LOCATION: ICD-10-CM

## 2023-11-08 DIAGNOSIS — J32.9 SINUSITIS, UNSPECIFIED CHRONICITY, UNSPECIFIED LOCATION: Primary | ICD-10-CM

## 2023-11-08 RX ORDER — AZITHROMYCIN 250 MG/1
TABLET, FILM COATED ORAL
Qty: 6 TABLET | Refills: 0 | Status: SHIPPED | OUTPATIENT
Start: 2023-11-08 | End: 2023-11-08 | Stop reason: SDUPTHER

## 2023-11-08 RX ORDER — AZITHROMYCIN 250 MG/1
TABLET, FILM COATED ORAL
Qty: 6 TABLET | Refills: 0 | Status: SHIPPED | OUTPATIENT
Start: 2023-11-08 | End: 2023-11-13

## 2023-11-08 NOTE — TELEPHONE ENCOUNTER
----- Message from Laney Hargrove sent at 11/8/2023  1:20 PM CST -----  Type:  Patient Returning Call    Who Called:Kyler Steele   Who Left Message for Patient:  Does the patient know what this is regarding?:rx  Would the patient rather a call back or a response via [facility]? Call   Best Call Back Number:[phone]  Additional Information: needs zpack 250mg tab rx called in elsewhere because they are shutting down permanently. States to not call it in to any other [facility] because they are all shutting down permanently.

## 2023-11-09 ENCOUNTER — HOSPITAL ENCOUNTER (OUTPATIENT)
Dept: RADIOLOGY | Facility: HOSPITAL | Age: 68
Discharge: HOME | End: 2023-11-09
Attending: INTERNAL MEDICINE
Payer: MEDICARE

## 2023-11-09 ENCOUNTER — PATIENT MESSAGE (OUTPATIENT)
Dept: HEMATOLOGY/ONCOLOGY | Facility: CLINIC | Age: 68
End: 2023-11-09
Payer: MEDICARE

## 2023-11-09 ENCOUNTER — TELEPHONE (OUTPATIENT)
Dept: HEMATOLOGY/ONCOLOGY | Facility: CLINIC | Age: 68
End: 2023-11-09
Payer: MEDICARE

## 2023-11-09 DIAGNOSIS — N32.89 BLADDER MASS: ICD-10-CM

## 2023-11-09 PROCEDURE — A9503 TC99M MEDRONATE: HCPCS | Mod: HCNC

## 2023-11-09 PROCEDURE — 78306 NM BONE SCAN WHOLE BODY: ICD-10-PCS | Mod: 26,HCNC,, | Performed by: STUDENT IN AN ORGANIZED HEALTH CARE EDUCATION/TRAINING PROGRAM

## 2023-11-09 PROCEDURE — 78306 BONE IMAGING WHOLE BODY: CPT | Mod: 26,HCNC,, | Performed by: STUDENT IN AN ORGANIZED HEALTH CARE EDUCATION/TRAINING PROGRAM

## 2023-11-13 ENCOUNTER — TELEPHONE (OUTPATIENT)
Dept: INTERVENTIONAL RADIOLOGY/VASCULAR | Facility: CLINIC | Age: 68
End: 2023-11-13
Payer: MEDICARE

## 2023-11-13 ENCOUNTER — OFFICE VISIT (OUTPATIENT)
Dept: HEMATOLOGY/ONCOLOGY | Facility: CLINIC | Age: 68
End: 2023-11-13
Payer: MEDICARE

## 2023-11-13 ENCOUNTER — CLINICAL SUPPORT (OUTPATIENT)
Dept: AUDIOLOGY | Facility: CLINIC | Age: 68
End: 2023-11-13
Payer: MEDICARE

## 2023-11-13 DIAGNOSIS — H93.13 TINNITUS, SUBJECTIVE, BILATERAL: ICD-10-CM

## 2023-11-13 DIAGNOSIS — J12.82 PNEUMONIA DUE TO COVID-19 VIRUS: ICD-10-CM

## 2023-11-13 DIAGNOSIS — C67.1 MALIGNANT NEOPLASM OF DOME OF URINARY BLADDER: Primary | ICD-10-CM

## 2023-11-13 DIAGNOSIS — Z51.81 ENCOUNTER FOR MONITORING OTOTOXIC DRUG THERAPY: ICD-10-CM

## 2023-11-13 DIAGNOSIS — U07.1 PNEUMONIA DUE TO COVID-19 VIRUS: ICD-10-CM

## 2023-11-13 DIAGNOSIS — H90.3 SENSORINEURAL HEARING LOSS (SNHL), BILATERAL: Primary | ICD-10-CM

## 2023-11-13 DIAGNOSIS — Z79.899 ENCOUNTER FOR MONITORING OTOTOXIC DRUG THERAPY: ICD-10-CM

## 2023-11-13 PROCEDURE — 1160F RVW MEDS BY RX/DR IN RCRD: CPT | Mod: HCNC,CPTII,S$GLB, | Performed by: INTERNAL MEDICINE

## 2023-11-13 PROCEDURE — 99999 PR PBB SHADOW E&M-EST. PATIENT-LVL I: ICD-10-PCS | Mod: PBBFAC,HCNC,,

## 2023-11-13 PROCEDURE — 92567 TYMPANOMETRY: CPT | Mod: HCNC,S$GLB,,

## 2023-11-13 PROCEDURE — 3044F PR MOST RECENT HEMOGLOBIN A1C LEVEL <7.0%: ICD-10-PCS | Mod: HCNC,CPTII,S$GLB, | Performed by: INTERNAL MEDICINE

## 2023-11-13 PROCEDURE — 92557 PR COMPREHENSIVE HEARING TEST: ICD-10-PCS | Mod: HCNC,S$GLB,,

## 2023-11-13 PROCEDURE — 1159F MED LIST DOCD IN RCRD: CPT | Mod: HCNC,CPTII,S$GLB, | Performed by: INTERNAL MEDICINE

## 2023-11-13 PROCEDURE — 99999 PR PBB SHADOW E&M-EST. PATIENT-LVL II: ICD-10-PCS | Mod: PBBFAC,HCNC,, | Performed by: INTERNAL MEDICINE

## 2023-11-13 PROCEDURE — 3044F HG A1C LEVEL LT 7.0%: CPT | Mod: HCNC,CPTII,S$GLB, | Performed by: INTERNAL MEDICINE

## 2023-11-13 PROCEDURE — 99999 PR PBB SHADOW E&M-EST. PATIENT-LVL II: CPT | Mod: PBBFAC,HCNC,, | Performed by: INTERNAL MEDICINE

## 2023-11-13 PROCEDURE — 92588 EVOKED AUDITORY TST COMPLETE: ICD-10-PCS | Mod: HCNC,S$GLB,,

## 2023-11-13 PROCEDURE — 99214 OFFICE O/P EST MOD 30 MIN: CPT | Mod: HCNC,S$GLB,, | Performed by: INTERNAL MEDICINE

## 2023-11-13 PROCEDURE — 99999 PR PBB SHADOW E&M-EST. PATIENT-LVL I: CPT | Mod: PBBFAC,HCNC,,

## 2023-11-13 PROCEDURE — 1160F PR REVIEW ALL MEDS BY PRESCRIBER/CLIN PHARMACIST DOCUMENTED: ICD-10-PCS | Mod: HCNC,CPTII,S$GLB, | Performed by: INTERNAL MEDICINE

## 2023-11-13 PROCEDURE — 1159F PR MEDICATION LIST DOCUMENTED IN MEDICAL RECORD: ICD-10-PCS | Mod: HCNC,CPTII,S$GLB, | Performed by: INTERNAL MEDICINE

## 2023-11-13 PROCEDURE — 92557 COMPREHENSIVE HEARING TEST: CPT | Mod: HCNC,S$GLB,,

## 2023-11-13 PROCEDURE — 92588 EVOKED AUDITORY TST COMPLETE: CPT | Mod: HCNC,S$GLB,,

## 2023-11-13 PROCEDURE — 92567 PR TYMPA2METRY: ICD-10-PCS | Mod: HCNC,S$GLB,,

## 2023-11-13 PROCEDURE — 99214 PR OFFICE/OUTPT VISIT, EST, LEVL IV, 30-39 MIN: ICD-10-PCS | Mod: HCNC,S$GLB,, | Performed by: INTERNAL MEDICINE

## 2023-11-13 NOTE — TELEPHONE ENCOUNTER
Spoke to pt on phone,  Pt is scheduled on 11/29/2023 with arrival time of 6am for IR procedure at [facility] location.  Preop instructions given (NPO after midnight, MUST have a ride home, Nurse will call 1-2  days before to go over instructions and medications), pt verbally understood. Pt aware and confirmed, Thanks

## 2023-11-13 NOTE — Clinical Note
Hi Dr. Spicer,  Please see the results of Mr. Palma's audiogram from today. If you have any questions, please let me know.   Thank you! Amanda Wheeler, SRAVANI-A

## 2023-11-13 NOTE — PROGRESS NOTES
Med Palma was seen today in the clinic for an audiologic evaluation for ototoxic monitoring during cisplatin chemotherapy. Mr. Palma reported a history of occupational noise exposure, with consistent use of hearing protection but not until later in his career. He reported previous hearing tests have indicated hearing loss. He endorsed constant tinnitus, perceived in both ears, slightly worse in the right ear. Mr. Palma denied otalgia, aural fullness, and dizziness.    Tympanometry revealed Type Ad tympanograms, bilaterally.  Acoustic reflex thresholds were attempted, however could not be obtained due to failure of the probe tip to form a hermetic seal, bilaterally.    Audiogram results revealed a mild to moderate sensorineural hearing loss (SNHL), bilaterally.    Speech reception thresholds were noted at 15 dBHL in the right ear and 15 dBHL in the left ear.    Speech discrimination scores were 96% in the right ear and 100% in the left ear.    Distortion product otoacoustic emissions (DPOAEs) from 1500-00307 Hz were  grossly absent , bilaterally. Present DPOAEs are indicative of normal cochlear function to at least the level of the outer hair cells. Absent DPOAEs are indicative of abnormal cochlear function to at least the level of the outer hair cells.    Recommendations:  Otologic evaluation  Continue to monitor hearing during ototoxic treatment  Hearing protection in noise  Hearing aid consultation following completion of ototoxic treatment

## 2023-11-13 NOTE — PROGRESS NOTES
Subjective     Patient ID: Med Palma is a 68 y.o. male.    Chief Complaint: Bladder Cancer    HPI    Diagnosis: Small cell bladder cancer  Referring MD: Dr. Jimenez    Returns after staging scans to review:  - 11/9/2023 Bone scan:  FINDINGS:  There is physiologic distribution of the radiopharmaceutical throughout the skeleton.  There is normal uptake in the bilateral kidneys and soft tissues.  Bladder is significantly distended with abnormal contour, likely due to known bladder mass.  Impression:  No scintigraphic evidence of osteoblastic metastatic disease.    - 11/7/2023 CT Chest:  FINDINGS:  Chest wall and lower neck: No axillary or supraclavicular lymphadenopathy.  Lungs and pleura: Biapical pleuroparenchymal thickening compatible with scar.  Focal air cystic spaces noted in the right upper lobe (image 154 sequence 4) suggestive of scar.  Multiple sub pleural bandlike scars are noted in the upper and lower lobes.  No pleural effusion or airspace consolidation is noted.  Mediastinum and christy: Right hilar node measuring 15 mm (short axis).  No mediastinal lymphadenopathy is noted.  Heart and pericardium: Heart size is normal.No pericardial effusion.  Vessels: Mild atheromatous disease is seen in the aorta.   The coronary arteries show mild atheromatous disease.  Upper abdomen: Cortical irregularity noted in the left kidney compatible with scars.  Both kidneys are excreting contrast during imaging acquisition.  Bones: Biomechanical changes are noted in the thoracic spine with osteophyte formations.  Schmorl nodes are noted in the thoracic spine.  Impression:  1. Multifocal bilateral pulmonary scars likely representing sequela prior infectious process to correlate with patient history.  No honeycombing is seen.  2. Right hilar node within maximum normal limits, nonspecific.  3. Left renal scar.  4. Additional findings.     - 11/7/2023 MRI Brain:  FINDINGS:  There is no midline shift, hydrocephalus or mass  effect.  No acute infarction or acute intracranial hemorrhage.  A few small foci of T2/FLAIR signal in the supratentorial white matter while nonspecific most likely mild chronic microvascular ischemic changes.  Small focus of bright T2 signal adjacent to the posteromedial temporal lobe likely an incidental choroidal fissure cyst.  No enhancing lesions to indicate intracranial metastatic disease.  Small (1 cm) focus of increased FLAIR signal in the posterior fossa near the foramen of Magendie.  This demonstrates gradient signal presumably relating to calcifications with no abnormal enhancement.  No significant mass effect.  No evidence of associated hydrocephalus.  No abnormal extra-axial fluid collections.  The major skull base flow voids present.  Diffuse patchy mucosal membrane thickening in the paranasal sinuses with aerated secretions and small air-fluid levels.  Trace mastoid fluid on the left.  Impression:  No acute intracranial abnormalities, specifically no evidence of intracranial metastatic disease.  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  No associated hydrocephalus at this time.  Recommend surveillance to ensure stability.  Paranasal sinus disease.  This report was flagged in Epic as abnormal.    Oncology History:  - gross hematuria in September 2023 while traveling in [place] (initially wondered if related to exercise and poor hydration) and resolved on return home he was due for annual PCP visit and he recommended some additional blood work  That same night he had another episode and again resolved with hydration but after occurred several times over a few weeks he sought further evaluation  Minimal discomfort noted with this (states he had a prior bladder infection and it felt like this)  Over summer months had noted occasional mild dysuria preceding this   --Notes prior episode of possible hematuria in 2016 with heavy exercise and poor hydration resolved  after 1 day (related to exercise)     - 10/21/2023 CT Urogram Abdomen/Pelvis:  FINDINGS:  : Kidneys are symmetric in size.  Precontrast imaging shows no stones or hydronephrosis.  Kidneys concentrate and excrete contrast appropriately on postcontrast imaging.  No focal filling defects.  Lobulated margins along the lateral aspect of the left kidney has the appearance of scarring.  Heterogeneously enhancing mass along the dorsal lateral aspect of the urinary bladder on the left estimated at 3.8 x 3.6 cm.  Enlarged left pelvic lymph node measures 1.9 cm in short axis dimension.  Enlarged, lobulated prostate gland bulging into the bladder base, similar compared to prior MRI.  CT:  Lung bases show subsegmental atelectasis or scarring.  Liver is homogeneous.  Gallbladder is unremarkable.  Bile ducts, spleen, pancreas, and adrenal glands are unremarkable.  Stomach and loops of bowel are normal in caliber.  No free fluid in the pelvis.  Regional skeleton shows degenerative change.  Impression:  Heterogeneously enhancing mass along the dorsal lateral aspect of the urinary bladder on the left most concerning for primary bladder malignancy.  Enlarged left pelvic lymph node most concerning for metastatic disease. Negative for obstructive uropathy.  Prostatomegaly with the prostate bulging into the bladder base, similar compared to prior MRI.  This report was flagged in Epic as abnormal.     - 10/26/2023 TURBT:  Pathology:  1. Bladder, tumor, superficial resection:   Small cell carcinoma.   Muscularis propria is present and is positive for carcinoma.     2. Bladder, base of tumor, resection:Small cell carcinoma.   Muscularis propria is present and is positive for carcinoma.   Malignant cells are positive for AE1/AE3, TTF1, Synaptophysin, and Chromogranin (rare positive cells) immunostains, and negative for GATA3, CK7, CK20, and p63 immunostains. Ki67 immunostain shows a proliferative index of >95%. The morphology and  immunophenotype are compatible with small cell carcinoma.      Prior evaluation for elevated PSA- dx with BPH  - 3/3/86909 MRI Prostate:  FINDINGS:  Previous biopsy: TRUS PBx on 1/26/15 - negative  PSA: 5.8 ng/mL 09/14/2021 (previously 5.9 on 07/07/2021, 3.1 on 05/12/2017, 5.9 on 12/11/2015)  Prior therapy: None  Prostate: 5.1 x 3.8 x 7.2 cm corresponding to a computed volume of 65 cc.  Peripheral zone: 1 focal area of signal abnormality as described below:  Lesion (ANDERSON) #3  Location: Side: Right; Region: Base; Zone: posterior peripheral zone laterally  Greatest dimension: 1.2 cm  T2-WI: T2 SI: Heterogeneous signal intensity or noncircumscribed, rounded, moderate hypointensity--score 3  DWI/ADC: DWI: Focal mild/moderate hypointense on ADC and isointense/mild hyperintense on high b-value DWI--score 3  DCE: No early enhancement  Extraprostatic extension: Abuts the adjacent right posterolateral capsular margin.  PI-RADS assessment category: 3  Transitional zone:  TZ abnormalities: Benign prostatic hyperplasia with at least 2 focal areas of signal abnormality do not completely conform to a BPH nodule.  The 2 lesions are detailed below:  Lesion (ANDERSON) #1  Location: left; region: mid; zone: anterior (more medial compared to lesion (ANDERSON) #2.)  Greatest dimension: 1.3 cm  T2-WI/SI: Heterogeneous signal intensity with obscured margins; score 3.  DWI/ADC: Focal mildly/moderately hypointense on ADC and isointense/mildly hyperintense on high b-value DWI; score 3  DCE: Positive  Extraprostatic extension: No  PI-RADS assessment category: 3  Lesion (ANDERSON) #2  Location: Left; region: Mid; zone: Anterior (lateral and more posterior compared to lesion (ANDERSON) #1  Greatest dimension: 1.1 cm  T2-WI/SI: Heterogeneous signal intensity with obscured margins; score 3.  DWI/ADC: Focal mildly/moderately hypointense on ADC and isointense/mildly hyperintense on high b-value DWI; score 3.  DCE: Positive  Extraprostatic extension: Abuts the adjacent  left lateral capsular margin.  PI-RADS assessment category: 3  Neurovascular bundle: Unremarkable  Seminal vesicles:  SV invasion: Negative.  Adjacent Organ Involvement: Prominent median lobe of the prostate protruding into the base the bladder.  No focal bladder wall thickening.  No rectal involvement.  Lymphadenopathy: No pathologically enlarged pelvic lymph nodes.  Other Findings: None  Impression:  1. There are three PI-RADS 3 lesions, 1 within the right posterolateral peripheral zone and 2 within the left anterior transition zone of the mid to base of the prostate.  Two lesions abut the capsular margin, as described above, without involvement of the seminal vesicles.  No pathologically enlarged pelvic lymph nodes.  2. BPH with a very prominent median lobe protruding into the base of the bladder.  Overall Assessment:  PIRADS 3 (the presence of clinically significant cancer is equivocal)  Number of targets created for potential MR/US fusion biopsy  Peripheral zone: 1  Transition zone: 2     - 5/2/2022 TRUS and biopsies:  Pathology:  RELIAPATH DIAGNOSIS:   A.   PROSTATE, LEFT APEX, NEEDLE BIOPSY:          Benign prostatic tissue with chronic inflammation, see comment.   (R97.20)   B.   PROSTATE, LEFT MID, NEEDLE BIOPSY:         Benign prostatic tissue with chronic inflammation, see comment.   (R97.20)   C.   PROSTATE, LEFT BASE, NEEDLE BIOPSY:         Benign prostatic tissue.  (R97.20)   D.   PROSTATE, RIGHT APEX, NEEDLE BIOPSY:          Benign prostatic tissue with chronic inflammation, see comment.   (R97.20)   E.   PROSTATE, RIGHT MID, NEEDLE BIOPSY:          Benign prostatic tissue with chronic inflammation, see comment.   (R97.20)   F.   PROSTATE, RIGHT BASE, NEEDLE BIOPSY:          Benign prostatic tissue with chronic inflammation, see comment.   (R97.20)   G.   TISSUE1          PROSTATE, TARGET LESION #1, BIOPSY:          -Benign prostatic tissue with chronic inflammation, see comment.   (R97.20)   H.    TISSUE2          PROSTATE, TARGET LESION #2, BIOPSY:          -Benign prostatic tissue .  (R97.20)   I.     TISSUE3          PROSTATE, TARGET LESION #3, BIOPSY:          -Benign prostatic tissue with chronic inflammation .  (R97.20)      PMH:  Covid pneumonia- recovered  BPH  Arthritis- hips, low back, shoulders, knees- prior PT and dry needling helped significantly  Deviated septum surgery  Anemia     FH:  Dad-  of Multiple Myeloma at age 60 yo  Sister- breast cancer survivor (dx in her mid 70s)  Mother lived until age 94 yo-  from natural causes, dementia     SH:  Retired   [employer] management  Prior chemical and environmental exposures at work  + tobacco- last 20-25 years 1/2 pack in a weekend   Dips tobacco     + social EtOH    3 children    Review of Systems  See recent     Objective     Physical Exam  See recent     Assessment and Plan     1. Malignant neoplasm of dome of urinary bladder    2. Pneumonia due to COVID-19 virus      Needs:  Audiology  Port  Chemo teaching course    Counseled on NA chemo plan and schedule      Route Chart for Scheduling    Med Onc Chart Routing  Urgent    Follow up with physician . Chemo teaching [facility] this week, port at [facility] asap; audiology asap, start chemo asap   Follow up with [facility]    Infusion scheduling note    Injection scheduling note    Labs    Imaging    Pharmacy appointment    Other referrals            Treatment Plan Information   OP CISPLATIN 75MG/M2 ETOPOSIDE 100MG/M2 Q3W   Mary Spicer MD   Upcoming Treatment Dates - OP CISPLATIN 75MG/M2 ETOPOSIDE 100MG/M2 Q3W    2023       Chemotherapy       CISPLATIN CHEMO INFUSION       ETOPOSIDE INFUSION       Pre-Hydration       HYDRATION FLUIDS + ADDITIVES (BEACON PROTOCOL USE)       Antiemetics       APREPITANT 7.2 MG/ML IV EMUL       PALONOSETRON 0.25MG/DEXAMETHASONE 12MG ORDERABLE  2023       Chemotherapy       ETOPOSIDE INFUSION  2023       Chemotherapy       ETOPOSIDE  INFUSION  12/18/2023       Chemotherapy       CISPLATIN CHEMO INFUSION       ETOPOSIDE INFUSION       Pre-Hydration       HYDRATION FLUIDS + ADDITIVES (BEACON PROTOCOL USE)       Antiemetics       APREPITANT 7.2 MG/ML IV EMUL       PALONOSETRON 0.25MG/DEXAMETHASONE 12MG ORDERABLE

## 2023-11-15 ENCOUNTER — PATIENT MESSAGE (OUTPATIENT)
Dept: UROLOGY | Facility: CLINIC | Age: 68
End: 2023-11-15
Payer: MEDICARE

## 2023-11-16 ENCOUNTER — OFFICE VISIT (OUTPATIENT)
Dept: UROLOGY | Facility: CLINIC | Age: 68
End: 2023-11-16
Payer: MEDICARE

## 2023-11-16 ENCOUNTER — PATIENT MESSAGE (OUTPATIENT)
Dept: HEMATOLOGY/ONCOLOGY | Facility: CLINIC | Age: 68
End: 2023-11-16
Payer: MEDICARE

## 2023-11-16 VITALS
WEIGHT: 195 LBS | RESPIRATION RATE: 16 BRPM | SYSTOLIC BLOOD PRESSURE: 113 MMHG | HEIGHT: 72 IN | OXYGEN SATURATION: 98 % | BODY MASS INDEX: 26.41 KG/M2 | DIASTOLIC BLOOD PRESSURE: 72 MMHG | HEART RATE: 56 BPM

## 2023-11-16 DIAGNOSIS — R33.9 INCOMPLETE BLADDER EMPTYING: ICD-10-CM

## 2023-11-16 DIAGNOSIS — C67.9 MALIGNANT NEOPLASM OF URINARY BLADDER, UNSPECIFIED SITE: Primary | ICD-10-CM

## 2023-11-16 PROCEDURE — 3008F BODY MASS INDEX DOCD: CPT | Mod: HCNC,CPTII,S$GLB, | Performed by: UROLOGY

## 2023-11-16 PROCEDURE — 3078F PR MOST RECENT DIASTOLIC BLOOD PRESSURE < 80 MM HG: ICD-10-PCS | Mod: HCNC,CPTII,S$GLB, | Performed by: UROLOGY

## 2023-11-16 PROCEDURE — 1159F PR MEDICATION LIST DOCUMENTED IN MEDICAL RECORD: ICD-10-PCS | Mod: HCNC,CPTII,S$GLB, | Performed by: UROLOGY

## 2023-11-16 PROCEDURE — 99214 OFFICE O/P EST MOD 30 MIN: CPT | Mod: HCNC,S$GLB,, | Performed by: UROLOGY

## 2023-11-16 PROCEDURE — 1126F PR PAIN SEVERITY QUANTIFIED, NO PAIN PRESENT: ICD-10-PCS | Mod: HCNC,CPTII,S$GLB, | Performed by: UROLOGY

## 2023-11-16 PROCEDURE — 1159F MED LIST DOCD IN RCRD: CPT | Mod: HCNC,CPTII,S$GLB, | Performed by: UROLOGY

## 2023-11-16 PROCEDURE — 1101F PT FALLS ASSESS-DOCD LE1/YR: CPT | Mod: HCNC,CPTII,S$GLB, | Performed by: UROLOGY

## 2023-11-16 PROCEDURE — 3074F SYST BP LT 130 MM HG: CPT | Mod: HCNC,CPTII,S$GLB, | Performed by: UROLOGY

## 2023-11-16 PROCEDURE — 3044F PR MOST RECENT HEMOGLOBIN A1C LEVEL <7.0%: ICD-10-PCS | Mod: HCNC,CPTII,S$GLB, | Performed by: UROLOGY

## 2023-11-16 PROCEDURE — 3078F DIAST BP <80 MM HG: CPT | Mod: HCNC,CPTII,S$GLB, | Performed by: UROLOGY

## 2023-11-16 PROCEDURE — 99214 PR OFFICE/OUTPT VISIT, EST, LEVL IV, 30-39 MIN: ICD-10-PCS | Mod: HCNC,S$GLB,, | Performed by: UROLOGY

## 2023-11-16 PROCEDURE — 3008F PR BODY MASS INDEX (BMI) DOCUMENTED: ICD-10-PCS | Mod: HCNC,CPTII,S$GLB, | Performed by: UROLOGY

## 2023-11-16 PROCEDURE — 3044F HG A1C LEVEL LT 7.0%: CPT | Mod: HCNC,CPTII,S$GLB, | Performed by: UROLOGY

## 2023-11-16 PROCEDURE — 3288F FALL RISK ASSESSMENT DOCD: CPT | Mod: HCNC,CPTII,S$GLB, | Performed by: UROLOGY

## 2023-11-16 PROCEDURE — 3074F PR MOST RECENT SYSTOLIC BLOOD PRESSURE < 130 MM HG: ICD-10-PCS | Mod: HCNC,CPTII,S$GLB, | Performed by: UROLOGY

## 2023-11-16 PROCEDURE — 3288F PR FALLS RISK ASSESSMENT DOCUMENTED: ICD-10-PCS | Mod: HCNC,CPTII,S$GLB, | Performed by: UROLOGY

## 2023-11-16 PROCEDURE — 1101F PR PT FALLS ASSESS DOC 0-1 FALLS W/OUT INJ PAST YR: ICD-10-PCS | Mod: HCNC,CPTII,S$GLB, | Performed by: UROLOGY

## 2023-11-16 PROCEDURE — 1126F AMNT PAIN NOTED NONE PRSNT: CPT | Mod: HCNC,CPTII,S$GLB, | Performed by: UROLOGY

## 2023-11-16 RX ORDER — TADALAFIL 20 MG/1
20 TABLET ORAL DAILY PRN
Qty: 30 TABLET | Refills: 11 | Status: SHIPPED | OUTPATIENT
Start: 2023-11-16 | End: 2024-11-10

## 2023-11-16 NOTE — PROGRESS NOTES
Subjective:      Med Palma is a 68 y.o. male who returns today regarding his     Urgent appt  Difficulty emptying bladder twice yesterday  Now resolved  Voiding well again today  On flomax for enlarged prostate      Beginning systematic therapy with Dr Spicer for small cell ca of the bladder in 2 weeks    Current Cycle Treatment Dates Line of Treatment Treatment Goal Treatment Plan Provider Treatment Department Status Auth Status Next Unauthorized Day   1 of 6 cycles 11/27/2023 to 3/13/2024 Neoadjuvant Curative Mary Spicer MD [facility] - Infusion Active  Day 1, Cycle 1 - 11/27/2023   Protocol   OP CISPLATIN 75MG/M2 ETOPOSIDE 100MG/M2 Q3W - As of 11/13/2023 9:09 AM       The following portions of the patient's history were reviewed and updated as appropriate: allergies, current medications, past family history, past medical history, past social history, past surgical history and problem list.    Review of Systems  Pertinent items are noted in HPI.  A comprehensive multipoint review of systems was negative except as otherwise stated in the HPI.    Past Medical History:   Diagnosis Date    Allergy     Arthritis     Elevated PSA     Hypertension      Past Surgical History:   Procedure Laterality Date    TURBT (TRANSURETHRAL RESECTION OF BLADDER TUMOR) N/A 10/26/2023    Procedure: TURBT (TRANSURETHRAL RESECTION OF BLADDER TUMOR);  Surgeon: Yinka Jimenez MD;  [facility]: [facility];  Service: Urology;  Laterality: N/A;       Review of patient's allergies indicates:  No Known Allergies       Objective:   Vitals: There were no vitals taken for this visit.    Physical Exam   General: alert and oriented, no acute distress  Respiratory: Symmetric expansion, non-labored breathing  Cardiovascular: no peripheral edema  Abdomen: soft, non distended  Skin: normal coloration and turgor, no rashes, no suspicious skin lesions noted  Neuro: no gross deficits  Psych: normal judgment and insight, normal mood/affect, and  non-anxious    Physical Exam    Lab Review   Urinalysis demonstrates nitrite neg  +wbc  +rbc    MICRO  NO BACTERIA  2-3WBC  2-3RBC    Lab Results   Component Value Date    WBC 9.60 11/07/2023    HGB 13.4 (L) 11/07/2023    HCT 41.0 11/07/2023    MCV 92 11/07/2023     11/07/2023     Lab Results   Component Value Date    CREATININE 1.1 11/07/2023    BUN 19 11/07/2023       Imaging    Assessment and Plan:   Malignant neoplasm of urinary bladder  Small cell cT3 cN1 cMx   Cisplatin/etoposide per Dr Spicer starting 11/27      Incomplete bladder emptying  I suspect this is related more to his enlarged prostate and prominenet median lobe rather then his bladder ca, because the bladder tumor is on the posterior wall  Cont flomax    ED  Cialis prn

## 2023-11-27 ENCOUNTER — TELEPHONE (OUTPATIENT)
Dept: INTERVENTIONAL RADIOLOGY/VASCULAR | Facility: HOSPITAL | Age: 68
End: 2023-11-27
Payer: MEDICARE

## 2023-11-27 NOTE — NURSING
Pre-procedure call complete.  Arrival time 0600.  Pt instructed not to eat or drink anything after midnight the night before procedure.  Pt aware will need someone to provide transport home and monitor pt post procedure.  No driving for at least 24 hours after procedure.   Medications reviewed.  Arrival time and location given.  Expected length of stay reviewed.  Covid screening completed.  Pt verbalized understanding.

## 2023-11-28 ENCOUNTER — LAB VISIT (OUTPATIENT)
Dept: LAB | Facility: HOSPITAL | Age: 68
End: 2023-11-28
Payer: MEDICARE

## 2023-11-28 DIAGNOSIS — C67.1 MALIGNANT NEOPLASM OF DOME OF URINARY BLADDER: ICD-10-CM

## 2023-11-28 LAB
INR PPP: 0.9 (ref 0.8–1.2)
PROTHROMBIN TIME: 9.6 SEC (ref 9–12.5)

## 2023-11-28 PROCEDURE — 36415 COLL VENOUS BLD VENIPUNCTURE: CPT | Mod: HCNC | Performed by: FAMILY MEDICINE

## 2023-11-28 PROCEDURE — 85610 PROTHROMBIN TIME: CPT | Mod: HCNC | Performed by: FAMILY MEDICINE

## 2023-11-29 ENCOUNTER — HOSPITAL ENCOUNTER (OUTPATIENT)
Dept: INTERVENTIONAL RADIOLOGY/VASCULAR | Facility: HOSPITAL | Age: 68
Discharge: HOME | End: 2023-11-29
Attending: FAMILY MEDICINE | Admitting: STUDENT IN AN ORGANIZED HEALTH CARE EDUCATION/TRAINING PROGRAM
Payer: MEDICARE

## 2023-11-29 VITALS
HEART RATE: 60 BPM | TEMPERATURE: 98 F | RESPIRATION RATE: 16 BRPM | DIASTOLIC BLOOD PRESSURE: 78 MMHG | OXYGEN SATURATION: 98 % | SYSTOLIC BLOOD PRESSURE: 119 MMHG

## 2023-11-29 DIAGNOSIS — C67.1 MALIGNANT NEOPLASM OF DOME OF URINARY BLADDER: ICD-10-CM

## 2023-11-29 PROCEDURE — 99152 MOD SED SAME PHYS/QHP 5/>YRS: CPT | Mod: ,,, | Performed by: STUDENT IN AN ORGANIZED HEALTH CARE EDUCATION/TRAINING PROGRAM

## 2023-11-29 PROCEDURE — 99900035 HC TECH TIME PER 15 MIN (STAT)

## 2023-11-29 PROCEDURE — 99152 PR MOD CONSCIOUS SEDATION, SAME PHYS, 5+ YRS, FIRST 15 MIN: ICD-10-PCS | Mod: ,,, | Performed by: STUDENT IN AN ORGANIZED HEALTH CARE EDUCATION/TRAINING PROGRAM

## 2023-11-29 PROCEDURE — 76937 US GUIDE VASCULAR ACCESS: CPT | Mod: TC

## 2023-11-29 PROCEDURE — 36561 PR INSERT TUNNELED CV CATH WITH PORT: ICD-10-PCS | Mod: RT,,, | Performed by: STUDENT IN AN ORGANIZED HEALTH CARE EDUCATION/TRAINING PROGRAM

## 2023-11-29 PROCEDURE — 99152 MOD SED SAME PHYS/QHP 5/>YRS: CPT

## 2023-11-29 PROCEDURE — 76937 US GUIDE VASCULAR ACCESS: CPT | Mod: 26,,, | Performed by: STUDENT IN AN ORGANIZED HEALTH CARE EDUCATION/TRAINING PROGRAM

## 2023-11-29 PROCEDURE — 25000003 PHARM REV CODE 250: Performed by: STUDENT IN AN ORGANIZED HEALTH CARE EDUCATION/TRAINING PROGRAM

## 2023-11-29 PROCEDURE — 63600175 PHARM REV CODE 636 W HCPCS: Performed by: STUDENT IN AN ORGANIZED HEALTH CARE EDUCATION/TRAINING PROGRAM

## 2023-11-29 PROCEDURE — C1894 INTRO/SHEATH, NON-LASER: HCPCS

## 2023-11-29 PROCEDURE — 76937 IR TUNNELED CATH PLACEMENT WITH PORT: ICD-10-PCS | Mod: 26,,, | Performed by: STUDENT IN AN ORGANIZED HEALTH CARE EDUCATION/TRAINING PROGRAM

## 2023-11-29 PROCEDURE — C1788 PORT, INDWELLING, IMP: HCPCS

## 2023-11-29 PROCEDURE — 94761 N-INVAS EAR/PLS OXIMETRY MLT: CPT | Mod: 59

## 2023-11-29 PROCEDURE — 36561 INSERT TUNNELED CV CATH: CPT | Mod: RT,,, | Performed by: STUDENT IN AN ORGANIZED HEALTH CARE EDUCATION/TRAINING PROGRAM

## 2023-11-29 PROCEDURE — 36561 INSERT TUNNELED CV CATH: CPT | Mod: RT

## 2023-11-29 RX ORDER — ONDANSETRON 2 MG/ML
4 INJECTION INTRAMUSCULAR; INTRAVENOUS EVERY 6 HOURS PRN
Status: DISCONTINUED | OUTPATIENT
Start: 2023-11-29 | End: 2023-11-30 | Stop reason: HOSPADM

## 2023-11-29 RX ORDER — LIDOCAINE HYDROCHLORIDE 10 MG/ML
1 INJECTION, SOLUTION EPIDURAL; INFILTRATION; INTRACAUDAL; PERINEURAL ONCE
Status: DISCONTINUED | OUTPATIENT
Start: 2023-11-29 | End: 2023-11-30 | Stop reason: HOSPADM

## 2023-11-29 RX ORDER — MIDAZOLAM HYDROCHLORIDE 1 MG/ML
INJECTION INTRAMUSCULAR; INTRAVENOUS
Status: COMPLETED | OUTPATIENT
Start: 2023-11-29 | End: 2023-11-29

## 2023-11-29 RX ORDER — SODIUM CHLORIDE 9 MG/ML
INJECTION, SOLUTION INTRAVENOUS CONTINUOUS
Status: DISCONTINUED | OUTPATIENT
Start: 2023-11-29 | End: 2023-11-30 | Stop reason: HOSPADM

## 2023-11-29 RX ORDER — HEPARIN SODIUM 1000 [USP'U]/ML
INJECTION, SOLUTION INTRAVENOUS; SUBCUTANEOUS
Status: COMPLETED | OUTPATIENT
Start: 2023-11-29 | End: 2023-11-29

## 2023-11-29 RX ORDER — FENTANYL CITRATE 50 UG/ML
INJECTION, SOLUTION INTRAMUSCULAR; INTRAVENOUS
Status: COMPLETED | OUTPATIENT
Start: 2023-11-29 | End: 2023-11-29

## 2023-11-29 RX ORDER — LIDOCAINE HYDROCHLORIDE 20 MG/ML
INJECTION, SOLUTION INFILTRATION; PERINEURAL
Status: COMPLETED | OUTPATIENT
Start: 2023-11-29 | End: 2023-11-29

## 2023-11-29 RX ADMIN — FENTANYL CITRATE 50 MCG: 50 INJECTION, SOLUTION INTRAMUSCULAR; INTRAVENOUS at 07:11

## 2023-11-29 RX ADMIN — HEPARIN SODIUM 500 UNITS: 1000 INJECTION, SOLUTION INTRAVENOUS; SUBCUTANEOUS at 07:11

## 2023-11-29 RX ADMIN — MIDAZOLAM HYDROCHLORIDE 1 MG: 1 INJECTION INTRAMUSCULAR; INTRAVENOUS at 07:11

## 2023-11-29 RX ADMIN — LIDOCAINE HYDROCHLORIDE 20 ML: 20 INJECTION, SOLUTION INFILTRATION; PERINEURAL at 07:11

## 2023-11-29 NOTE — DISCHARGE INSTRUCTIONS
Home Instructions for Port Care      Bathing:     Keep port site dry for 1 week     Today you may only sponge bathe     You may shower with the dressing covered tomorrow. Do not submerge.     Dressing:     Remove the dressing after 48 hours and leave open to air     Cover the site with a bandage if it might get dirty (for example if doing yard work)     Remove bandage as instructed. If there are skin tapes over the incision, leave them in place and wash over them. You should wash the site with soap and water, but do not soak site in the tub until completely healed. The skin tapes should fall off in 7-10 days. If they have not come off by themselves at that time, they can easily be removed in your shower or bath.     Activity level:     If you have received sedation or an anesthetic, you may feel sleepy for several hours. Rest until you are awake. Do not drive for 24 hours. Gradually resume normal activities.      Special Restrictions:     If you experience discomfort, take the medication you normally use for pain. If pain persists, please call us.     When to call the doctor     Obvious bleeding (some dried blood over the incision is normal)     Redness or swelling in the affected area     Fever equal or greater than 101F     Drainage (pus) from the wound     Persistent pain not relieved by pain medications     **After hours and weekends Call [phone] and ask for Radiology Resident on call**     For immediate concerns that are not emergent, you may call our radiology clinic at [phone].

## 2023-11-29 NOTE — H&P
Radiology History & Physical      SUBJECTIVE:     Chief Complaint: Bladder cancer    History of Present Illness:  Med Palma is a 68 y.o. male who presents for port placement.    Past Medical History:   Diagnosis Date    Allergy     Arthritis     Elevated PSA     Hypertension      Past Surgical History:   Procedure Laterality Date    TURBT (TRANSURETHRAL RESECTION OF BLADDER TUMOR) N/A 10/26/2023    Procedure: TURBT (TRANSURETHRAL RESECTION OF BLADDER TUMOR);  Surgeon: Yinka Jimenez MD;  Location: [facility];  Service: Urology;  Laterality: N/A;       Home Meds:   Prior to Admission medications    Medication Sig Start Date End Date Taking? Authorizing Provider   fluticasone propionate (FLONASE) 50 mcg/actuation nasal spray 1 spray (50 mcg total) by Each Nostril route as needed for Allergies. 9/21/23  Yes Jah Wilson MD   IBUPROFEN ORAL Take by mouth as needed.   Yes Provider, Historical   tamsulosin (FLOMAX) 0.4 mg Cap Take 1 capsule (0.4 mg total) by mouth once daily. 11/27/23 2/25/24 Yes Yinka Jimenez MD   oxybutynin (DITROPAN) 5 MG Tab Take 1 tablet (5 mg total) by mouth 3 (three) times daily as needed (bladder spasms). 10/27/23   Tomi Knox MD   tadalafiL (CIALIS) 20 MG Tab Take 1 tablet (20 mg total) by mouth daily as needed (ED). 11/16/23 11/10/24  Yinka Jimenez MD     Anticoagulants/Antiplatelets: no anticoagulation    Allergies: Review of patient's allergies indicates:  No Known Allergies  Sedation History:  no adverse reactions    Review of Systems:   Hematological: no known coagulopathies  Respiratory: no shortness of breath  Cardiovascular: no chest pain  Gastrointestinal: no abdominal pain  Genito-Urinary: no dysuria  Musculoskeletal: negative  Neurological: no TIA or stroke symptoms         OBJECTIVE:     Vital Signs (Most Recent)  Temp: 97.5 °F (36.4 °C) (11/29/23 0640)  Pulse: 70 (11/29/23 0640)  Resp: 18 (11/29/23 0640)  BP: 121/75 (11/29/23 0640)  SpO2: 99 %  "(11/29/23 0653)    Physical Exam:  ASA: 2  Mallampati: 2    General: no acute distress  Mental Status: alert and oriented to person, place and time  HEENT: normocephalic, atraumatic  Chest: unlabored breathing  Heart: regular heart rate  Abdomen: nondistended  Extremity: moves all extremities    Laboratory  Lab Results   Component Value Date    INR 0.9 11/28/2023       Lab Results   Component Value Date    WBC 9.60 11/07/2023    HGB 13.4 (L) 11/07/2023    HCT 41.0 11/07/2023    MCV 92 11/07/2023     11/07/2023      Lab Results   Component Value Date    GLU 99 11/07/2023     11/07/2023    K 4.5 11/07/2023     11/07/2023    CO2 24 11/07/2023    BUN 19 11/07/2023    CREATININE 1.1 11/07/2023    CALCIUM 9.9 11/07/2023    MG 2.1 08/11/2021    ALT 8 (L) 11/07/2023    AST 7 (L) 11/07/2023    ALBUMIN 4.1 11/07/2023    BILITOT 0.4 11/07/2023       ASSESSMENT/PLAN:     Sedation Plan: moderate  Patient will undergo right chest port placement.    Rigoberto Sarmiento MD ([facility])  Interventional Radiology          "

## 2023-11-29 NOTE — Clinical Note
Right: Chest.   Scrubbed with ChloroPrep With Tint.   Painted with None.  Hair: Clipped.  Skin prep dry before draping.  Prepped by: Osman Ponce 11/29/2023 7:35 AM.

## 2023-11-29 NOTE — DISCHARGE SUMMARY
"Radiology Discharge Summary      Hospital Course: No complications    Admit Date: 11/29/2023  Discharge Date: 11/29/2023     Instructions Given to Patient: Yes  Diet: Resume prior diet  Activity: activity as tolerated and no driving for today    Description of Condition on Discharge: Stable  Vital Signs (Most Recent): Temp: 98 °F (36.7 °C) (11/29/23 0808)  Pulse: 60 (11/29/23 0845)  Resp: 16 (11/29/23 0845)  BP: 119/78 (11/29/23 0845)  SpO2: 98 % (11/29/23 0845)    Discharge Disposition: Home    Discharge Diagnosis: bladder cancer     Follow-up: as scheduled    Rigoberto Sarmiento MD ([facility])  Interventional Radiology  [phone]      "

## 2023-11-29 NOTE — PROCEDURES
"  Pre Op Diagnosis: Bladder cancer  Post Op Diagnosis: Same    Procedure: port placement    Procedure performed by: Torsten    Written Informed Consent Obtained: Yes  Specimen Removed: NO  Estimated Blood Loss: Minimal    Findings:   Successful placement of right chest port. Ready for immediate use.     Patient tolerated procedure well.    Rigoberto Sarmiento MD ([facility])  Interventional Radiology  [phone]      "

## 2023-11-29 NOTE — PLAN OF CARE
Pt in preop bay 42, VSS and IV inserted. Pt denies any open wounds on body or the use of any weight loss injections. Pt needs consents and an updated H&P, otherwise ready to roll.

## 2023-12-04 ENCOUNTER — INFUSION (OUTPATIENT)
Dept: INFUSION THERAPY | Facility: HOSPITAL | Age: 68
End: 2023-12-04
Payer: MEDICARE

## 2023-12-04 ENCOUNTER — OFFICE VISIT (OUTPATIENT)
Dept: HEMATOLOGY/ONCOLOGY | Facility: CLINIC | Age: 68
End: 2023-12-04
Payer: MEDICARE

## 2023-12-04 VITALS
HEIGHT: 72 IN | WEIGHT: 204.38 LBS | BODY MASS INDEX: 27.68 KG/M2 | DIASTOLIC BLOOD PRESSURE: 81 MMHG | OXYGEN SATURATION: 98 % | SYSTOLIC BLOOD PRESSURE: 132 MMHG | RESPIRATION RATE: 18 BRPM | HEART RATE: 68 BPM

## 2023-12-04 VITALS — DIASTOLIC BLOOD PRESSURE: 66 MMHG | OXYGEN SATURATION: 100 % | SYSTOLIC BLOOD PRESSURE: 126 MMHG | HEART RATE: 65 BPM

## 2023-12-04 DIAGNOSIS — T45.1X5A IMMUNODEFICIENCY SECONDARY TO CHEMOTHERAPY: ICD-10-CM

## 2023-12-04 DIAGNOSIS — D64.9 ANEMIA, NORMOCYTIC NORMOCHROMIC: ICD-10-CM

## 2023-12-04 DIAGNOSIS — D49.9 IMMUNODEFICIENCY SECONDARY TO NEOPLASM: ICD-10-CM

## 2023-12-04 DIAGNOSIS — C67.1 MALIGNANT NEOPLASM OF DOME OF URINARY BLADDER: Primary | ICD-10-CM

## 2023-12-04 DIAGNOSIS — D84.821 IMMUNODEFICIENCY SECONDARY TO CHEMOTHERAPY: ICD-10-CM

## 2023-12-04 DIAGNOSIS — D84.81 IMMUNODEFICIENCY SECONDARY TO NEOPLASM: ICD-10-CM

## 2023-12-04 DIAGNOSIS — Z79.899 IMMUNODEFICIENCY SECONDARY TO CHEMOTHERAPY: ICD-10-CM

## 2023-12-04 PROCEDURE — 96366 THER/PROPH/DIAG IV INF ADDON: CPT | Mod: HCNC

## 2023-12-04 PROCEDURE — 25000003 PHARM REV CODE 250: Mod: HCNC | Performed by: INTERNAL MEDICINE

## 2023-12-04 PROCEDURE — 3008F BODY MASS INDEX DOCD: CPT | Mod: HCNC,CPTII,S$GLB, | Performed by: REGISTERED NURSE

## 2023-12-04 PROCEDURE — 1159F MED LIST DOCD IN RCRD: CPT | Mod: HCNC,CPTII,S$GLB, | Performed by: REGISTERED NURSE

## 2023-12-04 PROCEDURE — 99215 PR OFFICE/OUTPT VISIT, EST, LEVL V, 40-54 MIN: ICD-10-PCS | Mod: HCNC,S$GLB,, | Performed by: REGISTERED NURSE

## 2023-12-04 PROCEDURE — 3008F PR BODY MASS INDEX (BMI) DOCUMENTED: ICD-10-PCS | Mod: HCNC,CPTII,S$GLB, | Performed by: REGISTERED NURSE

## 2023-12-04 PROCEDURE — 99999 PR PBB SHADOW E&M-EST. PATIENT-LVL V: ICD-10-PCS | Mod: PBBFAC,HCNC,, | Performed by: REGISTERED NURSE

## 2023-12-04 PROCEDURE — 1160F PR REVIEW ALL MEDS BY PRESCRIBER/CLIN PHARMACIST DOCUMENTED: ICD-10-PCS | Mod: HCNC,CPTII,S$GLB, | Performed by: REGISTERED NURSE

## 2023-12-04 PROCEDURE — 3044F HG A1C LEVEL LT 7.0%: CPT | Mod: HCNC,CPTII,S$GLB, | Performed by: REGISTERED NURSE

## 2023-12-04 PROCEDURE — 3075F PR MOST RECENT SYSTOLIC BLOOD PRESS GE 130-139MM HG: ICD-10-PCS | Mod: HCNC,CPTII,S$GLB, | Performed by: REGISTERED NURSE

## 2023-12-04 PROCEDURE — 1101F PT FALLS ASSESS-DOCD LE1/YR: CPT | Mod: HCNC,CPTII,S$GLB, | Performed by: REGISTERED NURSE

## 2023-12-04 PROCEDURE — 99215 OFFICE O/P EST HI 40 MIN: CPT | Mod: HCNC,S$GLB,, | Performed by: REGISTERED NURSE

## 2023-12-04 PROCEDURE — 96413 CHEMO IV INFUSION 1 HR: CPT | Mod: HCNC

## 2023-12-04 PROCEDURE — 96417 CHEMO IV INFUS EACH ADDL SEQ: CPT | Mod: HCNC

## 2023-12-04 PROCEDURE — 1159F PR MEDICATION LIST DOCUMENTED IN MEDICAL RECORD: ICD-10-PCS | Mod: HCNC,CPTII,S$GLB, | Performed by: REGISTERED NURSE

## 2023-12-04 PROCEDURE — 3044F PR MOST RECENT HEMOGLOBIN A1C LEVEL <7.0%: ICD-10-PCS | Mod: HCNC,CPTII,S$GLB, | Performed by: REGISTERED NURSE

## 2023-12-04 PROCEDURE — 96375 TX/PRO/DX INJ NEW DRUG ADDON: CPT | Mod: HCNC

## 2023-12-04 PROCEDURE — 3079F PR MOST RECENT DIASTOLIC BLOOD PRESSURE 80-89 MM HG: ICD-10-PCS | Mod: HCNC,CPTII,S$GLB, | Performed by: REGISTERED NURSE

## 2023-12-04 PROCEDURE — 3288F FALL RISK ASSESSMENT DOCD: CPT | Mod: HCNC,CPTII,S$GLB, | Performed by: REGISTERED NURSE

## 2023-12-04 PROCEDURE — 96367 TX/PROPH/DG ADDL SEQ IV INF: CPT | Mod: HCNC

## 2023-12-04 PROCEDURE — 1126F AMNT PAIN NOTED NONE PRSNT: CPT | Mod: HCNC,CPTII,S$GLB, | Performed by: REGISTERED NURSE

## 2023-12-04 PROCEDURE — 3288F PR FALLS RISK ASSESSMENT DOCUMENTED: ICD-10-PCS | Mod: HCNC,CPTII,S$GLB, | Performed by: REGISTERED NURSE

## 2023-12-04 PROCEDURE — 1126F PR PAIN SEVERITY QUANTIFIED, NO PAIN PRESENT: ICD-10-PCS | Mod: HCNC,CPTII,S$GLB, | Performed by: REGISTERED NURSE

## 2023-12-04 PROCEDURE — 1101F PR PT FALLS ASSESS DOC 0-1 FALLS W/OUT INJ PAST YR: ICD-10-PCS | Mod: HCNC,CPTII,S$GLB, | Performed by: REGISTERED NURSE

## 2023-12-04 PROCEDURE — 63600175 PHARM REV CODE 636 W HCPCS: Mod: HCNC | Performed by: INTERNAL MEDICINE

## 2023-12-04 PROCEDURE — 1160F RVW MEDS BY RX/DR IN RCRD: CPT | Mod: HCNC,CPTII,S$GLB, | Performed by: REGISTERED NURSE

## 2023-12-04 PROCEDURE — A4216 STERILE WATER/SALINE, 10 ML: HCPCS | Mod: HCNC | Performed by: INTERNAL MEDICINE

## 2023-12-04 PROCEDURE — 3075F SYST BP GE 130 - 139MM HG: CPT | Mod: HCNC,CPTII,S$GLB, | Performed by: REGISTERED NURSE

## 2023-12-04 PROCEDURE — 99999 PR PBB SHADOW E&M-EST. PATIENT-LVL V: CPT | Mod: PBBFAC,HCNC,, | Performed by: REGISTERED NURSE

## 2023-12-04 PROCEDURE — 3079F DIAST BP 80-89 MM HG: CPT | Mod: HCNC,CPTII,S$GLB, | Performed by: REGISTERED NURSE

## 2023-12-04 RX ORDER — SODIUM CHLORIDE 0.9 % (FLUSH) 0.9 %
10 SYRINGE (ML) INJECTION
Status: CANCELLED | OUTPATIENT
Start: 2023-12-05

## 2023-12-04 RX ORDER — EPINEPHRINE 0.3 MG/.3ML
0.3 INJECTION SUBCUTANEOUS ONCE AS NEEDED
Status: CANCELLED | OUTPATIENT
Start: 2023-12-06

## 2023-12-04 RX ORDER — DIPHENHYDRAMINE HYDROCHLORIDE 50 MG/ML
50 INJECTION INTRAMUSCULAR; INTRAVENOUS ONCE AS NEEDED
Status: COMPLETED | OUTPATIENT
Start: 2023-12-04 | End: 2023-12-04

## 2023-12-04 RX ORDER — OLANZAPINE 5 MG/1
TABLET ORAL
Qty: 30 TABLET | Refills: 1 | Status: SHIPPED | OUTPATIENT
Start: 2023-12-04 | End: 2024-03-07 | Stop reason: CLARIF

## 2023-12-04 RX ORDER — EPINEPHRINE 0.3 MG/.3ML
0.3 INJECTION SUBCUTANEOUS ONCE AS NEEDED
Status: CANCELLED | OUTPATIENT
Start: 2023-12-04

## 2023-12-04 RX ORDER — DIPHENHYDRAMINE HYDROCHLORIDE 50 MG/ML
50 INJECTION INTRAMUSCULAR; INTRAVENOUS ONCE AS NEEDED
Status: CANCELLED | OUTPATIENT
Start: 2023-12-04

## 2023-12-04 RX ORDER — HEPARIN 100 UNIT/ML
500 SYRINGE INTRAVENOUS
Status: CANCELLED | OUTPATIENT
Start: 2023-12-05

## 2023-12-04 RX ORDER — SODIUM CHLORIDE 0.9 % (FLUSH) 0.9 %
10 SYRINGE (ML) INJECTION
Status: DISCONTINUED | OUTPATIENT
Start: 2023-12-04 | End: 2023-12-04 | Stop reason: HOSPADM

## 2023-12-04 RX ORDER — EPINEPHRINE 0.3 MG/.3ML
0.3 INJECTION SUBCUTANEOUS ONCE AS NEEDED
Status: DISCONTINUED | OUTPATIENT
Start: 2023-12-04 | End: 2023-12-04 | Stop reason: HOSPADM

## 2023-12-04 RX ORDER — HEPARIN 100 UNIT/ML
500 SYRINGE INTRAVENOUS
Status: DISCONTINUED | OUTPATIENT
Start: 2023-12-04 | End: 2023-12-04 | Stop reason: HOSPADM

## 2023-12-04 RX ORDER — PROCHLORPERAZINE EDISYLATE 5 MG/ML
5 INJECTION INTRAMUSCULAR; INTRAVENOUS ONCE AS NEEDED
Status: CANCELLED
Start: 2023-12-04

## 2023-12-04 RX ORDER — HEPARIN 100 UNIT/ML
500 SYRINGE INTRAVENOUS
Status: CANCELLED | OUTPATIENT
Start: 2023-12-04

## 2023-12-04 RX ORDER — SODIUM CHLORIDE 0.9 % (FLUSH) 0.9 %
10 SYRINGE (ML) INJECTION
Status: CANCELLED | OUTPATIENT
Start: 2023-12-06

## 2023-12-04 RX ORDER — PROCHLORPERAZINE EDISYLATE 5 MG/ML
5 INJECTION INTRAMUSCULAR; INTRAVENOUS ONCE AS NEEDED
Status: CANCELLED
Start: 2023-12-06

## 2023-12-04 RX ORDER — DIPHENHYDRAMINE HYDROCHLORIDE 50 MG/ML
50 INJECTION INTRAMUSCULAR; INTRAVENOUS ONCE AS NEEDED
Status: CANCELLED | OUTPATIENT
Start: 2023-12-06

## 2023-12-04 RX ORDER — PROCHLORPERAZINE EDISYLATE 5 MG/ML
5 INJECTION INTRAMUSCULAR; INTRAVENOUS ONCE AS NEEDED
Status: DISCONTINUED | OUTPATIENT
Start: 2023-12-04 | End: 2023-12-04 | Stop reason: HOSPADM

## 2023-12-04 RX ORDER — LIDOCAINE AND PRILOCAINE 25; 25 MG/G; MG/G
CREAM TOPICAL
Qty: 30 G | Refills: 5 | Status: SHIPPED | OUTPATIENT
Start: 2023-12-04

## 2023-12-04 RX ORDER — DIPHENHYDRAMINE HYDROCHLORIDE 50 MG/ML
50 INJECTION INTRAMUSCULAR; INTRAVENOUS ONCE AS NEEDED
Status: CANCELLED | OUTPATIENT
Start: 2023-12-05

## 2023-12-04 RX ORDER — HEPARIN 100 UNIT/ML
500 SYRINGE INTRAVENOUS
Status: CANCELLED | OUTPATIENT
Start: 2023-12-06

## 2023-12-04 RX ORDER — PROCHLORPERAZINE MALEATE 10 MG
10 TABLET ORAL EVERY 6 HOURS PRN
Qty: 30 TABLET | Refills: 3 | Status: SHIPPED | OUTPATIENT
Start: 2023-12-04 | End: 2024-12-03

## 2023-12-04 RX ORDER — DEXAMETHASONE 4 MG/1
TABLET ORAL
Qty: 24 TABLET | Refills: 2 | Status: SHIPPED | OUTPATIENT
Start: 2023-12-04 | End: 2024-03-07 | Stop reason: CLARIF

## 2023-12-04 RX ORDER — EPINEPHRINE 0.3 MG/.3ML
0.3 INJECTION SUBCUTANEOUS ONCE AS NEEDED
Status: CANCELLED | OUTPATIENT
Start: 2023-12-05

## 2023-12-04 RX ORDER — SODIUM CHLORIDE 0.9 % (FLUSH) 0.9 %
10 SYRINGE (ML) INJECTION
Status: CANCELLED | OUTPATIENT
Start: 2023-12-04

## 2023-12-04 RX ORDER — PROCHLORPERAZINE EDISYLATE 5 MG/ML
5 INJECTION INTRAMUSCULAR; INTRAVENOUS ONCE AS NEEDED
Status: CANCELLED
Start: 2023-12-05

## 2023-12-04 RX ADMIN — HYDROCORTISONE SODIUM SUCCINATE 100 MG: 100 INJECTION, POWDER, FOR SOLUTION INTRAMUSCULAR; INTRAVENOUS at 12:12

## 2023-12-04 RX ADMIN — DIPHENHYDRAMINE HYDROCHLORIDE 50 MG: 50 INJECTION, SOLUTION INTRAMUSCULAR; INTRAVENOUS at 12:12

## 2023-12-04 RX ADMIN — CISPLATIN 158 MG: 1 INJECTION, SOLUTION INTRAVENOUS at 11:12

## 2023-12-04 RX ADMIN — Medication 10 ML: at 02:12

## 2023-12-04 RX ADMIN — APREPITANT 130 MG: 130 INJECTION, EMULSION INTRAVENOUS at 09:12

## 2023-12-04 RX ADMIN — ETOPOSIDE 212 MG: 20 INJECTION INTRAVENOUS at 12:12

## 2023-12-04 RX ADMIN — HEPARIN 500 UNITS: 100 SYRINGE at 02:12

## 2023-12-04 RX ADMIN — DEXAMETHASONE SODIUM PHOSPHATE 0.25 MG: 4 INJECTION, SOLUTION INTRA-ARTICULAR; INTRALESIONAL; INTRAMUSCULAR; INTRAVENOUS; SOFT TISSUE at 09:12

## 2023-12-04 RX ADMIN — SODIUM CHLORIDE 500 ML/HR: 9 INJECTION, SOLUTION INTRAVENOUS at 01:12

## 2023-12-04 RX ADMIN — POTASSIUM CHLORIDE 500 ML/HR: 2 INJECTION, SOLUTION, CONCENTRATE INTRAVENOUS at 09:12

## 2023-12-04 NOTE — PROGRESS NOTES
"Subjective     Patient ID: Med Palma is a 68 y.o. male.    Chief Complaint: Malignant neoplasm of dome of urinary bladder    HPI    Diagnosis: Small cell bladder cancer  Referring MD: Dr. Jimenez    Doing well overall  Presents today prior to starting chemo  Notes sinus congestion over last 3 days  No fevers, no cough  States this generally occurs yearly around this time - called "the crud" in his family  Feeling better today as symptoms improving  Does have chronic paresthesias to last 3 fingers on left hand since Covid diagnosis a few years ago - these are stable and do not interfere with daily activities   No other symptoms or concerns  Eating well and appetite is good  Staying active  Ready to start treatment      Most Recent Scans:  - 11/9/2023 Bone scan:  FINDINGS:  There is physiologic distribution of the radiopharmaceutical throughout the skeleton.  There is normal uptake in the bilateral kidneys and soft tissues.  Bladder is significantly distended with abnormal contour, likely due to known bladder mass.  Impression:  No scintigraphic evidence of osteoblastic metastatic disease.    - 11/7/2023 CT Chest:  FINDINGS:  Chest wall and lower neck: No axillary or supraclavicular lymphadenopathy.  Lungs and pleura: Biapical pleuroparenchymal thickening compatible with scar.  Focal air cystic spaces noted in the right upper lobe (image 154 sequence 4) suggestive of scar.  Multiple sub pleural bandlike scars are noted in the upper and lower lobes.  No pleural effusion or airspace consolidation is noted.  Mediastinum and christy: Right hilar node measuring 15 mm (short axis).  No mediastinal lymphadenopathy is noted.  Heart and pericardium: Heart size is normal.No pericardial effusion.  Vessels: Mild atheromatous disease is seen in the aorta.   The coronary arteries show mild atheromatous disease.  Upper abdomen: Cortical irregularity noted in the left kidney compatible with scars.  Both kidneys are excreting " contrast during imaging acquisition.  Bones: Biomechanical changes are noted in the thoracic spine with osteophyte formations.  Schmorl nodes are noted in the thoracic spine.  Impression:  1. Multifocal bilateral pulmonary scars likely representing sequela prior infectious process to correlate with patient history.  No honeycombing is seen.  2. Right hilar node within maximum normal limits, nonspecific.  3. Left renal scar.  4. Additional findings.     - 11/7/2023 MRI Brain:  FINDINGS:  There is no midline shift, hydrocephalus or mass effect.  No acute infarction or acute intracranial hemorrhage.  A few small foci of T2/FLAIR signal in the supratentorial white matter while nonspecific most likely mild chronic microvascular ischemic changes.  Small focus of bright T2 signal adjacent to the posteromedial temporal lobe likely an incidental choroidal fissure cyst.  No enhancing lesions to indicate intracranial metastatic disease.  Small (1 cm) focus of increased FLAIR signal in the posterior fossa near the foramen of Magendie.  This demonstrates gradient signal presumably relating to calcifications with no abnormal enhancement.  No significant mass effect.  No evidence of associated hydrocephalus.  No abnormal extra-axial fluid collections.  The major skull base flow voids present.  Diffuse patchy mucosal membrane thickening in the paranasal sinuses with aerated secretions and small air-fluid levels.  Trace mastoid fluid on the left.  Impression:  No acute intracranial abnormalities, specifically no evidence of intracranial metastatic disease.  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  No associated hydrocephalus at this time.  Recommend surveillance to ensure stability.  Paranasal sinus disease.  This report was flagged in Epic as abnormal.    Oncology History:  - gross hematuria in September 2023 while traveling in [place] (initially wondered if related to exercise and poor  hydration) and resolved on return home he was due for annual PCP visit and he recommended some additional blood work  That same night he had another episode and again resolved with hydration but after occurred several times over a few weeks he sought further evaluation  Minimal discomfort noted with this (states he had a prior bladder infection and it felt like this)  Over summer months had noted occasional mild dysuria preceding this   --Notes prior episode of possible hematuria in 2016 with heavy exercise and poor hydration resolved after 1 day (related to exercise)     - 10/21/2023 CT Urogram Abdomen/Pelvis:  FINDINGS:  : Kidneys are symmetric in size.  Precontrast imaging shows no stones or hydronephrosis.  Kidneys concentrate and excrete contrast appropriately on postcontrast imaging.  No focal filling defects.  Lobulated margins along the lateral aspect of the left kidney has the appearance of scarring.  Heterogeneously enhancing mass along the dorsal lateral aspect of the urinary bladder on the left estimated at 3.8 x 3.6 cm.  Enlarged left pelvic lymph node measures 1.9 cm in short axis dimension.  Enlarged, lobulated prostate gland bulging into the bladder base, similar compared to prior MRI.  CT:  Lung bases show subsegmental atelectasis or scarring.  Liver is homogeneous.  Gallbladder is unremarkable.  Bile ducts, spleen, pancreas, and adrenal glands are unremarkable.  Stomach and loops of bowel are normal in caliber.  No free fluid in the pelvis.  Regional skeleton shows degenerative change.  Impression:  Heterogeneously enhancing mass along the dorsal lateral aspect of the urinary bladder on the left most concerning for primary bladder malignancy.  Enlarged left pelvic lymph node most concerning for metastatic disease. Negative for obstructive uropathy.  Prostatomegaly with the prostate bulging into the bladder base, similar compared to prior MRI.  This report was flagged in Epic as abnormal.     -  10/26/2023 TURBT:  Pathology:  1. Bladder, tumor, superficial resection:   Small cell carcinoma.   Muscularis propria is present and is positive for carcinoma.     2. Bladder, base of tumor, resection:Small cell carcinoma.   Muscularis propria is present and is positive for carcinoma.   Malignant cells are positive for AE1/AE3, TTF1, Synaptophysin, and Chromogranin (rare positive cells) immunostains, and negative for GATA3, CK7, CK20, and p63 immunostains. Ki67 immunostain shows a proliferative index of >95%. The morphology and immunophenotype are compatible with small cell carcinoma.      Prior evaluation for elevated PSA- dx with BPH  - 3/3/28616 MRI Prostate:  FINDINGS:  Previous biopsy: TRUS PBx on 1/26/15 - negative  PSA: 5.8 ng/mL 09/14/2021 (previously 5.9 on 07/07/2021, 3.1 on 05/12/2017, 5.9 on 12/11/2015)  Prior therapy: None  Prostate: 5.1 x 3.8 x 7.2 cm corresponding to a computed volume of 65 cc.  Peripheral zone: 1 focal area of signal abnormality as described below:  Lesion (ANDERSON) #3  Location: Side: Right; Region: Base; Zone: posterior peripheral zone laterally  Greatest dimension: 1.2 cm  T2-WI: T2 SI: Heterogeneous signal intensity or noncircumscribed, rounded, moderate hypointensity--score 3  DWI/ADC: DWI: Focal mild/moderate hypointense on ADC and isointense/mild hyperintense on high b-value DWI--score 3  DCE: No early enhancement  Extraprostatic extension: Abuts the adjacent right posterolateral capsular margin.  PI-RADS assessment category: 3  Transitional zone:  TZ abnormalities: Benign prostatic hyperplasia with at least 2 focal areas of signal abnormality do not completely conform to a BPH nodule.  The 2 lesions are detailed below:  Lesion (ANDERSON) #1  Location: left; region: mid; zone: anterior (more medial compared to lesion (ANDERSON) #2.)  Greatest dimension: 1.3 cm  T2-WI/SI: Heterogeneous signal intensity with obscured margins; score 3.  DWI/ADC: Focal mildly/moderately hypointense on ADC and  isointense/mildly hyperintense on high b-value DWI; score 3  DCE: Positive  Extraprostatic extension: No  PI-RADS assessment category: 3  Lesion (ANDERSON) #2  Location: Left; region: Mid; zone: Anterior (lateral and more posterior compared to lesion (ANDERSON) #1  Greatest dimension: 1.1 cm  T2-WI/SI: Heterogeneous signal intensity with obscured margins; score 3.  DWI/ADC: Focal mildly/moderately hypointense on ADC and isointense/mildly hyperintense on high b-value DWI; score 3.  DCE: Positive  Extraprostatic extension: Abuts the adjacent left lateral capsular margin.  PI-RADS assessment category: 3  Neurovascular bundle: Unremarkable  Seminal vesicles:  SV invasion: Negative.  Adjacent Organ Involvement: Prominent median lobe of the prostate protruding into the base the bladder.  No focal bladder wall thickening.  No rectal involvement.  Lymphadenopathy: No pathologically enlarged pelvic lymph nodes.  Other Findings: None  Impression:  1. There are three PI-RADS 3 lesions, 1 within the right posterolateral peripheral zone and 2 within the left anterior transition zone of the mid to base of the prostate.  Two lesions abut the capsular margin, as described above, without involvement of the seminal vesicles.  No pathologically enlarged pelvic lymph nodes.  2. BPH with a very prominent median lobe protruding into the base of the bladder.  Overall Assessment:  PIRADS 3 (the presence of clinically significant cancer is equivocal)  Number of targets created for potential MR/US fusion biopsy  Peripheral zone: 1  Transition zone: 2     - 5/2/2022 TRUS and biopsies:  Pathology:  RELIAPATH DIAGNOSIS:   A.   PROSTATE, LEFT APEX, NEEDLE BIOPSY:          Benign prostatic tissue with chronic inflammation, see comment.   (R97.20)   B.   PROSTATE, LEFT MID, NEEDLE BIOPSY:         Benign prostatic tissue with chronic inflammation, see comment.   (R97.20)   C.   PROSTATE, LEFT BASE, NEEDLE BIOPSY:         Benign prostatic tissue.  (R97.20)    D.   PROSTATE, RIGHT APEX, NEEDLE BIOPSY:          Benign prostatic tissue with chronic inflammation, see comment.   (R97.20)   E.   PROSTATE, RIGHT MID, NEEDLE BIOPSY:          Benign prostatic tissue with chronic inflammation, see comment.   (R97.20)   F.   PROSTATE, RIGHT BASE, NEEDLE BIOPSY:          Benign prostatic tissue with chronic inflammation, see comment.   (R97.20)   G.   TISSUE1          PROSTATE, TARGET LESION #1, BIOPSY:          -Benign prostatic tissue with chronic inflammation, see comment.   (R97.20)   H.   TISSUE2          PROSTATE, TARGET LESION #2, BIOPSY:          -Benign prostatic tissue .  (R97.20)   I.     TISSUE3          PROSTATE, TARGET LESION #3, BIOPSY:          -Benign prostatic tissue with chronic inflammation .  (R97.20)      PMH:  Covid pneumonia- recovered  BPH  Arthritis- hips, low back, shoulders, knees- prior PT and dry needling helped significantly  Deviated septum surgery  Anemia     FH:  Dad-  of Multiple Myeloma at age 60 yo  Sister- breast cancer survivor (dx in her mid 70s)  Mother lived until age 94 yo-  from natural causes, dementia     SH:  Retired   [employer]  Prior chemical and environmental exposures at work  + tobacco- last 20-25 years 1/2 pack in a weekend   Dips tobacco     + social EtOH    3 children    Review of Systems   Constitutional:  Negative for activity change, appetite change, chills, fatigue, fever and unexpected weight change.   HENT:  Positive for hearing loss (stable). Negative for nasal congestion, dental problem, postnasal drip, rhinorrhea, sinus pressure/congestion and trouble swallowing.    Eyes:  Negative for visual disturbance (wears bifocals).   Respiratory:  Negative for cough, shortness of breath and wheezing.    Cardiovascular:  Negative for chest pain, palpitations and leg swelling.   Gastrointestinal:  Negative for abdominal distention, abdominal pain, change in bowel habit, constipation, diarrhea, nausea,  vomiting and reflux.   Genitourinary:  Positive for frequency and hematuria (resolved now). Negative for decreased urine volume, difficulty urinating, dysuria and urgency.   Musculoskeletal:  Negative for arthralgias, back pain, gait problem, myalgias and joint deformity.   Integumentary:  Negative for rash and wound.   Neurological:  Negative for dizziness, speech difficulty, weakness, numbness, headaches and coordination difficulties.   Hematological:  Negative for adenopathy. Does not bruise/bleed easily.   Psychiatric/Behavioral:  Negative for agitation, behavioral problems, confusion, dysphoric mood and sleep disturbance. The patient is not nervous/anxious.       Objective     Physical Exam  Vitals and nursing note reviewed.   Constitutional:       General: He is not in acute distress.     Appearance: Normal appearance. He is well-developed and normal weight. He is not ill-appearing, toxic-appearing or diaphoretic.      Comments: Presents with his wife and brother  Very pleasant  ECOG= 0   HENT:      Head: Normocephalic and atraumatic.      Right Ear: External ear normal.      Left Ear: External ear normal.      Nose: Nose normal. No congestion.      Mouth/Throat:      Mouth: Mucous membranes are dry.      Pharynx: Oropharynx is clear. No oropharyngeal exudate or posterior oropharyngeal erythema.   Eyes:      General: No scleral icterus.     Extraocular Movements: Extraocular movements intact.      Conjunctiva/sclera: Conjunctivae normal.      Pupils: Pupils are equal, round, and reactive to light.   Neck:      Thyroid: No thyromegaly.      Trachea: No tracheal deviation.   Cardiovascular:      Rate and Rhythm: Normal rate and regular rhythm.      Heart sounds: Normal heart sounds. No murmur heard.     No friction rub. No gallop.   Pulmonary:      Effort: Pulmonary effort is normal. No respiratory distress.      Breath sounds: Normal breath sounds. No wheezing, rhonchi or rales.   Chest:      Chest wall: No  tenderness.      Comments: RCW port, steristrips in place with bruise surrounding area, no signs of infection or drainage to area  Abdominal:      General: Abdomen is flat. Bowel sounds are normal. There is no distension.      Palpations: Abdomen is soft. There is no mass.      Tenderness: There is no abdominal tenderness. There is no guarding or rebound.      Comments: No organomegaly   Musculoskeletal:         General: No swelling or tenderness. Normal range of motion.      Cervical back: Normal range of motion and neck supple.      Right lower leg: No edema.      Left lower leg: No edema.   Lymphadenopathy:      Cervical: No cervical adenopathy.   Skin:     General: Skin is warm and dry.      Coloration: Skin is not jaundiced or pale.      Findings: No erythema, lesion or rash.   Neurological:      General: No focal deficit present.      Mental Status: He is alert and oriented to person, place, and time.      Sensory: No sensory deficit.      Motor: No weakness or abnormal muscle tone.      Coordination: Coordination normal.      Gait: Gait normal.      Deep Tendon Reflexes: Reflexes are normal and symmetric. Reflexes normal.   Psychiatric:         Mood and Affect: Mood normal.         Behavior: Behavior normal.         Thought Content: Thought content normal.         Judgment: Judgment normal.      Assessment and Plan     1. Malignant neoplasm of dome of urinary bladder  -     prochlorperazine (COMPAZINE) 10 MG tablet; Take 1 tablet (10 mg total) by mouth every 6 (six) hours as needed (nausea).  Dispense: 30 tablet; Refill: 3  -     dexAMETHasone (DECADRON) 4 MG Tab; Take 2 tablets (8 mg total) by mouth once daily on days 2-4 of each chemotherapy cycle.  Dispense: 24 tablet; Refill: 2  -     OLANZapine (ZYPREXA) 5 MG tablet; Take 1 tablet (5 mg total) by mouth nightly on days 1-4 of each chemotherapy cycle.  Dispense: 30 tablet; Refill: 1  -     LIDOcaine-prilocaine (EMLA) cream; Apply topically as needed (place  to port site 45-60 minutes prior to chemotherapy).  Dispense: 30 g; Refill: 5    2. Immunodeficiency secondary to neoplasm    3. Immunodeficiency secondary to chemotherapy    4. Anemia, normocytic normochromic      Bladder cancer-   Had port placed 11/29  Audiology eval done 11/13 with mild to moderate sensorineural hearing loss - will monitor closely while on treatment    Patient was consented for chemotherapy today 12/4/2023. An extensive discussion was had which included a thorough discussion of the risk and benefits of treatment and alternatives.  Risks, including but not limited to, possible hair loss, bone marrow damage (anemia, thrombocytopenia, immune suppression, neutropenia), damage to body organs (brain, heart, liver, kidney, lungs, nervous system, skin, and others), allergic reactions, sterility, nausea/vomiting, constipation/diarrhea, sores in the mouth, secondary cancers, local damage at possible injection sites, and rarely death were all discussed. Consented the patient to the treatment plan and the patient was educated on the planned duration of the treatment and schedule of the treatment administration. The patient agrees with the plan, and all questions have been answered to their satisfaction. Consent was signed the patient, provider, and a third party witness.      Will send antiemetics to pharmacy today -   - Dexamethasone 8mg PO daily on days 2-4 of each cycle   - Olanzapine 5mg PO nightly on days 1-4 of each cycle   - Compazine 10 mg PO q6 hours PRN   Emla cream sent to pharmacy today.   Discussed admin instructions for all medications.     Labs reviewed, adequate for treatment today.   Will proceed with cycle 1.   RTC in 3 weeks for next cycle.     Anemia-  Parameters stable  No new signs of bleeding  Will continue to monitor on treatment     Patient is in agreement with the proposed treatment plan. All questions were answered to the patient's satisfaction. Pt knows to call clinic if anything  is needed before the next clinic visit.    Patient discussed with collaborating physician, Dr. Spicer.    At least 40 minutes were spent today on this encounter including face to face time with the patient, data gathering/interpretation and documentation.       Dang Brooks, MSN, APRN, [facility]  Hematology and Medical Oncology  Clinical Nurse Specialist to Dr. Cruz, Dr. Spicer & Dr. Martin Chart for Scheduling    Med Onc Chart Routing  Urgent    Follow up with physician 3 weeks. rtc in 3 weeks with labs to see Dr. Spicer in clinic for cycle 2   Follow up with LALIT 6 weeks. rtc in 6 weeks with labs to see LALIT in clinic for cycle 3   Infusion scheduling note   treatment given on days 1-3 of each 21 day cycle   Injection scheduling note    Labs CBC and CMP   Scheduling:  Preferred lab:  Lab interval: every 3 weeks     Imaging    Pharmacy appointment    Other referrals              Treatment Plan Information   OP CISPLATIN 75MG/M2 ETOPOSIDE 100MG/M2 Q3W   Mary Spicer MD   Upcoming Treatment Dates - OP CISPLATIN 75MG/M2 ETOPOSIDE 100MG/M2 Q3W    11/27/2023       Chemotherapy       CISplatin (Platinol) 75 mg/m2 = 158 mg in sodium chloride 0.9% 658 mL chemo infusion       etoposide (VEPESID) 100 mg/m2 = 212 mg in sodium chloride 0.9% 510.6 mL chemo infusion       Pre-Hydration       sodium chloride 0.9% 1,000 mL with magnesium sulfate 1 g, potassium chloride 20 mEq infusion       Post-Hydration       sodium chloride 0.9% 1,000 mL infusion       Antiemetics       aprepitant (CINVANTI) injection 130 mg       palonosetron (ALOXI) 0.25 mg with Dexamethasone (DECADRON) 12 mg in NS 50 mL IVPB  11/28/2023       Chemotherapy       etoposide (VEPESID) 100 mg/m2 = 212 mg in sodium chloride 0.9% 510.6 mL chemo infusion  11/29/2023       Chemotherapy       etoposide (VEPESID) 100 mg/m2 = 212 mg in sodium chloride 0.9% 510.6 mL chemo infusion  12/18/2023       Chemotherapy       CISplatin (Platinol) 75 mg/m2 = 158 mg in  sodium chloride 0.9% 658 mL chemo infusion       etoposide (VEPESID) 100 mg/m2 = 212 mg in sodium chloride 0.9% 510.6 mL chemo infusion       Pre-Hydration       sodium chloride 0.9% 1,000 mL with magnesium sulfate 1 g, potassium chloride 20 mEq infusion       Post-Hydration       sodium chloride 0.9% 1,000 mL with magnesium sulfate 1 g, potassium chloride 20 mEq infusion       Antiemetics       aprepitant (CINVANTI) injection 130 mg       palonosetron (ALOXI) 0.25 mg with Dexamethasone (DECADRON) 12 mg in NS 50 mL IVPB

## 2023-12-04 NOTE — PLAN OF CARE
D1C1 Pt had immediate reaction to etoposide. Flushing, back pain, . Benadry 25 mg and solucortef 100 mg given. Pt rechallenged  without further incident. Pt will return tomorrow for ETOP again. Pt requested port be deaccessed. Dr Spicer informed.

## 2023-12-05 ENCOUNTER — INFUSION (OUTPATIENT)
Dept: INFUSION THERAPY | Facility: HOSPITAL | Age: 68
End: 2023-12-05
Payer: MEDICARE

## 2023-12-05 VITALS
SYSTOLIC BLOOD PRESSURE: 135 MMHG | OXYGEN SATURATION: 100 % | HEART RATE: 54 BPM | TEMPERATURE: 98 F | RESPIRATION RATE: 18 BRPM | DIASTOLIC BLOOD PRESSURE: 73 MMHG

## 2023-12-05 DIAGNOSIS — C67.1 MALIGNANT NEOPLASM OF DOME OF URINARY BLADDER: Primary | ICD-10-CM

## 2023-12-05 PROCEDURE — 96413 CHEMO IV INFUSION 1 HR: CPT | Mod: HCNC

## 2023-12-05 PROCEDURE — A4216 STERILE WATER/SALINE, 10 ML: HCPCS | Mod: HCNC | Performed by: INTERNAL MEDICINE

## 2023-12-05 PROCEDURE — 25000003 PHARM REV CODE 250: Mod: HCNC | Performed by: INTERNAL MEDICINE

## 2023-12-05 PROCEDURE — 63600175 PHARM REV CODE 636 W HCPCS: Mod: HCNC | Performed by: INTERNAL MEDICINE

## 2023-12-05 PROCEDURE — 96375 TX/PRO/DX INJ NEW DRUG ADDON: CPT | Mod: HCNC

## 2023-12-05 RX ORDER — DIPHENHYDRAMINE HYDROCHLORIDE 50 MG/ML
50 INJECTION INTRAMUSCULAR; INTRAVENOUS ONCE AS NEEDED
Status: DISCONTINUED | OUTPATIENT
Start: 2023-12-05 | End: 2023-12-05 | Stop reason: HOSPADM

## 2023-12-05 RX ORDER — EPINEPHRINE 0.3 MG/.3ML
0.3 INJECTION SUBCUTANEOUS ONCE AS NEEDED
Status: DISCONTINUED | OUTPATIENT
Start: 2023-12-05 | End: 2023-12-05 | Stop reason: HOSPADM

## 2023-12-05 RX ORDER — PROCHLORPERAZINE EDISYLATE 5 MG/ML
5 INJECTION INTRAMUSCULAR; INTRAVENOUS ONCE AS NEEDED
Status: DISCONTINUED | OUTPATIENT
Start: 2023-12-05 | End: 2023-12-05 | Stop reason: HOSPADM

## 2023-12-05 RX ORDER — DIPHENHYDRAMINE HYDROCHLORIDE 50 MG/ML
25 INJECTION INTRAMUSCULAR; INTRAVENOUS ONCE
Status: COMPLETED | OUTPATIENT
Start: 2023-12-05 | End: 2023-12-05

## 2023-12-05 RX ORDER — SODIUM CHLORIDE 0.9 % (FLUSH) 0.9 %
10 SYRINGE (ML) INJECTION
Status: DISCONTINUED | OUTPATIENT
Start: 2023-12-05 | End: 2023-12-05 | Stop reason: HOSPADM

## 2023-12-05 RX ORDER — HEPARIN 100 UNIT/ML
500 SYRINGE INTRAVENOUS
Status: DISCONTINUED | OUTPATIENT
Start: 2023-12-05 | End: 2023-12-05 | Stop reason: HOSPADM

## 2023-12-05 RX ORDER — FAMOTIDINE 10 MG/ML
20 INJECTION INTRAVENOUS ONCE
Status: COMPLETED | OUTPATIENT
Start: 2023-12-05 | End: 2023-12-05

## 2023-12-05 RX ORDER — FAMOTIDINE 10 MG/ML
20 INJECTION INTRAVENOUS ONCE
Status: CANCELLED
Start: 2023-12-05 | End: 2023-12-05

## 2023-12-05 RX ORDER — DIPHENHYDRAMINE HYDROCHLORIDE 50 MG/ML
25 INJECTION INTRAMUSCULAR; INTRAVENOUS ONCE
Status: CANCELLED
Start: 2023-12-05 | End: 2023-12-05

## 2023-12-05 RX ADMIN — DIPHENHYDRAMINE HYDROCHLORIDE 25 MG: 50 INJECTION INTRAMUSCULAR; INTRAVENOUS at 03:12

## 2023-12-05 RX ADMIN — HEPARIN 500 UNITS: 100 SYRINGE at 04:12

## 2023-12-05 RX ADMIN — FAMOTIDINE 20 MG: 10 INJECTION INTRAVENOUS at 03:12

## 2023-12-05 RX ADMIN — HYDROCORTISONE SODIUM SUCCINATE 100 MG: 100 INJECTION, POWDER, FOR SOLUTION INTRAMUSCULAR; INTRAVENOUS at 03:12

## 2023-12-05 RX ADMIN — ETOPOSIDE 212 MG: 20 INJECTION INTRAVENOUS at 03:12

## 2023-12-05 RX ADMIN — Medication 10 ML: at 04:12

## 2023-12-05 NOTE — PLAN OF CARE
15:15-Labs, hx, and medications reviewed, pt meets parameters for treatment today. Informed by previous infusion RN of rxn to etoposide yesterday that quickly resolved with rescue meds. Messaged Dannielle CARDONA earlier today asking if she would like to include premeds in tx plan for today considering previous rxn. PharmD informed as well and premeds ordered and given.  Assessment completed and plan of care reviewed. Pt verbalized understanding. PAC accessed with no complications. Pt voices no new complaints or concerns, will continue to monitor for safety.

## 2023-12-05 NOTE — PLAN OF CARE
17:00-Pt tolerated etoposide infusion well today with addition of premeds, no complaints or complications,. VSS through duration of treatment. Pt aware to call provider with any questions or concerns and is aware of upcoming appts. Pt ambulatory from clinic with steady gait, no distress noted.

## 2023-12-06 ENCOUNTER — INFUSION (OUTPATIENT)
Dept: INFUSION THERAPY | Facility: HOSPITAL | Age: 68
End: 2023-12-06
Payer: MEDICARE

## 2023-12-06 ENCOUNTER — PATIENT MESSAGE (OUTPATIENT)
Dept: HEMATOLOGY/ONCOLOGY | Facility: CLINIC | Age: 68
End: 2023-12-06
Payer: MEDICARE

## 2023-12-06 VITALS
RESPIRATION RATE: 18 BRPM | SYSTOLIC BLOOD PRESSURE: 138 MMHG | BODY MASS INDEX: 27.68 KG/M2 | HEIGHT: 72 IN | WEIGHT: 204.38 LBS | HEART RATE: 56 BPM | DIASTOLIC BLOOD PRESSURE: 71 MMHG | TEMPERATURE: 98 F

## 2023-12-06 DIAGNOSIS — C67.1 MALIGNANT NEOPLASM OF DOME OF URINARY BLADDER: Primary | ICD-10-CM

## 2023-12-06 PROCEDURE — 63600175 PHARM REV CODE 636 W HCPCS: Mod: HCNC | Performed by: INTERNAL MEDICINE

## 2023-12-06 PROCEDURE — 96375 TX/PRO/DX INJ NEW DRUG ADDON: CPT | Mod: HCNC

## 2023-12-06 PROCEDURE — 25000003 PHARM REV CODE 250: Mod: HCNC | Performed by: INTERNAL MEDICINE

## 2023-12-06 PROCEDURE — 96413 CHEMO IV INFUSION 1 HR: CPT | Mod: HCNC

## 2023-12-06 RX ORDER — DIPHENHYDRAMINE HYDROCHLORIDE 50 MG/ML
50 INJECTION INTRAMUSCULAR; INTRAVENOUS ONCE AS NEEDED
Status: DISCONTINUED | OUTPATIENT
Start: 2023-12-06 | End: 2023-12-06 | Stop reason: HOSPADM

## 2023-12-06 RX ORDER — HEPARIN 100 UNIT/ML
500 SYRINGE INTRAVENOUS
Status: DISCONTINUED | OUTPATIENT
Start: 2023-12-06 | End: 2023-12-06 | Stop reason: HOSPADM

## 2023-12-06 RX ORDER — SODIUM CHLORIDE 0.9 % (FLUSH) 0.9 %
10 SYRINGE (ML) INJECTION
Status: DISCONTINUED | OUTPATIENT
Start: 2023-12-06 | End: 2023-12-06 | Stop reason: HOSPADM

## 2023-12-06 RX ORDER — EPINEPHRINE 0.3 MG/.3ML
0.3 INJECTION SUBCUTANEOUS ONCE AS NEEDED
Status: DISCONTINUED | OUTPATIENT
Start: 2023-12-06 | End: 2023-12-06 | Stop reason: HOSPADM

## 2023-12-06 RX ORDER — DIPHENHYDRAMINE HYDROCHLORIDE 50 MG/ML
25 INJECTION INTRAMUSCULAR; INTRAVENOUS ONCE
Status: COMPLETED | OUTPATIENT
Start: 2023-12-06 | End: 2023-12-06

## 2023-12-06 RX ORDER — FAMOTIDINE 10 MG/ML
20 INJECTION INTRAVENOUS ONCE
Status: COMPLETED | OUTPATIENT
Start: 2023-12-06 | End: 2023-12-06

## 2023-12-06 RX ORDER — PROCHLORPERAZINE EDISYLATE 5 MG/ML
5 INJECTION INTRAMUSCULAR; INTRAVENOUS ONCE AS NEEDED
Status: DISCONTINUED | OUTPATIENT
Start: 2023-12-06 | End: 2023-12-06 | Stop reason: HOSPADM

## 2023-12-06 RX ORDER — DIPHENHYDRAMINE HYDROCHLORIDE 50 MG/ML
25 INJECTION INTRAMUSCULAR; INTRAVENOUS ONCE
Status: CANCELLED | OUTPATIENT
Start: 2023-12-06 | End: 2023-12-06

## 2023-12-06 RX ORDER — FAMOTIDINE 10 MG/ML
20 INJECTION INTRAVENOUS ONCE
Status: CANCELLED | OUTPATIENT
Start: 2023-12-06 | End: 2023-12-06

## 2023-12-06 RX ADMIN — FAMOTIDINE 20 MG: 10 INJECTION INTRAVENOUS at 03:12

## 2023-12-06 RX ADMIN — DIPHENHYDRAMINE HYDROCHLORIDE 25 MG: 50 INJECTION INTRAMUSCULAR; INTRAVENOUS at 03:12

## 2023-12-06 RX ADMIN — ETOPOSIDE 212 MG: 20 INJECTION INTRAVENOUS at 04:12

## 2023-12-06 RX ADMIN — HEPARIN 500 UNITS: 100 SYRINGE at 05:12

## 2023-12-06 RX ADMIN — HYDROCORTISONE SODIUM SUCCINATE 100 MG: 100 INJECTION, POWDER, FOR SOLUTION INTRAMUSCULAR; INTRAVENOUS at 03:12

## 2023-12-06 RX ADMIN — SODIUM CHLORIDE: 9 INJECTION, SOLUTION INTRAVENOUS at 03:12

## 2023-12-06 NOTE — PLAN OF CARE
1504-Labs, hx, and medications reviewed, pt meets parameters for treatment today. Assessment completed and plan of care reviewed. Pt verbalized understanding. PAC accessed with no complications. Pt voices no new complaints or concerns, will continue to monitor for safety.

## 2023-12-06 NOTE — PLAN OF CARE
1709-Pt tolerated Etoposide well today, no complaints or complications. VSS through duration of treatment. Pt aware to call provider with any questions or concerns and is aware of upcoming appts. Pt ambulatory from clinic with steady gait, no distress noted.

## 2023-12-07 ENCOUNTER — TELEPHONE (OUTPATIENT)
Dept: HEMATOLOGY/ONCOLOGY | Facility: CLINIC | Age: 68
End: 2023-12-07
Payer: MEDICARE

## 2023-12-07 DIAGNOSIS — C67.1 MALIGNANT NEOPLASM OF DOME OF URINARY BLADDER: Primary | ICD-10-CM

## 2023-12-08 ENCOUNTER — PATIENT MESSAGE (OUTPATIENT)
Dept: HEMATOLOGY/ONCOLOGY | Facility: CLINIC | Age: 68
End: 2023-12-08
Payer: MEDICARE

## 2023-12-18 ENCOUNTER — PATIENT MESSAGE (OUTPATIENT)
Dept: UROLOGY | Facility: CLINIC | Age: 68
End: 2023-12-18
Payer: MEDICARE

## 2023-12-18 ENCOUNTER — LAB VISIT (OUTPATIENT)
Dept: LAB | Facility: HOSPITAL | Age: 68
End: 2023-12-18
Attending: UROLOGY
Payer: MEDICARE

## 2023-12-18 DIAGNOSIS — C67.9 MALIGNANT NEOPLASM OF URINARY BLADDER, UNSPECIFIED SITE: Primary | ICD-10-CM

## 2023-12-18 DIAGNOSIS — C67.9 MALIGNANT NEOPLASM OF URINARY BLADDER, UNSPECIFIED SITE: ICD-10-CM

## 2023-12-18 LAB
BACTERIA #/AREA URNS AUTO: ABNORMAL /HPF
BILIRUB UR QL STRIP: NEGATIVE
CLARITY UR REFRACT.AUTO: ABNORMAL
COLOR UR AUTO: YELLOW
GLUCOSE UR QL STRIP: NEGATIVE
HGB UR QL STRIP: NEGATIVE
KETONES UR QL STRIP: NEGATIVE
LEUKOCYTE ESTERASE UR QL STRIP: ABNORMAL
MICROSCOPIC COMMENT: ABNORMAL
NITRITE UR QL STRIP: NEGATIVE
PH UR STRIP: 5 [PH] (ref 5–8)
PROT UR QL STRIP: NEGATIVE
RBC #/AREA URNS AUTO: 3 /HPF (ref 0–4)
SP GR UR STRIP: 1.01 (ref 1–1.03)
URN SPEC COLLECT METH UR: ABNORMAL
WBC #/AREA URNS AUTO: 47 /HPF (ref 0–5)

## 2023-12-18 PROCEDURE — 81001 URINALYSIS AUTO W/SCOPE: CPT | Mod: HCNC | Performed by: UROLOGY

## 2023-12-18 PROCEDURE — 87086 URINE CULTURE/COLONY COUNT: CPT | Mod: HCNC | Performed by: UROLOGY

## 2023-12-20 LAB — BACTERIA UR CULT: NO GROWTH

## 2023-12-21 ENCOUNTER — LAB VISIT (OUTPATIENT)
Dept: LAB | Facility: HOSPITAL | Age: 68
End: 2023-12-21
Attending: INTERNAL MEDICINE
Payer: MEDICARE

## 2023-12-21 DIAGNOSIS — C67.1 MALIGNANT NEOPLASM OF DOME OF URINARY BLADDER: ICD-10-CM

## 2023-12-21 LAB
ALBUMIN SERPL BCP-MCNC: 3.6 G/DL (ref 3.5–5.2)
ALP SERPL-CCNC: 68 U/L (ref 55–135)
ALT SERPL W/O P-5'-P-CCNC: 13 U/L (ref 10–44)
ANION GAP SERPL CALC-SCNC: 6 MMOL/L (ref 8–16)
AST SERPL-CCNC: 11 U/L (ref 10–40)
BASOPHILS # BLD AUTO: 0.04 K/UL (ref 0–0.2)
BASOPHILS NFR BLD: 1.4 % (ref 0–1.9)
BILIRUB SERPL-MCNC: 0.3 MG/DL (ref 0.1–1)
BUN SERPL-MCNC: 20 MG/DL (ref 8–23)
CALCIUM SERPL-MCNC: 9.6 MG/DL (ref 8.7–10.5)
CHLORIDE SERPL-SCNC: 107 MMOL/L (ref 95–110)
CO2 SERPL-SCNC: 27 MMOL/L (ref 23–29)
CREAT SERPL-MCNC: 1 MG/DL (ref 0.5–1.4)
DIFFERENTIAL METHOD: ABNORMAL
EOSINOPHIL # BLD AUTO: 0 K/UL (ref 0–0.5)
EOSINOPHIL NFR BLD: 1.4 % (ref 0–8)
ERYTHROCYTE [DISTWIDTH] IN BLOOD BY AUTOMATED COUNT: 13.8 % (ref 11.5–14.5)
EST. GFR  (NO RACE VARIABLE): >60 ML/MIN/1.73 M^2
GLUCOSE SERPL-MCNC: 87 MG/DL (ref 70–110)
HCT VFR BLD AUTO: 34.3 % (ref 40–54)
HGB BLD-MCNC: 11.7 G/DL (ref 14–18)
HYPOCHROMIA BLD QL SMEAR: ABNORMAL
IMM GRANULOCYTES # BLD AUTO: 0.09 K/UL (ref 0–0.04)
IMM GRANULOCYTES NFR BLD AUTO: 3.1 % (ref 0–0.5)
LYMPHOCYTES # BLD AUTO: 2 K/UL (ref 1–4.8)
LYMPHOCYTES NFR BLD: 70.1 % (ref 18–48)
MCH RBC QN AUTO: 29.8 PG (ref 27–31)
MCHC RBC AUTO-ENTMCNC: 34.1 G/DL (ref 32–36)
MCV RBC AUTO: 88 FL (ref 82–98)
MONOCYTES # BLD AUTO: 0.5 K/UL (ref 0.3–1)
MONOCYTES NFR BLD: 16.7 % (ref 4–15)
NEUTROPHILS # BLD AUTO: 0.2 K/UL (ref 1.8–7.7)
NEUTROPHILS NFR BLD: 7.3 % (ref 38–73)
NRBC BLD-RTO: 0 /100 WBC
PLATELET # BLD AUTO: 311 K/UL (ref 150–450)
PLATELET BLD QL SMEAR: ABNORMAL
PMV BLD AUTO: 10 FL (ref 9.2–12.9)
POTASSIUM SERPL-SCNC: 4.5 MMOL/L (ref 3.5–5.1)
PROT SERPL-MCNC: 7.2 G/DL (ref 6–8.4)
RBC # BLD AUTO: 3.92 M/UL (ref 4.6–6.2)
SODIUM SERPL-SCNC: 140 MMOL/L (ref 136–145)
WBC # BLD AUTO: 2.88 K/UL (ref 3.9–12.7)

## 2023-12-21 PROCEDURE — 85025 COMPLETE CBC W/AUTO DIFF WBC: CPT | Mod: HCNC | Performed by: INTERNAL MEDICINE

## 2023-12-21 PROCEDURE — 36415 COLL VENOUS BLD VENIPUNCTURE: CPT | Mod: HCNC | Performed by: INTERNAL MEDICINE

## 2023-12-21 PROCEDURE — 80053 COMPREHEN METABOLIC PANEL: CPT | Mod: HCNC | Performed by: INTERNAL MEDICINE

## 2023-12-22 ENCOUNTER — OFFICE VISIT (OUTPATIENT)
Dept: HEMATOLOGY/ONCOLOGY | Facility: CLINIC | Age: 68
End: 2023-12-22
Payer: MEDICARE

## 2023-12-22 VITALS
RESPIRATION RATE: 16 BRPM | SYSTOLIC BLOOD PRESSURE: 139 MMHG | HEART RATE: 68 BPM | DIASTOLIC BLOOD PRESSURE: 86 MMHG | WEIGHT: 200.63 LBS | OXYGEN SATURATION: 99 % | HEIGHT: 72 IN | BODY MASS INDEX: 27.17 KG/M2 | TEMPERATURE: 98 F

## 2023-12-22 DIAGNOSIS — D64.9 ANEMIA, NORMOCYTIC NORMOCHROMIC: ICD-10-CM

## 2023-12-22 DIAGNOSIS — D49.9 IMMUNODEFICIENCY SECONDARY TO NEOPLASM: ICD-10-CM

## 2023-12-22 DIAGNOSIS — D70.1 CHEMOTHERAPY INDUCED NEUTROPENIA: ICD-10-CM

## 2023-12-22 DIAGNOSIS — Z79.899 IMMUNODEFICIENCY SECONDARY TO CHEMOTHERAPY: ICD-10-CM

## 2023-12-22 DIAGNOSIS — T45.1X5A IMMUNODEFICIENCY SECONDARY TO CHEMOTHERAPY: ICD-10-CM

## 2023-12-22 DIAGNOSIS — D84.81 IMMUNODEFICIENCY SECONDARY TO NEOPLASM: ICD-10-CM

## 2023-12-22 DIAGNOSIS — D84.821 IMMUNODEFICIENCY SECONDARY TO CHEMOTHERAPY: ICD-10-CM

## 2023-12-22 DIAGNOSIS — C67.1 MALIGNANT NEOPLASM OF DOME OF URINARY BLADDER: Primary | ICD-10-CM

## 2023-12-22 DIAGNOSIS — T45.1X5A CHEMOTHERAPY INDUCED NEUTROPENIA: ICD-10-CM

## 2023-12-22 PROCEDURE — 3044F HG A1C LEVEL LT 7.0%: CPT | Mod: HCNC,CPTII,S$GLB, | Performed by: REGISTERED NURSE

## 2023-12-22 PROCEDURE — 3079F PR MOST RECENT DIASTOLIC BLOOD PRESSURE 80-89 MM HG: ICD-10-PCS | Mod: HCNC,CPTII,S$GLB, | Performed by: REGISTERED NURSE

## 2023-12-22 PROCEDURE — 3079F DIAST BP 80-89 MM HG: CPT | Mod: HCNC,CPTII,S$GLB, | Performed by: REGISTERED NURSE

## 2023-12-22 PROCEDURE — 3008F BODY MASS INDEX DOCD: CPT | Mod: HCNC,CPTII,S$GLB, | Performed by: REGISTERED NURSE

## 2023-12-22 PROCEDURE — 3075F PR MOST RECENT SYSTOLIC BLOOD PRESS GE 130-139MM HG: ICD-10-PCS | Mod: HCNC,CPTII,S$GLB, | Performed by: REGISTERED NURSE

## 2023-12-22 PROCEDURE — 99215 OFFICE O/P EST HI 40 MIN: CPT | Mod: HCNC,S$GLB,, | Performed by: REGISTERED NURSE

## 2023-12-22 PROCEDURE — 1160F PR REVIEW ALL MEDS BY PRESCRIBER/CLIN PHARMACIST DOCUMENTED: ICD-10-PCS | Mod: HCNC,CPTII,S$GLB, | Performed by: REGISTERED NURSE

## 2023-12-22 PROCEDURE — 1126F AMNT PAIN NOTED NONE PRSNT: CPT | Mod: HCNC,CPTII,S$GLB, | Performed by: REGISTERED NURSE

## 2023-12-22 PROCEDURE — 3008F PR BODY MASS INDEX (BMI) DOCUMENTED: ICD-10-PCS | Mod: HCNC,CPTII,S$GLB, | Performed by: REGISTERED NURSE

## 2023-12-22 PROCEDURE — 3044F PR MOST RECENT HEMOGLOBIN A1C LEVEL <7.0%: ICD-10-PCS | Mod: HCNC,CPTII,S$GLB, | Performed by: REGISTERED NURSE

## 2023-12-22 PROCEDURE — 1159F MED LIST DOCD IN RCRD: CPT | Mod: HCNC,CPTII,S$GLB, | Performed by: REGISTERED NURSE

## 2023-12-22 PROCEDURE — 1159F PR MEDICATION LIST DOCUMENTED IN MEDICAL RECORD: ICD-10-PCS | Mod: HCNC,CPTII,S$GLB, | Performed by: REGISTERED NURSE

## 2023-12-22 PROCEDURE — 1160F RVW MEDS BY RX/DR IN RCRD: CPT | Mod: HCNC,CPTII,S$GLB, | Performed by: REGISTERED NURSE

## 2023-12-22 PROCEDURE — 3075F SYST BP GE 130 - 139MM HG: CPT | Mod: HCNC,CPTII,S$GLB, | Performed by: REGISTERED NURSE

## 2023-12-22 PROCEDURE — 99999 PR PBB SHADOW E&M-EST. PATIENT-LVL III: ICD-10-PCS | Mod: PBBFAC,HCNC,, | Performed by: REGISTERED NURSE

## 2023-12-22 PROCEDURE — 1126F PR PAIN SEVERITY QUANTIFIED, NO PAIN PRESENT: ICD-10-PCS | Mod: HCNC,CPTII,S$GLB, | Performed by: REGISTERED NURSE

## 2023-12-22 PROCEDURE — 99999 PR PBB SHADOW E&M-EST. PATIENT-LVL III: CPT | Mod: PBBFAC,HCNC,, | Performed by: REGISTERED NURSE

## 2023-12-22 PROCEDURE — 99215 PR OFFICE/OUTPT VISIT, EST, LEVL V, 40-54 MIN: ICD-10-PCS | Mod: HCNC,S$GLB,, | Performed by: REGISTERED NURSE

## 2023-12-22 NOTE — PROGRESS NOTES
Subjective     Patient ID: Med Palma is a 68 y.o. male.    Chief Complaint: Malignant neoplasm of dome of urinary bladder    HPI    Diagnosis: Small cell bladder cancer  Referring MD: Dr. Jimenez    Doing well overall  Notes reaction to etoposide with first reaction   Tolerated better with premedications once restarted  No issues on days 2 or 3     Reports fatigue, but remains active   Has continued to work without issues   Notes increased nocturia, now going 4-6 times per night   However, feels this started around time of procedure   Initially had constipation x 5 days, now normalized   Eating well, well hydrated   Congestion has resolved  Chronic ringing in his ears now at slightly higher pitch - otherwise no changes in hearing  Paresthesias to 3 fingers on left hand unchanged  No fevers or chills  No SOB, CP, palpitations  No nausea or vomiting  No diarrhea  No blood in stool. Blood in urine has resolved  No other concerns.    Most Recent Scans:  - 11/9/2023 Bone scan:  FINDINGS:  There is physiologic distribution of the radiopharmaceutical throughout the skeleton.  There is normal uptake in the bilateral kidneys and soft tissues.  Bladder is significantly distended with abnormal contour, likely due to known bladder mass.  Impression:  No scintigraphic evidence of osteoblastic metastatic disease.    - 11/7/2023 CT Chest:  FINDINGS:  Chest wall and lower neck: No axillary or supraclavicular lymphadenopathy.  Lungs and pleura: Biapical pleuroparenchymal thickening compatible with scar.  Focal air cystic spaces noted in the right upper lobe (image 154 sequence 4) suggestive of scar.  Multiple sub pleural bandlike scars are noted in the upper and lower lobes.  No pleural effusion or airspace consolidation is noted.  Mediastinum and christy: Right hilar node measuring 15 mm (short axis).  No mediastinal lymphadenopathy is noted.  Heart and pericardium: Heart size is normal.No pericardial effusion.  Vessels: Mild  atheromatous disease is seen in the aorta.   The coronary arteries show mild atheromatous disease.  Upper abdomen: Cortical irregularity noted in the left kidney compatible with scars.  Both kidneys are excreting contrast during imaging acquisition.  Bones: Biomechanical changes are noted in the thoracic spine with osteophyte formations.  Schmorl nodes are noted in the thoracic spine.  Impression:  1. Multifocal bilateral pulmonary scars likely representing sequela prior infectious process to correlate with patient history.  No honeycombing is seen.  2. Right hilar node within maximum normal limits, nonspecific.  3. Left renal scar.  4. Additional findings.     - 11/7/2023 MRI Brain:  FINDINGS:  There is no midline shift, hydrocephalus or mass effect.  No acute infarction or acute intracranial hemorrhage.  A few small foci of T2/FLAIR signal in the supratentorial white matter while nonspecific most likely mild chronic microvascular ischemic changes.  Small focus of bright T2 signal adjacent to the posteromedial temporal lobe likely an incidental choroidal fissure cyst.  No enhancing lesions to indicate intracranial metastatic disease.  Small (1 cm) focus of increased FLAIR signal in the posterior fossa near the foramen of Magendie.  This demonstrates gradient signal presumably relating to calcifications with no abnormal enhancement.  No significant mass effect.  No evidence of associated hydrocephalus.  No abnormal extra-axial fluid collections.  The major skull base flow voids present.  Diffuse patchy mucosal membrane thickening in the paranasal sinuses with aerated secretions and small air-fluid levels.  Trace mastoid fluid on the left.  Impression:  No acute intracranial abnormalities, specifically no evidence of intracranial metastatic disease.  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  No associated hydrocephalus at this time.  Recommend surveillance to ensure  stability.  Paranasal sinus disease.  This report was flagged in Epic as abnormal.    Oncology History:  - gross hematuria in September 2023 while traveling in [place] (initially wondered if related to exercise and poor hydration) and resolved on return home he was due for annual PCP visit and he recommended some additional blood work  That same night he had another episode and again resolved with hydration but after occurred several times over a few weeks he sought further evaluation  Minimal discomfort noted with this (states he had a prior bladder infection and it felt like this)  Over summer months had noted occasional mild dysuria preceding this   --Notes prior episode of possible hematuria in 2016 with heavy exercise and poor hydration resolved after 1 day (related to exercise)     - 10/21/2023 CT Urogram Abdomen/Pelvis:  FINDINGS:  : Kidneys are symmetric in size.  Precontrast imaging shows no stones or hydronephrosis.  Kidneys concentrate and excrete contrast appropriately on postcontrast imaging.  No focal filling defects.  Lobulated margins along the lateral aspect of the left kidney has the appearance of scarring.  Heterogeneously enhancing mass along the dorsal lateral aspect of the urinary bladder on the left estimated at 3.8 x 3.6 cm.  Enlarged left pelvic lymph node measures 1.9 cm in short axis dimension.  Enlarged, lobulated prostate gland bulging into the bladder base, similar compared to prior MRI.  CT:  Lung bases show subsegmental atelectasis or scarring.  Liver is homogeneous.  Gallbladder is unremarkable.  Bile ducts, spleen, pancreas, and adrenal glands are unremarkable.  Stomach and loops of bowel are normal in caliber.  No free fluid in the pelvis.  Regional skeleton shows degenerative change.  Impression:  Heterogeneously enhancing mass along the dorsal lateral aspect of the urinary bladder on the left most concerning for primary bladder malignancy.  Enlarged left pelvic lymph node most  concerning for metastatic disease. Negative for obstructive uropathy.  Prostatomegaly with the prostate bulging into the bladder base, similar compared to prior MRI.  This report was flagged in Epic as abnormal.     - 10/26/2023 TURBT:  Pathology:  1. Bladder, tumor, superficial resection:   Small cell carcinoma.   Muscularis propria is present and is positive for carcinoma.     2. Bladder, base of tumor, resection:Small cell carcinoma.   Muscularis propria is present and is positive for carcinoma.   Malignant cells are positive for AE1/AE3, TTF1, Synaptophysin, and Chromogranin (rare positive cells) immunostains, and negative for GATA3, CK7, CK20, and p63 immunostains. Ki67 immunostain shows a proliferative index of >95%. The morphology and immunophenotype are compatible with small cell carcinoma.      Prior evaluation for elevated PSA- dx with BPH  - 3/3/76823 MRI Prostate:  FINDINGS:  Previous biopsy: TRUS PBx on 1/26/15 - negative  PSA: 5.8 ng/mL 09/14/2021 (previously 5.9 on 07/07/2021, 3.1 on 05/12/2017, 5.9 on 12/11/2015)  Prior therapy: None  Prostate: 5.1 x 3.8 x 7.2 cm corresponding to a computed volume of 65 cc.  Peripheral zone: 1 focal area of signal abnormality as described below:  Lesion (ANDERSON) #3  Location: Side: Right; Region: Base; Zone: posterior peripheral zone laterally  Greatest dimension: 1.2 cm  T2-WI: T2 SI: Heterogeneous signal intensity or noncircumscribed, rounded, moderate hypointensity--score 3  DWI/ADC: DWI: Focal mild/moderate hypointense on ADC and isointense/mild hyperintense on high b-value DWI--score 3  DCE: No early enhancement  Extraprostatic extension: Abuts the adjacent right posterolateral capsular margin.  PI-RADS assessment category: 3  Transitional zone:  TZ abnormalities: Benign prostatic hyperplasia with at least 2 focal areas of signal abnormality do not completely conform to a BPH nodule.  The 2 lesions are detailed below:  Lesion (ANDERSON) #1  Location: left; region: mid;  zone: anterior (more medial compared to lesion (ANDERSON) #2.)  Greatest dimension: 1.3 cm  T2-WI/SI: Heterogeneous signal intensity with obscured margins; score 3.  DWI/ADC: Focal mildly/moderately hypointense on ADC and isointense/mildly hyperintense on high b-value DWI; score 3  DCE: Positive  Extraprostatic extension: No  PI-RADS assessment category: 3  Lesion (ANDERSON) #2  Location: Left; region: Mid; zone: Anterior (lateral and more posterior compared to lesion (ANDERSON) #1  Greatest dimension: 1.1 cm  T2-WI/SI: Heterogeneous signal intensity with obscured margins; score 3.  DWI/ADC: Focal mildly/moderately hypointense on ADC and isointense/mildly hyperintense on high b-value DWI; score 3.  DCE: Positive  Extraprostatic extension: Abuts the adjacent left lateral capsular margin.  PI-RADS assessment category: 3  Neurovascular bundle: Unremarkable  Seminal vesicles:  SV invasion: Negative.  Adjacent Organ Involvement: Prominent median lobe of the prostate protruding into the base the bladder.  No focal bladder wall thickening.  No rectal involvement.  Lymphadenopathy: No pathologically enlarged pelvic lymph nodes.  Other Findings: None  Impression:  1. There are three PI-RADS 3 lesions, 1 within the right posterolateral peripheral zone and 2 within the left anterior transition zone of the mid to base of the prostate.  Two lesions abut the capsular margin, as described above, without involvement of the seminal vesicles.  No pathologically enlarged pelvic lymph nodes.  2. BPH with a very prominent median lobe protruding into the base of the bladder.  Overall Assessment:  PIRADS 3 (the presence of clinically significant cancer is equivocal)  Number of targets created for potential MR/US fusion biopsy  Peripheral zone: 1  Transition zone: 2     - 5/2/2022 TRUS and biopsies:  Pathology:  RELIAPATH DIAGNOSIS:   A.   PROSTATE, LEFT APEX, NEEDLE BIOPSY:          Benign prostatic tissue with chronic inflammation, see comment.    (R97.20)   B.   PROSTATE, LEFT MID, NEEDLE BIOPSY:         Benign prostatic tissue with chronic inflammation, see comment.   (R97.20)   C.   PROSTATE, LEFT BASE, NEEDLE BIOPSY:         Benign prostatic tissue.  (R97.20)   D.   PROSTATE, RIGHT APEX, NEEDLE BIOPSY:          Benign prostatic tissue with chronic inflammation, see comment.   (R97.20)   E.   PROSTATE, RIGHT MID, NEEDLE BIOPSY:          Benign prostatic tissue with chronic inflammation, see comment.   (R97.20)   F.   PROSTATE, RIGHT BASE, NEEDLE BIOPSY:          Benign prostatic tissue with chronic inflammation, see comment.   (R97.20)   G.   TISSUE1          PROSTATE, TARGET LESION #1, BIOPSY:          -Benign prostatic tissue with chronic inflammation, see comment.   (R97.20)   H.   TISSUE2          PROSTATE, TARGET LESION #2, BIOPSY:          -Benign prostatic tissue .  (R97.20)   I.     TISSUE3          PROSTATE, TARGET LESION #3, BIOPSY:          -Benign prostatic tissue with chronic inflammation .  (R97.20)      PMH:  Covid pneumonia- recovered  BPH  Arthritis- hips, low back, shoulders, knees- prior PT and dry needling helped significantly  Deviated septum surgery  Anemia     FH:  Dad-  of Multiple Myeloma at age 60 yo  Sister- breast cancer survivor (dx in her mid 70s)  Mother lived until age 92 yo-  from natural causes, dementia     SH:  Retired   [employer] management  Prior chemical and environmental exposures at work  + tobacco- last 20-25 years 1/2 pack in a weekend   Dips tobacco     + social EtOH    3 children    Review of Systems   Constitutional:  Negative for activity change, appetite change, chills, fatigue, fever and unexpected weight change.   HENT:  Positive for hearing loss (stable). Negative for nasal congestion, dental problem, postnasal drip, rhinorrhea, sinus pressure/congestion and trouble swallowing.    Eyes:  Negative for visual disturbance (wears bifocals).   Respiratory:  Negative for cough, shortness of  breath and wheezing.    Cardiovascular:  Negative for chest pain, palpitations and leg swelling.   Gastrointestinal:  Positive for constipation (at beginning of cycle - now resolved). Negative for abdominal distention, abdominal pain, change in bowel habit, diarrhea, nausea, vomiting and reflux.   Genitourinary:  Positive for frequency and hematuria (resolved now). Negative for decreased urine volume, difficulty urinating, dysuria and urgency.   Musculoskeletal:  Negative for arthralgias, back pain, gait problem, myalgias and joint deformity.   Integumentary:  Negative for rash and wound.   Neurological:  Negative for dizziness, speech difficulty, weakness, numbness, headaches and coordination difficulties.   Hematological:  Negative for adenopathy. Does not bruise/bleed easily.   Psychiatric/Behavioral:  Negative for agitation, behavioral problems, confusion, dysphoric mood and sleep disturbance. The patient is not nervous/anxious.       Objective     Physical Exam  Vitals and nursing note reviewed.   Constitutional:       General: He is not in acute distress.     Appearance: Normal appearance. He is well-developed and normal weight. He is not ill-appearing, toxic-appearing or diaphoretic.      Comments: Presents with his wife  Very pleasant  ECOG= 0   HENT:      Head: Normocephalic and atraumatic.      Right Ear: External ear normal.      Left Ear: External ear normal.      Nose: Nose normal. No congestion.      Mouth/Throat:      Mouth: Mucous membranes are dry.      Pharynx: Oropharynx is clear. No oropharyngeal exudate or posterior oropharyngeal erythema.   Eyes:      General: No scleral icterus.     Extraocular Movements: Extraocular movements intact.      Conjunctiva/sclera: Conjunctivae normal.      Pupils: Pupils are equal, round, and reactive to light.   Neck:      Thyroid: No thyromegaly.      Trachea: No tracheal deviation.   Cardiovascular:      Rate and Rhythm: Normal rate and regular rhythm.       Heart sounds: Normal heart sounds. No murmur heard.     No friction rub. No gallop.   Pulmonary:      Effort: Pulmonary effort is normal. No respiratory distress.      Breath sounds: Normal breath sounds. No wheezing, rhonchi or rales.   Chest:      Chest wall: No tenderness.      Comments: RCW port  Abdominal:      General: Abdomen is flat. Bowel sounds are normal. There is no distension.      Palpations: Abdomen is soft. There is no mass.      Tenderness: There is no abdominal tenderness. There is no guarding or rebound.      Comments: No organomegaly   Musculoskeletal:         General: No swelling or tenderness. Normal range of motion.      Cervical back: Normal range of motion and neck supple.      Right lower leg: No edema.      Left lower leg: No edema.   Lymphadenopathy:      Cervical: No cervical adenopathy.   Skin:     General: Skin is warm and dry.      Coloration: Skin is not jaundiced or pale.      Findings: No erythema, lesion or rash.   Neurological:      General: No focal deficit present.      Mental Status: He is alert and oriented to person, place, and time.      Sensory: No sensory deficit.      Motor: No weakness or abnormal muscle tone.      Coordination: Coordination normal.      Gait: Gait normal.      Deep Tendon Reflexes: Reflexes are normal and symmetric. Reflexes normal.   Psychiatric:         Mood and Affect: Mood normal.         Behavior: Behavior normal.         Thought Content: Thought content normal.         Judgment: Judgment normal.        Assessment and Plan     1. Malignant neoplasm of dome of urinary bladder    2. Immunodeficiency secondary to neoplasm    3. Immunodeficiency secondary to chemotherapy    4. Anemia, normocytic normochromic    5. Chemotherapy induced neutropenia      Bladder cancer-   Had port placed 11/29  Audiology eval done 11/13 with mild to moderate sensorineural hearing loss - will monitor closely while on treatment    Has antiemetics at home -   -  Dexamethasone 8mg PO daily on days 2-4 of each cycle   - Olanzapine 5mg PO nightly on days 1-4 of each cycle   - Compazine 10 mg PO q6 hours PRN   Emla cream sent to pharmacy.     Cycle 1 started on 12/4/23.   Presents for cycle 2.   Labs reviewed. .   Will recheck next week prior to infusion. If improved and adequate for chemo, will proceed. If ANC remains too low for chemo, will delay x 1 week.   Neulasta OBI added to treatment plan today and will submit to insurance for authorization.     Anemia-  Parameters stable  No new signs of bleeding  Will continue to monitor on treatment     Patient is in agreement with the proposed treatment plan. All questions were answered to the patient's satisfaction. Pt knows to call clinic if anything is needed before the next clinic visit.    Patient discussed with collaborating physician, Dr. Spicer.    At least 40 minutes were spent today on this encounter including face to face time with the patient, data gathering/interpretation and documentation.       Dang Brooks, MSN, APRN, ACCNS-  Hematology and Medical Oncology  Clinical Nurse Specialist to Dr. Cruz, Dr. Spicer & Dr. Da Silva    Route Chart for Scheduling    Med Onc Chart Routing      Follow up with physician . Keep as scheduled for now - may need to delay x 1 week pending lab results on 12/26.   Follow up with [facility]    Infusion scheduling note    Injection scheduling note    Labs    Imaging    Pharmacy appointment    Other referrals              Treatment Plan Information   OP CISPLATIN 75MG/M2 ETOPOSIDE 100MG/M2 Q3W   Mary Spicer MD   Upcoming Treatment Dates - OP CISPLATIN 75MG/M2 ETOPOSIDE 100MG/M2 Q3W    12/25/2023       Pre-Medications       diphenhydrAMINE injection 25 mg       famotidine (PF) injection 20 mg       hydrocortisone sodium succinate injection 100 mg       Chemotherapy       CISplatin (Platinol) 75 mg/m2 = 158 mg in sodium chloride 0.9% 658 mL chemo infusion       etoposide (VEPESID) 100  mg/m2 = 212 mg in sodium chloride 0.9% 510.6 mL chemo infusion       Pre-Hydration       sodium chloride 0.9% 1,000 mL with magnesium sulfate 1 g, potassium chloride 20 mEq infusion       Post-Hydration       sodium chloride 0.9% 1,000 mL with magnesium sulfate 1 g, potassium chloride 20 mEq infusion       Antiemetics       aprepitant (CINVANTI) injection 130 mg       palonosetron (ALOXI) 0.25 mg with Dexamethasone (DECADRON) 12 mg in NS 50 mL IVPB  12/26/2023       Pre-Medications       diphenhydrAMINE injection 25 mg       famotidine (PF) injection 20 mg       hydrocortisone sodium succinate injection 100 mg       Chemotherapy       etoposide (VEPESID) 100 mg/m2 = 212 mg in sodium chloride 0.9% 510.6 mL chemo infusion  12/27/2023       Pre-Medications       diphenhydrAMINE injection 25 mg       famotidine (PF) injection 20 mg       hydrocortisone sodium succinate injection 100 mg       Chemotherapy       etoposide (VEPESID) 100 mg/m2 = 212 mg in sodium chloride 0.9% 510.6 mL chemo infusion  1/15/2024       Pre-Medications       diphenhydrAMINE injection 25 mg       famotidine (PF) injection 20 mg       hydrocortisone sodium succinate injection 100 mg       Chemotherapy       CISplatin (Platinol) 75 mg/m2 = 158 mg in sodium chloride 0.9% 658 mL chemo infusion       etoposide (VEPESID) 100 mg/m2 = 212 mg in sodium chloride 0.9% 510.6 mL chemo infusion       Pre-Hydration       sodium chloride 0.9% 1,000 mL with magnesium sulfate 1 g, potassium chloride 20 mEq infusion       Post-Hydration       sodium chloride 0.9% 1,000 mL with magnesium sulfate 1 g, potassium chloride 20 mEq infusion       Antiemetics       aprepitant (CINVANTI) injection 130 mg       palonosetron (ALOXI) 0.25 mg with Dexamethasone (DECADRON) 12 mg in NS 50 mL IVPB

## 2023-12-26 ENCOUNTER — LAB VISIT (OUTPATIENT)
Dept: LAB | Facility: HOSPITAL | Age: 68
End: 2023-12-26
Payer: MEDICARE

## 2023-12-26 DIAGNOSIS — C67.1 MALIGNANT NEOPLASM OF DOME OF URINARY BLADDER: ICD-10-CM

## 2023-12-26 LAB
ERYTHROCYTE [DISTWIDTH] IN BLOOD BY AUTOMATED COUNT: 14.2 % (ref 11.5–14.5)
HCT VFR BLD AUTO: 35.3 % (ref 40–54)
HGB BLD-MCNC: 11.8 G/DL (ref 14–18)
IMM GRANULOCYTES # BLD AUTO: 1.52 K/UL (ref 0–0.04)
MCH RBC QN AUTO: 30.2 PG (ref 27–31)
MCHC RBC AUTO-ENTMCNC: 33.4 G/DL (ref 32–36)
MCV RBC AUTO: 90 FL (ref 82–98)
NEUTROPHILS # BLD AUTO: 4.5 K/UL (ref 1.8–7.7)
PLATELET # BLD AUTO: 476 K/UL (ref 150–450)
PMV BLD AUTO: 11 FL (ref 9.2–12.9)
RBC # BLD AUTO: 3.91 M/UL (ref 4.6–6.2)
WBC # BLD AUTO: 10.77 K/UL (ref 3.9–12.7)

## 2023-12-26 PROCEDURE — 85027 COMPLETE CBC AUTOMATED: CPT | Mod: HCNC | Performed by: REGISTERED NURSE

## 2023-12-26 PROCEDURE — 36415 COLL VENOUS BLD VENIPUNCTURE: CPT | Mod: HCNC | Performed by: REGISTERED NURSE

## 2023-12-26 RX ORDER — FAMOTIDINE 10 MG/ML
20 INJECTION INTRAVENOUS ONCE
Status: CANCELLED | OUTPATIENT
Start: 2023-12-29 | End: 2023-12-27

## 2023-12-26 RX ORDER — SODIUM CHLORIDE 0.9 % (FLUSH) 0.9 %
10 SYRINGE (ML) INJECTION
Status: CANCELLED | OUTPATIENT
Start: 2023-12-28

## 2023-12-26 RX ORDER — DIPHENHYDRAMINE HYDROCHLORIDE 50 MG/ML
25 INJECTION INTRAMUSCULAR; INTRAVENOUS ONCE
Status: CANCELLED | OUTPATIENT
Start: 2023-12-28 | End: 2023-12-26

## 2023-12-26 RX ORDER — EPINEPHRINE 0.3 MG/.3ML
0.3 INJECTION SUBCUTANEOUS ONCE AS NEEDED
Status: CANCELLED | OUTPATIENT
Start: 2023-12-29

## 2023-12-26 RX ORDER — DIPHENHYDRAMINE HYDROCHLORIDE 50 MG/ML
25 INJECTION INTRAMUSCULAR; INTRAVENOUS ONCE
Status: CANCELLED | OUTPATIENT
Start: 2023-12-29 | End: 2023-12-27

## 2023-12-26 RX ORDER — DIPHENHYDRAMINE HYDROCHLORIDE 50 MG/ML
50 INJECTION INTRAMUSCULAR; INTRAVENOUS ONCE AS NEEDED
Status: CANCELLED | OUTPATIENT
Start: 2023-12-29

## 2023-12-26 RX ORDER — PROCHLORPERAZINE EDISYLATE 5 MG/ML
5 INJECTION INTRAMUSCULAR; INTRAVENOUS ONCE AS NEEDED
Status: CANCELLED
Start: 2023-12-28

## 2023-12-26 RX ORDER — HEPARIN 100 UNIT/ML
500 SYRINGE INTRAVENOUS
Status: CANCELLED | OUTPATIENT
Start: 2023-12-29

## 2023-12-26 RX ORDER — HEPARIN 100 UNIT/ML
500 SYRINGE INTRAVENOUS
Status: CANCELLED | OUTPATIENT
Start: 2023-12-28

## 2023-12-26 RX ORDER — PROCHLORPERAZINE EDISYLATE 5 MG/ML
5 INJECTION INTRAMUSCULAR; INTRAVENOUS ONCE AS NEEDED
Status: CANCELLED
Start: 2023-12-29

## 2023-12-26 RX ORDER — DIPHENHYDRAMINE HYDROCHLORIDE 50 MG/ML
50 INJECTION INTRAMUSCULAR; INTRAVENOUS ONCE AS NEEDED
Status: CANCELLED | OUTPATIENT
Start: 2023-12-28

## 2023-12-26 RX ORDER — FAMOTIDINE 10 MG/ML
20 INJECTION INTRAVENOUS ONCE
Status: CANCELLED | OUTPATIENT
Start: 2023-12-28 | End: 2023-12-26

## 2023-12-26 RX ORDER — SODIUM CHLORIDE 0.9 % (FLUSH) 0.9 %
10 SYRINGE (ML) INJECTION
Status: CANCELLED | OUTPATIENT
Start: 2023-12-29

## 2023-12-26 RX ORDER — EPINEPHRINE 0.3 MG/.3ML
0.3 INJECTION SUBCUTANEOUS ONCE AS NEEDED
Status: CANCELLED | OUTPATIENT
Start: 2023-12-28

## 2023-12-27 ENCOUNTER — INFUSION (OUTPATIENT)
Dept: INFUSION THERAPY | Facility: HOSPITAL | Age: 68
End: 2023-12-27
Attending: INTERNAL MEDICINE
Payer: MEDICARE

## 2023-12-27 VITALS
HEART RATE: 85 BPM | SYSTOLIC BLOOD PRESSURE: 119 MMHG | TEMPERATURE: 98 F | BODY MASS INDEX: 28.1 KG/M2 | WEIGHT: 207.44 LBS | RESPIRATION RATE: 14 BRPM | DIASTOLIC BLOOD PRESSURE: 70 MMHG | OXYGEN SATURATION: 97 % | HEIGHT: 72 IN

## 2023-12-27 DIAGNOSIS — C67.1 MALIGNANT NEOPLASM OF DOME OF URINARY BLADDER: Primary | ICD-10-CM

## 2023-12-27 PROCEDURE — 63600175 PHARM REV CODE 636 W HCPCS: Mod: JZ,JG,HCNC,PN | Performed by: REGISTERED NURSE

## 2023-12-27 PROCEDURE — 96375 TX/PRO/DX INJ NEW DRUG ADDON: CPT | Mod: HCNC,PN

## 2023-12-27 PROCEDURE — A4216 STERILE WATER/SALINE, 10 ML: HCPCS | Mod: HCNC,PN | Performed by: REGISTERED NURSE

## 2023-12-27 PROCEDURE — 96366 THER/PROPH/DIAG IV INF ADDON: CPT | Mod: HCNC,PN

## 2023-12-27 PROCEDURE — 96413 CHEMO IV INFUSION 1 HR: CPT | Mod: HCNC,PN

## 2023-12-27 PROCEDURE — 96417 CHEMO IV INFUS EACH ADDL SEQ: CPT | Mod: HCNC,PN

## 2023-12-27 PROCEDURE — 96367 TX/PROPH/DG ADDL SEQ IV INF: CPT | Mod: HCNC,PN

## 2023-12-27 PROCEDURE — 25000003 PHARM REV CODE 250: Mod: HCNC,PN | Performed by: REGISTERED NURSE

## 2023-12-27 RX ORDER — DIPHENHYDRAMINE HYDROCHLORIDE 50 MG/ML
50 INJECTION INTRAMUSCULAR; INTRAVENOUS ONCE AS NEEDED
Status: DISCONTINUED | OUTPATIENT
Start: 2023-12-27 | End: 2023-12-27 | Stop reason: HOSPADM

## 2023-12-27 RX ORDER — DIPHENHYDRAMINE HYDROCHLORIDE 50 MG/ML
INJECTION INTRAMUSCULAR; INTRAVENOUS
Status: DISCONTINUED
Start: 2023-12-27 | End: 2023-12-27 | Stop reason: WASHOUT

## 2023-12-27 RX ORDER — EPINEPHRINE 0.3 MG/.3ML
INJECTION SUBCUTANEOUS
Status: DISCONTINUED
Start: 2023-12-27 | End: 2023-12-27 | Stop reason: WASHOUT

## 2023-12-27 RX ORDER — SODIUM CHLORIDE 0.9 % (FLUSH) 0.9 %
10 SYRINGE (ML) INJECTION
Status: DISCONTINUED | OUTPATIENT
Start: 2023-12-27 | End: 2023-12-27 | Stop reason: HOSPADM

## 2023-12-27 RX ORDER — HEPARIN 100 UNIT/ML
500 SYRINGE INTRAVENOUS
Status: DISCONTINUED | OUTPATIENT
Start: 2023-12-27 | End: 2023-12-27 | Stop reason: HOSPADM

## 2023-12-27 RX ORDER — FAMOTIDINE 10 MG/ML
20 INJECTION INTRAVENOUS ONCE
Status: COMPLETED | OUTPATIENT
Start: 2023-12-27 | End: 2023-12-27

## 2023-12-27 RX ORDER — DIPHENHYDRAMINE HYDROCHLORIDE 50 MG/ML
25 INJECTION INTRAMUSCULAR; INTRAVENOUS ONCE
Status: COMPLETED | OUTPATIENT
Start: 2023-12-27 | End: 2023-12-27

## 2023-12-27 RX ORDER — PROCHLORPERAZINE EDISYLATE 5 MG/ML
5 INJECTION INTRAMUSCULAR; INTRAVENOUS ONCE AS NEEDED
Status: DISCONTINUED | OUTPATIENT
Start: 2023-12-27 | End: 2023-12-27 | Stop reason: HOSPADM

## 2023-12-27 RX ORDER — EPINEPHRINE 0.3 MG/.3ML
0.3 INJECTION SUBCUTANEOUS ONCE AS NEEDED
Status: DISCONTINUED | OUTPATIENT
Start: 2023-12-27 | End: 2023-12-27 | Stop reason: HOSPADM

## 2023-12-27 RX ADMIN — Medication 500 UNITS: at 06:12

## 2023-12-27 RX ADMIN — HYDROCORTISONE SODIUM SUCCINATE 100 MG: 100 INJECTION, POWDER, FOR SOLUTION INTRAMUSCULAR; INTRAVENOUS at 02:12

## 2023-12-27 RX ADMIN — DEXAMETHASONE SODIUM PHOSPHATE 0.25 MG: 10 INJECTION, SOLUTION INTRAMUSCULAR; INTRAVENOUS at 01:12

## 2023-12-27 RX ADMIN — APREPITANT 130 MG: 130 INJECTION, EMULSION INTRAVENOUS at 01:12

## 2023-12-27 RX ADMIN — DIPHENHYDRAMINE HYDROCHLORIDE 25 MG: 50 INJECTION INTRAMUSCULAR; INTRAVENOUS at 02:12

## 2023-12-27 RX ADMIN — Medication 10 ML: at 06:12

## 2023-12-27 RX ADMIN — CISPLATIN 158 MG: 1 INJECTION, SOLUTION INTRAVENOUS at 01:12

## 2023-12-27 RX ADMIN — POTASSIUM CHLORIDE 500 ML/HR: 2 INJECTION, SOLUTION, CONCENTRATE INTRAVENOUS at 10:12

## 2023-12-27 RX ADMIN — POTASSIUM CHLORIDE 500 ML/HR: 2 INJECTION, SOLUTION, CONCENTRATE INTRAVENOUS at 04:12

## 2023-12-27 RX ADMIN — ETOPOSIDE 212 MG: 20 INJECTION INTRAVENOUS at 02:12

## 2023-12-27 RX ADMIN — FAMOTIDINE 20 MG: 10 INJECTION INTRAVENOUS at 02:12

## 2023-12-27 NOTE — PLAN OF CARE
Pt arrived today for C2D1 of Cisplatin and Etoposide infusions and tolerated well and completed infusions after holding Etoposide 4 minutes into starting due to flushing and heart racing. Dr. Bennett notified. VSS. Flushing resolved within 2 minutes of stopping med and no additional meds were given. Etoposide restarted at half rate 13 min after holding x 15 min and tolerated, therefore increased to max rate and completed infusion with no changes and tolerating. Pt has home meds and aware of instructions for use. Pt aware of side effects with prn meds at home if needed. Pt aware of f/u appts and number to call for any concerns and discharged to home in NAD with wife.

## 2023-12-28 ENCOUNTER — INFUSION (OUTPATIENT)
Dept: INFUSION THERAPY | Facility: HOSPITAL | Age: 68
End: 2023-12-28
Attending: INTERNAL MEDICINE
Payer: MEDICARE

## 2023-12-28 VITALS
BODY MASS INDEX: 28.1 KG/M2 | TEMPERATURE: 98 F | SYSTOLIC BLOOD PRESSURE: 128 MMHG | WEIGHT: 207.44 LBS | HEIGHT: 72 IN | DIASTOLIC BLOOD PRESSURE: 78 MMHG | HEART RATE: 68 BPM | RESPIRATION RATE: 18 BRPM

## 2023-12-28 DIAGNOSIS — C67.1 MALIGNANT NEOPLASM OF DOME OF URINARY BLADDER: Primary | ICD-10-CM

## 2023-12-28 PROCEDURE — 25000003 PHARM REV CODE 250: Mod: HCNC,PN | Performed by: REGISTERED NURSE

## 2023-12-28 PROCEDURE — A4216 STERILE WATER/SALINE, 10 ML: HCPCS | Mod: HCNC,PN | Performed by: REGISTERED NURSE

## 2023-12-28 PROCEDURE — 96375 TX/PRO/DX INJ NEW DRUG ADDON: CPT | Mod: HCNC,PN

## 2023-12-28 PROCEDURE — 63600175 PHARM REV CODE 636 W HCPCS: Mod: HCNC,PN | Performed by: REGISTERED NURSE

## 2023-12-28 PROCEDURE — 96413 CHEMO IV INFUSION 1 HR: CPT | Mod: HCNC,PN

## 2023-12-28 RX ORDER — PROCHLORPERAZINE EDISYLATE 5 MG/ML
5 INJECTION INTRAMUSCULAR; INTRAVENOUS ONCE AS NEEDED
Status: DISCONTINUED | OUTPATIENT
Start: 2023-12-28 | End: 2023-12-28 | Stop reason: HOSPADM

## 2023-12-28 RX ORDER — DIPHENHYDRAMINE HYDROCHLORIDE 50 MG/ML
50 INJECTION INTRAMUSCULAR; INTRAVENOUS ONCE AS NEEDED
Status: DISCONTINUED | OUTPATIENT
Start: 2023-12-28 | End: 2023-12-28 | Stop reason: HOSPADM

## 2023-12-28 RX ORDER — EPINEPHRINE 0.3 MG/.3ML
0.3 INJECTION SUBCUTANEOUS ONCE AS NEEDED
Status: DISCONTINUED | OUTPATIENT
Start: 2023-12-28 | End: 2023-12-28 | Stop reason: HOSPADM

## 2023-12-28 RX ORDER — SODIUM CHLORIDE 0.9 % (FLUSH) 0.9 %
10 SYRINGE (ML) INJECTION
Status: DISCONTINUED | OUTPATIENT
Start: 2023-12-28 | End: 2023-12-28 | Stop reason: HOSPADM

## 2023-12-28 RX ORDER — DIPHENHYDRAMINE HYDROCHLORIDE 50 MG/ML
25 INJECTION INTRAMUSCULAR; INTRAVENOUS ONCE
Status: COMPLETED | OUTPATIENT
Start: 2023-12-28 | End: 2023-12-28

## 2023-12-28 RX ORDER — FAMOTIDINE 10 MG/ML
20 INJECTION INTRAVENOUS ONCE
Status: COMPLETED | OUTPATIENT
Start: 2023-12-28 | End: 2023-12-28

## 2023-12-28 RX ORDER — HEPARIN 100 UNIT/ML
500 SYRINGE INTRAVENOUS
Status: DISCONTINUED | OUTPATIENT
Start: 2023-12-28 | End: 2023-12-28 | Stop reason: HOSPADM

## 2023-12-28 RX ADMIN — DIPHENHYDRAMINE HYDROCHLORIDE 25 MG: 50 INJECTION INTRAMUSCULAR; INTRAVENOUS at 09:12

## 2023-12-28 RX ADMIN — Medication 500 UNITS: at 12:12

## 2023-12-28 RX ADMIN — ETOPOSIDE 212 MG: 20 INJECTION INTRAVENOUS at 11:12

## 2023-12-28 RX ADMIN — FAMOTIDINE 20 MG: 10 INJECTION INTRAVENOUS at 09:12

## 2023-12-28 RX ADMIN — Medication 10 ML: at 12:12

## 2023-12-28 RX ADMIN — HYDROCORTISONE SODIUM SUCCINATE 100 MG: 100 INJECTION, POWDER, FOR SOLUTION INTRAMUSCULAR; INTRAVENOUS at 09:12

## 2023-12-28 NOTE — PLAN OF CARE
Tolerated VP16 well.  No reactions noted.  No questions or concerns at this time.  Ambulated off unit in NAD.

## 2023-12-28 NOTE — PLAN OF CARE
Problem: Adult Inpatient Plan of Care  Goal: Plan of Care Review  Outcome: Ongoing, Progressing  Flowsheets (Taken 12/27/2023 1821)  Plan of Care Reviewed With:   patient   spouse  Goal: Patient-Specific Goal (Individualized)  Outcome: Ongoing, Progressing  Flowsheets (Taken 12/27/2023 1821)  Anxieties, Fears or Concerns: reaction to etoposide again  Individualized Care Needs: education, conversation, family/friends rotating visiting, snacks, sleep, home heating pad, blanket, pillow  Goal: Optimal Comfort and Wellbeing  Outcome: Ongoing, Progressing  Intervention: Provide Person-Centered Care  Flowsheets (Taken 12/27/2023 1821)  Trust Relationship/Rapport:   care explained   questions encouraged   choices provided   reassurance provided   emotional support provided   thoughts/feelings acknowledged   empathic listening provided   questions answered     Problem: Fatigue  Goal: Improved Activity Tolerance  Outcome: Ongoing, Progressing  Intervention: Promote Improved Energy  Flowsheets (Taken 12/27/2023 1821)  Fatigue Management:   activity assistance provided   fatigue-related activity identified   paced activity encouraged   frequent rest breaks encouraged  Sleep/Rest Enhancement:   awakenings minimized   natural light exposure provided   consistent schedule promoted   noise level reduced   relaxation techniques promoted   family presence promoted   therapeutic touch utilized  Activity Management:   Ambulated -L4   Up in chair - L3     Problem: Fall Injury Risk  Goal: Absence of Fall and Fall-Related Injury  Outcome: Ongoing, Progressing  Intervention: Promote Injury-Free Environment  Flowsheets (Taken 12/27/2023 1821)  Safety Promotion/Fall Prevention:   high risk medications identified   Fall Risk reviewed with patient/family   family to remain at bedside   instructed to call staff for mobility   supervised activity   room near unit station   in recliner, wheels locked   lighting adjusted   medications reviewed

## 2023-12-29 ENCOUNTER — INFUSION (OUTPATIENT)
Dept: INFUSION THERAPY | Facility: HOSPITAL | Age: 68
End: 2023-12-29
Attending: INTERNAL MEDICINE
Payer: MEDICARE

## 2023-12-29 VITALS
BODY MASS INDEX: 28.1 KG/M2 | SYSTOLIC BLOOD PRESSURE: 123 MMHG | OXYGEN SATURATION: 98 % | WEIGHT: 207.44 LBS | HEIGHT: 72 IN | DIASTOLIC BLOOD PRESSURE: 72 MMHG | HEART RATE: 64 BPM | TEMPERATURE: 98 F | RESPIRATION RATE: 20 BRPM

## 2023-12-29 DIAGNOSIS — C67.1 MALIGNANT NEOPLASM OF DOME OF URINARY BLADDER: Primary | ICD-10-CM

## 2023-12-29 PROCEDURE — A4216 STERILE WATER/SALINE, 10 ML: HCPCS | Mod: HCNC,PN | Performed by: REGISTERED NURSE

## 2023-12-29 PROCEDURE — 96375 TX/PRO/DX INJ NEW DRUG ADDON: CPT | Mod: HCNC,PN

## 2023-12-29 PROCEDURE — 25000003 PHARM REV CODE 250: Mod: HCNC,PN | Performed by: REGISTERED NURSE

## 2023-12-29 PROCEDURE — 63600175 PHARM REV CODE 636 W HCPCS: Mod: JZ,JG,HCNC,PN | Performed by: REGISTERED NURSE

## 2023-12-29 PROCEDURE — 96413 CHEMO IV INFUSION 1 HR: CPT | Mod: HCNC,PN

## 2023-12-29 PROCEDURE — 96377 APPLICATON ON-BODY INJECTOR: CPT | Mod: 59,HCNC,PN

## 2023-12-29 RX ORDER — HEPARIN 100 UNIT/ML
500 SYRINGE INTRAVENOUS
Status: DISCONTINUED | OUTPATIENT
Start: 2023-12-29 | End: 2023-12-29 | Stop reason: HOSPADM

## 2023-12-29 RX ORDER — DIPHENHYDRAMINE HYDROCHLORIDE 50 MG/ML
25 INJECTION INTRAMUSCULAR; INTRAVENOUS ONCE
Status: COMPLETED | OUTPATIENT
Start: 2023-12-29 | End: 2023-12-29

## 2023-12-29 RX ORDER — SODIUM CHLORIDE 0.9 % (FLUSH) 0.9 %
10 SYRINGE (ML) INJECTION
Status: DISCONTINUED | OUTPATIENT
Start: 2023-12-29 | End: 2023-12-29 | Stop reason: HOSPADM

## 2023-12-29 RX ORDER — FAMOTIDINE 10 MG/ML
20 INJECTION INTRAVENOUS ONCE
Status: COMPLETED | OUTPATIENT
Start: 2023-12-29 | End: 2023-12-29

## 2023-12-29 RX ADMIN — ETOPOSIDE 212 MG: 20 INJECTION INTRAVENOUS at 10:12

## 2023-12-29 RX ADMIN — Medication 500 UNITS: at 12:12

## 2023-12-29 RX ADMIN — DIPHENHYDRAMINE HYDROCHLORIDE 25 MG: 50 INJECTION INTRAMUSCULAR; INTRAVENOUS at 09:12

## 2023-12-29 RX ADMIN — Medication 10 ML: at 12:12

## 2023-12-29 RX ADMIN — PEGFILGRASTIM 6 MG: KIT SUBCUTANEOUS at 12:12

## 2023-12-29 RX ADMIN — HYDROCORTISONE SODIUM SUCCINATE 100 MG: 100 INJECTION, POWDER, FOR SOLUTION INTRAMUSCULAR; INTRAVENOUS at 09:12

## 2023-12-29 RX ADMIN — SODIUM CHLORIDE: 9 INJECTION, SOLUTION INTRAVENOUS at 09:12

## 2023-12-29 RX ADMIN — FAMOTIDINE 20 MG: 10 INJECTION INTRAVENOUS at 09:12

## 2023-12-29 NOTE — PLAN OF CARE
.Pt tolerated VP16  infusion well.  No adverse reaction noted.   Port flushed with Hep/ NS  and de-accessed per protocol.  Patient left clinic in no acute distress. OBI in place with no problem .

## 2023-12-30 ENCOUNTER — PATIENT MESSAGE (OUTPATIENT)
Dept: HEMATOLOGY/ONCOLOGY | Facility: CLINIC | Age: 68
End: 2023-12-30
Payer: MEDICARE

## 2024-01-01 ENCOUNTER — PATIENT MESSAGE (OUTPATIENT)
Dept: UROLOGY | Facility: CLINIC | Age: 69
End: 2024-01-01

## 2024-01-01 ENCOUNTER — OFFICE VISIT (OUTPATIENT)
Dept: SURGERY | Facility: CLINIC | Age: 69
End: 2024-01-01
Payer: MEDICARE

## 2024-01-01 ENCOUNTER — LAB VISIT (OUTPATIENT)
Dept: LAB | Facility: OTHER | Age: 69
End: 2024-01-01
Attending: INTERNAL MEDICINE
Payer: MEDICARE

## 2024-01-01 ENCOUNTER — PATIENT MESSAGE (OUTPATIENT)
Dept: SURGERY | Facility: CLINIC | Age: 69
End: 2024-01-01
Payer: MEDICARE

## 2024-01-01 ENCOUNTER — HOSPITAL ENCOUNTER (EMERGENCY)
Facility: HOSPITAL | Age: 69
End: 2024-08-26
Attending: EMERGENCY MEDICINE
Payer: MEDICARE

## 2024-01-01 VITALS
WEIGHT: 201.06 LBS | DIASTOLIC BLOOD PRESSURE: 66 MMHG | HEART RATE: 95 BPM | HEIGHT: 71 IN | OXYGEN SATURATION: 100 % | BODY MASS INDEX: 28.15 KG/M2 | SYSTOLIC BLOOD PRESSURE: 102 MMHG | RESPIRATION RATE: 19 BRPM

## 2024-01-01 VITALS
BODY MASS INDEX: 28.15 KG/M2 | SYSTOLIC BLOOD PRESSURE: 93 MMHG | WEIGHT: 201.06 LBS | HEIGHT: 71 IN | DIASTOLIC BLOOD PRESSURE: 56 MMHG | TEMPERATURE: 100 F

## 2024-01-01 DIAGNOSIS — R41.89 UNRESPONSIVE: ICD-10-CM

## 2024-01-01 DIAGNOSIS — R79.89 ELEVATED TROPONIN: Primary | ICD-10-CM

## 2024-01-01 DIAGNOSIS — Z71.89 ENCOUNTER FOR OSTOMY CARE EDUCATION: Primary | ICD-10-CM

## 2024-01-01 DIAGNOSIS — C67.1 MALIGNANT NEOPLASM OF DOME OF URINARY BLADDER: ICD-10-CM

## 2024-01-01 DIAGNOSIS — I46.9 CARDIAC ARREST: ICD-10-CM

## 2024-01-01 LAB
ALBUMIN SERPL BCP-MCNC: 2.1 G/DL (ref 3.5–5.2)
ALBUMIN SERPL BCP-MCNC: 3 G/DL (ref 3.5–5.2)
ALLENS TEST: ABNORMAL
ALP SERPL-CCNC: 118 U/L (ref 55–135)
ALP SERPL-CCNC: 79 U/L (ref 55–135)
ALT SERPL W/O P-5'-P-CCNC: 13 U/L (ref 10–44)
ALT SERPL W/O P-5'-P-CCNC: 130 U/L (ref 10–44)
ANION GAP SERPL CALC-SCNC: 12 MMOL/L (ref 8–16)
ANION GAP SERPL CALC-SCNC: 25 MMOL/L (ref 8–16)
ANISOCYTOSIS BLD QL SMEAR: SLIGHT
APTT PPP: 38.4 SEC (ref 21–32)
AST SERPL-CCNC: 242 U/L (ref 10–40)
AST SERPL-CCNC: 29 U/L (ref 10–40)
BASOPHILS # BLD AUTO: 0.05 K/UL (ref 0–0.2)
BASOPHILS # BLD AUTO: ABNORMAL K/UL (ref 0–0.2)
BASOPHILS NFR BLD: 0 % (ref 0–1.9)
BASOPHILS NFR BLD: 0.5 % (ref 0–1.9)
BILIRUB SERPL-MCNC: 0.3 MG/DL (ref 0.1–1)
BILIRUB SERPL-MCNC: 0.4 MG/DL (ref 0.1–1)
BNP SERPL-MCNC: 23 PG/ML (ref 0–99)
BUN SERPL-MCNC: 20 MG/DL (ref 8–23)
BUN SERPL-MCNC: 23 MG/DL (ref 6–30)
BUN SERPL-MCNC: 23 MG/DL (ref 8–23)
BUN SERPL-MCNC: 5 MG/DL (ref 6–30)
CALCIUM SERPL-MCNC: 13.3 MG/DL (ref 8.7–10.5)
CALCIUM SERPL-MCNC: 9.7 MG/DL (ref 8.7–10.5)
CHLORIDE SERPL-SCNC: 101 MMOL/L (ref 95–110)
CHLORIDE SERPL-SCNC: 104 MMOL/L (ref 95–110)
CHLORIDE SERPL-SCNC: 105 MMOL/L (ref 95–110)
CHLORIDE SERPL-SCNC: 133 MMOL/L (ref 95–110)
CO2 SERPL-SCNC: 11 MMOL/L (ref 23–29)
CO2 SERPL-SCNC: 22 MMOL/L (ref 23–29)
CREAT SERPL-MCNC: 0.3 MG/DL (ref 0.5–1.4)
CREAT SERPL-MCNC: 1.6 MG/DL (ref 0.5–1.4)
CREAT SERPL-MCNC: 1.8 MG/DL (ref 0.5–1.4)
CREAT SERPL-MCNC: 1.9 MG/DL (ref 0.5–1.4)
DIFFERENTIAL METHOD BLD: ABNORMAL
DIFFERENTIAL METHOD BLD: ABNORMAL
EOSINOPHIL # BLD AUTO: 0.1 K/UL (ref 0–0.5)
EOSINOPHIL # BLD AUTO: ABNORMAL K/UL (ref 0–0.5)
EOSINOPHIL NFR BLD: 1 % (ref 0–8)
EOSINOPHIL NFR BLD: 2 % (ref 0–8)
ERYTHROCYTE [DISTWIDTH] IN BLOOD BY AUTOMATED COUNT: 13.2 % (ref 11.5–14.5)
ERYTHROCYTE [DISTWIDTH] IN BLOOD BY AUTOMATED COUNT: 13.5 % (ref 11.5–14.5)
EST. GFR  (NO RACE VARIABLE): 37.9 ML/MIN/1.73 M^2
EST. GFR  (NO RACE VARIABLE): 47 ML/MIN/1.73 M^2
GLUCOSE SERPL-MCNC: 126 MG/DL (ref 70–110)
GLUCOSE SERPL-MCNC: 226 MG/DL (ref 70–110)
GLUCOSE SERPL-MCNC: 257 MG/DL (ref 70–110)
GLUCOSE SERPL-MCNC: 68 MG/DL (ref 70–110)
HCO3 UR-SCNC: 4.8 MMOL/L (ref 24–28)
HCT VFR BLD AUTO: 30.1 % (ref 40–54)
HCT VFR BLD AUTO: 32.1 % (ref 40–54)
HCT VFR BLD CALC: 25 %PCV (ref 36–54)
HCT VFR BLD CALC: <15 %PCV (ref 36–54)
HCT VFR BLD CALC: <15 %PCV (ref 36–54)
HGB BLD-MCNC: 10.3 G/DL (ref 14–18)
HGB BLD-MCNC: 8.9 G/DL (ref 14–18)
HYPOCHROMIA BLD QL SMEAR: ABNORMAL
IMM GRANULOCYTES # BLD AUTO: 0.11 K/UL (ref 0–0.04)
IMM GRANULOCYTES # BLD AUTO: ABNORMAL K/UL (ref 0–0.04)
IMM GRANULOCYTES NFR BLD AUTO: 1.1 % (ref 0–0.5)
IMM GRANULOCYTES NFR BLD AUTO: ABNORMAL % (ref 0–0.5)
INR PPP: 1.2 (ref 0.8–1.2)
LYMPHOCYTES # BLD AUTO: 1 K/UL (ref 1–4.8)
LYMPHOCYTES # BLD AUTO: ABNORMAL K/UL (ref 1–4.8)
LYMPHOCYTES NFR BLD: 54 % (ref 18–48)
LYMPHOCYTES NFR BLD: 9.7 % (ref 18–48)
MCH RBC QN AUTO: 28.9 PG (ref 27–31)
MCH RBC QN AUTO: 29.5 PG (ref 27–31)
MCHC RBC AUTO-ENTMCNC: 29.6 G/DL (ref 32–36)
MCHC RBC AUTO-ENTMCNC: 32.1 G/DL (ref 32–36)
MCV RBC AUTO: 100 FL (ref 82–98)
MCV RBC AUTO: 90 FL (ref 82–98)
METAMYELOCYTES NFR BLD MANUAL: 2 %
MONOCYTES # BLD AUTO: 1.2 K/UL (ref 0.3–1)
MONOCYTES # BLD AUTO: ABNORMAL K/UL (ref 0.3–1)
MONOCYTES NFR BLD: 12.4 % (ref 4–15)
MONOCYTES NFR BLD: 3 % (ref 4–15)
MYELOCYTES NFR BLD MANUAL: 4 %
NEUTROPHILS # BLD AUTO: 7.5 K/UL (ref 1.8–7.7)
NEUTROPHILS NFR BLD: 33 % (ref 38–73)
NEUTROPHILS NFR BLD: 75.3 % (ref 38–73)
NEUTS BAND NFR BLD MANUAL: 2 %
NRBC BLD-RTO: 0 /100 WBC
NRBC BLD-RTO: 1 /100 WBC
OHS QRS DURATION: 106 MS
OHS QRS DURATION: 144 MS
OHS QRS DURATION: 160 MS
OHS QRS DURATION: 162 MS
OHS QRS DURATION: 166 MS
OHS QRS DURATION: 88 MS
OHS QTC CALCULATION: 326 MS
OHS QTC CALCULATION: 402 MS
OHS QTC CALCULATION: 464 MS
OHS QTC CALCULATION: 472 MS
OHS QTC CALCULATION: 472 MS
OHS QTC CALCULATION: 488 MS
OVALOCYTES BLD QL SMEAR: ABNORMAL
PCO2 BLDA: 21.5 MMHG (ref 35–45)
PH SMN: 6.96 [PH] (ref 7.35–7.45)
PLATELET # BLD AUTO: 119 K/UL (ref 150–450)
PLATELET # BLD AUTO: 234 K/UL (ref 150–450)
PLATELET BLD QL SMEAR: ABNORMAL
PMV BLD AUTO: 11.2 FL (ref 9.2–12.9)
PMV BLD AUTO: 12.4 FL (ref 9.2–12.9)
PO2 BLDA: 35 MMHG (ref 40–60)
POC BE: -27 MMOL/L
POC IONIZED CALCIUM: 0.62 MMOL/L (ref 1.06–1.42)
POC IONIZED CALCIUM: 0.62 MMOL/L (ref 1.06–1.42)
POC IONIZED CALCIUM: 1.72 MMOL/L (ref 1.06–1.42)
POC SATURATED O2: 39 % (ref 95–100)
POC TCO2 (MEASURED): 20 MMOL/L (ref 23–29)
POC TCO2 (MEASURED): 7 MMOL/L (ref 23–29)
POC TCO2: 5 MMOL/L (ref 24–29)
POIKILOCYTOSIS BLD QL SMEAR: SLIGHT
POLYCHROMASIA BLD QL SMEAR: ABNORMAL
POTASSIUM BLD-SCNC: 4.1 MMOL/L (ref 3.5–5.1)
POTASSIUM BLD-SCNC: <2 MMOL/L (ref 3.5–5.1)
POTASSIUM BLD-SCNC: <2 MMOL/L (ref 3.5–5.1)
POTASSIUM SERPL-SCNC: 4.2 MMOL/L (ref 3.5–5.1)
POTASSIUM SERPL-SCNC: 4.3 MMOL/L (ref 3.5–5.1)
PROT SERPL-MCNC: 5.3 G/DL (ref 6–8.4)
PROT SERPL-MCNC: 6.9 G/DL (ref 6–8.4)
PROTHROMBIN TIME: 12.5 SEC (ref 9–12.5)
RBC # BLD AUTO: 3.02 M/UL (ref 4.6–6.2)
RBC # BLD AUTO: 3.56 M/UL (ref 4.6–6.2)
SAMPLE: ABNORMAL
SITE: ABNORMAL
SODIUM BLD-SCNC: 138 MMOL/L (ref 136–145)
SODIUM BLD-SCNC: 147 MMOL/L (ref 136–145)
SODIUM BLD-SCNC: 148 MMOL/L (ref 136–145)
SODIUM SERPL-SCNC: 135 MMOL/L (ref 136–145)
SODIUM SERPL-SCNC: 141 MMOL/L (ref 136–145)
TROPONIN I SERPL DL<=0.01 NG/ML-MCNC: 0.23 NG/ML (ref 0–0.03)
WBC # BLD AUTO: 10.03 K/UL (ref 3.9–12.7)
WBC # BLD AUTO: 9.93 K/UL (ref 3.9–12.7)

## 2024-01-01 PROCEDURE — 80053 COMPREHEN METABOLIC PANEL: CPT | Mod: HCNC | Performed by: INTERNAL MEDICINE

## 2024-01-01 PROCEDURE — 25000003 PHARM REV CODE 250: Mod: HCNC

## 2024-01-01 PROCEDURE — 85014 HEMATOCRIT: CPT | Mod: HCNC,91

## 2024-01-01 PROCEDURE — 93005 ELECTROCARDIOGRAM TRACING: CPT | Mod: HCNC

## 2024-01-01 PROCEDURE — 3008F BODY MASS INDEX DOCD: CPT | Mod: HCNC,CPTII,S$GLB, | Performed by: SURGERY

## 2024-01-01 PROCEDURE — 63600175 PHARM REV CODE 636 W HCPCS: Mod: HCNC | Performed by: EMERGENCY MEDICINE

## 2024-01-01 PROCEDURE — 94002 VENT MGMT INPAT INIT DAY: CPT | Mod: HCNC

## 2024-01-01 PROCEDURE — 3078F DIAST BP <80 MM HG: CPT | Mod: HCNC,CPTII,S$GLB, | Performed by: SURGERY

## 2024-01-01 PROCEDURE — 80053 COMPREHEN METABOLIC PANEL: CPT | Mod: HCNC | Performed by: EMERGENCY MEDICINE

## 2024-01-01 PROCEDURE — 63600175 PHARM REV CODE 636 W HCPCS: Mod: HCNC

## 2024-01-01 PROCEDURE — 1101F PT FALLS ASSESS-DOCD LE1/YR: CPT | Mod: HCNC,CPTII,S$GLB, | Performed by: SURGERY

## 2024-01-01 PROCEDURE — 1125F AMNT PAIN NOTED PAIN PRSNT: CPT | Mod: HCNC,CPTII,S$GLB, | Performed by: SURGERY

## 2024-01-01 PROCEDURE — 99214 OFFICE O/P EST MOD 30 MIN: CPT | Mod: HCNC,S$GLB,, | Performed by: SURGERY

## 2024-01-01 PROCEDURE — 96365 THER/PROPH/DIAG IV INF INIT: CPT | Mod: 59,HCNC

## 2024-01-01 PROCEDURE — 3074F SYST BP LT 130 MM HG: CPT | Mod: HCNC,CPTII,S$GLB, | Performed by: SURGERY

## 2024-01-01 PROCEDURE — 1111F DSCHRG MED/CURRENT MED MERGE: CPT | Mod: HCNC,CPTII,S$GLB, | Performed by: SURGERY

## 2024-01-01 PROCEDURE — 63600175 PHARM REV CODE 636 W HCPCS: Mod: JG,HCNC

## 2024-01-01 PROCEDURE — 31500 INSERT EMERGENCY AIRWAY: CPT | Mod: HCNC

## 2024-01-01 PROCEDURE — 85027 COMPLETE CBC AUTOMATED: CPT | Mod: HCNC | Performed by: EMERGENCY MEDICINE

## 2024-01-01 PROCEDURE — 84484 ASSAY OF TROPONIN QUANT: CPT | Mod: HCNC | Performed by: EMERGENCY MEDICINE

## 2024-01-01 PROCEDURE — 94761 N-INVAS EAR/PLS OXIMETRY MLT: CPT | Mod: HCNC,XB

## 2024-01-01 PROCEDURE — 99291 CRITICAL CARE FIRST HOUR: CPT | Mod: HCNC

## 2024-01-01 PROCEDURE — 96376 TX/PRO/DX INJ SAME DRUG ADON: CPT | Mod: HCNC,59

## 2024-01-01 PROCEDURE — 85610 PROTHROMBIN TIME: CPT | Mod: HCNC | Performed by: EMERGENCY MEDICINE

## 2024-01-01 PROCEDURE — 83880 ASSAY OF NATRIURETIC PEPTIDE: CPT | Mod: HCNC | Performed by: EMERGENCY MEDICINE

## 2024-01-01 PROCEDURE — 82330 ASSAY OF CALCIUM: CPT | Mod: HCNC

## 2024-01-01 PROCEDURE — 93010 ELECTROCARDIOGRAM REPORT: CPT | Mod: HCNC,,, | Performed by: INTERNAL MEDICINE

## 2024-01-01 PROCEDURE — 82803 BLOOD GASES ANY COMBINATION: CPT | Mod: HCNC

## 2024-01-01 PROCEDURE — 99999 PR PBB SHADOW E&M-EST. PATIENT-LVL III: CPT | Mod: PBBFAC,HCNC,, | Performed by: SURGERY

## 2024-01-01 PROCEDURE — 36415 COLL VENOUS BLD VENIPUNCTURE: CPT | Mod: HCNC | Performed by: INTERNAL MEDICINE

## 2024-01-01 PROCEDURE — 3288F FALL RISK ASSESSMENT DOCD: CPT | Mod: HCNC,CPTII,S$GLB, | Performed by: SURGERY

## 2024-01-01 PROCEDURE — 99900035 HC TECH TIME PER 15 MIN (STAT): Mod: HCNC

## 2024-01-01 PROCEDURE — 85007 BL SMEAR W/DIFF WBC COUNT: CPT | Mod: HCNC | Performed by: EMERGENCY MEDICINE

## 2024-01-01 PROCEDURE — 25000003 PHARM REV CODE 250: Mod: HCNC | Performed by: EMERGENCY MEDICINE

## 2024-01-01 PROCEDURE — 96366 THER/PROPH/DIAG IV INF ADDON: CPT | Mod: HCNC

## 2024-01-01 PROCEDURE — 84295 ASSAY OF SERUM SODIUM: CPT | Mod: HCNC

## 2024-01-01 PROCEDURE — 92950 HEART/LUNG RESUSCITATION CPR: CPT | Mod: HCNC

## 2024-01-01 PROCEDURE — 96375 TX/PRO/DX INJ NEW DRUG ADDON: CPT | Mod: HCNC

## 2024-01-01 PROCEDURE — 85730 THROMBOPLASTIN TIME PARTIAL: CPT | Mod: HCNC | Performed by: EMERGENCY MEDICINE

## 2024-01-01 PROCEDURE — 27100171 HC OXYGEN HIGH FLOW UP TO 24 HOURS: Mod: HCNC

## 2024-01-01 PROCEDURE — 84132 ASSAY OF SERUM POTASSIUM: CPT | Mod: HCNC

## 2024-01-01 PROCEDURE — 85025 COMPLETE CBC W/AUTO DIFF WBC: CPT | Mod: HCNC | Performed by: INTERNAL MEDICINE

## 2024-01-01 RX ORDER — HEPARIN SODIUM,PORCINE/D5W 25000/250
0-40 INTRAVENOUS SOLUTION INTRAVENOUS CONTINUOUS
Status: DISCONTINUED | OUTPATIENT
Start: 2024-01-01 | End: 2024-01-01

## 2024-01-01 RX ORDER — SODIUM BICARBONATE 1 MEQ/ML
SYRINGE (ML) INTRAVENOUS CODE/TRAUMA/SEDATION MEDICATION
Status: COMPLETED | OUTPATIENT
Start: 2024-01-01 | End: 2024-01-01

## 2024-01-01 RX ORDER — EPINEPHRINE 0.1 MG/ML
INJECTION INTRAVENOUS CODE/TRAUMA/SEDATION MEDICATION
Status: COMPLETED | OUTPATIENT
Start: 2024-01-01 | End: 2024-01-01

## 2024-01-01 RX ORDER — NOREPINEPHRINE BITARTRATE/D5W 4MG/250ML
PLASTIC BAG, INJECTION (ML) INTRAVENOUS
Status: DISCONTINUED
Start: 2024-01-01 | End: 2024-01-01 | Stop reason: HOSPADM

## 2024-01-01 RX ORDER — ATROPINE SULFATE 0.1 MG/ML
INJECTION INTRAVENOUS CODE/TRAUMA/SEDATION MEDICATION
Status: COMPLETED | OUTPATIENT
Start: 2024-01-01 | End: 2024-01-01

## 2024-01-01 RX ORDER — MAGNESIUM SULFATE HEPTAHYDRATE 500 MG/ML
INJECTION, SOLUTION INTRAMUSCULAR; INTRAVENOUS CODE/TRAUMA/SEDATION MEDICATION
Status: COMPLETED | OUTPATIENT
Start: 2024-01-01 | End: 2024-01-01

## 2024-01-01 RX ORDER — CALCIUM CHLORIDE INJECTION 100 MG/ML
INJECTION, SOLUTION INTRAVENOUS CODE/TRAUMA/SEDATION MEDICATION
Status: COMPLETED | OUTPATIENT
Start: 2024-01-01 | End: 2024-01-01

## 2024-01-01 RX ORDER — AMIODARONE HYDROCHLORIDE 150 MG/3ML
INJECTION, SOLUTION INTRAVENOUS CODE/TRAUMA/SEDATION MEDICATION
Status: COMPLETED | OUTPATIENT
Start: 2024-01-01 | End: 2024-01-01

## 2024-01-01 RX ORDER — VASOPRESSIN 20 [USP'U]/ML
INJECTION, SOLUTION INTRAMUSCULAR; SUBCUTANEOUS
Status: COMPLETED
Start: 2024-01-01 | End: 2024-01-01

## 2024-01-01 RX ORDER — NOREPINEPHRINE BITARTRATE/D5W 4MG/250ML
0-3 PLASTIC BAG, INJECTION (ML) INTRAVENOUS CONTINUOUS
Status: DISCONTINUED | OUTPATIENT
Start: 2024-01-01 | End: 2024-01-01

## 2024-01-01 RX ORDER — NOREPINEPHRINE BITARTRATE/D5W 4MG/250ML
PLASTIC BAG, INJECTION (ML) INTRAVENOUS
Status: COMPLETED
Start: 2024-01-01 | End: 2024-01-01

## 2024-01-01 RX ORDER — ONDANSETRON 2 MG/ML
INJECTION INTRAMUSCULAR; INTRAVENOUS
Status: DISCONTINUED
Start: 2024-01-01 | End: 2024-01-01 | Stop reason: HOSPADM

## 2024-01-01 RX ADMIN — CALCIUM CHLORIDE INJECTION 1 G: 100 INJECTION, SOLUTION INTRAVENOUS at 07:08

## 2024-01-01 RX ADMIN — EPINEPHRINE 2 MCG/KG/MIN: 1 INJECTION INTRAMUSCULAR; INTRAVENOUS; SUBCUTANEOUS at 10:08

## 2024-01-01 RX ADMIN — EPINEPHRINE 1 MG: 0.1 INJECTION INTRAVENOUS at 07:08

## 2024-01-01 RX ADMIN — Medication 0.05 MCG/KG/MIN: at 08:08

## 2024-01-01 RX ADMIN — EPINEPHRINE 1 MG: 0.1 INJECTION INTRAVENOUS at 08:08

## 2024-01-01 RX ADMIN — CALCIUM CHLORIDE INJECTION 1 G: 100 INJECTION, SOLUTION INTRAVENOUS at 08:08

## 2024-01-01 RX ADMIN — VASOPRESSIN 0.04 UNITS/MIN: 20 INJECTION INTRAVENOUS at 09:08

## 2024-01-01 RX ADMIN — SODIUM BICARBONATE 50 MEQ: 84 INJECTION INTRAVENOUS at 08:08

## 2024-01-01 RX ADMIN — MAGNESIUM SULFATE HEPTAHYDRATE 2 G: 500 INJECTION, SOLUTION INTRAMUSCULAR; INTRAVENOUS at 07:08

## 2024-01-01 RX ADMIN — SODIUM BICARBONATE 50 MEQ: 84 INJECTION INTRAVENOUS at 07:08

## 2024-01-01 RX ADMIN — NOREPINEPHRINE BITARTRATE 0.05 MCG/KG/MIN: 4 INJECTION, SOLUTION INTRAVENOUS at 08:08

## 2024-01-01 RX ADMIN — ATROPINE SULFATE 1 MG: 0.1 INJECTION INTRAVENOUS at 08:08

## 2024-01-01 RX ADMIN — ATROPINE SULFATE 1 MG: 0.1 INJECTION INTRAVENOUS at 07:08

## 2024-01-01 RX ADMIN — VASOPRESSIN 20 UNITS: 20 INJECTION INTRAVENOUS at 08:08

## 2024-01-01 RX ADMIN — AMIODARONE HYDROCHLORIDE 300 MG: 50 INJECTION, SOLUTION INTRAVENOUS at 07:08

## 2024-01-16 ENCOUNTER — OFFICE VISIT (OUTPATIENT)
Dept: HEMATOLOGY/ONCOLOGY | Facility: CLINIC | Age: 69
End: 2024-01-16
Payer: MEDICARE

## 2024-01-16 ENCOUNTER — INFUSION (OUTPATIENT)
Dept: INFUSION THERAPY | Facility: HOSPITAL | Age: 69
End: 2024-01-16
Payer: MEDICARE

## 2024-01-16 VITALS
WEIGHT: 203.81 LBS | OXYGEN SATURATION: 99 % | DIASTOLIC BLOOD PRESSURE: 74 MMHG | SYSTOLIC BLOOD PRESSURE: 120 MMHG | BODY MASS INDEX: 27.61 KG/M2 | TEMPERATURE: 97 F | HEART RATE: 64 BPM | HEIGHT: 72 IN | RESPIRATION RATE: 17 BRPM

## 2024-01-16 VITALS
SYSTOLIC BLOOD PRESSURE: 127 MMHG | RESPIRATION RATE: 18 BRPM | OXYGEN SATURATION: 100 % | HEART RATE: 71 BPM | DIASTOLIC BLOOD PRESSURE: 74 MMHG

## 2024-01-16 DIAGNOSIS — D64.9 NORMOCYTIC ANEMIA: ICD-10-CM

## 2024-01-16 DIAGNOSIS — C67.1 MALIGNANT NEOPLASM OF DOME OF URINARY BLADDER: Primary | ICD-10-CM

## 2024-01-16 PROCEDURE — 3078F DIAST BP <80 MM HG: CPT | Mod: HCNC,CPTII,S$GLB, | Performed by: INTERNAL MEDICINE

## 2024-01-16 PROCEDURE — 96366 THER/PROPH/DIAG IV INF ADDON: CPT | Mod: HCNC

## 2024-01-16 PROCEDURE — 99999 PR PBB SHADOW E&M-EST. PATIENT-LVL III: CPT | Mod: PBBFAC,HCNC,, | Performed by: INTERNAL MEDICINE

## 2024-01-16 PROCEDURE — 1101F PT FALLS ASSESS-DOCD LE1/YR: CPT | Mod: HCNC,CPTII,S$GLB, | Performed by: INTERNAL MEDICINE

## 2024-01-16 PROCEDURE — 63600175 PHARM REV CODE 636 W HCPCS: Mod: HCNC | Performed by: INTERNAL MEDICINE

## 2024-01-16 PROCEDURE — 3288F FALL RISK ASSESSMENT DOCD: CPT | Mod: HCNC,CPTII,S$GLB, | Performed by: INTERNAL MEDICINE

## 2024-01-16 PROCEDURE — 96375 TX/PRO/DX INJ NEW DRUG ADDON: CPT | Mod: HCNC

## 2024-01-16 PROCEDURE — 3008F BODY MASS INDEX DOCD: CPT | Mod: HCNC,CPTII,S$GLB, | Performed by: INTERNAL MEDICINE

## 2024-01-16 PROCEDURE — 25000003 PHARM REV CODE 250: Mod: HCNC | Performed by: INTERNAL MEDICINE

## 2024-01-16 PROCEDURE — 1126F AMNT PAIN NOTED NONE PRSNT: CPT | Mod: HCNC,CPTII,S$GLB, | Performed by: INTERNAL MEDICINE

## 2024-01-16 PROCEDURE — 96417 CHEMO IV INFUS EACH ADDL SEQ: CPT | Mod: HCNC

## 2024-01-16 PROCEDURE — 96367 TX/PROPH/DG ADDL SEQ IV INF: CPT | Mod: HCNC

## 2024-01-16 PROCEDURE — 96413 CHEMO IV INFUSION 1 HR: CPT | Mod: HCNC

## 2024-01-16 PROCEDURE — A4216 STERILE WATER/SALINE, 10 ML: HCPCS | Mod: HCNC | Performed by: INTERNAL MEDICINE

## 2024-01-16 PROCEDURE — 3074F SYST BP LT 130 MM HG: CPT | Mod: HCNC,CPTII,S$GLB, | Performed by: INTERNAL MEDICINE

## 2024-01-16 PROCEDURE — 99215 OFFICE O/P EST HI 40 MIN: CPT | Mod: HCNC,S$GLB,, | Performed by: INTERNAL MEDICINE

## 2024-01-16 RX ORDER — FAMOTIDINE 10 MG/ML
20 INJECTION INTRAVENOUS ONCE
Status: CANCELLED | OUTPATIENT
Start: 2024-01-18 | End: 2024-01-18

## 2024-01-16 RX ORDER — SODIUM CHLORIDE 0.9 % (FLUSH) 0.9 %
10 SYRINGE (ML) INJECTION
Status: CANCELLED | OUTPATIENT
Start: 2024-01-18

## 2024-01-16 RX ORDER — HEPARIN 100 UNIT/ML
500 SYRINGE INTRAVENOUS
Status: CANCELLED | OUTPATIENT
Start: 2024-01-19

## 2024-01-16 RX ORDER — SODIUM CHLORIDE 0.9 % (FLUSH) 0.9 %
10 SYRINGE (ML) INJECTION
Status: DISCONTINUED | OUTPATIENT
Start: 2024-01-16 | End: 2024-01-16 | Stop reason: HOSPADM

## 2024-01-16 RX ORDER — PROCHLORPERAZINE EDISYLATE 5 MG/ML
5 INJECTION INTRAMUSCULAR; INTRAVENOUS ONCE AS NEEDED
Status: DISCONTINUED | OUTPATIENT
Start: 2024-01-16 | End: 2024-01-16 | Stop reason: HOSPADM

## 2024-01-16 RX ORDER — DIPHENHYDRAMINE HYDROCHLORIDE 50 MG/ML
25 INJECTION INTRAMUSCULAR; INTRAVENOUS ONCE
Status: CANCELLED | OUTPATIENT
Start: 2024-01-17

## 2024-01-16 RX ORDER — DIPHENHYDRAMINE HYDROCHLORIDE 50 MG/ML
50 INJECTION INTRAMUSCULAR; INTRAVENOUS ONCE AS NEEDED
Status: CANCELLED | OUTPATIENT
Start: 2024-01-17

## 2024-01-16 RX ORDER — PROCHLORPERAZINE EDISYLATE 5 MG/ML
5 INJECTION INTRAMUSCULAR; INTRAVENOUS ONCE AS NEEDED
Status: CANCELLED
Start: 2024-01-18

## 2024-01-16 RX ORDER — EPINEPHRINE 0.3 MG/.3ML
0.3 INJECTION SUBCUTANEOUS ONCE AS NEEDED
Status: CANCELLED | OUTPATIENT
Start: 2024-01-18

## 2024-01-16 RX ORDER — FAMOTIDINE 10 MG/ML
20 INJECTION INTRAVENOUS ONCE
Status: CANCELLED | OUTPATIENT
Start: 2024-01-17

## 2024-01-16 RX ORDER — EPINEPHRINE 0.3 MG/.3ML
0.3 INJECTION SUBCUTANEOUS ONCE AS NEEDED
Status: CANCELLED | OUTPATIENT
Start: 2024-01-17

## 2024-01-16 RX ORDER — FAMOTIDINE 10 MG/ML
20 INJECTION INTRAVENOUS ONCE
Status: COMPLETED | OUTPATIENT
Start: 2024-01-16 | End: 2024-01-16

## 2024-01-16 RX ORDER — SODIUM CHLORIDE 0.9 % (FLUSH) 0.9 %
10 SYRINGE (ML) INJECTION
Status: CANCELLED | OUTPATIENT
Start: 2024-01-17

## 2024-01-16 RX ORDER — DIPHENHYDRAMINE HYDROCHLORIDE 50 MG/ML
25 INJECTION INTRAMUSCULAR; INTRAVENOUS ONCE
Status: CANCELLED | OUTPATIENT
Start: 2024-01-19 | End: 2024-01-19

## 2024-01-16 RX ORDER — PROCHLORPERAZINE EDISYLATE 5 MG/ML
5 INJECTION INTRAMUSCULAR; INTRAVENOUS ONCE AS NEEDED
Status: CANCELLED
Start: 2024-01-19

## 2024-01-16 RX ORDER — HEPARIN 100 UNIT/ML
500 SYRINGE INTRAVENOUS
Status: CANCELLED | OUTPATIENT
Start: 2024-01-17

## 2024-01-16 RX ORDER — EPINEPHRINE 0.3 MG/.3ML
0.3 INJECTION SUBCUTANEOUS ONCE AS NEEDED
Status: DISCONTINUED | OUTPATIENT
Start: 2024-01-16 | End: 2024-01-16 | Stop reason: HOSPADM

## 2024-01-16 RX ORDER — DIPHENHYDRAMINE HYDROCHLORIDE 50 MG/ML
25 INJECTION INTRAMUSCULAR; INTRAVENOUS ONCE
Status: COMPLETED | OUTPATIENT
Start: 2024-01-16 | End: 2024-01-16

## 2024-01-16 RX ORDER — DIPHENHYDRAMINE HYDROCHLORIDE 50 MG/ML
50 INJECTION INTRAMUSCULAR; INTRAVENOUS ONCE AS NEEDED
Status: CANCELLED | OUTPATIENT
Start: 2024-01-19

## 2024-01-16 RX ORDER — HEPARIN 100 UNIT/ML
500 SYRINGE INTRAVENOUS
Status: DISCONTINUED | OUTPATIENT
Start: 2024-01-16 | End: 2024-01-16 | Stop reason: HOSPADM

## 2024-01-16 RX ORDER — FAMOTIDINE 10 MG/ML
20 INJECTION INTRAVENOUS ONCE
Status: CANCELLED | OUTPATIENT
Start: 2024-01-19 | End: 2024-01-19

## 2024-01-16 RX ORDER — EPINEPHRINE 0.3 MG/.3ML
0.3 INJECTION SUBCUTANEOUS ONCE AS NEEDED
Status: CANCELLED | OUTPATIENT
Start: 2024-01-19

## 2024-01-16 RX ORDER — DIPHENHYDRAMINE HYDROCHLORIDE 50 MG/ML
25 INJECTION INTRAMUSCULAR; INTRAVENOUS ONCE
Status: CANCELLED | OUTPATIENT
Start: 2024-01-18 | End: 2024-01-18

## 2024-01-16 RX ORDER — HEPARIN 100 UNIT/ML
500 SYRINGE INTRAVENOUS
Status: CANCELLED | OUTPATIENT
Start: 2024-01-18

## 2024-01-16 RX ORDER — PROCHLORPERAZINE EDISYLATE 5 MG/ML
5 INJECTION INTRAMUSCULAR; INTRAVENOUS ONCE AS NEEDED
Status: CANCELLED
Start: 2024-01-17

## 2024-01-16 RX ORDER — DIPHENHYDRAMINE HYDROCHLORIDE 50 MG/ML
50 INJECTION INTRAMUSCULAR; INTRAVENOUS ONCE AS NEEDED
Status: CANCELLED | OUTPATIENT
Start: 2024-01-18

## 2024-01-16 RX ORDER — DIPHENHYDRAMINE HYDROCHLORIDE 50 MG/ML
50 INJECTION INTRAMUSCULAR; INTRAVENOUS ONCE AS NEEDED
Status: DISCONTINUED | OUTPATIENT
Start: 2024-01-16 | End: 2024-01-16 | Stop reason: HOSPADM

## 2024-01-16 RX ORDER — SODIUM CHLORIDE 0.9 % (FLUSH) 0.9 %
10 SYRINGE (ML) INJECTION
Status: CANCELLED | OUTPATIENT
Start: 2024-01-19

## 2024-01-16 RX ADMIN — ETOPOSIDE 212 MG: 20 INJECTION INTRAVENOUS at 03:01

## 2024-01-16 RX ADMIN — HYDROCORTISONE SODIUM SUCCINATE 100 MG: 100 INJECTION, POWDER, FOR SOLUTION INTRAMUSCULAR; INTRAVENOUS at 02:01

## 2024-01-16 RX ADMIN — Medication 10 ML: at 04:01

## 2024-01-16 RX ADMIN — CISPLATIN 158 MG: 1 INJECTION, SOLUTION INTRAVENOUS at 12:01

## 2024-01-16 RX ADMIN — DEXAMETHASONE SODIUM PHOSPHATE 0.25 MG: 4 INJECTION, SOLUTION INTRA-ARTICULAR; INTRALESIONAL; INTRAMUSCULAR; INTRAVENOUS; SOFT TISSUE at 11:01

## 2024-01-16 RX ADMIN — POTASSIUM CHLORIDE 500 ML/HR: 2 INJECTION, SOLUTION, CONCENTRATE INTRAVENOUS at 12:01

## 2024-01-16 RX ADMIN — DIPHENHYDRAMINE HYDROCHLORIDE 25 MG: 50 INJECTION, SOLUTION INTRAMUSCULAR; INTRAVENOUS at 02:01

## 2024-01-16 RX ADMIN — HEPARIN 500 UNITS: 100 SYRINGE at 04:01

## 2024-01-16 RX ADMIN — APREPITANT 130 MG: 130 INJECTION, EMULSION INTRAVENOUS at 11:01

## 2024-01-16 RX ADMIN — POTASSIUM CHLORIDE 500 ML/HR: 2 INJECTION, SOLUTION, CONCENTRATE INTRAVENOUS at 02:01

## 2024-01-16 RX ADMIN — FAMOTIDINE 20 MG: 10 INJECTION INTRAVENOUS at 02:01

## 2024-01-16 NOTE — PLAN OF CARE
1635 Patient tolerated C3D1 Cisplatin/ Etoposide/ IVFs without incident. Seen by Dr Spicer prior to treatment today. Labs reviewed and within parameters for treatment. Vitals stable before, during and after treatment. Port with brisk blood return and flushed without resistance before, during and after infusion, heparin locked and needle removed at discharge. Patient declined leaving port accessed for tomorrow's infusion. Pre-medicated with dex/Aloxi and Cinvanti prior to cisplatin. Pre-medicated with benadryl IVP, hydrocortisone IVP, and famotidine IVP prior to Etoposide. Patient requested this nurse wait 30-45 minutes prior to giving Etoposide after pre-meds. Etoposide started at half rate, okay per Dr Spicer. Etoposide titrated to full rate after 15 minutes and tolerated without incident. Patient aware of his appointment tomorrow at 0700.  To contact provider with questions or concerns. D/C ambulatory and stable with his friend.

## 2024-01-16 NOTE — PROGRESS NOTES
Subjective     Patient ID: Med Palma is a 68 y.o. male.    Chief Complaint: Malignant neoplasm of dome of urinary bladder    HPI    Diagnosis: Small cell bladder cancer  Referring MD: Dr. Jimenez     Doing well overall  No significant fatigue  Denies nausea and taking prophylaxis correctly  Eating and drinking well  Exercising by walking  No fevers, chills or infectious complaints  Chronic ringing in his ears without change and no hearing changes (rt hearing loss)  Paresthesias to 3 fingers on left hand unchanged  Remainder ROS    Notes reaction to etoposide with first reaction   Tolerated better with premedications once restarted  No issues on days 2 or 3      Oncology History:  - gross hematuria in September 2023 while traveling in [place] (initially wondered if related to exercise and poor hydration) and resolved on return home he was due for annual PCP visit and he recommended some additional blood work  That same night he had another episode and again resolved with hydration but after occurred several times over a few weeks he sought further evaluation  Minimal discomfort noted with this (states he had a prior bladder infection and it felt like this)  Over summer months had noted occasional mild dysuria preceding this   --Notes prior episode of possible hematuria in 2016 with heavy exercise and poor hydration resolved after 1 day (related to exercise)     - 10/21/2023 CT Urogram Abdomen/Pelvis:  FINDINGS:  : Kidneys are symmetric in size.  Precontrast imaging shows no stones or hydronephrosis.  Kidneys concentrate and excrete contrast appropriately on postcontrast imaging.  No focal filling defects.  Lobulated margins along the lateral aspect of the left kidney has the appearance of scarring.  Heterogeneously enhancing mass along the dorsal lateral aspect of the urinary bladder on the left estimated at 3.8 x 3.6 cm.  Enlarged left pelvic lymph node measures 1.9 cm in short axis dimension.  Enlarged,  lobulated prostate gland bulging into the bladder base, similar compared to prior MRI.  CT:  Lung bases show subsegmental atelectasis or scarring.  Liver is homogeneous.  Gallbladder is unremarkable.  Bile ducts, spleen, pancreas, and adrenal glands are unremarkable.  Stomach and loops of bowel are normal in caliber.  No free fluid in the pelvis.  Regional skeleton shows degenerative change.  Impression:  Heterogeneously enhancing mass along the dorsal lateral aspect of the urinary bladder on the left most concerning for primary bladder malignancy.  Enlarged left pelvic lymph node most concerning for metastatic disease. Negative for obstructive uropathy.  Prostatomegaly with the prostate bulging into the bladder base, similar compared to prior MRI.  This report was flagged in Epic as abnormal.     - 10/26/2023 TURBT:  Pathology:  1. Bladder, tumor, superficial resection:   Small cell carcinoma.   Muscularis propria is present and is positive for carcinoma.     2. Bladder, base of tumor, resection:Small cell carcinoma.   Muscularis propria is present and is positive for carcinoma.   Malignant cells are positive for AE1/AE3, TTF1, Synaptophysin, and Chromogranin (rare positive cells) immunostains, and negative for GATA3, CK7, CK20, and p63 immunostains. Ki67 immunostain shows a proliferative index of >95%. The morphology and immunophenotype are compatible with small cell carcinoma.      Prior evaluation for elevated PSA- dx with BPH  - 3/3/38379 MRI Prostate:  FINDINGS:  Previous biopsy: TRUS PBx on 1/26/15 - negative  PSA: 5.8 ng/mL 09/14/2021 (previously 5.9 on 07/07/2021, 3.1 on 05/12/2017, 5.9 on 12/11/2015)  Prior therapy: None  Prostate: 5.1 x 3.8 x 7.2 cm corresponding to a computed volume of 65 cc.  Peripheral zone: 1 focal area of signal abnormality as described below:  Lesion (ANDERSON) #3  Location: Side: Right; Region: Base; Zone: posterior peripheral zone laterally  Greatest dimension: 1.2 cm  T2-WI: T2 SI:  Heterogeneous signal intensity or noncircumscribed, rounded, moderate hypointensity--score 3  DWI/ADC: DWI: Focal mild/moderate hypointense on ADC and isointense/mild hyperintense on high b-value DWI--score 3  DCE: No early enhancement  Extraprostatic extension: Abuts the adjacent right posterolateral capsular margin.  PI-RADS assessment category: 3  Transitional zone:  TZ abnormalities: Benign prostatic hyperplasia with at least 2 focal areas of signal abnormality do not completely conform to a BPH nodule.  The 2 lesions are detailed below:  Lesion (ANDERSON) #1  Location: left; region: mid; zone: anterior (more medial compared to lesion (ANDERSON) #2.)  Greatest dimension: 1.3 cm  T2-WI/SI: Heterogeneous signal intensity with obscured margins; score 3.  DWI/ADC: Focal mildly/moderately hypointense on ADC and isointense/mildly hyperintense on high b-value DWI; score 3  DCE: Positive  Extraprostatic extension: No  PI-RADS assessment category: 3  Lesion (ANDERSON) #2  Location: Left; region: Mid; zone: Anterior (lateral and more posterior compared to lesion (ANDERSON) #1  Greatest dimension: 1.1 cm  T2-WI/SI: Heterogeneous signal intensity with obscured margins; score 3.  DWI/ADC: Focal mildly/moderately hypointense on ADC and isointense/mildly hyperintense on high b-value DWI; score 3.  DCE: Positive  Extraprostatic extension: Abuts the adjacent left lateral capsular margin.  PI-RADS assessment category: 3  Neurovascular bundle: Unremarkable  Seminal vesicles:  SV invasion: Negative.  Adjacent Organ Involvement: Prominent median lobe of the prostate protruding into the base the bladder.  No focal bladder wall thickening.  No rectal involvement.  Lymphadenopathy: No pathologically enlarged pelvic lymph nodes.  Other Findings: None  Impression:  1. There are three PI-RADS 3 lesions, 1 within the right posterolateral peripheral zone and 2 within the left anterior transition zone of the mid to base of the prostate.  Two lesions abut the  capsular margin, as described above, without involvement of the seminal vesicles.  No pathologically enlarged pelvic lymph nodes.  2. BPH with a very prominent median lobe protruding into the base of the bladder.  Overall Assessment:  PIRADS 3 (the presence of clinically significant cancer is equivocal)  Number of targets created for potential MR/US fusion biopsy  Peripheral zone: 1  Transition zone: 2     - 5/2/2022 TRUS and biopsies:  Pathology:  RELIAPATH DIAGNOSIS:   A.   PROSTATE, LEFT APEX, NEEDLE BIOPSY:          Benign prostatic tissue with chronic inflammation, see comment.   (R97.20)   B.   PROSTATE, LEFT MID, NEEDLE BIOPSY:         Benign prostatic tissue with chronic inflammation, see comment.   (R97.20)   C.   PROSTATE, LEFT BASE, NEEDLE BIOPSY:         Benign prostatic tissue.  (R97.20)   D.   PROSTATE, RIGHT APEX, NEEDLE BIOPSY:          Benign prostatic tissue with chronic inflammation, see comment.   (R97.20)   E.   PROSTATE, RIGHT MID, NEEDLE BIOPSY:          Benign prostatic tissue with chronic inflammation, see comment.   (R97.20)   F.   PROSTATE, RIGHT BASE, NEEDLE BIOPSY:          Benign prostatic tissue with chronic inflammation, see comment.   (R97.20)   G.   TISSUE1          PROSTATE, TARGET LESION #1, BIOPSY:          -Benign prostatic tissue with chronic inflammation, see comment.   (R97.20)   H.   TISSUE2          PROSTATE, TARGET LESION #2, BIOPSY:          -Benign prostatic tissue .  (R97.20)   I.     TISSUE3          PROSTATE, TARGET LESION #3, BIOPSY:          -Benign prostatic tissue with chronic inflammation .  (R97.20)      Most Recent Scans:  - 11/9/2023 Bone scan:  FINDINGS:  There is physiologic distribution of the radiopharmaceutical throughout the skeleton.  There is normal uptake in the bilateral kidneys and soft tissues.  Bladder is significantly distended with abnormal contour, likely due to known bladder mass.  Impression:  No scintigraphic evidence of osteoblastic  metastatic disease.     - 11/7/2023 CT Chest:  FINDINGS:  Chest wall and lower neck: No axillary or supraclavicular lymphadenopathy.  Lungs and pleura: Biapical pleuroparenchymal thickening compatible with scar.  Focal air cystic spaces noted in the right upper lobe (image 154 sequence 4) suggestive of scar.  Multiple sub pleural bandlike scars are noted in the upper and lower lobes.  No pleural effusion or airspace consolidation is noted.  Mediastinum and christy: Right hilar node measuring 15 mm (short axis).  No mediastinal lymphadenopathy is noted.  Heart and pericardium: Heart size is normal.No pericardial effusion.  Vessels: Mild atheromatous disease is seen in the aorta.   The coronary arteries show mild atheromatous disease.  Upper abdomen: Cortical irregularity noted in the left kidney compatible with scars.  Both kidneys are excreting contrast during imaging acquisition.  Bones: Biomechanical changes are noted in the thoracic spine with osteophyte formations.  Schmorl nodes are noted in the thoracic spine.  Impression:  1. Multifocal bilateral pulmonary scars likely representing sequela prior infectious process to correlate with patient history.  No honeycombing is seen.  2. Right hilar node within maximum normal limits, nonspecific.  3. Left renal scar.  4. Additional findings.     - 11/7/2023 MRI Brain:  FINDINGS:  There is no midline shift, hydrocephalus or mass effect.  No acute infarction or acute intracranial hemorrhage.  A few small foci of T2/FLAIR signal in the supratentorial white matter while nonspecific most likely mild chronic microvascular ischemic changes.  Small focus of bright T2 signal adjacent to the posteromedial temporal lobe likely an incidental choroidal fissure cyst.  No enhancing lesions to indicate intracranial metastatic disease.  Small (1 cm) focus of increased FLAIR signal in the posterior fossa near the foramen of Magendie.  This demonstrates gradient signal presumably relating  to calcifications with no abnormal enhancement.  No significant mass effect.  No evidence of associated hydrocephalus.  No abnormal extra-axial fluid collections.  The major skull base flow voids present.  Diffuse patchy mucosal membrane thickening in the paranasal sinuses with aerated secretions and small air-fluid levels.  Trace mastoid fluid on the left.  Impression:  No acute intracranial abnormalities, specifically no evidence of intracranial metastatic disease.  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  No associated hydrocephalus at this time.  Recommend surveillance to ensure stability.  Paranasal sinus disease.  This report was flagged in Epic as abnormal.    PMH:  Covid pneumonia- recovered  BPH  Arthritis- hips, low back, shoulders, knees- prior PT and dry needling helped significantly  Deviated septum surgery  Anemia     FH:  Dad-  of Multiple Myeloma at age 60 yo  Sister- breast cancer survivor (dx in her mid 70s)  Mother lived until age 94 yo-  from natural causes, dementia     SH:  Retired   [employer] management  Prior chemical and environmental exposures at work  + tobacco- last 20-25 years 1/2 pack in a weekend   Dips tobacco     + social EtOH    3 children    Review of Systems   Constitutional:  Negative for activity change, appetite change, chills, fatigue, fever and unexpected weight change.   HENT:  Positive for hearing loss (stable). Negative for nasal congestion, dental problem, postnasal drip, rhinorrhea, sinus pressure/congestion and trouble swallowing.    Eyes:  Negative for visual disturbance (wears bifocals).   Respiratory:  Negative for cough, shortness of breath and wheezing.    Cardiovascular:  Negative for chest pain, palpitations and leg swelling.   Gastrointestinal:  Negative for abdominal distention, abdominal pain, change in bowel habit, constipation, diarrhea, nausea, vomiting and reflux.   Genitourinary:  Negative for  decreased urine volume, difficulty urinating, dysuria, frequency, hematuria and urgency.   Musculoskeletal:  Negative for arthralgias, back pain, gait problem, myalgias and joint deformity.   Integumentary:  Negative for rash and wound.   Neurological:  Negative for dizziness, speech difficulty, weakness, numbness, headaches and coordination difficulties.   Hematological:  Negative for adenopathy. Does not bruise/bleed easily.   Psychiatric/Behavioral:  Negative for agitation, behavioral problems, confusion, dysphoric mood and sleep disturbance. The patient is not nervous/anxious.           Objective     Physical Exam  Vitals and nursing note reviewed.   Constitutional:       General: He is not in acute distress.     Appearance: Normal appearance. He is well-developed. He is not ill-appearing.      Comments: Presents with his wife  Very pleasant  ECOG= 0   HENT:      Head: Normocephalic and atraumatic.      Comments: Chemo alopecia  Eyes:      General: No scleral icterus.     Extraocular Movements: Extraocular movements intact.      Conjunctiva/sclera: Conjunctivae normal.      Pupils: Pupils are equal, round, and reactive to light.   Neck:      Thyroid: No thyromegaly.      Trachea: No tracheal deviation.   Cardiovascular:      Rate and Rhythm: Normal rate and regular rhythm.      Heart sounds: Normal heart sounds. No murmur heard.     No friction rub. No gallop.   Pulmonary:      Effort: Pulmonary effort is normal. No respiratory distress.      Breath sounds: Normal breath sounds. No wheezing, rhonchi or rales.   Chest:      Chest wall: No tenderness.      Comments: RCW port  Abdominal:      General: Abdomen is flat. Bowel sounds are normal. There is no distension.      Palpations: Abdomen is soft. There is no mass.      Tenderness: There is no abdominal tenderness. There is no guarding or rebound.      Comments: No organomegaly   Musculoskeletal:         General: No swelling, tenderness or deformity. Normal range  of motion.      Cervical back: Normal range of motion and neck supple. No rigidity or tenderness.      Right lower leg: No edema.      Left lower leg: No edema.   Lymphadenopathy:      Cervical: No cervical adenopathy.   Skin:     General: Skin is warm and dry.      Coloration: Skin is not jaundiced or pale.      Findings: No erythema, lesion or rash.   Neurological:      General: No focal deficit present.      Mental Status: He is alert and oriented to person, place, and time. Mental status is at baseline.      Sensory: No sensory deficit.      Motor: No weakness or abnormal muscle tone.      Coordination: Coordination normal.      Gait: Gait normal.      Deep Tendon Reflexes: Reflexes are normal and symmetric. Reflexes normal.   Psychiatric:         Mood and Affect: Mood normal.         Behavior: Behavior normal.         Thought Content: Thought content normal.         Judgment: Judgment normal.     Labs- reviewed       Assessment and Plan     1. Malignant neoplasm of dome of urinary bladder    2. Normocytic anemia    Other orders  -     Cancel: diphenhydrAMINE injection 25 mg  -     Cancel: famotidine (PF) injection 20 mg  -     Cancel: hydrocortisone sodium succinate injection 100 mg  -     Cancel: sodium chloride 0.9% 1,000 mL with magnesium sulfate 1 g, potassium chloride 20 mEq infusion  -     Cancel: aprepitant (CINVANTI) injection 130 mg  -     Cancel: palonosetron (ALOXI) 0.25 mg with Dexamethasone (DECADRON) 12 mg in NS 50 mL IVPB  -     Cancel: CISplatin (Platinol) 75 mg/m2 = 158 mg in sodium chloride 0.9% 658 mL chemo infusion  -     Cancel: etoposide (VEPESID) 100 mg/m2 = 212 mg in sodium chloride 0.9% 510.6 mL chemo infusion  -     Cancel: sodium chloride 0.9% 1,000 mL with magnesium sulfate 1 g, potassium chloride 20 mEq infusion  -     Cancel: prochlorperazine injection Soln 5 mg  -     Cancel: EPINEPHrine (EPIPEN) 0.3 mg/0.3 mL pen injection 0.3 mg  -     Cancel: diphenhydrAMINE injection 50 mg  -      Cancel: hydrocortisone sodium succinate injection 100 mg  -     Cancel: sodium chloride 0.9% flush 10 mL  -     Cancel: heparin, porcine (PF) 100 unit/mL injection flush 500 Units  -     Cancel: alteplase injection 2 mg  -     diphenhydrAMINE injection 25 mg  -     famotidine (PF) injection 20 mg  -     hydrocortisone sodium succinate injection 100 mg  -     etoposide (VEPESID) 100 mg/m2 = 212 mg in sodium chloride 0.9% 510.6 mL chemo infusion  -     prochlorperazine injection Soln 5 mg  -     EPINEPHrine (EPIPEN) 0.3 mg/0.3 mL pen injection 0.3 mg  -     diphenhydrAMINE injection 50 mg  -     hydrocortisone sodium succinate injection 100 mg  -     sodium chloride 0.9% flush 10 mL  -     heparin, porcine (PF) 100 unit/mL injection flush 500 Units  -     alteplase injection 2 mg  -     pegfilgrastim (NEULASTA (ON BODY INJECTOR)) injection 6 mg  -     diphenhydrAMINE injection 25 mg  -     famotidine (PF) injection 20 mg  -     hydrocortisone sodium succinate injection 100 mg  -     etoposide (VEPESID) 100 mg/m2 = 212 mg in sodium chloride 0.9% 510.6 mL chemo infusion  -     sodium chloride 0.9% 100 mL flush bag  -     prochlorperazine injection Soln 5 mg  -     EPINEPHrine (EPIPEN) 0.3 mg/0.3 mL pen injection 0.3 mg  -     diphenhydrAMINE injection 50 mg  -     hydrocortisone sodium succinate injection 100 mg  -     sodium chloride 0.9% flush 10 mL  -     heparin, porcine (PF) 100 unit/mL injection flush 500 Units  -     alteplase injection 2 mg        Bladder cancer- small cell variant  On chemotherapy- note has additional pre meds and rate change due to prior reaction to Etoposide  Cycle 1 started on 12/4/23.   Presents for cycle 3  Labs adequate to proceed  RTC 3 weeks for C4 and see me and Dr. Jimenez following C4    Anemia-  Parameters stable and appropriate on chemotherapy  Will continue to monitor on treatment     25 minutes direct with patient  15 minutes additional care coordination    Route Chart for  Scheduling    Med Onc Chart Routing      Follow up with physician . 5 weeks to 6 weeks - see me and Dr. Jimenez in Urology   Follow up with [facility] 3 weeks. cbc, cmp and EP and next day chemo (see Dang)   Infusion scheduling note    Injection scheduling note    Labs    Imaging    Pharmacy appointment    Other referrals                  Treatment Plan Information   OP CISPLATIN 75MG/M2 ETOPOSIDE 100MG/M2 Q3W   Mary Spicer MD   Upcoming Treatment Dates - OP CISPLATIN 75MG/M2 ETOPOSIDE 100MG/M2 Q3W    1/18/2024       Pre-Medications       diphenhydrAMINE injection 25 mg       famotidine (PF) injection 20 mg       hydrocortisone sodium succinate injection 100 mg       Chemotherapy       etoposide (VEPESID) 100 mg/m2 = 212 mg in sodium chloride 0.9% 510.6 mL chemo infusion  1/19/2024       Pre-Medications       diphenhydrAMINE injection 25 mg       famotidine (PF) injection 20 mg       hydrocortisone sodium succinate injection 100 mg       Chemotherapy       etoposide (VEPESID) 100 mg/m2 = 212 mg in sodium chloride 0.9% 510.6 mL chemo infusion  2/7/2024       Pre-Medications       diphenhydrAMINE injection 25 mg       famotidine (PF) injection 20 mg       hydrocortisone sodium succinate injection 100 mg       Chemotherapy       CISplatin (Platinol) 75 mg/m2 = 158 mg in sodium chloride 0.9% 658 mL chemo infusion       etoposide (VEPESID) 100 mg/m2 = 212 mg in sodium chloride 0.9% 510.6 mL chemo infusion       Pre-Hydration       sodium chloride 0.9% 1,000 mL with magnesium sulfate 1 g, potassium chloride 20 mEq infusion       Post-Hydration       sodium chloride 0.9% 1,000 mL with magnesium sulfate 1 g, potassium chloride 20 mEq infusion       Antiemetics       aprepitant (CINVANTI) injection 130 mg       palonosetron (ALOXI) 0.25 mg with Dexamethasone (DECADRON) 12 mg in NS 50 mL IVPB  2/8/2024       Pre-Medications       diphenhydrAMINE injection 25 mg       famotidine (PF) injection 20 mg       hydrocortisone sodium  succinate injection 100 mg       Chemotherapy       etoposide (VEPESID) 100 mg/m2 = 212 mg in sodium chloride 0.9% 510.6 mL chemo infusion

## 2024-01-17 ENCOUNTER — INFUSION (OUTPATIENT)
Dept: INFUSION THERAPY | Facility: HOSPITAL | Age: 69
End: 2024-01-17
Payer: MEDICARE

## 2024-01-17 VITALS
RESPIRATION RATE: 18 BRPM | DIASTOLIC BLOOD PRESSURE: 64 MMHG | HEIGHT: 72 IN | TEMPERATURE: 98 F | BODY MASS INDEX: 27.61 KG/M2 | WEIGHT: 203.81 LBS | HEART RATE: 65 BPM | SYSTOLIC BLOOD PRESSURE: 118 MMHG

## 2024-01-17 DIAGNOSIS — C67.1 MALIGNANT NEOPLASM OF DOME OF URINARY BLADDER: Primary | ICD-10-CM

## 2024-01-17 PROCEDURE — A4216 STERILE WATER/SALINE, 10 ML: HCPCS | Mod: HCNC | Performed by: INTERNAL MEDICINE

## 2024-01-17 PROCEDURE — 63600175 PHARM REV CODE 636 W HCPCS: Mod: HCNC | Performed by: INTERNAL MEDICINE

## 2024-01-17 PROCEDURE — 96375 TX/PRO/DX INJ NEW DRUG ADDON: CPT | Mod: HCNC

## 2024-01-17 PROCEDURE — 25000003 PHARM REV CODE 250: Mod: HCNC | Performed by: INTERNAL MEDICINE

## 2024-01-17 PROCEDURE — 96413 CHEMO IV INFUSION 1 HR: CPT | Mod: HCNC

## 2024-01-17 RX ORDER — DIPHENHYDRAMINE HYDROCHLORIDE 50 MG/ML
25 INJECTION INTRAMUSCULAR; INTRAVENOUS ONCE
Status: COMPLETED | OUTPATIENT
Start: 2024-01-17 | End: 2024-01-17

## 2024-01-17 RX ORDER — HEPARIN 100 UNIT/ML
500 SYRINGE INTRAVENOUS
Status: DISCONTINUED | OUTPATIENT
Start: 2024-01-17 | End: 2024-01-17 | Stop reason: HOSPADM

## 2024-01-17 RX ORDER — SODIUM CHLORIDE 9 MG/ML
INJECTION, SOLUTION INTRAVENOUS CONTINUOUS
Status: DISCONTINUED | OUTPATIENT
Start: 2024-01-17 | End: 2024-01-17 | Stop reason: HOSPADM

## 2024-01-17 RX ORDER — PROCHLORPERAZINE EDISYLATE 5 MG/ML
5 INJECTION INTRAMUSCULAR; INTRAVENOUS ONCE AS NEEDED
Status: DISCONTINUED | OUTPATIENT
Start: 2024-01-17 | End: 2024-01-17 | Stop reason: HOSPADM

## 2024-01-17 RX ORDER — SODIUM CHLORIDE 0.9 % (FLUSH) 0.9 %
10 SYRINGE (ML) INJECTION
Status: DISCONTINUED | OUTPATIENT
Start: 2024-01-17 | End: 2024-01-17 | Stop reason: HOSPADM

## 2024-01-17 RX ORDER — DIPHENHYDRAMINE HYDROCHLORIDE 50 MG/ML
50 INJECTION INTRAMUSCULAR; INTRAVENOUS ONCE AS NEEDED
Status: DISCONTINUED | OUTPATIENT
Start: 2024-01-17 | End: 2024-01-17 | Stop reason: HOSPADM

## 2024-01-17 RX ORDER — EPINEPHRINE 0.3 MG/.3ML
0.3 INJECTION SUBCUTANEOUS ONCE AS NEEDED
Status: DISCONTINUED | OUTPATIENT
Start: 2024-01-17 | End: 2024-01-17 | Stop reason: HOSPADM

## 2024-01-17 RX ORDER — FAMOTIDINE 10 MG/ML
20 INJECTION INTRAVENOUS ONCE
Status: COMPLETED | OUTPATIENT
Start: 2024-01-17 | End: 2024-01-17

## 2024-01-17 RX ADMIN — DIPHENHYDRAMINE HYDROCHLORIDE 25 MG: 50 INJECTION, SOLUTION INTRAMUSCULAR; INTRAVENOUS at 07:01

## 2024-01-17 RX ADMIN — HYDROCORTISONE SODIUM SUCCINATE 100 MG: 100 INJECTION, POWDER, FOR SOLUTION INTRAMUSCULAR; INTRAVENOUS at 07:01

## 2024-01-17 RX ADMIN — SODIUM CHLORIDE: 9 INJECTION, SOLUTION INTRAVENOUS at 07:01

## 2024-01-17 RX ADMIN — ETOPOSIDE 212 MG: 20 INJECTION INTRAVENOUS at 08:01

## 2024-01-17 RX ADMIN — HEPARIN 500 UNITS: 100 SYRINGE at 10:01

## 2024-01-17 RX ADMIN — Medication 10 ML: at 10:01

## 2024-01-17 RX ADMIN — FAMOTIDINE 20 MG: 10 INJECTION INTRAVENOUS at 07:01

## 2024-01-17 NOTE — NURSING
0708  Pt here for Etoposide infusion, accompanied by brother-in-law, no new complaints or concerns and reports tolerating infusion yesterday; discussed treatment plan for today, all questions answered and pt agrees to proceed

## 2024-01-17 NOTE — PLAN OF CARE
1015  Infusion completed, pt tolerated (per pt request, 30 min hold after last premed administered, then Etoposide at 100mL/hr for 15 mins, then titrate to ordered rate; pt has hx of prior reaction); instructed to increase water hydration and reason for same; discussed when to contact MD, when to report to ED; pt and family member verbalized understanding of all discussed and when to report next

## 2024-01-18 ENCOUNTER — INFUSION (OUTPATIENT)
Dept: INFUSION THERAPY | Facility: HOSPITAL | Age: 69
End: 2024-01-18
Payer: MEDICARE

## 2024-01-18 VITALS
TEMPERATURE: 99 F | HEART RATE: 54 BPM | SYSTOLIC BLOOD PRESSURE: 137 MMHG | RESPIRATION RATE: 18 BRPM | DIASTOLIC BLOOD PRESSURE: 77 MMHG | OXYGEN SATURATION: 99 %

## 2024-01-18 DIAGNOSIS — C67.1 MALIGNANT NEOPLASM OF DOME OF URINARY BLADDER: Primary | ICD-10-CM

## 2024-01-18 PROCEDURE — 96375 TX/PRO/DX INJ NEW DRUG ADDON: CPT | Mod: HCNC

## 2024-01-18 PROCEDURE — 25000003 PHARM REV CODE 250: Mod: HCNC | Performed by: INTERNAL MEDICINE

## 2024-01-18 PROCEDURE — A4216 STERILE WATER/SALINE, 10 ML: HCPCS | Mod: HCNC | Performed by: INTERNAL MEDICINE

## 2024-01-18 PROCEDURE — 63600175 PHARM REV CODE 636 W HCPCS: Mod: HCNC | Performed by: INTERNAL MEDICINE

## 2024-01-18 PROCEDURE — 96377 APPLICATON ON-BODY INJECTOR: CPT | Mod: 59,HCNC

## 2024-01-18 PROCEDURE — 96413 CHEMO IV INFUSION 1 HR: CPT | Mod: HCNC

## 2024-01-18 RX ORDER — DIPHENHYDRAMINE HYDROCHLORIDE 50 MG/ML
50 INJECTION INTRAMUSCULAR; INTRAVENOUS ONCE AS NEEDED
Status: DISCONTINUED | OUTPATIENT
Start: 2024-01-18 | End: 2024-01-18 | Stop reason: HOSPADM

## 2024-01-18 RX ORDER — HEPARIN 100 UNIT/ML
500 SYRINGE INTRAVENOUS
Status: DISCONTINUED | OUTPATIENT
Start: 2024-01-18 | End: 2024-01-18 | Stop reason: HOSPADM

## 2024-01-18 RX ORDER — EPINEPHRINE 0.3 MG/.3ML
0.3 INJECTION SUBCUTANEOUS ONCE AS NEEDED
Status: DISCONTINUED | OUTPATIENT
Start: 2024-01-18 | End: 2024-01-18 | Stop reason: HOSPADM

## 2024-01-18 RX ORDER — PROCHLORPERAZINE EDISYLATE 5 MG/ML
5 INJECTION INTRAMUSCULAR; INTRAVENOUS ONCE AS NEEDED
Status: DISCONTINUED | OUTPATIENT
Start: 2024-01-18 | End: 2024-01-18 | Stop reason: HOSPADM

## 2024-01-18 RX ORDER — SODIUM CHLORIDE 0.9 % (FLUSH) 0.9 %
10 SYRINGE (ML) INJECTION
Status: DISCONTINUED | OUTPATIENT
Start: 2024-01-18 | End: 2024-01-18 | Stop reason: HOSPADM

## 2024-01-18 RX ORDER — DIPHENHYDRAMINE HYDROCHLORIDE 50 MG/ML
25 INJECTION INTRAMUSCULAR; INTRAVENOUS ONCE
Status: COMPLETED | OUTPATIENT
Start: 2024-01-18 | End: 2024-01-18

## 2024-01-18 RX ORDER — FAMOTIDINE 10 MG/ML
20 INJECTION INTRAVENOUS ONCE
Status: COMPLETED | OUTPATIENT
Start: 2024-01-18 | End: 2024-01-18

## 2024-01-18 RX ADMIN — HYDROCORTISONE SODIUM SUCCINATE 100 MG: 100 INJECTION, POWDER, FOR SOLUTION INTRAMUSCULAR; INTRAVENOUS at 08:01

## 2024-01-18 RX ADMIN — HEPARIN 500 UNITS: 100 SYRINGE at 10:01

## 2024-01-18 RX ADMIN — SODIUM CHLORIDE: 9 INJECTION, SOLUTION INTRAVENOUS at 08:01

## 2024-01-18 RX ADMIN — Medication 10 ML: at 10:01

## 2024-01-18 RX ADMIN — ETOPOSIDE 212 MG: 20 INJECTION INTRAVENOUS at 08:01

## 2024-01-18 RX ADMIN — PEGFILGRASTIM 6 MG: KIT SUBCUTANEOUS at 10:01

## 2024-01-18 RX ADMIN — FAMOTIDINE 20 MG: 10 INJECTION INTRAVENOUS at 08:01

## 2024-01-18 RX ADMIN — DIPHENHYDRAMINE HYDROCHLORIDE 25 MG: 50 INJECTION, SOLUTION INTRAMUSCULAR; INTRAVENOUS at 08:01

## 2024-01-18 NOTE — PLAN OF CARE
1015-Pt requests Etoposide start at 125 ml/hr for 1st 15 min then at 575.6 ml/hr per order due to previous adverse reaction. Pt tolerated Etoposide infusion well, no complications or side effects, POC and meds discussed with pt, Gavin OBI to pt abd., green light flashing before d/c, pt aware of upcoming appts, pt knows to call MD with any questions or concerns. Pt ambulated off unit, no distress noted.

## 2024-02-06 ENCOUNTER — OFFICE VISIT (OUTPATIENT)
Dept: HEMATOLOGY/ONCOLOGY | Facility: CLINIC | Age: 69
End: 2024-02-06
Payer: MEDICARE

## 2024-02-06 ENCOUNTER — INFUSION (OUTPATIENT)
Dept: INFUSION THERAPY | Facility: HOSPITAL | Age: 69
End: 2024-02-06
Payer: MEDICARE

## 2024-02-06 VITALS
BODY MASS INDEX: 28.44 KG/M2 | HEART RATE: 81 BPM | HEIGHT: 71 IN | WEIGHT: 203.13 LBS | OXYGEN SATURATION: 98 % | TEMPERATURE: 99 F | DIASTOLIC BLOOD PRESSURE: 81 MMHG | SYSTOLIC BLOOD PRESSURE: 109 MMHG

## 2024-02-06 VITALS — RESPIRATION RATE: 18 BRPM | DIASTOLIC BLOOD PRESSURE: 84 MMHG | SYSTOLIC BLOOD PRESSURE: 133 MMHG | HEART RATE: 72 BPM

## 2024-02-06 DIAGNOSIS — D84.821 IMMUNODEFICIENCY SECONDARY TO CHEMOTHERAPY: ICD-10-CM

## 2024-02-06 DIAGNOSIS — C67.1 MALIGNANT NEOPLASM OF DOME OF URINARY BLADDER: Primary | ICD-10-CM

## 2024-02-06 DIAGNOSIS — D64.9 NORMOCYTIC ANEMIA: ICD-10-CM

## 2024-02-06 DIAGNOSIS — Z79.899 IMMUNODEFICIENCY SECONDARY TO CHEMOTHERAPY: ICD-10-CM

## 2024-02-06 DIAGNOSIS — D49.9 IMMUNODEFICIENCY SECONDARY TO NEOPLASM: ICD-10-CM

## 2024-02-06 DIAGNOSIS — D84.81 IMMUNODEFICIENCY SECONDARY TO NEOPLASM: ICD-10-CM

## 2024-02-06 DIAGNOSIS — T45.1X5A IMMUNODEFICIENCY SECONDARY TO CHEMOTHERAPY: ICD-10-CM

## 2024-02-06 PROCEDURE — 63600175 PHARM REV CODE 636 W HCPCS: Mod: HCNC | Performed by: REGISTERED NURSE

## 2024-02-06 PROCEDURE — 1101F PT FALLS ASSESS-DOCD LE1/YR: CPT | Mod: HCNC,CPTII,S$GLB, | Performed by: REGISTERED NURSE

## 2024-02-06 PROCEDURE — 96368 THER/DIAG CONCURRENT INF: CPT | Mod: HCNC

## 2024-02-06 PROCEDURE — 96375 TX/PRO/DX INJ NEW DRUG ADDON: CPT | Mod: HCNC

## 2024-02-06 PROCEDURE — 25000003 PHARM REV CODE 250: Mod: HCNC | Performed by: REGISTERED NURSE

## 2024-02-06 PROCEDURE — 99999 PR PBB SHADOW E&M-EST. PATIENT-LVL V: CPT | Mod: PBBFAC,HCNC,, | Performed by: REGISTERED NURSE

## 2024-02-06 PROCEDURE — 3288F FALL RISK ASSESSMENT DOCD: CPT | Mod: HCNC,CPTII,S$GLB, | Performed by: REGISTERED NURSE

## 2024-02-06 PROCEDURE — 3074F SYST BP LT 130 MM HG: CPT | Mod: HCNC,CPTII,S$GLB, | Performed by: REGISTERED NURSE

## 2024-02-06 PROCEDURE — 3008F BODY MASS INDEX DOCD: CPT | Mod: HCNC,CPTII,S$GLB, | Performed by: REGISTERED NURSE

## 2024-02-06 PROCEDURE — 96417 CHEMO IV INFUS EACH ADDL SEQ: CPT | Mod: HCNC

## 2024-02-06 PROCEDURE — 1126F AMNT PAIN NOTED NONE PRSNT: CPT | Mod: HCNC,CPTII,S$GLB, | Performed by: REGISTERED NURSE

## 2024-02-06 PROCEDURE — 3079F DIAST BP 80-89 MM HG: CPT | Mod: HCNC,CPTII,S$GLB, | Performed by: REGISTERED NURSE

## 2024-02-06 PROCEDURE — 1160F RVW MEDS BY RX/DR IN RCRD: CPT | Mod: HCNC,CPTII,S$GLB, | Performed by: REGISTERED NURSE

## 2024-02-06 PROCEDURE — 96413 CHEMO IV INFUSION 1 HR: CPT | Mod: HCNC

## 2024-02-06 PROCEDURE — 96367 TX/PROPH/DG ADDL SEQ IV INF: CPT | Mod: HCNC

## 2024-02-06 PROCEDURE — 1159F MED LIST DOCD IN RCRD: CPT | Mod: HCNC,CPTII,S$GLB, | Performed by: REGISTERED NURSE

## 2024-02-06 PROCEDURE — A4216 STERILE WATER/SALINE, 10 ML: HCPCS | Mod: HCNC | Performed by: REGISTERED NURSE

## 2024-02-06 PROCEDURE — 99215 OFFICE O/P EST HI 40 MIN: CPT | Mod: HCNC,S$GLB,, | Performed by: REGISTERED NURSE

## 2024-02-06 RX ORDER — EPINEPHRINE 0.3 MG/.3ML
0.3 INJECTION SUBCUTANEOUS ONCE AS NEEDED
Status: CANCELLED | OUTPATIENT
Start: 2024-02-07

## 2024-02-06 RX ORDER — ETOPOSIDE 20 MG/ML
INJECTION INTRAVENOUS
COMMUNITY
Start: 2023-12-06 | End: 2024-03-07 | Stop reason: CLARIF

## 2024-02-06 RX ORDER — EPINEPHRINE 0.3 MG/.3ML
0.3 INJECTION SUBCUTANEOUS ONCE AS NEEDED
Status: CANCELLED | OUTPATIENT
Start: 2024-02-09

## 2024-02-06 RX ORDER — FAMOTIDINE 10 MG/ML
20 INJECTION INTRAVENOUS ONCE
Status: CANCELLED | OUTPATIENT
Start: 2024-02-09 | End: 2024-02-09

## 2024-02-06 RX ORDER — DIPHENHYDRAMINE HYDROCHLORIDE 50 MG/ML
50 INJECTION INTRAMUSCULAR; INTRAVENOUS ONCE AS NEEDED
Status: CANCELLED | OUTPATIENT
Start: 2024-02-07

## 2024-02-06 RX ORDER — HEPARIN 100 UNIT/ML
500 SYRINGE INTRAVENOUS
Status: DISCONTINUED | OUTPATIENT
Start: 2024-02-06 | End: 2024-02-06 | Stop reason: HOSPADM

## 2024-02-06 RX ORDER — PROPOFOL 10 MG/ML
INJECTION, EMULSION INTRAVENOUS
COMMUNITY
Start: 2023-10-26 | End: 2024-03-07 | Stop reason: CLARIF

## 2024-02-06 RX ORDER — INFLUENZA A VIRUS A/VICTORIA/4897/2022 IVR-238 (H1N1) ANTIGEN (FORMALDEHYDE INACTIVATED), INFLUENZA A VIRUS A/DARWIN/6/2021 IVR-227 (H3N2) ANTIGEN (FORMALDEHYDE INACTIVATED), INFLUENZA B VIRUS B/AUSTRIA/1359417/2021 BVR-26 ANTIGEN (FORMALDEHYDE INACTIVATED), INFLUENZA B VIRUS B/PHUKET/3073/2013 BVR-1B ANTIGEN (FORMALDEHYDE INACTIVATED) 15; 15; 15; 15 UG/.5ML; UG/.5ML; UG/.5ML; UG/.5ML
INJECTION, SUSPENSION INTRAMUSCULAR
COMMUNITY
Start: 2023-11-09 | End: 2024-03-07 | Stop reason: CLARIF

## 2024-02-06 RX ORDER — ONDANSETRON HYDROCHLORIDE 2 MG/ML
INJECTION, SOLUTION INTRAVENOUS
COMMUNITY
Start: 2023-10-26 | End: 2024-03-07 | Stop reason: CLARIF

## 2024-02-06 RX ORDER — FAMOTIDINE 10 MG/ML
20 INJECTION INTRAVENOUS ONCE
Status: CANCELLED | OUTPATIENT
Start: 2024-02-08 | End: 2024-02-08

## 2024-02-06 RX ORDER — FAMOTIDINE 10 MG/ML
20 INJECTION INTRAVENOUS ONCE
Status: CANCELLED | OUTPATIENT
Start: 2024-02-07

## 2024-02-06 RX ORDER — SODIUM CHLORIDE, SODIUM LACTATE, POTASSIUM CHLORIDE, CALCIUM CHLORIDE 600; 310; 30; 20 MG/100ML; MG/100ML; MG/100ML; MG/100ML
INJECTION, SOLUTION INTRAVENOUS
COMMUNITY
Start: 2023-10-26 | End: 2024-03-07 | Stop reason: CLARIF

## 2024-02-06 RX ORDER — DIPHENHYDRAMINE HYDROCHLORIDE 50 MG/ML
INJECTION INTRAMUSCULAR; INTRAVENOUS
COMMUNITY
Start: 2023-12-06 | End: 2024-03-07 | Stop reason: CLARIF

## 2024-02-06 RX ORDER — PALONOSETRON 0.05 MG/ML
INJECTION, SOLUTION INTRAVENOUS
COMMUNITY
Start: 2023-12-04 | End: 2024-03-07 | Stop reason: CLARIF

## 2024-02-06 RX ORDER — HEPARIN 100 UNIT/ML
500 SYRINGE INTRAVENOUS
Status: CANCELLED | OUTPATIENT
Start: 2024-02-07

## 2024-02-06 RX ORDER — HEPARIN 100 UNIT/ML
500 SYRINGE INTRAVENOUS
Status: CANCELLED | OUTPATIENT
Start: 2024-02-08

## 2024-02-06 RX ORDER — SODIUM CHLORIDE 0.9 % (FLUSH) 0.9 %
10 SYRINGE (ML) INJECTION
Status: CANCELLED | OUTPATIENT
Start: 2024-02-08

## 2024-02-06 RX ORDER — DIPHENHYDRAMINE HYDROCHLORIDE 50 MG/ML
25 INJECTION INTRAMUSCULAR; INTRAVENOUS ONCE
Status: CANCELLED | OUTPATIENT
Start: 2024-02-09 | End: 2024-02-09

## 2024-02-06 RX ORDER — CISPLATIN 1 MG/ML
INJECTION INTRAVENOUS
COMMUNITY
Start: 2023-12-04 | End: 2024-03-07 | Stop reason: CLARIF

## 2024-02-06 RX ORDER — ZOSTER VACCINE RECOMBINANT, ADJUVANTED 50 MCG/0.5
KIT INTRAMUSCULAR
COMMUNITY
Start: 2023-11-09

## 2024-02-06 RX ORDER — SODIUM CHLORIDE 0.9 % (FLUSH) 0.9 %
10 SYRINGE (ML) INJECTION
Status: DISCONTINUED | OUTPATIENT
Start: 2024-02-06 | End: 2024-02-06 | Stop reason: HOSPADM

## 2024-02-06 RX ORDER — DIPHENHYDRAMINE HYDROCHLORIDE 50 MG/ML
50 INJECTION INTRAMUSCULAR; INTRAVENOUS ONCE AS NEEDED
Status: DISCONTINUED | OUTPATIENT
Start: 2024-02-06 | End: 2024-02-06 | Stop reason: HOSPADM

## 2024-02-06 RX ORDER — POTASSIUM CHLORIDE 29.8 MG/ML
INJECTION INTRAVENOUS
COMMUNITY
Start: 2023-12-04 | End: 2024-03-07 | Stop reason: CLARIF

## 2024-02-06 RX ORDER — DEXAMETHASONE SODIUM PHOSPHATE 4 MG/ML
INJECTION, SOLUTION INTRA-ARTICULAR; INTRALESIONAL; INTRAMUSCULAR; INTRAVENOUS; SOFT TISSUE
COMMUNITY
Start: 2023-12-04 | End: 2024-03-07 | Stop reason: CLARIF

## 2024-02-06 RX ORDER — PROCHLORPERAZINE EDISYLATE 5 MG/ML
5 INJECTION INTRAMUSCULAR; INTRAVENOUS ONCE AS NEEDED
Status: CANCELLED
Start: 2024-02-07

## 2024-02-06 RX ORDER — HEPARIN 100 UNIT/ML
SYRINGE INTRAVENOUS
COMMUNITY
Start: 2023-12-06 | End: 2024-03-07 | Stop reason: CLARIF

## 2024-02-06 RX ORDER — EPINEPHRINE 0.3 MG/.3ML
0.3 INJECTION SUBCUTANEOUS ONCE AS NEEDED
Status: DISCONTINUED | OUTPATIENT
Start: 2024-02-06 | End: 2024-02-06 | Stop reason: HOSPADM

## 2024-02-06 RX ORDER — SODIUM CHLORIDE 0.9 % (FLUSH) 0.9 %
10 SYRINGE (ML) INJECTION
Status: CANCELLED | OUTPATIENT
Start: 2024-02-09

## 2024-02-06 RX ORDER — DIPHENHYDRAMINE HYDROCHLORIDE 50 MG/ML
50 INJECTION INTRAMUSCULAR; INTRAVENOUS ONCE AS NEEDED
Status: CANCELLED | OUTPATIENT
Start: 2024-02-09

## 2024-02-06 RX ORDER — DIPHENHYDRAMINE HYDROCHLORIDE 50 MG/ML
25 INJECTION INTRAMUSCULAR; INTRAVENOUS ONCE
Status: CANCELLED | OUTPATIENT
Start: 2024-02-07

## 2024-02-06 RX ORDER — HEPARIN 100 UNIT/ML
500 SYRINGE INTRAVENOUS
Status: CANCELLED | OUTPATIENT
Start: 2024-02-09

## 2024-02-06 RX ORDER — SODIUM CHLORIDE 0.9 % (FLUSH) 0.9 %
10 SYRINGE (ML) INJECTION
Status: CANCELLED | OUTPATIENT
Start: 2024-02-07

## 2024-02-06 RX ORDER — PROCHLORPERAZINE EDISYLATE 5 MG/ML
5 INJECTION INTRAMUSCULAR; INTRAVENOUS ONCE AS NEEDED
Status: CANCELLED
Start: 2024-02-08

## 2024-02-06 RX ORDER — DIPHENHYDRAMINE HYDROCHLORIDE 50 MG/ML
25 INJECTION INTRAMUSCULAR; INTRAVENOUS ONCE
Status: COMPLETED | OUTPATIENT
Start: 2024-02-06 | End: 2024-02-06

## 2024-02-06 RX ORDER — PROCHLORPERAZINE EDISYLATE 5 MG/ML
5 INJECTION INTRAMUSCULAR; INTRAVENOUS ONCE AS NEEDED
Status: CANCELLED
Start: 2024-02-09

## 2024-02-06 RX ORDER — EPINEPHRINE 0.3 MG/.3ML
0.3 INJECTION SUBCUTANEOUS ONCE AS NEEDED
Status: CANCELLED | OUTPATIENT
Start: 2024-02-08

## 2024-02-06 RX ORDER — DIPHENHYDRAMINE HYDROCHLORIDE 50 MG/ML
50 INJECTION INTRAMUSCULAR; INTRAVENOUS ONCE AS NEEDED
Status: CANCELLED | OUTPATIENT
Start: 2024-02-08

## 2024-02-06 RX ORDER — PROCHLORPERAZINE EDISYLATE 5 MG/ML
5 INJECTION INTRAMUSCULAR; INTRAVENOUS ONCE AS NEEDED
Status: DISCONTINUED | OUTPATIENT
Start: 2024-02-06 | End: 2024-02-06 | Stop reason: HOSPADM

## 2024-02-06 RX ORDER — FAMOTIDINE 10 MG/ML
20 INJECTION INTRAVENOUS ONCE
Status: COMPLETED | OUTPATIENT
Start: 2024-02-06 | End: 2024-02-06

## 2024-02-06 RX ORDER — PHENYLEPHRINE HYDROCHLORIDE 10 MG/ML
INJECTION INTRAVENOUS
COMMUNITY
Start: 2023-10-26 | End: 2024-03-07 | Stop reason: CLARIF

## 2024-02-06 RX ORDER — DIPHENHYDRAMINE HYDROCHLORIDE 50 MG/ML
25 INJECTION INTRAMUSCULAR; INTRAVENOUS ONCE
Status: CANCELLED | OUTPATIENT
Start: 2024-02-08 | End: 2024-02-08

## 2024-02-06 RX ADMIN — Medication 10 ML: at 05:02

## 2024-02-06 RX ADMIN — CISPLATIN 158 MG: 1 INJECTION, SOLUTION INTRAVENOUS at 01:02

## 2024-02-06 RX ADMIN — DIPHENHYDRAMINE HYDROCHLORIDE 25 MG: 50 INJECTION, SOLUTION INTRAMUSCULAR; INTRAVENOUS at 03:02

## 2024-02-06 RX ADMIN — FAMOTIDINE 20 MG: 10 INJECTION, SOLUTION INTRAVENOUS at 03:02

## 2024-02-06 RX ADMIN — APREPITANT 130 MG: 130 INJECTION, EMULSION INTRAVENOUS at 12:02

## 2024-02-06 RX ADMIN — POTASSIUM CHLORIDE 500 ML/HR: 2 INJECTION, SOLUTION, CONCENTRATE INTRAVENOUS at 03:02

## 2024-02-06 RX ADMIN — ETOPOSIDE 212 MG: 20 INJECTION INTRAVENOUS at 03:02

## 2024-02-06 RX ADMIN — HYDROCORTISONE SODIUM SUCCINATE 100 MG: 100 INJECTION, POWDER, FOR SOLUTION INTRAMUSCULAR; INTRAVENOUS at 03:02

## 2024-02-06 RX ADMIN — DEXAMETHASONE SODIUM PHOSPHATE 0.25 MG: 4 INJECTION, SOLUTION INTRA-ARTICULAR; INTRALESIONAL; INTRAMUSCULAR; INTRAVENOUS; SOFT TISSUE at 12:02

## 2024-02-06 RX ADMIN — POTASSIUM CHLORIDE 500 ML/HR: 2 INJECTION, SOLUTION, CONCENTRATE INTRAVENOUS at 01:02

## 2024-02-06 RX ADMIN — HEPARIN 500 UNITS: 100 SYRINGE at 05:02

## 2024-02-06 NOTE — PROGRESS NOTES
Subjective     Patient ID: Med Palma is a 68 y.o. male.    Chief Complaint: No chief complaint on file.    HPI    Diagnosis: Small cell bladder cancer  Referring MD: Dr. Jimenez     Doing very well overall  Energy and appetite are good  Remains active   Weight stable  Staying hydrated  No new urinary concerns  Urine stream remains abnormal since surgery/catheter - no worsening or changes  No fevers, chills or infectious complaints   No CP or palpitations  Had slight SOB and fatigue after cutting the grass/yard work   This resolved after sleeping for a little while   No nausea, vomiting, diarrhea or constipation   Chronic tinnitus, unchanged.   Paresthesias to 3 fingers on left hand unchanged  No other concerns       Oncology History:  - gross hematuria in September 2023 while traveling in [place] (initially wondered if related to exercise and poor hydration) and resolved on return home he was due for annual PCP visit and he recommended some additional blood work  That same night he had another episode and again resolved with hydration but after occurred several times over a few weeks he sought further evaluation  Minimal discomfort noted with this (states he had a prior bladder infection and it felt like this)  Over summer months had noted occasional mild dysuria preceding this   --Notes prior episode of possible hematuria in 2016 with heavy exercise and poor hydration resolved after 1 day (related to exercise)     - 10/21/2023 CT Urogram Abdomen/Pelvis:  FINDINGS:  : Kidneys are symmetric in size.  Precontrast imaging shows no stones or hydronephrosis.  Kidneys concentrate and excrete contrast appropriately on postcontrast imaging.  No focal filling defects.  Lobulated margins along the lateral aspect of the left kidney has the appearance of scarring.  Heterogeneously enhancing mass along the dorsal lateral aspect of the urinary bladder on the left estimated at 3.8 x 3.6 cm.  Enlarged left pelvic lymph  node measures 1.9 cm in short axis dimension.  Enlarged, lobulated prostate gland bulging into the bladder base, similar compared to prior MRI.  CT:  Lung bases show subsegmental atelectasis or scarring.  Liver is homogeneous.  Gallbladder is unremarkable.  Bile ducts, spleen, pancreas, and adrenal glands are unremarkable.  Stomach and loops of bowel are normal in caliber.  No free fluid in the pelvis.  Regional skeleton shows degenerative change.  Impression:  Heterogeneously enhancing mass along the dorsal lateral aspect of the urinary bladder on the left most concerning for primary bladder malignancy.  Enlarged left pelvic lymph node most concerning for metastatic disease. Negative for obstructive uropathy.  Prostatomegaly with the prostate bulging into the bladder base, similar compared to prior MRI.  This report was flagged in Epic as abnormal.     - 10/26/2023 TURBT:  Pathology:  1. Bladder, tumor, superficial resection:   Small cell carcinoma.   Muscularis propria is present and is positive for carcinoma.     2. Bladder, base of tumor, resection:Small cell carcinoma.   Muscularis propria is present and is positive for carcinoma.   Malignant cells are positive for AE1/AE3, TTF1, Synaptophysin, and Chromogranin (rare positive cells) immunostains, and negative for GATA3, CK7, CK20, and p63 immunostains. Ki67 immunostain shows a proliferative index of >95%. The morphology and immunophenotype are compatible with small cell carcinoma.      Prior evaluation for elevated PSA- dx with BPH  - 3/3/56961 MRI Prostate:  FINDINGS:  Previous biopsy: TRUS PBx on 1/26/15 - negative  PSA: 5.8 ng/mL 09/14/2021 (previously 5.9 on 07/07/2021, 3.1 on 05/12/2017, 5.9 on 12/11/2015)  Prior therapy: None  Prostate: 5.1 x 3.8 x 7.2 cm corresponding to a computed volume of 65 cc.  Peripheral zone: 1 focal area of signal abnormality as described below:  Lesion (ANDERSON) #3  Location: Side: Right; Region: Base; Zone: posterior peripheral  zone laterally  Greatest dimension: 1.2 cm  T2-WI: T2 SI: Heterogeneous signal intensity or noncircumscribed, rounded, moderate hypointensity--score 3  DWI/ADC: DWI: Focal mild/moderate hypointense on ADC and isointense/mild hyperintense on high b-value DWI--score 3  DCE: No early enhancement  Extraprostatic extension: Abuts the adjacent right posterolateral capsular margin.  PI-RADS assessment category: 3  Transitional zone:  TZ abnormalities: Benign prostatic hyperplasia with at least 2 focal areas of signal abnormality do not completely conform to a BPH nodule.  The 2 lesions are detailed below:  Lesion (ANDERSON) #1  Location: left; region: mid; zone: anterior (more medial compared to lesion (ANDERSON) #2.)  Greatest dimension: 1.3 cm  T2-WI/SI: Heterogeneous signal intensity with obscured margins; score 3.  DWI/ADC: Focal mildly/moderately hypointense on ADC and isointense/mildly hyperintense on high b-value DWI; score 3  DCE: Positive  Extraprostatic extension: No  PI-RADS assessment category: 3  Lesion (ANDERSON) #2  Location: Left; region: Mid; zone: Anterior (lateral and more posterior compared to lesion (ANDERSON) #1  Greatest dimension: 1.1 cm  T2-WI/SI: Heterogeneous signal intensity with obscured margins; score 3.  DWI/ADC: Focal mildly/moderately hypointense on ADC and isointense/mildly hyperintense on high b-value DWI; score 3.  DCE: Positive  Extraprostatic extension: Abuts the adjacent left lateral capsular margin.  PI-RADS assessment category: 3  Neurovascular bundle: Unremarkable  Seminal vesicles:  SV invasion: Negative.  Adjacent Organ Involvement: Prominent median lobe of the prostate protruding into the base the bladder.  No focal bladder wall thickening.  No rectal involvement.  Lymphadenopathy: No pathologically enlarged pelvic lymph nodes.  Other Findings: None  Impression:  1. There are three PI-RADS 3 lesions, 1 within the right posterolateral peripheral zone and 2 within the left anterior transition zone of  the mid to base of the prostate.  Two lesions abut the capsular margin, as described above, without involvement of the seminal vesicles.  No pathologically enlarged pelvic lymph nodes.  2. BPH with a very prominent median lobe protruding into the base of the bladder.  Overall Assessment:  PIRADS 3 (the presence of clinically significant cancer is equivocal)  Number of targets created for potential MR/US fusion biopsy  Peripheral zone: 1  Transition zone: 2     - 5/2/2022 TRUS and biopsies:  Pathology:  RELIAPATH DIAGNOSIS:   A.   PROSTATE, LEFT APEX, NEEDLE BIOPSY:          Benign prostatic tissue with chronic inflammation, see comment.   (R97.20)   B.   PROSTATE, LEFT MID, NEEDLE BIOPSY:         Benign prostatic tissue with chronic inflammation, see comment.   (R97.20)   C.   PROSTATE, LEFT BASE, NEEDLE BIOPSY:         Benign prostatic tissue.  (R97.20)   D.   PROSTATE, RIGHT APEX, NEEDLE BIOPSY:          Benign prostatic tissue with chronic inflammation, see comment.   (R97.20)   E.   PROSTATE, RIGHT MID, NEEDLE BIOPSY:          Benign prostatic tissue with chronic inflammation, see comment.   (R97.20)   F.   PROSTATE, RIGHT BASE, NEEDLE BIOPSY:          Benign prostatic tissue with chronic inflammation, see comment.   (R97.20)   G.   TISSUE1          PROSTATE, TARGET LESION #1, BIOPSY:          -Benign prostatic tissue with chronic inflammation, see comment.   (R97.20)   H.   TISSUE2          PROSTATE, TARGET LESION #2, BIOPSY:          -Benign prostatic tissue .  (R97.20)   I.     TISSUE3          PROSTATE, TARGET LESION #3, BIOPSY:          -Benign prostatic tissue with chronic inflammation .  (R97.20)      Most Recent Scans:  - 11/9/2023 Bone scan:  FINDINGS:  There is physiologic distribution of the radiopharmaceutical throughout the skeleton.  There is normal uptake in the bilateral kidneys and soft tissues.  Bladder is significantly distended with abnormal contour, likely due to known bladder  mass.  Impression:  No scintigraphic evidence of osteoblastic metastatic disease.     - 11/7/2023 CT Chest:  FINDINGS:  Chest wall and lower neck: No axillary or supraclavicular lymphadenopathy.  Lungs and pleura: Biapical pleuroparenchymal thickening compatible with scar.  Focal air cystic spaces noted in the right upper lobe (image 154 sequence 4) suggestive of scar.  Multiple sub pleural bandlike scars are noted in the upper and lower lobes.  No pleural effusion or airspace consolidation is noted.  Mediastinum and christy: Right hilar node measuring 15 mm (short axis).  No mediastinal lymphadenopathy is noted.  Heart and pericardium: Heart size is normal.No pericardial effusion.  Vessels: Mild atheromatous disease is seen in the aorta.   The coronary arteries show mild atheromatous disease.  Upper abdomen: Cortical irregularity noted in the left kidney compatible with scars.  Both kidneys are excreting contrast during imaging acquisition.  Bones: Biomechanical changes are noted in the thoracic spine with osteophyte formations.  Schmorl nodes are noted in the thoracic spine.  Impression:  1. Multifocal bilateral pulmonary scars likely representing sequela prior infectious process to correlate with patient history.  No honeycombing is seen.  2. Right hilar node within maximum normal limits, nonspecific.  3. Left renal scar.  4. Additional findings.     - 11/7/2023 MRI Brain:  FINDINGS:  There is no midline shift, hydrocephalus or mass effect.  No acute infarction or acute intracranial hemorrhage.  A few small foci of T2/FLAIR signal in the supratentorial white matter while nonspecific most likely mild chronic microvascular ischemic changes.  Small focus of bright T2 signal adjacent to the posteromedial temporal lobe likely an incidental choroidal fissure cyst.  No enhancing lesions to indicate intracranial metastatic disease.  Small (1 cm) focus of increased FLAIR signal in the posterior fossa near the foramen of  Miguelina.  This demonstrates gradient signal presumably relating to calcifications with no abnormal enhancement.  No significant mass effect.  No evidence of associated hydrocephalus.  No abnormal extra-axial fluid collections.  The major skull base flow voids present.  Diffuse patchy mucosal membrane thickening in the paranasal sinuses with aerated secretions and small air-fluid levels.  Trace mastoid fluid on the left.  Impression:  No acute intracranial abnormalities, specifically no evidence of intracranial metastatic disease.  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  No associated hydrocephalus at this time.  Recommend surveillance to ensure stability.  Paranasal sinus disease.  This report was flagged in Epic as abnormal.    PMH:  Covid pneumonia- recovered  BPH  Arthritis- hips, low back, shoulders, knees- prior PT and dry needling helped significantly  Deviated septum surgery  Anemia     FH:  Dad-  of Multiple Myeloma at age 62 yo  Sister- breast cancer survivor (dx in her mid 70s)  Mother lived until age 94 yo-  from natural causes, dementia     SH:  Retired   [employer] management  Prior chemical and environmental exposures at work  + tobacco- last 20-25 years 1/2 pack in a weekend   Dips tobacco     + social EtOH    3 children    Review of Systems   Constitutional:  Negative for activity change, appetite change, chills, fatigue, fever and unexpected weight change.   HENT:  Positive for hearing loss (stable). Negative for nasal congestion, dental problem, postnasal drip, rhinorrhea, sinus pressure/congestion and trouble swallowing.    Eyes:  Negative for visual disturbance (wears bifocals).   Respiratory:  Negative for cough, shortness of breath and wheezing.    Cardiovascular:  Negative for chest pain, palpitations and leg swelling.   Gastrointestinal:  Negative for abdominal distention, abdominal pain, change in bowel habit, constipation,  diarrhea, nausea, vomiting and reflux.   Genitourinary:  Negative for decreased urine volume, difficulty urinating, dysuria, frequency, hematuria and urgency.   Musculoskeletal:  Negative for arthralgias, back pain, gait problem, myalgias and joint deformity.   Integumentary:  Negative for rash and wound.   Neurological:  Negative for dizziness, speech difficulty, weakness, numbness, headaches and coordination difficulties.   Hematological:  Negative for adenopathy. Does not bruise/bleed easily.   Psychiatric/Behavioral:  Negative for agitation, behavioral problems, confusion, dysphoric mood and sleep disturbance. The patient is not nervous/anxious.         Objective     Physical Exam  Vitals and nursing note reviewed.   Constitutional:       General: He is not in acute distress.     Appearance: Normal appearance. He is well-developed. He is not ill-appearing, toxic-appearing or diaphoretic.      Comments: Presents with his wife and brother  Very pleasant  ECOG= 0   HENT:      Head: Normocephalic and atraumatic.      Comments: Chemo alopecia     Right Ear: External ear normal.      Left Ear: External ear normal.      Nose: Nose normal.      Mouth/Throat:      Pharynx: Oropharynx is clear.   Eyes:      General: No scleral icterus.     Extraocular Movements: Extraocular movements intact.      Conjunctiva/sclera: Conjunctivae normal.      Pupils: Pupils are equal, round, and reactive to light.   Neck:      Thyroid: No thyromegaly.      Trachea: No tracheal deviation.   Cardiovascular:      Rate and Rhythm: Normal rate and regular rhythm.      Heart sounds: Normal heart sounds. No murmur heard.     No friction rub. No gallop.   Pulmonary:      Effort: Pulmonary effort is normal. No respiratory distress.      Breath sounds: Normal breath sounds. No wheezing, rhonchi or rales.   Chest:      Chest wall: No tenderness.      Comments: RCW port  Abdominal:      General: Abdomen is flat. Bowel sounds are normal. There is no  distension.      Palpations: Abdomen is soft. There is no mass.      Tenderness: There is no abdominal tenderness. There is no guarding or rebound.      Comments: No organomegaly   Musculoskeletal:         General: No swelling, tenderness or deformity. Normal range of motion.      Cervical back: Normal range of motion and neck supple. No rigidity or tenderness.      Right lower leg: No edema.      Left lower leg: No edema.   Lymphadenopathy:      Cervical: No cervical adenopathy.   Skin:     General: Skin is warm and dry.      Coloration: Skin is not jaundiced or pale.      Findings: No erythema, lesion or rash.   Neurological:      General: No focal deficit present.      Mental Status: He is alert and oriented to person, place, and time. Mental status is at baseline.      Sensory: No sensory deficit.      Motor: No weakness or abnormal muscle tone.      Coordination: Coordination normal.      Gait: Gait normal.      Deep Tendon Reflexes: Reflexes are normal and symmetric. Reflexes normal.   Psychiatric:         Mood and Affect: Mood normal.         Behavior: Behavior normal.         Thought Content: Thought content normal.         Judgment: Judgment normal.     Labs- reviewed     Assessment and Plan     1. Malignant neoplasm of dome of urinary bladder  -     CT Chest Abdomen Pelvis With IV Contrast (XPD) Routine Oral Contrast; Future; Expected date: 02/06/2024    2. Immunodeficiency secondary to neoplasm    3. Immunodeficiency secondary to chemotherapy    4. Normocytic anemia    Other orders  -     Cancel: diphenhydrAMINE injection 25 mg  -     Cancel: famotidine (PF) injection 20 mg  -     Cancel: hydrocortisone sodium succinate injection 100 mg  -     Cancel: sodium chloride 0.9% 1,000 mL with magnesium sulfate 1 g, potassium chloride 20 mEq infusion  -     Cancel: aprepitant (CINVANTI) injection 130 mg  -     Cancel: palonosetron (ALOXI) 0.25 mg with Dexamethasone (DECADRON) 12 mg in NS 50 mL IVPB  -     Cancel:  CISplatin (Platinol) 75 mg/m2 = 158 mg in sodium chloride 0.9% 658 mL chemo infusion  -     Cancel: etoposide (VEPESID) 100 mg/m2 = 212 mg in sodium chloride 0.9% 510.6 mL chemo infusion  -     Cancel: sodium chloride 0.9% 1,000 mL with magnesium sulfate 1 g, potassium chloride 20 mEq infusion  -     Cancel: prochlorperazine injection Soln 5 mg  -     Cancel: EPINEPHrine (EPIPEN) 0.3 mg/0.3 mL pen injection 0.3 mg  -     Cancel: diphenhydrAMINE injection 50 mg  -     Cancel: hydrocortisone sodium succinate injection 100 mg  -     Cancel: sodium chloride 0.9% flush 10 mL  -     Cancel: heparin, porcine (PF) 100 unit/mL injection flush 500 Units  -     Cancel: alteplase injection 2 mg  -     diphenhydrAMINE injection 25 mg  -     famotidine (PF) injection 20 mg  -     hydrocortisone sodium succinate injection 100 mg  -     etoposide (VEPESID) 100 mg/m2 = 212 mg in sodium chloride 0.9% 510.6 mL chemo infusion  -     prochlorperazine injection Soln 5 mg  -     EPINEPHrine (EPIPEN) 0.3 mg/0.3 mL pen injection 0.3 mg  -     diphenhydrAMINE injection 50 mg  -     hydrocortisone sodium succinate injection 100 mg  -     sodium chloride 0.9% flush 10 mL  -     heparin, porcine (PF) 100 unit/mL injection flush 500 Units  -     alteplase injection 2 mg  -     pegfilgrastim (NEULASTA (ON BODY INJECTOR)) injection 6 mg  -     diphenhydrAMINE injection 25 mg  -     famotidine (PF) injection 20 mg  -     hydrocortisone sodium succinate injection 100 mg  -     etoposide (VEPESID) 100 mg/m2 = 212 mg in sodium chloride 0.9% 510.6 mL chemo infusion  -     sodium chloride 0.9% 100 mL flush bag  -     prochlorperazine injection Soln 5 mg  -     EPINEPHrine (EPIPEN) 0.3 mg/0.3 mL pen injection 0.3 mg  -     diphenhydrAMINE injection 50 mg  -     hydrocortisone sodium succinate injection 100 mg  -     sodium chloride 0.9% flush 10 mL  -     heparin, porcine (PF) 100 unit/mL injection flush 500 Units  -     alteplase injection 2  mg      Bladder cancer- small cell variant  On chemotherapy- note has additional pre meds and rate change due to prior reaction to Etoposide  Cycle 1 started on 12/4/23.   Presents for cycle 4   Tolerating treatment well overall.   Labs adequate to proceed  Will obtain new imaging prior to seeing Dr. Spicer and Dr. Jimenez in a few weeks to discuss next steps/plan of care     Anemia-  Parameters stable and appropriate on chemotherapy  Will continue to monitor on treatment   Asymptomatic.     Patient is in agreement with the proposed treatment plan. All questions were answered to the patient's satisfaction. Pt knows to call clinic if anything is needed before the next clinic visit.    Patient discussed with collaborating physician Dr. Spicer and urologist Dr. Jimenez.     At least 40 minutes were spent today on this encounter including face to face time with the patient, data gathering/interpretation and documentation.       Dang Brooks, MSN, APRN, [facility]-  Hematology and Medical Oncology  Clinical Nurse Specialist to Dr. Cruz, Dr. Spicer & Dr. Da Silva    Route Chart for Scheduling    Med Onc Chart Routing      Follow up with physician . Please schedule CT CAP 1-2 days prior to visit with Dr. Spicer on 2/27   Follow up with [facility]    Infusion scheduling note    Injection scheduling note    Labs    Imaging    Pharmacy appointment    Other referrals              Treatment Plan Information   OP CISPLATIN 75MG/M2 ETOPOSIDE 100MG/M2 Q3W   Mary Spicer MD   Upcoming Treatment Dates - OP CISPLATIN 75MG/M2 ETOPOSIDE 100MG/M2 Q3W    2/7/2024       Pre-Medications       diphenhydrAMINE injection 25 mg       famotidine (PF) injection 20 mg       hydrocortisone sodium succinate injection 100 mg       Chemotherapy       CISplatin (Platinol) 75 mg/m2 = 158 mg in sodium chloride 0.9% 658 mL chemo infusion       etoposide (VEPESID) 100 mg/m2 = 212 mg in sodium chloride 0.9% 510.6 mL chemo infusion       Pre-Hydration       sodium  chloride 0.9% 1,000 mL with magnesium sulfate 1 g, potassium chloride 20 mEq infusion       Post-Hydration       sodium chloride 0.9% 1,000 mL with magnesium sulfate 1 g, potassium chloride 20 mEq infusion       Antiemetics       aprepitant (CINVANTI) injection 130 mg       palonosetron (ALOXI) 0.25 mg with Dexamethasone (DECADRON) 12 mg in NS 50 mL IVPB  2/8/2024       Pre-Medications       diphenhydrAMINE injection 25 mg       famotidine (PF) injection 20 mg       hydrocortisone sodium succinate injection 100 mg       Chemotherapy       etoposide (VEPESID) 100 mg/m2 = 212 mg in sodium chloride 0.9% 510.6 mL chemo infusion  2/9/2024       Pre-Medications       diphenhydrAMINE injection 25 mg       famotidine (PF) injection 20 mg       hydrocortisone sodium succinate injection 100 mg       Chemotherapy       etoposide (VEPESID) 100 mg/m2 = 212 mg in sodium chloride 0.9% 510.6 mL chemo infusion  2/28/2024       Pre-Medications       diphenhydrAMINE injection 25 mg       famotidine (PF) injection 20 mg       hydrocortisone sodium succinate injection 100 mg       Chemotherapy       CISplatin (Platinol) 75 mg/m2 = 158 mg in sodium chloride 0.9% 658 mL chemo infusion       etoposide (VEPESID) 100 mg/m2 = 212 mg in sodium chloride 0.9% 510.6 mL chemo infusion       Pre-Hydration       sodium chloride 0.9% 1,000 mL with magnesium sulfate 1 g, potassium chloride 20 mEq infusion       Post-Hydration       sodium chloride 0.9% 1,000 mL with magnesium sulfate 1 g, potassium chloride 20 mEq infusion       Antiemetics       aprepitant (CINVANTI) injection 130 mg       palonosetron (ALOXI) 0.25 mg with Dexamethasone (DECADRON) 12 mg in NS 50 mL IVPB

## 2024-02-06 NOTE — PLAN OF CARE
1130 Labs, hx, and medications reviewed, pt meets parameters for treatment today. Assessment completed and plan of care reviewed. Pt verbalized understanding. PAC accessed with no complications. Pt voices no new complaints or concerns, will continue to monitor for safety.

## 2024-02-06 NOTE — PLAN OF CARE
1709 Pt tolerated IVF's, Cisplatin, and Etoposide infusions well today, no complaints or complications,. VSS through duration of treatment. Pt aware to call provider with any questions or concerns and is aware of upcoming appts. Pt ambulatory from clinic with steady gait, no distress noted.

## 2024-02-07 ENCOUNTER — INFUSION (OUTPATIENT)
Dept: INFUSION THERAPY | Facility: HOSPITAL | Age: 69
End: 2024-02-07
Payer: MEDICARE

## 2024-02-07 VITALS
OXYGEN SATURATION: 100 % | TEMPERATURE: 98 F | DIASTOLIC BLOOD PRESSURE: 83 MMHG | HEART RATE: 69 BPM | SYSTOLIC BLOOD PRESSURE: 134 MMHG | RESPIRATION RATE: 16 BRPM

## 2024-02-07 DIAGNOSIS — C67.1 MALIGNANT NEOPLASM OF DOME OF URINARY BLADDER: Primary | ICD-10-CM

## 2024-02-07 PROCEDURE — 63600175 PHARM REV CODE 636 W HCPCS: Mod: HCNC | Performed by: REGISTERED NURSE

## 2024-02-07 PROCEDURE — 96413 CHEMO IV INFUSION 1 HR: CPT | Mod: HCNC

## 2024-02-07 PROCEDURE — 96375 TX/PRO/DX INJ NEW DRUG ADDON: CPT | Mod: HCNC

## 2024-02-07 PROCEDURE — A4216 STERILE WATER/SALINE, 10 ML: HCPCS | Mod: HCNC | Performed by: REGISTERED NURSE

## 2024-02-07 PROCEDURE — 25000003 PHARM REV CODE 250: Mod: HCNC | Performed by: REGISTERED NURSE

## 2024-02-07 RX ORDER — PROCHLORPERAZINE EDISYLATE 5 MG/ML
5 INJECTION INTRAMUSCULAR; INTRAVENOUS ONCE AS NEEDED
Status: DISCONTINUED | OUTPATIENT
Start: 2024-02-07 | End: 2024-02-07 | Stop reason: HOSPADM

## 2024-02-07 RX ORDER — SODIUM CHLORIDE 0.9 % (FLUSH) 0.9 %
10 SYRINGE (ML) INJECTION
Status: DISCONTINUED | OUTPATIENT
Start: 2024-02-07 | End: 2024-02-07 | Stop reason: HOSPADM

## 2024-02-07 RX ORDER — DIPHENHYDRAMINE HYDROCHLORIDE 50 MG/ML
25 INJECTION INTRAMUSCULAR; INTRAVENOUS ONCE
Status: COMPLETED | OUTPATIENT
Start: 2024-02-07 | End: 2024-02-07

## 2024-02-07 RX ORDER — DIPHENHYDRAMINE HYDROCHLORIDE 50 MG/ML
50 INJECTION INTRAMUSCULAR; INTRAVENOUS ONCE AS NEEDED
Status: DISCONTINUED | OUTPATIENT
Start: 2024-02-07 | End: 2024-02-07 | Stop reason: HOSPADM

## 2024-02-07 RX ORDER — EPINEPHRINE 0.3 MG/.3ML
0.3 INJECTION SUBCUTANEOUS ONCE AS NEEDED
Status: DISCONTINUED | OUTPATIENT
Start: 2024-02-07 | End: 2024-02-07 | Stop reason: HOSPADM

## 2024-02-07 RX ORDER — HEPARIN 100 UNIT/ML
500 SYRINGE INTRAVENOUS
Status: DISCONTINUED | OUTPATIENT
Start: 2024-02-07 | End: 2024-02-07 | Stop reason: HOSPADM

## 2024-02-07 RX ORDER — FAMOTIDINE 10 MG/ML
20 INJECTION INTRAVENOUS ONCE
Status: COMPLETED | OUTPATIENT
Start: 2024-02-07 | End: 2024-02-07

## 2024-02-07 RX ADMIN — ETOPOSIDE 212 MG: 20 INJECTION INTRAVENOUS at 02:02

## 2024-02-07 RX ADMIN — HEPARIN 500 UNITS: 100 SYRINGE at 04:02

## 2024-02-07 RX ADMIN — DIPHENHYDRAMINE HYDROCHLORIDE 25 MG: 50 INJECTION, SOLUTION INTRAMUSCULAR; INTRAVENOUS at 02:02

## 2024-02-07 RX ADMIN — HYDROCORTISONE SODIUM SUCCINATE 100 MG: 100 INJECTION, POWDER, FOR SOLUTION INTRAMUSCULAR; INTRAVENOUS at 02:02

## 2024-02-07 RX ADMIN — FAMOTIDINE 20 MG: 10 INJECTION INTRAVENOUS at 02:02

## 2024-02-07 RX ADMIN — Medication 10 ML: at 04:02

## 2024-02-07 NOTE — PLAN OF CARE
1610-Pt tolerated Etoposide infusion well, no complications or side effects, POC and meds discussed with pt, pt aware of upcoming appts, pt knows to call MD with any questions or concerns. Pt ambulated off unit, no distress noted.

## 2024-02-08 ENCOUNTER — INFUSION (OUTPATIENT)
Dept: INFUSION THERAPY | Facility: HOSPITAL | Age: 69
End: 2024-02-08
Payer: MEDICARE

## 2024-02-08 VITALS
SYSTOLIC BLOOD PRESSURE: 139 MMHG | TEMPERATURE: 98 F | WEIGHT: 202.81 LBS | DIASTOLIC BLOOD PRESSURE: 85 MMHG | HEART RATE: 59 BPM | BODY MASS INDEX: 28.39 KG/M2 | RESPIRATION RATE: 18 BRPM | HEIGHT: 71 IN

## 2024-02-08 DIAGNOSIS — C67.1 MALIGNANT NEOPLASM OF DOME OF URINARY BLADDER: Primary | ICD-10-CM

## 2024-02-08 PROCEDURE — 96377 APPLICATON ON-BODY INJECTOR: CPT | Mod: HCNC

## 2024-02-08 PROCEDURE — A4216 STERILE WATER/SALINE, 10 ML: HCPCS | Mod: HCNC | Performed by: REGISTERED NURSE

## 2024-02-08 PROCEDURE — 63600175 PHARM REV CODE 636 W HCPCS: Mod: HCNC | Performed by: REGISTERED NURSE

## 2024-02-08 PROCEDURE — 96413 CHEMO IV INFUSION 1 HR: CPT | Mod: HCNC

## 2024-02-08 PROCEDURE — 96375 TX/PRO/DX INJ NEW DRUG ADDON: CPT | Mod: HCNC

## 2024-02-08 PROCEDURE — 25000003 PHARM REV CODE 250: Mod: HCNC | Performed by: REGISTERED NURSE

## 2024-02-08 RX ORDER — EPINEPHRINE 0.3 MG/.3ML
0.3 INJECTION SUBCUTANEOUS ONCE AS NEEDED
Status: DISCONTINUED | OUTPATIENT
Start: 2024-02-08 | End: 2024-02-08 | Stop reason: HOSPADM

## 2024-02-08 RX ORDER — HEPARIN 100 UNIT/ML
500 SYRINGE INTRAVENOUS
Status: DISCONTINUED | OUTPATIENT
Start: 2024-02-08 | End: 2024-02-08 | Stop reason: HOSPADM

## 2024-02-08 RX ORDER — FAMOTIDINE 10 MG/ML
20 INJECTION INTRAVENOUS ONCE
Status: COMPLETED | OUTPATIENT
Start: 2024-02-08 | End: 2024-02-08

## 2024-02-08 RX ORDER — DIPHENHYDRAMINE HYDROCHLORIDE 50 MG/ML
25 INJECTION INTRAMUSCULAR; INTRAVENOUS ONCE
Status: COMPLETED | OUTPATIENT
Start: 2024-02-08 | End: 2024-02-08

## 2024-02-08 RX ORDER — DIPHENHYDRAMINE HYDROCHLORIDE 50 MG/ML
50 INJECTION INTRAMUSCULAR; INTRAVENOUS ONCE AS NEEDED
Status: DISCONTINUED | OUTPATIENT
Start: 2024-02-08 | End: 2024-02-08 | Stop reason: HOSPADM

## 2024-02-08 RX ORDER — PROCHLORPERAZINE EDISYLATE 5 MG/ML
5 INJECTION INTRAMUSCULAR; INTRAVENOUS ONCE AS NEEDED
Status: DISCONTINUED | OUTPATIENT
Start: 2024-02-08 | End: 2024-02-08 | Stop reason: HOSPADM

## 2024-02-08 RX ORDER — SODIUM CHLORIDE 0.9 % (FLUSH) 0.9 %
10 SYRINGE (ML) INJECTION
Status: DISCONTINUED | OUTPATIENT
Start: 2024-02-08 | End: 2024-02-08 | Stop reason: HOSPADM

## 2024-02-08 RX ADMIN — Medication 10 ML: at 02:02

## 2024-02-08 RX ADMIN — SODIUM CHLORIDE: 9 INJECTION, SOLUTION INTRAVENOUS at 12:02

## 2024-02-08 RX ADMIN — ETOPOSIDE 212 MG: 20 INJECTION INTRAVENOUS at 01:02

## 2024-02-08 RX ADMIN — PEGFILGRASTIM 6 MG: KIT SUBCUTANEOUS at 02:02

## 2024-02-08 RX ADMIN — HYDROCORTISONE SODIUM SUCCINATE 100 MG: 100 INJECTION, POWDER, FOR SOLUTION INTRAMUSCULAR; INTRAVENOUS at 01:02

## 2024-02-08 RX ADMIN — DIPHENHYDRAMINE HYDROCHLORIDE 25 MG: 50 INJECTION, SOLUTION INTRAMUSCULAR; INTRAVENOUS at 12:02

## 2024-02-08 RX ADMIN — FAMOTIDINE 20 MG: 10 INJECTION INTRAVENOUS at 12:02

## 2024-02-08 RX ADMIN — HEPARIN 500 UNITS: 100 SYRINGE at 02:02

## 2024-02-08 NOTE — PLAN OF CARE
1315 pt here for D3C4 Etoposide infuison, labs, hx, meds, allergies reviewed, pt with no new complaints at this time, reclined in chair, continue to monitor

## 2024-02-08 NOTE — PLAN OF CARE
1439 pt tolerated etoposide infuson without issue, OBI activated on right abdomen, no distress noted upon d/c to home

## 2024-02-12 PROBLEM — J12.82 PNEUMONIA DUE TO COVID-19 VIRUS: Status: RESOLVED | Noted: 2021-08-08 | Resolved: 2024-02-12

## 2024-02-12 PROBLEM — U07.1 PNEUMONIA DUE TO COVID-19 VIRUS: Status: RESOLVED | Noted: 2021-08-08 | Resolved: 2024-02-12

## 2024-02-14 ENCOUNTER — TELEPHONE (OUTPATIENT)
Dept: HEMATOLOGY/ONCOLOGY | Facility: CLINIC | Age: 69
End: 2024-02-14
Payer: MEDICARE

## 2024-02-22 RX ORDER — TAMSULOSIN HYDROCHLORIDE 0.4 MG/1
0.4 CAPSULE ORAL DAILY
Qty: 90 CAPSULE | Refills: 3 | Status: ON HOLD | OUTPATIENT
Start: 2024-02-22 | End: 2024-03-19 | Stop reason: HOSPADM

## 2024-02-23 ENCOUNTER — HOSPITAL ENCOUNTER (OUTPATIENT)
Dept: RADIOLOGY | Facility: HOSPITAL | Age: 69
Discharge: HOME | End: 2024-02-23
Attending: REGISTERED NURSE
Payer: MEDICARE

## 2024-02-23 DIAGNOSIS — C67.1 MALIGNANT NEOPLASM OF DOME OF URINARY BLADDER: ICD-10-CM

## 2024-02-23 PROCEDURE — 74177 CT ABD & PELVIS W/CONTRAST: CPT | Mod: 26,HCNC,, | Performed by: RADIOLOGY

## 2024-02-23 PROCEDURE — 25500020 PHARM REV CODE 255: Mod: HCNC | Performed by: REGISTERED NURSE

## 2024-02-23 PROCEDURE — 71260 CT THORAX DX C+: CPT | Mod: 26,HCNC,, | Performed by: RADIOLOGY

## 2024-02-23 PROCEDURE — 74177 CT ABD & PELVIS W/CONTRAST: CPT | Mod: TC,HCNC

## 2024-02-23 RX ADMIN — IOHEXOL 100 ML: 350 INJECTION, SOLUTION INTRAVENOUS at 04:02

## 2024-02-27 ENCOUNTER — TELEPHONE (OUTPATIENT)
Dept: UROLOGY | Facility: CLINIC | Age: 69
End: 2024-02-27
Payer: MEDICARE

## 2024-02-27 ENCOUNTER — OFFICE VISIT (OUTPATIENT)
Dept: HEMATOLOGY/ONCOLOGY | Facility: CLINIC | Age: 69
End: 2024-02-27
Payer: MEDICARE

## 2024-02-27 ENCOUNTER — LAB VISIT (OUTPATIENT)
Dept: LAB | Facility: HOSPITAL | Age: 69
End: 2024-02-27
Attending: INTERNAL MEDICINE
Payer: MEDICARE

## 2024-02-27 VITALS
DIASTOLIC BLOOD PRESSURE: 68 MMHG | BODY MASS INDEX: 27.1 KG/M2 | WEIGHT: 200.06 LBS | HEIGHT: 72 IN | OXYGEN SATURATION: 99 % | TEMPERATURE: 97 F | RESPIRATION RATE: 17 BRPM | HEART RATE: 81 BPM | SYSTOLIC BLOOD PRESSURE: 118 MMHG

## 2024-02-27 DIAGNOSIS — D64.9 NORMOCYTIC ANEMIA: ICD-10-CM

## 2024-02-27 DIAGNOSIS — C67.9 BLADDER CANCER: ICD-10-CM

## 2024-02-27 DIAGNOSIS — C67.9 MALIGNANT NEOPLASM OF URINARY BLADDER, UNSPECIFIED SITE: Primary | ICD-10-CM

## 2024-02-27 DIAGNOSIS — C67.1 MALIGNANT NEOPLASM OF DOME OF URINARY BLADDER: Primary | ICD-10-CM

## 2024-02-27 DIAGNOSIS — C67.1 MALIGNANT NEOPLASM OF DOME OF URINARY BLADDER: ICD-10-CM

## 2024-02-27 LAB
ALBUMIN SERPL BCP-MCNC: 4 G/DL (ref 3.5–5.2)
ALP SERPL-CCNC: 74 U/L (ref 55–135)
ALT SERPL W/O P-5'-P-CCNC: 13 U/L (ref 10–44)
ANION GAP SERPL CALC-SCNC: 8 MMOL/L (ref 8–16)
AST SERPL-CCNC: 11 U/L (ref 10–40)
BASOPHILS # BLD AUTO: 0.06 K/UL (ref 0–0.2)
BASOPHILS NFR BLD: 0.5 % (ref 0–1.9)
BILIRUB SERPL-MCNC: 0.2 MG/DL (ref 0.1–1)
BUN SERPL-MCNC: 24 MG/DL (ref 8–23)
CALCIUM SERPL-MCNC: 10 MG/DL (ref 8.7–10.5)
CHLORIDE SERPL-SCNC: 108 MMOL/L (ref 95–110)
CO2 SERPL-SCNC: 23 MMOL/L (ref 23–29)
CREAT SERPL-MCNC: 1.2 MG/DL (ref 0.5–1.4)
DIFFERENTIAL METHOD BLD: ABNORMAL
EOSINOPHIL # BLD AUTO: 0.1 K/UL (ref 0–0.5)
EOSINOPHIL NFR BLD: 0.5 % (ref 0–8)
ERYTHROCYTE [DISTWIDTH] IN BLOOD BY AUTOMATED COUNT: 18 % (ref 11.5–14.5)
EST. GFR  (NO RACE VARIABLE): >60 ML/MIN/1.73 M^2
GLUCOSE SERPL-MCNC: 97 MG/DL (ref 70–110)
HCT VFR BLD AUTO: 32.1 % (ref 40–54)
HGB BLD-MCNC: 10.5 G/DL (ref 14–18)
IMM GRANULOCYTES # BLD AUTO: 0.28 K/UL (ref 0–0.04)
IMM GRANULOCYTES NFR BLD AUTO: 2.5 % (ref 0–0.5)
LYMPHOCYTES # BLD AUTO: 2.9 K/UL (ref 1–4.8)
LYMPHOCYTES NFR BLD: 26.6 % (ref 18–48)
MCH RBC QN AUTO: 31.3 PG (ref 27–31)
MCHC RBC AUTO-ENTMCNC: 32.7 G/DL (ref 32–36)
MCV RBC AUTO: 96 FL (ref 82–98)
MONOCYTES # BLD AUTO: 1.1 K/UL (ref 0.3–1)
MONOCYTES NFR BLD: 10.2 % (ref 4–15)
NEUTROPHILS # BLD AUTO: 6.6 K/UL (ref 1.8–7.7)
NEUTROPHILS NFR BLD: 59.7 % (ref 38–73)
NRBC BLD-RTO: 0 /100 WBC
PLATELET # BLD AUTO: 410 K/UL (ref 150–450)
PMV BLD AUTO: 10.4 FL (ref 9.2–12.9)
POTASSIUM SERPL-SCNC: 5.1 MMOL/L (ref 3.5–5.1)
PROT SERPL-MCNC: 7.3 G/DL (ref 6–8.4)
RBC # BLD AUTO: 3.36 M/UL (ref 4.6–6.2)
SODIUM SERPL-SCNC: 139 MMOL/L (ref 136–145)
WBC # BLD AUTO: 11.03 K/UL (ref 3.9–12.7)

## 2024-02-27 PROCEDURE — 1159F MED LIST DOCD IN RCRD: CPT | Mod: HCNC,CPTII,S$GLB, | Performed by: INTERNAL MEDICINE

## 2024-02-27 PROCEDURE — 85025 COMPLETE CBC W/AUTO DIFF WBC: CPT | Mod: HCNC | Performed by: INTERNAL MEDICINE

## 2024-02-27 PROCEDURE — 3074F SYST BP LT 130 MM HG: CPT | Mod: HCNC,CPTII,S$GLB, | Performed by: INTERNAL MEDICINE

## 2024-02-27 PROCEDURE — 1126F AMNT PAIN NOTED NONE PRSNT: CPT | Mod: HCNC,CPTII,S$GLB, | Performed by: INTERNAL MEDICINE

## 2024-02-27 PROCEDURE — 3008F BODY MASS INDEX DOCD: CPT | Mod: HCNC,CPTII,S$GLB, | Performed by: INTERNAL MEDICINE

## 2024-02-27 PROCEDURE — 1160F RVW MEDS BY RX/DR IN RCRD: CPT | Mod: HCNC,CPTII,S$GLB, | Performed by: INTERNAL MEDICINE

## 2024-02-27 PROCEDURE — 1101F PT FALLS ASSESS-DOCD LE1/YR: CPT | Mod: HCNC,CPTII,S$GLB, | Performed by: INTERNAL MEDICINE

## 2024-02-27 PROCEDURE — 3288F FALL RISK ASSESSMENT DOCD: CPT | Mod: HCNC,CPTII,S$GLB, | Performed by: INTERNAL MEDICINE

## 2024-02-27 PROCEDURE — 99215 OFFICE O/P EST HI 40 MIN: CPT | Mod: HCNC,S$GLB,, | Performed by: INTERNAL MEDICINE

## 2024-02-27 PROCEDURE — 3078F DIAST BP <80 MM HG: CPT | Mod: HCNC,CPTII,S$GLB, | Performed by: INTERNAL MEDICINE

## 2024-02-27 PROCEDURE — 36415 COLL VENOUS BLD VENIPUNCTURE: CPT | Mod: HCNC | Performed by: INTERNAL MEDICINE

## 2024-02-27 PROCEDURE — 99999 PR PBB SHADOW E&M-EST. PATIENT-LVL V: CPT | Mod: PBBFAC,HCNC,, | Performed by: INTERNAL MEDICINE

## 2024-02-27 PROCEDURE — 80053 COMPREHEN METABOLIC PANEL: CPT | Mod: HCNC | Performed by: INTERNAL MEDICINE

## 2024-02-27 RX ORDER — HEPARIN SODIUM 5000 [USP'U]/ML
5000 INJECTION, SOLUTION INTRAVENOUS; SUBCUTANEOUS
Status: CANCELLED | OUTPATIENT
Start: 2024-03-20 | End: 2024-03-20

## 2024-02-27 RX ORDER — ALVIMOPAN 12 MG/1
12 CAPSULE ORAL
Status: CANCELLED | OUTPATIENT
Start: 2024-03-20 | End: 2024-03-20

## 2024-02-27 NOTE — PROGRESS NOTES
Subjective     Patient ID: Med Palma is a 68 y.o. male.    Chief Complaint: Malignant neoplasm of dome of urinary bladder    HPI    Presents for follow up and scan results    Diagnosis: Small cell bladder cancer  Referring MD: Dr. Jimenez     Reviewed below scan results:  - 2/23/2024 CT C/A/P:  FINDINGS:  In the chest, visualized thyroid is normal.  Multiple scattered nonenlarged mediastinal nodes similar.  8 mm right hilar node series 2, image 69 also similar.  There is a 13 mm right lower hilar node image 76.  Small hiatal hernia.  No significant pleural or pericardial fluid.  No axillary adenopathy.  Evaluation lungs demonstrate scattered fibrotic opacities.  No convincing mass lesions.  In the abdomen, liver is normal in size.  No focal hepatic mass.  Gallbladder mildly distended.  No wall thickening or stones.  Pancreas and spleen are normal.  No adrenal masses.  Kidneys are similar in size.  Some cortical loss left kidney again noted.  Hypodensity left kidney too small to characterize.  No hydronephrosis.  Ureters fairly well visualized to the bladder and no obstructive uropathy.  Aorta tapers normally.  Nonenlarged para-aortic nodes.  In the pelvis, no convincing pelvic adenopathy.  Previously noted 1.9 cm left external iliac node measures only a few mm in short axis on today's study.  Prostate is enlarged 4.9 cm.  Lobulated soft tissue density extends into the bladder similar. There is some residual wall thickening left posterosuperior aspect of the bladder measuring up to 12 mm series 3, image 143.  This extends caudally along the lateral aspect of the bladder.  Some soft tissue stranding noted.  Scattered stool and bowel gas.  No focal dilatation.  No convincing mesenteric adenopathy.  Bones are fairly well mineralized.  Degenerative changes are noted.  No convincing lytic nor blastic lesion.  Impression:  History of bladder cancer with residual approximately 12 mm thickening left posterosuperior  aspect of the bladder wall.  Some adjacent soft tissue stranding.  Previously enlarged left pelvic node measures only a few mm short axis on today's study.  Prostate is enlarged and there is lobular soft tissue extending into the bladder base.  13 mm right lower hilar node similar to prior chest CT November 2023.  Additional scattered mediastinal nodes as above.  Metastases not excluded.  Additional findings above.  This report was flagged in Epic as abnormal.    Reports recovering from chemotherapy well  He has gained 3 lbs  Energy level recovering well  Taste and smell have returned  No pain complaints  Reports urination is normal  No fevers, chills or infectious complaints  Chronic ringing in his ears without change and mild hearing loss  Mild neuropathy - unchanged     Oncology History:  - gross hematuria in September 2023 while traveling in [place] (initially wondered if related to exercise and poor hydration) and resolved on return home he was due for annual PCP visit and he recommended some additional blood work  That same night he had another episode and again resolved with hydration but after occurred several times over a few weeks he sought further evaluation  Minimal discomfort noted with this (states he had a prior bladder infection and it felt like this)  Over summer months had noted occasional mild dysuria preceding this   --Notes prior episode of possible hematuria in 2016 with heavy exercise and poor hydration resolved after 1 day (related to exercise)     - 10/21/2023 CT Urogram Abdomen/Pelvis:  FINDINGS:  : Kidneys are symmetric in size.  Precontrast imaging shows no stones or hydronephrosis.  Kidneys concentrate and excrete contrast appropriately on postcontrast imaging.  No focal filling defects.  Lobulated margins along the lateral aspect of the left kidney has the appearance of scarring.  Heterogeneously enhancing mass along the dorsal lateral aspect of the urinary bladder on the left  estimated at 3.8 x 3.6 cm.  Enlarged left pelvic lymph node measures 1.9 cm in short axis dimension.  Enlarged, lobulated prostate gland bulging into the bladder base, similar compared to prior MRI.  CT:  Lung bases show subsegmental atelectasis or scarring.  Liver is homogeneous.  Gallbladder is unremarkable.  Bile ducts, spleen, pancreas, and adrenal glands are unremarkable.  Stomach and loops of bowel are normal in caliber.  No free fluid in the pelvis.  Regional skeleton shows degenerative change.  Impression:  Heterogeneously enhancing mass along the dorsal lateral aspect of the urinary bladder on the left most concerning for primary bladder malignancy.  Enlarged left pelvic lymph node most concerning for metastatic disease. Negative for obstructive uropathy.  Prostatomegaly with the prostate bulging into the bladder base, similar compared to prior MRI.  This report was flagged in Epic as abnormal.     - 10/26/2023 TURBT:  Pathology:  1. Bladder, tumor, superficial resection:   Small cell carcinoma.   Muscularis propria is present and is positive for carcinoma.     2. Bladder, base of tumor, resection:Small cell carcinoma.   Muscularis propria is present and is positive for carcinoma.   Malignant cells are positive for AE1/AE3, TTF1, Synaptophysin, and Chromogranin (rare positive cells) immunostains, and negative for GATA3, CK7, CK20, and p63 immunostains. Ki67 immunostain shows a proliferative index of >95%. The morphology and immunophenotype are compatible with small cell carcinoma.      Prior evaluation for elevated PSA- dx with BPH  - 3/3/46335 MRI Prostate:  FINDINGS:  Previous biopsy: TRUS PBx on 1/26/15 - negative  PSA: 5.8 ng/mL 09/14/2021 (previously 5.9 on 07/07/2021, 3.1 on 05/12/2017, 5.9 on 12/11/2015)  Prior therapy: None  Prostate: 5.1 x 3.8 x 7.2 cm corresponding to a computed volume of 65 cc.  Peripheral zone: 1 focal area of signal abnormality as described below:  Lesion (ANDERSON) #3  Location:  Side: Right; Region: Base; Zone: posterior peripheral zone laterally  Greatest dimension: 1.2 cm  T2-WI: T2 SI: Heterogeneous signal intensity or noncircumscribed, rounded, moderate hypointensity--score 3  DWI/ADC: DWI: Focal mild/moderate hypointense on ADC and isointense/mild hyperintense on high b-value DWI--score 3  DCE: No early enhancement  Extraprostatic extension: Abuts the adjacent right posterolateral capsular margin.  PI-RADS assessment category: 3  Transitional zone:  TZ abnormalities: Benign prostatic hyperplasia with at least 2 focal areas of signal abnormality do not completely conform to a BPH nodule.  The 2 lesions are detailed below:  Lesion (ANDERSON) #1  Location: left; region: mid; zone: anterior (more medial compared to lesion (ANDERSON) #2.)  Greatest dimension: 1.3 cm  T2-WI/SI: Heterogeneous signal intensity with obscured margins; score 3.  DWI/ADC: Focal mildly/moderately hypointense on ADC and isointense/mildly hyperintense on high b-value DWI; score 3  DCE: Positive  Extraprostatic extension: No  PI-RADS assessment category: 3  Lesion (ANDERSON) #2  Location: Left; region: Mid; zone: Anterior (lateral and more posterior compared to lesion (ANDERSON) #1  Greatest dimension: 1.1 cm  T2-WI/SI: Heterogeneous signal intensity with obscured margins; score 3.  DWI/ADC: Focal mildly/moderately hypointense on ADC and isointense/mildly hyperintense on high b-value DWI; score 3.  DCE: Positive  Extraprostatic extension: Abuts the adjacent left lateral capsular margin.  PI-RADS assessment category: 3  Neurovascular bundle: Unremarkable  Seminal vesicles:  SV invasion: Negative.  Adjacent Organ Involvement: Prominent median lobe of the prostate protruding into the base the bladder.  No focal bladder wall thickening.  No rectal involvement.  Lymphadenopathy: No pathologically enlarged pelvic lymph nodes.  Other Findings: None  Impression:  1. There are three PI-RADS 3 lesions, 1 within the right posterolateral peripheral  zone and 2 within the left anterior transition zone of the mid to base of the prostate.  Two lesions abut the capsular margin, as described above, without involvement of the seminal vesicles.  No pathologically enlarged pelvic lymph nodes.  2. BPH with a very prominent median lobe protruding into the base of the bladder.  Overall Assessment:  PIRADS 3 (the presence of clinically significant cancer is equivocal)  Number of targets created for potential MR/US fusion biopsy  Peripheral zone: 1  Transition zone: 2     - 5/2/2022 TRUS and biopsies:  Pathology:  RELIAPATH DIAGNOSIS:   A.   PROSTATE, LEFT APEX, NEEDLE BIOPSY:          Benign prostatic tissue with chronic inflammation, see comment.   (R97.20)   B.   PROSTATE, LEFT MID, NEEDLE BIOPSY:         Benign prostatic tissue with chronic inflammation, see comment.   (R97.20)   C.   PROSTATE, LEFT BASE, NEEDLE BIOPSY:         Benign prostatic tissue.  (R97.20)   D.   PROSTATE, RIGHT APEX, NEEDLE BIOPSY:          Benign prostatic tissue with chronic inflammation, see comment.   (R97.20)   E.   PROSTATE, RIGHT MID, NEEDLE BIOPSY:          Benign prostatic tissue with chronic inflammation, see comment.   (R97.20)   F.   PROSTATE, RIGHT BASE, NEEDLE BIOPSY:          Benign prostatic tissue with chronic inflammation, see comment.   (R97.20)   G.   TISSUE1          PROSTATE, TARGET LESION #1, BIOPSY:          -Benign prostatic tissue with chronic inflammation, see comment.   (R97.20)   H.   TISSUE2          PROSTATE, TARGET LESION #2, BIOPSY:          -Benign prostatic tissue .  (R97.20)   I.     TISSUE3          PROSTATE, TARGET LESION #3, BIOPSY:          -Benign prostatic tissue with chronic inflammation .  (R97.20)      Most Recent Scans:  - 11/9/2023 Bone scan:  FINDINGS:  There is physiologic distribution of the radiopharmaceutical throughout the skeleton.  There is normal uptake in the bilateral kidneys and soft tissues.  Bladder is significantly distended with  abnormal contour, likely due to known bladder mass.  Impression:  No scintigraphic evidence of osteoblastic metastatic disease.     - 11/7/2023 CT Chest:  FINDINGS:  Chest wall and lower neck: No axillary or supraclavicular lymphadenopathy.  Lungs and pleura: Biapical pleuroparenchymal thickening compatible with scar.  Focal air cystic spaces noted in the right upper lobe (image 154 sequence 4) suggestive of scar.  Multiple sub pleural bandlike scars are noted in the upper and lower lobes.  No pleural effusion or airspace consolidation is noted.  Mediastinum and christy: Right hilar node measuring 15 mm (short axis).  No mediastinal lymphadenopathy is noted.  Heart and pericardium: Heart size is normal.No pericardial effusion.  Vessels: Mild atheromatous disease is seen in the aorta.   The coronary arteries show mild atheromatous disease.  Upper abdomen: Cortical irregularity noted in the left kidney compatible with scars.  Both kidneys are excreting contrast during imaging acquisition.  Bones: Biomechanical changes are noted in the thoracic spine with osteophyte formations.  Schmorl nodes are noted in the thoracic spine.  Impression:  1. Multifocal bilateral pulmonary scars likely representing sequela prior infectious process to correlate with patient history.  No honeycombing is seen.  2. Right hilar node within maximum normal limits, nonspecific.  3. Left renal scar.  4. Additional findings.     - 11/7/2023 MRI Brain:  FINDINGS:  There is no midline shift, hydrocephalus or mass effect.  No acute infarction or acute intracranial hemorrhage.  A few small foci of T2/FLAIR signal in the supratentorial white matter while nonspecific most likely mild chronic microvascular ischemic changes.  Small focus of bright T2 signal adjacent to the posteromedial temporal lobe likely an incidental choroidal fissure cyst.  No enhancing lesions to indicate intracranial metastatic disease.  Small (1 cm) focus of increased FLAIR signal  in the posterior fossa near the foramen of Magendie.  This demonstrates gradient signal presumably relating to calcifications with no abnormal enhancement.  No significant mass effect.  No evidence of associated hydrocephalus.  No abnormal extra-axial fluid collections.  The major skull base flow voids present.  Diffuse patchy mucosal membrane thickening in the paranasal sinuses with aerated secretions and small air-fluid levels.  Trace mastoid fluid on the left.  Impression:  No acute intracranial abnormalities, specifically no evidence of intracranial metastatic disease.  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  No associated hydrocephalus at this time.  Recommend surveillance to ensure stability.  Paranasal sinus disease.  This report was flagged in Epic as abnormal.     - initiated chemo with Cis/Etoposide on 2023    PMH:  Covid pneumonia- recovered  BPH  Arthritis- hips, low back, shoulders, knees- prior PT and dry needling helped significantly  Deviated septum surgery  Anemia     FH:  Dad-  of Multiple Myeloma at age 60 yo  Sister- breast cancer survivor (dx in her mid 70s)  Mother lived until age 94 yo-  from natural causes, dementia     SH:  Retired   [employer] management  Prior chemical and environmental exposures at work  + tobacco- last 20-25 years 1/2 pack in a weekend   Dips tobacco     + social EtOH    3 children    Review of Systems   Constitutional:  Negative for activity change, appetite change, chills, fatigue, fever and unexpected weight change.   HENT:  Positive for hearing loss (stable). Negative for nasal congestion, dental problem, postnasal drip, rhinorrhea, sinus pressure/congestion and trouble swallowing.    Eyes:  Negative for visual disturbance (wears bifocals).   Respiratory:  Negative for cough, shortness of breath and wheezing.    Cardiovascular:  Negative for chest pain, palpitations and leg swelling.   Gastrointestinal:   Negative for abdominal distention, abdominal pain, change in bowel habit, constipation, diarrhea, nausea, vomiting and reflux.   Genitourinary:  Negative for decreased urine volume, difficulty urinating, dysuria, frequency, hematuria and urgency.   Musculoskeletal:  Negative for arthralgias, back pain, gait problem, myalgias and joint deformity.   Integumentary:  Negative for rash and wound.   Neurological:  Positive for numbness. Negative for dizziness, speech difficulty, weakness, headaches and coordination difficulties.   Hematological:  Negative for adenopathy. Does not bruise/bleed easily.   Psychiatric/Behavioral:  Negative for agitation, behavioral problems, confusion, dysphoric mood and sleep disturbance. The patient is not nervous/anxious.           Objective     Physical Exam  Vitals and nursing note reviewed.   Constitutional:       General: He is not in acute distress.     Appearance: Normal appearance. He is well-developed and normal weight. He is not ill-appearing.      Comments: Presents with his wife and closest friend  Very pleasant  ECOG= 0   HENT:      Head: Normocephalic and atraumatic.      Comments: Chemo alopecia  Eyes:      General: No scleral icterus.     Extraocular Movements: Extraocular movements intact.      Conjunctiva/sclera: Conjunctivae normal.      Pupils: Pupils are equal, round, and reactive to light.   Neck:      Thyroid: No thyromegaly.      Trachea: No tracheal deviation.   Cardiovascular:      Rate and Rhythm: Normal rate and regular rhythm.      Heart sounds: Normal heart sounds. No murmur heard.     No friction rub. No gallop.   Pulmonary:      Effort: Pulmonary effort is normal. No respiratory distress.      Breath sounds: Normal breath sounds. No wheezing, rhonchi or rales.   Chest:      Chest wall: No tenderness.      Comments: RCW port  Abdominal:      General: Abdomen is flat. Bowel sounds are normal. There is no distension.      Palpations: Abdomen is soft. There is  no mass.      Tenderness: There is no abdominal tenderness. There is no guarding or rebound.      Comments: No organomegaly   Musculoskeletal:         General: No swelling, tenderness or deformity. Normal range of motion.      Cervical back: Normal range of motion and neck supple. No rigidity or tenderness.      Right lower leg: No edema.      Left lower leg: No edema.   Lymphadenopathy:      Cervical: No cervical adenopathy.   Skin:     General: Skin is warm and dry.      Coloration: Skin is not jaundiced or pale.      Findings: No erythema, lesion or rash.   Neurological:      General: No focal deficit present.      Mental Status: He is alert and oriented to person, place, and time. Mental status is at baseline.      Sensory: No sensory deficit.      Motor: No weakness or abnormal muscle tone.      Coordination: Coordination normal.      Gait: Gait normal.      Deep Tendon Reflexes: Reflexes are normal and symmetric. Reflexes normal.   Psychiatric:         Mood and Affect: Mood normal.         Behavior: Behavior normal.         Thought Content: Thought content normal.         Judgment: Judgment normal.     Labs- reviewed     Assessment and Plan     1. Malignant neoplasm of dome of urinary bladder  -     CBC W/ AUTO DIFFERENTIAL; Future; Expected date: 02/27/2024  -     CMP; Future; Expected date: 02/27/2024  -     CBC W/ AUTO DIFFERENTIAL; Future; Expected date: 02/27/2024  -     CMP; Future; Expected date: 02/27/2024    2. Normocytic anemia      Bladder cancer- small cell variant  Completed 4 cycles of chemo  Scans with response- we discussed findings  LNs in hilar area remain stable and best plan is to monitor these going forward as indeterminate  Known disease has responded  Check labs today    Sees Dr. Jimenez next week  Will need strict surveillance after surgery and we will review final pathology    Anemia-  Recheck parameters today     Route Chart for Scheduling    Med Onc Chart Routing      Follow up with  physician . RTC 4-5 weeks   Follow up with [facility]    Infusion scheduling note    Injection scheduling note    Labs    Imaging    Pharmacy appointment    Other referrals            Treatment Plan Information   OP CISPLATIN 75MG/M2 ETOPOSIDE 100MG/M2 Q3W   Mary Spicer MD   Upcoming Treatment Dates - OP CISPLATIN 75MG/M2 ETOPOSIDE 100MG/M2 Q3W    2/28/2024       Pre-Medications       diphenhydrAMINE injection 25 mg       famotidine (PF) injection 20 mg       hydrocortisone sodium succinate injection 100 mg       Chemotherapy       CISplatin (Platinol) 75 mg/m2 = 158 mg in sodium chloride 0.9% 658 mL chemo infusion       etoposide (VEPESID) 100 mg/m2 = 212 mg in sodium chloride 0.9% 510.6 mL chemo infusion       Pre-Hydration       sodium chloride 0.9% 1,000 mL with magnesium sulfate 1 g, potassium chloride 20 mEq infusion       Post-Hydration       sodium chloride 0.9% 1,000 mL with magnesium sulfate 1 g, potassium chloride 20 mEq infusion       Antiemetics       aprepitant (CINVANTI) injection 130 mg       palonosetron (ALOXI) 0.25 mg with Dexamethasone (DECADRON) 12 mg in NS 50 mL IVPB  2/29/2024       Pre-Medications       diphenhydrAMINE injection 25 mg       famotidine (PF) injection 20 mg       hydrocortisone sodium succinate injection 100 mg       Chemotherapy       etoposide (VEPESID) 100 mg/m2 = 212 mg in sodium chloride 0.9% 510.6 mL chemo infusion  3/1/2024       Pre-Medications       diphenhydrAMINE injection 25 mg       famotidine (PF) injection 20 mg       hydrocortisone sodium succinate injection 100 mg       Chemotherapy       etoposide (VEPESID) 100 mg/m2 = 212 mg in sodium chloride 0.9% 510.6 mL chemo infusion  3/20/2024       Pre-Medications       diphenhydrAMINE injection 25 mg       famotidine (PF) injection 20 mg       hydrocortisone sodium succinate injection 100 mg       Chemotherapy       CISplatin (Platinol) 75 mg/m2 = 158 mg in sodium chloride 0.9% 658 mL chemo infusion       etoposide  (VEPESID) 100 mg/m2 = 212 mg in sodium chloride 0.9% 510.6 mL chemo infusion       Pre-Hydration       sodium chloride 0.9% 1,000 mL with magnesium sulfate 1 g, potassium chloride 20 mEq infusion       Post-Hydration       sodium chloride 0.9% 1,000 mL with magnesium sulfate 1 g, potassium chloride 20 mEq infusion       Antiemetics       aprepitant (CINVANTI) injection 130 mg       palonosetron (ALOXI) 0.25 mg with Dexamethasone (DECADRON) 12 mg in NS 50 mL IVPB

## 2024-02-27 NOTE — TELEPHONE ENCOUNTER
Discussed with Dr Spicer  Will schedule cystectomy for 3/20 at [facility]  Preop appt with me on 3/5 at [facility]

## 2024-03-04 NOTE — H&P (VIEW-ONLY)
Subjective:      Med Palma is a 68 y.o. male who returns today regarding his     Preop for cystectomy  Last dose of chemo was 1 month ago  EP with Dr Dannielle Elaine great!  Eating well  Weight is good    OP CISPLATIN 75MG/M2 ETOPOSIDE 100MG/M2 Q3W    Current Cycle Treatment Dates Line of Treatment Treatment Goal Treatment Plan Provider Treatment Department Status Auth Status   5 of 6 cycles 11/27/2023 to 3/22/2024 Neoadjuvant Curative Mary Spicer MD [facility] - Infusion Active    Protocol   OP CISPLATIN 75MG/M2 ETOPOSIDE 100MG/M2 Q3W - As of 11/13/2023 9:09 AM       .    The following portions of the patient's history were reviewed and updated as appropriate: allergies, current medications, past family history, past medical history, past social history, past surgical history and problem list.    Review of Systems  A comprehensive multipoint review of systems was negative except as otherwise stated in the HPI.    Past Medical History:   Diagnosis Date    Allergy     Arthritis     Elevated PSA     Hypertension      Past Surgical History:   Procedure Laterality Date    TURBT (TRANSURETHRAL RESECTION OF BLADDER TUMOR) N/A 10/26/2023    Procedure: TURBT (TRANSURETHRAL RESECTION OF BLADDER TUMOR);  Surgeon: Yinka Jimenez MD;  Location: [facility];  Service: Urology;  Laterality: N/A;       Review of patient's allergies indicates:  No Known Allergies       Objective:   Vitals: There were no vitals taken for this visit.    Physical Exam   General: alert and oriented, no acute distress  Respiratory: Symmetric expansion, non-labored breathing  Cardiovascular: no peripheral edema  Abdomen: soft, non distended  No scars  Skin: normal coloration and turgor, no rashes, no suspicious skin lesions noted  Neuro: no gross deficits  Psych: normal judgment and insight, normal mood/affect, and non-anxious    Physical Exam    Lab Review   Urinalysis demonstrates no specimen    Lab Results   Component Value Date    WBC  11.03 02/27/2024    HGB 10.5 (L) 02/27/2024    HCT 32.1 (L) 02/27/2024    MCV 96 02/27/2024     02/27/2024     Lab Results   Component Value Date    CREATININE 1.2 02/27/2024    BUN 24 (H) 02/27/2024       Imaging  CT CHEST ABDOMEN PELVIS WITH IV CONTRAST (XPD)     CLINICAL HISTORY:  Metastatic disease evaluation;Bladder cancer, invasive, assess treatment response;bladder cancer, small cell variant; Malignant neoplasm of dome of bladder     TECHNIQUE:  Low dose axial images, sagittal and coronal reformations were obtained from the thoracic inlet to the pubic symphysis following the IV administration of 100 mL of Omnipaque 350 .  No oral contrast was administered.     COMPARISON:  CT chest November 7, 2023 and CT urogram October 2023     FINDINGS:  In the chest, visualized thyroid is normal.  Multiple scattered nonenlarged mediastinal nodes similar.  8 mm right hilar node series 2, image 69 also similar.  There is a 13 mm right lower hilar node image 76.  Small hiatal hernia.  No significant pleural or pericardial fluid.  No axillary adenopathy.     Evaluation lungs demonstrate scattered fibrotic opacities.  No convincing mass lesions.     In the abdomen, liver is normal in size.  No focal hepatic mass.  Gallbladder mildly distended.  No wall thickening or stones.  Pancreas and spleen are normal.  No adrenal masses.     Kidneys are similar in size.  Some cortical loss left kidney again noted.  Hypodensity left kidney too small to characterize.  No hydronephrosis.  Ureters fairly well visualized to the bladder and no obstructive uropathy.     Aorta tapers normally.  Nonenlarged para-aortic nodes.     In the pelvis, no convincing pelvic adenopathy.  Previously noted 1.9 cm left external iliac node measures only a few mm in short axis on today's study.  Prostate is enlarged 4.9 cm.  Lobulated soft tissue density extends into the bladder similar.  There is some residual wall thickening left posterosuperior aspect  of the bladder measuring up to 12 mm series 3, image 143.  This extends caudally along the lateral aspect of the bladder.  Some soft tissue stranding noted.     Scattered stool and bowel gas.  No focal dilatation.  No convincing mesenteric adenopathy.     Bones are fairly well mineralized.  Degenerative changes are noted.  No convincing lytic nor blastic lesion.     Impression:     History of bladder cancer with residual approximately 12 mm thickening left posterosuperior aspect of the bladder wall.  Some adjacent soft tissue stranding.  Previously enlarged left pelvic node measures only a few mm short axis on today's study.     Prostate is enlarged and there is lobular soft tissue extending into the bladder base.     13 mm right lower hilar node similar to prior chest CT November 2023.  Additional scattered mediastinal nodes as above.  Metastases not excluded.     Additional findings above.     This report was flagged in Epic as abnormal.        Electronically signed by: Isidro Terrell MD  Date:                                            02/24/2024  Time:                                           09:40    PRE-CHEMO DR HIGGINS                POST-CHEMO DR HIGGINS          Assessment and Plan:   Malignant neoplasm of urinary bladder,  Small cell cT3 cN1 cMx   Cisplatin/etoposide per Dr Higgins completed Feb 2024  Very good response  Recovered well  Discussed with Dr Higgins.  She feels he is ready for surgery asap    Schedule robotic radical cystectoprostatectomy and PLND and ileal conduit vs neobladder 3/13 at [facility]    We discussed neobladder, [place] pouch and ileal conduit.  He prefers ileal conduit.    We discuss robotic and open approaches.  He prefers and open approach.      See Enterostomal therapy NP asap    Dysuria  UA and reflex cs  Bactrim  Pyridium        I spent 45 min on the day of this encounter preparing for, treating and managing the above

## 2024-03-04 NOTE — PROGRESS NOTES
Subjective:      Med Palma is a 68 y.o. male who returns today regarding his     Preop for cystectomy  Last dose of chemo was 1 month ago  EP with Dr Dannielle Elaine great!  Eating well  Weight is good    OP CISPLATIN 75MG/M2 ETOPOSIDE 100MG/M2 Q3W    Current Cycle Treatment Dates Line of Treatment Treatment Goal Treatment Plan Provider Treatment Department Status Auth Status   5 of 6 cycles 11/27/2023 to 3/22/2024 Neoadjuvant Curative Mary Spicer MD [facility] - Infusion Active    Protocol   OP CISPLATIN 75MG/M2 ETOPOSIDE 100MG/M2 Q3W - As of 11/13/2023 9:09 AM       .    The following portions of the patient's history were reviewed and updated as appropriate: allergies, current medications, past family history, past medical history, past social history, past surgical history and problem list.    Review of Systems  A comprehensive multipoint review of systems was negative except as otherwise stated in the HPI.    Past Medical History:   Diagnosis Date    Allergy     Arthritis     Elevated PSA     Hypertension      Past Surgical History:   Procedure Laterality Date    TURBT (TRANSURETHRAL RESECTION OF BLADDER TUMOR) N/A 10/26/2023    Procedure: TURBT (TRANSURETHRAL RESECTION OF BLADDER TUMOR);  Surgeon: Yinka Jimenez MD;  Location: [facility];  Service: Urology;  Laterality: N/A;       Review of patient's allergies indicates:  No Known Allergies       Objective:   Vitals: There were no vitals taken for this visit.    Physical Exam   General: alert and oriented, no acute distress  Respiratory: Symmetric expansion, non-labored breathing  Cardiovascular: no peripheral edema  Abdomen: soft, non distended  No scars  Skin: normal coloration and turgor, no rashes, no suspicious skin lesions noted  Neuro: no gross deficits  Psych: normal judgment and insight, normal mood/affect, and non-anxious    Physical Exam    Lab Review   Urinalysis demonstrates no specimen    Lab Results   Component Value Date    WBC  11.03 02/27/2024    HGB 10.5 (L) 02/27/2024    HCT 32.1 (L) 02/27/2024    MCV 96 02/27/2024     02/27/2024     Lab Results   Component Value Date    CREATININE 1.2 02/27/2024    BUN 24 (H) 02/27/2024       Imaging  CT CHEST ABDOMEN PELVIS WITH IV CONTRAST (XPD)     CLINICAL HISTORY:  Metastatic disease evaluation;Bladder cancer, invasive, assess treatment response;bladder cancer, small cell variant; Malignant neoplasm of dome of bladder     TECHNIQUE:  Low dose axial images, sagittal and coronal reformations were obtained from the thoracic inlet to the pubic symphysis following the IV administration of 100 mL of Omnipaque 350 .  No oral contrast was administered.     COMPARISON:  CT chest November 7, 2023 and CT urogram October 2023     FINDINGS:  In the chest, visualized thyroid is normal.  Multiple scattered nonenlarged mediastinal nodes similar.  8 mm right hilar node series 2, image 69 also similar.  There is a 13 mm right lower hilar node image 76.  Small hiatal hernia.  No significant pleural or pericardial fluid.  No axillary adenopathy.     Evaluation lungs demonstrate scattered fibrotic opacities.  No convincing mass lesions.     In the abdomen, liver is normal in size.  No focal hepatic mass.  Gallbladder mildly distended.  No wall thickening or stones.  Pancreas and spleen are normal.  No adrenal masses.     Kidneys are similar in size.  Some cortical loss left kidney again noted.  Hypodensity left kidney too small to characterize.  No hydronephrosis.  Ureters fairly well visualized to the bladder and no obstructive uropathy.     Aorta tapers normally.  Nonenlarged para-aortic nodes.     In the pelvis, no convincing pelvic adenopathy.  Previously noted 1.9 cm left external iliac node measures only a few mm in short axis on today's study.  Prostate is enlarged 4.9 cm.  Lobulated soft tissue density extends into the bladder similar.  There is some residual wall thickening left posterosuperior aspect  of the bladder measuring up to 12 mm series 3, image 143.  This extends caudally along the lateral aspect of the bladder.  Some soft tissue stranding noted.     Scattered stool and bowel gas.  No focal dilatation.  No convincing mesenteric adenopathy.     Bones are fairly well mineralized.  Degenerative changes are noted.  No convincing lytic nor blastic lesion.     Impression:     History of bladder cancer with residual approximately 12 mm thickening left posterosuperior aspect of the bladder wall.  Some adjacent soft tissue stranding.  Previously enlarged left pelvic node measures only a few mm short axis on today's study.     Prostate is enlarged and there is lobular soft tissue extending into the bladder base.     13 mm right lower hilar node similar to prior chest CT November 2023.  Additional scattered mediastinal nodes as above.  Metastases not excluded.     Additional findings above.     This report was flagged in Epic as abnormal.        Electronically signed by: Isidro Terrell MD  Date:                                            02/24/2024  Time:                                           09:40    PRE-CHEMO DR HIGGINS                POST-CHEMO DR HIGGINS          Assessment and Plan:   Malignant neoplasm of urinary bladder,  Small cell cT3 cN1 cMx   Cisplatin/etoposide per Dr Higgins completed Feb 2024  Very good response  Recovered well  Discussed with Dr Higgins.  She feels he is ready for surgery asap    Schedule robotic radical cystectoprostatectomy and PLND and ileal conduit vs neobladder 3/13 at [facility]    We discussed neobladder, [place] pouch and ileal conduit.  He prefers ileal conduit.    We discuss robotic and open approaches.  He prefers and open approach.      See Enterostomal therapy NP asap    Dysuria  UA and reflex cs  Bactrim  Pyridium        I spent 45 min on the day of this encounter preparing for, treating and managing the above

## 2024-03-05 ENCOUNTER — OFFICE VISIT (OUTPATIENT)
Dept: UROLOGY | Facility: CLINIC | Age: 69
End: 2024-03-05
Payer: MEDICARE

## 2024-03-05 VITALS
RESPIRATION RATE: 16 BRPM | DIASTOLIC BLOOD PRESSURE: 76 MMHG | BODY MASS INDEX: 27.45 KG/M2 | SYSTOLIC BLOOD PRESSURE: 117 MMHG | WEIGHT: 202.38 LBS | HEART RATE: 76 BPM

## 2024-03-05 DIAGNOSIS — C67.9 MALIGNANT NEOPLASM OF URINARY BLADDER, UNSPECIFIED SITE: Primary | ICD-10-CM

## 2024-03-05 LAB
BACTERIA #/AREA URNS AUTO: ABNORMAL /HPF
BILIRUB UR QL STRIP: NEGATIVE
CLARITY UR REFRACT.AUTO: ABNORMAL
COLOR UR AUTO: YELLOW
GLUCOSE UR QL STRIP: NEGATIVE
HGB UR QL STRIP: ABNORMAL
HYALINE CASTS UR QL AUTO: 0 /LPF
KETONES UR QL STRIP: NEGATIVE
LEUKOCYTE ESTERASE UR QL STRIP: ABNORMAL
MICROSCOPIC COMMENT: ABNORMAL
NITRITE UR QL STRIP: NEGATIVE
PH UR STRIP: 6 [PH] (ref 5–8)
PROT UR QL STRIP: ABNORMAL
RBC #/AREA URNS AUTO: 28 /HPF (ref 0–4)
SP GR UR STRIP: 1.02 (ref 1–1.03)
URN SPEC COLLECT METH UR: ABNORMAL
WBC #/AREA URNS AUTO: >100 /HPF (ref 0–5)
WBC CLUMPS UR QL AUTO: ABNORMAL

## 2024-03-05 PROCEDURE — 81001 URINALYSIS AUTO W/SCOPE: CPT | Performed by: UROLOGY

## 2024-03-05 PROCEDURE — 3288F FALL RISK ASSESSMENT DOCD: CPT | Mod: CPTII,S$GLB,, | Performed by: UROLOGY

## 2024-03-05 PROCEDURE — 1125F AMNT PAIN NOTED PAIN PRSNT: CPT | Mod: CPTII,S$GLB,, | Performed by: UROLOGY

## 2024-03-05 PROCEDURE — 99215 OFFICE O/P EST HI 40 MIN: CPT | Mod: S$GLB,,, | Performed by: UROLOGY

## 2024-03-05 PROCEDURE — 87086 URINE CULTURE/COLONY COUNT: CPT | Performed by: UROLOGY

## 2024-03-05 PROCEDURE — 1159F MED LIST DOCD IN RCRD: CPT | Mod: CPTII,S$GLB,, | Performed by: UROLOGY

## 2024-03-05 PROCEDURE — 3008F BODY MASS INDEX DOCD: CPT | Mod: CPTII,S$GLB,, | Performed by: UROLOGY

## 2024-03-05 PROCEDURE — 3078F DIAST BP <80 MM HG: CPT | Mod: CPTII,S$GLB,, | Performed by: UROLOGY

## 2024-03-05 PROCEDURE — 1101F PT FALLS ASSESS-DOCD LE1/YR: CPT | Mod: CPTII,S$GLB,, | Performed by: UROLOGY

## 2024-03-05 PROCEDURE — 99999 PR PBB SHADOW E&M-EST. PATIENT-LVL III: CPT | Mod: PBBFAC,HCNC,, | Performed by: UROLOGY

## 2024-03-05 PROCEDURE — 3074F SYST BP LT 130 MM HG: CPT | Mod: CPTII,S$GLB,, | Performed by: UROLOGY

## 2024-03-05 RX ORDER — PHENAZOPYRIDINE HYDROCHLORIDE 100 MG/1
200 TABLET, FILM COATED ORAL 3 TIMES DAILY PRN
Qty: 30 TABLET | Refills: 0 | Status: SHIPPED | OUTPATIENT
Start: 2024-03-05 | End: 2024-03-15

## 2024-03-05 RX ORDER — SULFAMETHOXAZOLE AND TRIMETHOPRIM 400; 80 MG/1; MG/1
1 TABLET ORAL 2 TIMES DAILY
Qty: 14 TABLET | Refills: 0 | Status: SHIPPED | OUTPATIENT
Start: 2024-03-05 | End: 2024-03-13

## 2024-03-05 NOTE — Clinical Note
Raúl, can one of you please see Mr Palma for urostomy teaching before 3/13?  We had surgery scheduled next month but Dr Spicer asked me to push it up.  Sorry for the late notice.  Thanks!!

## 2024-03-06 LAB — BACTERIA UR CULT: NO GROWTH

## 2024-03-07 ENCOUNTER — ANESTHESIA EVENT (OUTPATIENT)
Dept: SURGERY | Facility: OTHER | Age: 69
DRG: 653 | End: 2024-03-07
Payer: MEDICARE

## 2024-03-07 ENCOUNTER — OFFICE VISIT (OUTPATIENT)
Dept: SURGERY | Facility: CLINIC | Age: 69
End: 2024-03-07
Payer: MEDICARE

## 2024-03-07 ENCOUNTER — HOSPITAL ENCOUNTER (OUTPATIENT)
Dept: PREADMISSION TESTING | Facility: OTHER | Age: 69
Discharge: HOME | End: 2024-03-07
Attending: UROLOGY
Payer: MEDICARE

## 2024-03-07 VITALS
BODY MASS INDEX: 27.09 KG/M2 | HEART RATE: 70 BPM | SYSTOLIC BLOOD PRESSURE: 115 MMHG | DIASTOLIC BLOOD PRESSURE: 78 MMHG | HEIGHT: 72 IN | OXYGEN SATURATION: 99 % | TEMPERATURE: 98 F | RESPIRATION RATE: 17 BRPM | WEIGHT: 200 LBS

## 2024-03-07 VITALS — BODY MASS INDEX: 27.21 KG/M2 | WEIGHT: 200.63 LBS

## 2024-03-07 DIAGNOSIS — Z01.818 PREOP TESTING: ICD-10-CM

## 2024-03-07 DIAGNOSIS — Z71.89 ENCOUNTER FOR OSTOMY CARE EDUCATION: Primary | ICD-10-CM

## 2024-03-07 DIAGNOSIS — Z01.818 PREOP TESTING: Primary | ICD-10-CM

## 2024-03-07 DIAGNOSIS — C67.9 MALIGNANT NEOPLASM OF URINARY BLADDER, UNSPECIFIED SITE: ICD-10-CM

## 2024-03-07 LAB
ABO + RH BLD: NORMAL
BLD GP AB SCN CELLS X3 SERPL QL: NORMAL
SPECIMEN OUTDATE: NORMAL

## 2024-03-07 PROCEDURE — 99213 OFFICE O/P EST LOW 20 MIN: CPT | Mod: S$GLB,,, | Performed by: NURSE PRACTITIONER

## 2024-03-07 PROCEDURE — 93005 ELECTROCARDIOGRAM TRACING: CPT

## 2024-03-07 PROCEDURE — 99999 PR PBB SHADOW E&M-EST. PATIENT-LVL III: CPT | Mod: PBBFAC,,, | Performed by: NURSE PRACTITIONER

## 2024-03-07 PROCEDURE — 86850 RBC ANTIBODY SCREEN: CPT | Performed by: UROLOGY

## 2024-03-07 PROCEDURE — 36415 COLL VENOUS BLD VENIPUNCTURE: CPT | Performed by: UROLOGY

## 2024-03-07 PROCEDURE — 93010 ELECTROCARDIOGRAM REPORT: CPT | Mod: ,,, | Performed by: INTERNAL MEDICINE

## 2024-03-07 PROCEDURE — 1159F MED LIST DOCD IN RCRD: CPT | Mod: CPTII,S$GLB,, | Performed by: NURSE PRACTITIONER

## 2024-03-07 PROCEDURE — 1160F RVW MEDS BY RX/DR IN RCRD: CPT | Mod: CPTII,S$GLB,, | Performed by: NURSE PRACTITIONER

## 2024-03-07 PROCEDURE — 3008F BODY MASS INDEX DOCD: CPT | Mod: CPTII,S$GLB,, | Performed by: NURSE PRACTITIONER

## 2024-03-07 RX ORDER — ACETAMINOPHEN 500 MG
1000 TABLET ORAL
Status: CANCELLED | OUTPATIENT
Start: 2024-03-07 | End: 2024-03-07

## 2024-03-07 RX ORDER — SODIUM CHLORIDE, SODIUM LACTATE, POTASSIUM CHLORIDE, CALCIUM CHLORIDE 600; 310; 30; 20 MG/100ML; MG/100ML; MG/100ML; MG/100ML
INJECTION, SOLUTION INTRAVENOUS CONTINUOUS
Status: CANCELLED | OUTPATIENT
Start: 2024-03-07

## 2024-03-07 NOTE — PROGRESS NOTES
Ostomy Office Visit History and Physical    Referring Md:   No referring provider defined for this encounter.    SUBJECTIVE:     Chief Complaint: urostomy marking    History of Present Illness:  The patient is new patient to this practice.   Course is as follows:  Patient is a 68 y.o. male presents for urostomy marking.   Having robotic cystectomy with ileal conduit with Dr. Jimenez on 3/13.    Review of patient's allergies indicates:  No Known Allergies    Past Medical History:   Diagnosis Date    Allergy     Arthritis     Elevated PSA     Hypertension      Past Surgical History:   Procedure Laterality Date    TURBT (TRANSURETHRAL RESECTION OF BLADDER TUMOR) N/A 10/26/2023    Procedure: TURBT (TRANSURETHRAL RESECTION OF BLADDER TUMOR);  Surgeon: Yinka Jimenez MD;  Location: [facility];  Service: Urology;  Laterality: N/A;     Family History   Problem Relation Age of Onset    Hypertension Mother     Cancer Father      Social History     Tobacco Use    Smoking status: Former     Types: Cigarettes    Smokeless tobacco: Never   Substance Use Topics    Alcohol use: Yes     Alcohol/week: 3.0 standard drinks of alcohol     Types: 3 Cans of beer per week    Drug use: No        Review of Systems:  Review of Systems   Genitourinary:  Positive for frequency.       OBJECTIVE:     Vital Signs (Most Recent)  Weight 91 kg (200 lb 9.9 oz)   Body Mass Index 27.21 kg/m²     Physical Exam:  General: White male in no distress   Neuro: Alert and oriented to person, place, and time.  Moves all extremities.     HEENT: No icterus.  Trachea midline  Respiratory: Respirations are even and unlabored, no cough or audible wheezing  Abdomen:   RLQ urostomy marking site (photo did not save)      Labs reviewed today:  Lab Results   Component Value Date    WBC 11.03 02/27/2024    HGB 10.5 (L) 02/27/2024    HCT 32.1 (L) 02/27/2024     02/27/2024    CHOL 177 09/21/2023    TRIG 151 (H) 09/21/2023    HDL 46 09/21/2023    ALT 13 02/27/2024     AST 11 02/27/2024     02/27/2024    K 5.1 02/27/2024     02/27/2024    CREATININE 1.2 02/27/2024    BUN 24 (H) 02/27/2024    CO2 23 02/27/2024    TSH 1.280 05/12/2017    PSA 0.96 10/10/2011    INR 0.9 11/28/2023    HGBA1C 5.4 09/21/2023       ASSESSMENT/PLAN:     Diagnoses and all orders for this visit:    Encounter for ostomy care education        Pre op Ostomy marking:    This patient was seen today per request  in preparation for upcoming ostomy surgery on 3/13/24  Explained procedure for stoma siting and rationale. Simulated appliance use with empty pouch provided. Abdomen examined with patient sitting, standing and lying down. Observed abdomen, contours, location of belt line, with in visual field and rectus muscle palpation considered.  Stoma marking/siting was performed per protocol and patient marked with a permanent marker and skin staining with silver nitrate. Explained that final decision for stomal placement is up to surgeon's discretion.     The proposed surgery was discussed and patient educated on expected postoperative course and expectations. The patient was allowed to ask questions and was also given pre-op information kit from  the [employer] for review at home prior to surgery.        Belle Baxter, ЕКАТЕРИНА-DEIRDRE  Colon and Rectal Surgery

## 2024-03-07 NOTE — ANESTHESIA PREPROCEDURE EVALUATION
03/07/2024  Med Palma is a 68 y.o., male.      Pre-op Assessment    I have reviewed the Patient Summary Reports.     I have reviewed the Nursing Notes. I have reviewed the NPO Status.   I have reviewed the Medications.     Review of Systems  Anesthesia Hx:  No problems with previous Anesthesia   History of prior surgery of interest to airway management or planning:          Denies Family Hx of Anesthesia complications.    Denies Personal Hx of Anesthesia complications.                    Social:  Non-Smoker, Former Smoker       Hematology/Oncology:       -- Anemia:               Hematology Comments: Hct 32    Current/Recent Cancer.            Oncology Comments: bladder     EENT/Dental:  EENT/Dental Normal           Cardiovascular:  Exercise tolerance: good    Denies Hypertension.                                        Pulmonary:  Pulmonary Normal                       Renal/:  Renal/ Normal    Bladder ca             Musculoskeletal:  Arthritis               Neurological:  Neurology Normal                                      Endocrine:  Endocrine Normal            Dermatological:  Skin Normal    Psych:  Psychiatric Normal                    Physical Exam  General: Well nourished, Cooperative, Oriented and Alert    Airway:  Mallampati: II / II  Mouth Opening: Normal  TM Distance: Normal  Neck ROM: Normal ROM    Dental:  Intact        Anesthesia Plan  Type of Anesthesia, risks & benefits discussed:    Anesthesia Type: Gen ETT  Intra-op Monitoring Plan: Standard ASA Monitors and Art Line  Post Op Pain Control Plan: multimodal analgesia and peripheral nerve block  Induction:  IV  Airway Plan: Video and Direct  Informed Consent: Informed consent signed with the Patient and all parties understand the risks and agree with anesthesia plan.  All questions answered.   ASA Score: 3  Day of Surgery Review of  History & Physical: H&P Update referred to the surgeon/provider.  Anesthesia Plan Notes: EKG, T&S    Ready For Surgery From Anesthesia Perspective.     .

## 2024-03-07 NOTE — DISCHARGE INSTRUCTIONS
Information to Prepare you for your Surgery    PRE-ADMIT TESTING -  [phone]    [address]  [facility]          Your surgery has been scheduled at [facility]. We are pleased to have the opportunity to serve you. For Further Information please call [phone].    On the day of surgery please report to the Information Desk on the 1st floor.    CONTACT YOUR PHYSICIAN'S OFFICE THE DAY PRIOR TO YOUR SURGERY TO OBTAIN YOUR ARRIVAL TIME.     The evening before surgery do not eat anything after 9 p.m. ( this includes hard candy, chewing gum and mints).  You may only have GATORADE, POWERADE AND WATER  from 9 p.m. until you leave your home.   DO NOT DRINK ANY LIQUIDS ON THE WAY TO THE HOSPITAL.      Why does your anesthesiologist allow you to drink Gatorade/Powerade before surgery?  Gatorade/Powerade helps to increase your comfort before surgery and to decrease your nausea after surgery. The carbohydrates in Gatorade/Powerade help reduce your body's stress response to surgery.  If you are a diabetic-drink only water prior to surgery.    Outpatient Surgery- May allow 2 adult (18 and older) Support Persons (1 being the designated ) for all surgical/procedural patients. A breastfeeding mother will be allowed her infant and 2 adult Support Persons. No one under the age of 18 will be allowed in the building. No swapping out of visitors in the [facility].      SPECIAL MEDICATION INSTRUCTIONS: TAKE medications checked off by the Anesthesiologist on your Medication List.    Angiogram Patients: Take medications as instructed by your physician, including aspirin.     Surgery Patients:    If you take ASPIRIN - Your PHYSICIAN/SURGEON will need to inform you IF/OR when you need to stop taking aspirin prior to your surgery.     The week prior to surgery do not ot take any medications containing IBUPROFEN or NSAIDS ( Advil, Motrin, Goodys, BC, Aleve, Naproxen etc)  If you are not sure if you should take a medicine please call your surgeon's office.  Ok to take Tylenol    Do Not Wear any make-up (especially eye make-up) to surgery. Please remove any false eyelashes or eyelash extensions. If you arrive the day of surgery with makeup/eyelashes on you will be required to remove prior to surgery. (There is a risk of corneal abrasions if eye makeup/eyelash extensions are not removed)      Leave all valuables at home.   Do Not wear any jewelry or watches, including any metal in body piercings. Jewelry must be removed prior to coming to the hospital.  There is a possibility that rings that are unable to be removed may be cut off if they are on the surgical extremity.    Please remove all hair extensions, wigs, clips and any other metal accessories/ ornaments from your hair.  These items may pose a flammable/fire risk in Surgery and must be removed.    Do not shave your surgical area at least 5 days prior to your surgery. The surgical prep will be performed at the hospital according to Infection Control regulations.    Contact Lens must be removed before surgery. Either do not wear the contact lens or bring a case and solution for storage.  Please bring a container for eyeglasses or dentures as required.  Bring any paperwork your physician has provided, such as consent forms,  history and physicals, doctor's orders, etc.   Bring comfortable clothes that are loose fitting to wear upon discharge. Take into consideration the type of surgery being performed.  Maintain your diet as advised per your physician the day prior to surgery.      Adequate rest the night before surgery is advised.   Park in the Parking lot behind the hospital or in the Mobile Parking Garage across the street from the parking lot. Parking is complimentary.  If you will be discharged the same day as your procedure, please arrange for a responsible adult to drive you home or to accompany you if traveling by taxi.    YOU WILL NOT BE PERMITTED TO DRIVE OR TO LEAVE THE HOSPITAL ALONE AFTER SURGERY.   If you are being discharged the same day, it is strongly recommended that you arrange for someone to remain with you for the first 24 hrs following your surgery.    The Surgeon will speak to your family/visitor after your surgery regarding the outcome of your surgery and post op care.  The Surgeon may speak to you after your surgery, but there is a possibility you may not remember the details.  Please check with your family members regarding the conversation with the Surgeon.    We strongly recommend whoever is bringing you home be present for discharge instructions.  This will ensure a thorough understanding for your post op home care.          Thank you for your cooperation.  The Staff of [facility].            Bathing Instructions with Hibiclens    Shower the evening before and morning of your procedure with Chlorhexidine (Hibiclens)  do not use Chlorhexidine on your face or genitals. Do not get in your eyes.  Wash your face with water and your regular face wash/soap  Use your regular shampoo  Apply Chlorhexidine (Hibiclens) directly on your skin or on a wet washcloth and wash gently. When showering: Move away from the shower stream when applying Chlorhexidine (Hibiclens) to avoid rinsing off too soon.  Rinse thoroughly with warm water  Do not dilute Chlorhexidine (Hibiclens)   Dry off as usual, do not use any deodorant, powder, body lotions, perfume, after shave or cologne.

## 2024-03-08 LAB
OHS QRS DURATION: 90 MS
OHS QTC CALCULATION: 428 MS

## 2024-03-08 RX ORDER — FLUTICASONE PROPIONATE 50 MCG
1 SPRAY, SUSPENSION (ML) NASAL
Qty: 16 G | Refills: 3 | Status: SHIPPED | OUTPATIENT
Start: 2024-03-08

## 2024-03-08 NOTE — TELEPHONE ENCOUNTER
No care due was identified.  [facility] Embedded Care Due Messages. Reference number: [id number].   3/08/2024 6:37:39 AM CST

## 2024-03-12 ENCOUNTER — TELEPHONE (OUTPATIENT)
Dept: UROLOGY | Facility: CLINIC | Age: 69
End: 2024-03-12
Payer: MEDICARE

## 2024-03-13 ENCOUNTER — ANESTHESIA (OUTPATIENT)
Dept: SURGERY | Facility: OTHER | Age: 69
DRG: 653 | End: 2024-03-13
Payer: MEDICARE

## 2024-03-13 ENCOUNTER — HOSPITAL ENCOUNTER (INPATIENT)
Facility: OTHER | Age: 69
LOS: 7 days | Discharge: HOME HEALTH | DRG: 653 | End: 2024-03-20
Attending: UROLOGY | Admitting: UROLOGY
Payer: MEDICARE

## 2024-03-13 DIAGNOSIS — M21.372 BILATERAL FOOT-DROP: ICD-10-CM

## 2024-03-13 DIAGNOSIS — C67.9 BLADDER CANCER: ICD-10-CM

## 2024-03-13 DIAGNOSIS — C67.9 MALIGNANT NEOPLASM OF URINARY BLADDER, UNSPECIFIED SITE: ICD-10-CM

## 2024-03-13 DIAGNOSIS — C67.1 MALIGNANT NEOPLASM OF DOME OF URINARY BLADDER: ICD-10-CM

## 2024-03-13 DIAGNOSIS — M21.371 BILATERAL FOOT-DROP: ICD-10-CM

## 2024-03-13 DIAGNOSIS — D64.9 NORMOCYTIC ANEMIA: Primary | ICD-10-CM

## 2024-03-13 LAB
ANION GAP SERPL CALC-SCNC: 9 MMOL/L (ref 8–16)
BASOPHILS # BLD AUTO: 0.02 K/UL (ref 0–0.2)
BASOPHILS NFR BLD: 0.1 % (ref 0–1.9)
BUN SERPL-MCNC: 22 MG/DL (ref 8–23)
CALCIUM SERPL-MCNC: 8.8 MG/DL (ref 8.7–10.5)
CHLORIDE SERPL-SCNC: 107 MMOL/L (ref 95–110)
CO2 SERPL-SCNC: 19 MMOL/L (ref 23–29)
CREAT SERPL-MCNC: 1.6 MG/DL (ref 0.5–1.4)
DIFFERENTIAL METHOD BLD: ABNORMAL
EOSINOPHIL # BLD AUTO: 0 K/UL (ref 0–0.5)
EOSINOPHIL NFR BLD: 0 % (ref 0–8)
ERYTHROCYTE [DISTWIDTH] IN BLOOD BY AUTOMATED COUNT: 16.2 % (ref 11.5–14.5)
EST. GFR  (NO RACE VARIABLE): 47 ML/MIN/1.73 M^2
GLUCOSE SERPL-MCNC: 144 MG/DL (ref 70–110)
HCT VFR BLD AUTO: 27.3 % (ref 40–54)
HGB BLD-MCNC: 8.9 G/DL (ref 14–18)
IMM GRANULOCYTES # BLD AUTO: 0.04 K/UL (ref 0–0.04)
IMM GRANULOCYTES NFR BLD AUTO: 0.3 % (ref 0–0.5)
LYMPHOCYTES # BLD AUTO: 0.8 K/UL (ref 1–4.8)
LYMPHOCYTES NFR BLD: 5.2 % (ref 18–48)
MCH RBC QN AUTO: 31.1 PG (ref 27–31)
MCHC RBC AUTO-ENTMCNC: 32.6 G/DL (ref 32–36)
MCV RBC AUTO: 96 FL (ref 82–98)
MONOCYTES # BLD AUTO: 0.9 K/UL (ref 0.3–1)
MONOCYTES NFR BLD: 5.9 % (ref 4–15)
NEUTROPHILS # BLD AUTO: 13.7 K/UL (ref 1.8–7.7)
NEUTROPHILS NFR BLD: 88.5 % (ref 38–73)
NRBC BLD-RTO: 0 /100 WBC
PLATELET # BLD AUTO: 221 K/UL (ref 150–450)
PMV BLD AUTO: 10.3 FL (ref 9.2–12.9)
POTASSIUM SERPL-SCNC: 4.9 MMOL/L (ref 3.5–5.1)
RBC # BLD AUTO: 2.86 M/UL (ref 4.6–6.2)
SODIUM SERPL-SCNC: 135 MMOL/L (ref 136–145)
WBC # BLD AUTO: 15.53 K/UL (ref 3.9–12.7)

## 2024-03-13 PROCEDURE — 0VT04ZZ RESECTION OF PROSTATE, PERCUTANEOUS ENDOSCOPIC APPROACH: ICD-10-PCS | Performed by: UROLOGY

## 2024-03-13 PROCEDURE — C2617 STENT, NON-COR, TEM W/O DEL: HCPCS | Performed by: UROLOGY

## 2024-03-13 PROCEDURE — D9220A PRA ANESTHESIA: Mod: ANES,,, | Performed by: ANESTHESIOLOGY

## 2024-03-13 PROCEDURE — 25000003 PHARM REV CODE 250: Performed by: ANESTHESIOLOGY

## 2024-03-13 PROCEDURE — C1769 GUIDE WIRE: HCPCS | Performed by: UROLOGY

## 2024-03-13 PROCEDURE — 88305 TISSUE EXAM BY PATHOLOGIST: CPT | Mod: HCNC | Performed by: STUDENT IN AN ORGANIZED HEALTH CARE EDUCATION/TRAINING PROGRAM

## 2024-03-13 PROCEDURE — 88342 IMHCHEM/IMCYTCHM 1ST ANTB: CPT | Mod: 26,HCNC,, | Performed by: STUDENT IN AN ORGANIZED HEALTH CARE EDUCATION/TRAINING PROGRAM

## 2024-03-13 PROCEDURE — 63600175 PHARM REV CODE 636 W HCPCS: Performed by: UROLOGY

## 2024-03-13 PROCEDURE — 37000009 HC ANESTHESIA EA ADD 15 MINS: Performed by: UROLOGY

## 2024-03-13 PROCEDURE — C9290 INJ, BUPIVACAINE LIPOSOME: HCPCS | Performed by: ANESTHESIOLOGY

## 2024-03-13 PROCEDURE — 8E0W4CZ ROBOTIC ASSISTED PROCEDURE OF TRUNK REGION, PERCUTANEOUS ENDOSCOPIC APPROACH: ICD-10-PCS | Performed by: UROLOGY

## 2024-03-13 PROCEDURE — 25000003 PHARM REV CODE 250: Performed by: NURSE ANESTHETIST, CERTIFIED REGISTERED

## 2024-03-13 PROCEDURE — 25000003 PHARM REV CODE 250: Performed by: UROLOGY

## 2024-03-13 PROCEDURE — 88309 TISSUE EXAM BY PATHOLOGIST: CPT | Mod: HCNC | Performed by: STUDENT IN AN ORGANIZED HEALTH CARE EDUCATION/TRAINING PROGRAM

## 2024-03-13 PROCEDURE — 88311 DECALCIFY TISSUE: CPT | Mod: 26,HCNC,, | Performed by: STUDENT IN AN ORGANIZED HEALTH CARE EDUCATION/TRAINING PROGRAM

## 2024-03-13 PROCEDURE — 63600175 PHARM REV CODE 636 W HCPCS: Performed by: ANESTHESIOLOGY

## 2024-03-13 PROCEDURE — 27201423 OPTIME MED/SURG SUP & DEVICES STERILE SUPPLY: Performed by: UROLOGY

## 2024-03-13 PROCEDURE — 85025 COMPLETE CBC W/AUTO DIFF WBC: CPT | Performed by: STUDENT IN AN ORGANIZED HEALTH CARE EDUCATION/TRAINING PROGRAM

## 2024-03-13 PROCEDURE — 88331 PATH CONSLTJ SURG 1 BLK 1SPC: CPT | Mod: 59,HCNC | Performed by: STUDENT IN AN ORGANIZED HEALTH CARE EDUCATION/TRAINING PROGRAM

## 2024-03-13 PROCEDURE — 36620 INSERTION CATHETER ARTERY: CPT | Performed by: ANESTHESIOLOGY

## 2024-03-13 PROCEDURE — 63600175 PHARM REV CODE 636 W HCPCS: Performed by: NURSE ANESTHETIST, CERTIFIED REGISTERED

## 2024-03-13 PROCEDURE — 64488 TAP BLOCK BI INJECTION: CPT | Performed by: ANESTHESIOLOGY

## 2024-03-13 PROCEDURE — 80048 BASIC METABOLIC PNL TOTAL CA: CPT | Performed by: STUDENT IN AN ORGANIZED HEALTH CARE EDUCATION/TRAINING PROGRAM

## 2024-03-13 PROCEDURE — 88342 IMHCHEM/IMCYTCHM 1ST ANTB: CPT | Mod: HCNC | Performed by: STUDENT IN AN ORGANIZED HEALTH CARE EDUCATION/TRAINING PROGRAM

## 2024-03-13 PROCEDURE — 52332 CYSTOSCOPY AND TREATMENT: CPT | Mod: 50,,, | Performed by: UROLOGY

## 2024-03-13 PROCEDURE — 07TC4ZZ RESECTION OF PELVIS LYMPHATIC, PERCUTANEOUS ENDOSCOPIC APPROACH: ICD-10-PCS | Performed by: UROLOGY

## 2024-03-13 PROCEDURE — 0TTB4ZZ RESECTION OF BLADDER, PERCUTANEOUS ENDOSCOPIC APPROACH: ICD-10-PCS | Performed by: UROLOGY

## 2024-03-13 PROCEDURE — 0T784DZ DILATION OF BILATERAL URETERS WITH INTRALUMINAL DEVICE, PERCUTANEOUS ENDOSCOPIC APPROACH: ICD-10-PCS | Performed by: UROLOGY

## 2024-03-13 PROCEDURE — 36000712 HC OR TIME LEV V 1ST 15 MIN: Performed by: UROLOGY

## 2024-03-13 PROCEDURE — C1729 CATH, DRAINAGE: HCPCS | Performed by: UROLOGY

## 2024-03-13 PROCEDURE — 36000713 HC OR TIME LEV V EA ADD 15 MIN: Performed by: UROLOGY

## 2024-03-13 PROCEDURE — 86920 COMPATIBILITY TEST SPIN: CPT | Performed by: STUDENT IN AN ORGANIZED HEALTH CARE EDUCATION/TRAINING PROGRAM

## 2024-03-13 PROCEDURE — A4216 STERILE WATER/SALINE, 10 ML: HCPCS | Performed by: NURSE ANESTHETIST, CERTIFIED REGISTERED

## 2024-03-13 PROCEDURE — D9220A PRA ANESTHESIA: Mod: CRNA,,, | Performed by: NURSE ANESTHETIST, CERTIFIED REGISTERED

## 2024-03-13 PROCEDURE — 20000000 HC ICU ROOM

## 2024-03-13 PROCEDURE — 64488 TAP BLOCK BI INJECTION: CPT | Mod: 59,,, | Performed by: ANESTHESIOLOGY

## 2024-03-13 PROCEDURE — 36620 INSERTION CATHETER ARTERY: CPT | Mod: 59,,, | Performed by: ANESTHESIOLOGY

## 2024-03-13 PROCEDURE — 37000008 HC ANESTHESIA 1ST 15 MINUTES: Performed by: UROLOGY

## 2024-03-13 PROCEDURE — P9045 ALBUMIN (HUMAN), 5%, 250 ML: HCPCS | Mod: JZ,JG | Performed by: NURSE ANESTHETIST, CERTIFIED REGISTERED

## 2024-03-13 PROCEDURE — 94799 UNLISTED PULMONARY SVC/PX: CPT | Mod: XB

## 2024-03-13 PROCEDURE — 88331 PATH CONSLTJ SURG 1 BLK 1SPC: CPT | Mod: 26,HCNC,, | Performed by: STUDENT IN AN ORGANIZED HEALTH CARE EDUCATION/TRAINING PROGRAM

## 2024-03-13 PROCEDURE — 88311 DECALCIFY TISSUE: CPT | Mod: HCNC | Performed by: STUDENT IN AN ORGANIZED HEALTH CARE EDUCATION/TRAINING PROGRAM

## 2024-03-13 PROCEDURE — 51999 UNLISTED LAPS PX BLADDER: CPT | Mod: ,,, | Performed by: UROLOGY

## 2024-03-13 PROCEDURE — C1758 CATHETER, URETERAL: HCPCS | Performed by: UROLOGY

## 2024-03-13 PROCEDURE — 88305 TISSUE EXAM BY PATHOLOGIST: CPT | Mod: 26,HCNC,, | Performed by: STUDENT IN AN ORGANIZED HEALTH CARE EDUCATION/TRAINING PROGRAM

## 2024-03-13 PROCEDURE — 88309 TISSUE EXAM BY PATHOLOGIST: CPT | Mod: 26,HCNC,, | Performed by: STUDENT IN AN ORGANIZED HEALTH CARE EDUCATION/TRAINING PROGRAM

## 2024-03-13 PROCEDURE — 88307 TISSUE EXAM BY PATHOLOGIST: CPT | Mod: 59,HCNC | Performed by: STUDENT IN AN ORGANIZED HEALTH CARE EDUCATION/TRAINING PROGRAM

## 2024-03-13 PROCEDURE — 0T184ZA BYPASS BILATERAL URETERS TO ILEUM, PERCUTANEOUS ENDOSCOPIC APPROACH: ICD-10-PCS | Performed by: UROLOGY

## 2024-03-13 PROCEDURE — 94761 N-INVAS EAR/PLS OXIMETRY MLT: CPT

## 2024-03-13 PROCEDURE — 0T7D7ZZ DILATION OF URETHRA, VIA NATURAL OR ARTIFICIAL OPENING: ICD-10-PCS | Performed by: UROLOGY

## 2024-03-13 PROCEDURE — 63600175 PHARM REV CODE 636 W HCPCS

## 2024-03-13 DEVICE — STENT URETERAL UNIV 6FR 22CM: Type: IMPLANTABLE DEVICE | Site: URETER | Status: FUNCTIONAL

## 2024-03-13 RX ORDER — SODIUM CHLORIDE 0.9 % (FLUSH) 0.9 %
3 SYRINGE (ML) INJECTION
Status: DISCONTINUED | OUTPATIENT
Start: 2024-03-13 | End: 2024-03-20 | Stop reason: HOSPADM

## 2024-03-13 RX ORDER — HEPARIN SODIUM 5000 [USP'U]/ML
5000 INJECTION, SOLUTION INTRAVENOUS; SUBCUTANEOUS
Status: DISCONTINUED | OUTPATIENT
Start: 2024-03-20 | End: 2024-03-13

## 2024-03-13 RX ORDER — HYDROMORPHONE HYDROCHLORIDE 2 MG/ML
INJECTION, SOLUTION INTRAMUSCULAR; INTRAVENOUS; SUBCUTANEOUS
Status: DISCONTINUED | OUTPATIENT
Start: 2024-03-13 | End: 2024-03-13

## 2024-03-13 RX ORDER — BUPIVACAINE HYDROCHLORIDE 2.5 MG/ML
INJECTION, SOLUTION EPIDURAL; INFILTRATION; INTRACAUDAL
Status: COMPLETED | OUTPATIENT
Start: 2024-03-13 | End: 2024-03-13

## 2024-03-13 RX ORDER — ONDANSETRON HYDROCHLORIDE 2 MG/ML
4 INJECTION, SOLUTION INTRAVENOUS EVERY 8 HOURS PRN
Status: DISCONTINUED | OUTPATIENT
Start: 2024-03-13 | End: 2024-03-14

## 2024-03-13 RX ORDER — ACETAMINOPHEN 10 MG/ML
INJECTION, SOLUTION INTRAVENOUS
Status: DISCONTINUED | OUTPATIENT
Start: 2024-03-13 | End: 2024-03-13

## 2024-03-13 RX ORDER — MIDAZOLAM HYDROCHLORIDE 1 MG/ML
INJECTION INTRAMUSCULAR; INTRAVENOUS
Status: DISCONTINUED | OUTPATIENT
Start: 2024-03-13 | End: 2024-03-13

## 2024-03-13 RX ORDER — SODIUM CHLORIDE, SODIUM LACTATE, POTASSIUM CHLORIDE, CALCIUM CHLORIDE 600; 310; 30; 20 MG/100ML; MG/100ML; MG/100ML; MG/100ML
INJECTION, SOLUTION INTRAVENOUS CONTINUOUS
Status: DISCONTINUED | OUTPATIENT
Start: 2024-03-13 | End: 2024-03-14

## 2024-03-13 RX ORDER — OXYCODONE HYDROCHLORIDE 5 MG/1
5 TABLET ORAL EVERY 4 HOURS PRN
Status: DISCONTINUED | OUTPATIENT
Start: 2024-03-13 | End: 2024-03-20 | Stop reason: HOSPADM

## 2024-03-13 RX ORDER — KETAMINE HYDROCHLORIDE 50 MG/ML
INJECTION, SOLUTION INTRAMUSCULAR; INTRAVENOUS
Status: DISCONTINUED | OUTPATIENT
Start: 2024-03-13 | End: 2024-03-13

## 2024-03-13 RX ORDER — ACETAMINOPHEN 500 MG
1000 TABLET ORAL EVERY 8 HOURS
Status: DISPENSED | OUTPATIENT
Start: 2024-03-14 | End: 2024-03-15

## 2024-03-13 RX ORDER — ACETAMINOPHEN 500 MG
1000 TABLET ORAL
Status: COMPLETED | OUTPATIENT
Start: 2024-03-13 | End: 2024-03-13

## 2024-03-13 RX ORDER — OXYCODONE HYDROCHLORIDE 5 MG/1
5 TABLET ORAL
Status: DISCONTINUED | OUTPATIENT
Start: 2024-03-13 | End: 2024-03-14

## 2024-03-13 RX ORDER — OXYCODONE HYDROCHLORIDE 10 MG/1
10 TABLET ORAL EVERY 4 HOURS PRN
Status: DISCONTINUED | OUTPATIENT
Start: 2024-03-13 | End: 2024-03-19

## 2024-03-13 RX ORDER — HEPARIN SODIUM 5000 [USP'U]/ML
5000 INJECTION, SOLUTION INTRAVENOUS; SUBCUTANEOUS
Status: COMPLETED | OUTPATIENT
Start: 2024-03-13 | End: 2024-03-13

## 2024-03-13 RX ORDER — ALVIMOPAN 12 MG/1
12 CAPSULE ORAL 2 TIMES DAILY
Status: DISCONTINUED | OUTPATIENT
Start: 2024-03-13 | End: 2024-03-17

## 2024-03-13 RX ORDER — ONDANSETRON HYDROCHLORIDE 2 MG/ML
INJECTION, SOLUTION INTRAVENOUS
Status: DISCONTINUED | OUTPATIENT
Start: 2024-03-13 | End: 2024-03-13

## 2024-03-13 RX ORDER — PROCHLORPERAZINE EDISYLATE 5 MG/ML
5 INJECTION INTRAMUSCULAR; INTRAVENOUS EVERY 30 MIN PRN
Status: DISCONTINUED | OUTPATIENT
Start: 2024-03-13 | End: 2024-03-14

## 2024-03-13 RX ORDER — METHOCARBAMOL 500 MG/1
500 TABLET, FILM COATED ORAL 4 TIMES DAILY
Status: DISCONTINUED | OUTPATIENT
Start: 2024-03-13 | End: 2024-03-15

## 2024-03-13 RX ORDER — INDOCYANINE GREEN AND WATER 25 MG
KIT INJECTION
Status: DISCONTINUED | OUTPATIENT
Start: 2024-03-13 | End: 2024-03-13

## 2024-03-13 RX ORDER — HYDROCODONE BITARTRATE AND ACETAMINOPHEN 500; 5 MG/1; MG/1
TABLET ORAL
Status: DISCONTINUED | OUTPATIENT
Start: 2024-03-13 | End: 2024-03-20 | Stop reason: HOSPADM

## 2024-03-13 RX ORDER — LIDOCAINE HYDROCHLORIDE 20 MG/ML
INJECTION INTRAVENOUS
Status: DISCONTINUED | OUTPATIENT
Start: 2024-03-13 | End: 2024-03-13

## 2024-03-13 RX ORDER — MEPERIDINE HYDROCHLORIDE 25 MG/ML
12.5 INJECTION INTRAMUSCULAR; INTRAVENOUS; SUBCUTANEOUS ONCE AS NEEDED
Status: ACTIVE | OUTPATIENT
Start: 2024-03-13 | End: 2024-03-14

## 2024-03-13 RX ORDER — DIPHENHYDRAMINE HYDROCHLORIDE 50 MG/ML
12.5 INJECTION INTRAMUSCULAR; INTRAVENOUS EVERY 6 HOURS PRN
Status: DISCONTINUED | OUTPATIENT
Start: 2024-03-13 | End: 2024-03-20 | Stop reason: HOSPADM

## 2024-03-13 RX ORDER — DEXAMETHASONE SODIUM PHOSPHATE 4 MG/ML
INJECTION, SOLUTION INTRA-ARTICULAR; INTRALESIONAL; INTRAMUSCULAR; INTRAVENOUS; SOFT TISSUE
Status: DISCONTINUED | OUTPATIENT
Start: 2024-03-13 | End: 2024-03-13

## 2024-03-13 RX ORDER — KETOROLAC TROMETHAMINE 30 MG/ML
15 INJECTION, SOLUTION INTRAMUSCULAR; INTRAVENOUS EVERY 8 HOURS
Status: DISCONTINUED | OUTPATIENT
Start: 2024-03-13 | End: 2024-03-13

## 2024-03-13 RX ORDER — ALBUMIN HUMAN 50 G/1000ML
SOLUTION INTRAVENOUS CONTINUOUS PRN
Status: DISCONTINUED | OUTPATIENT
Start: 2024-03-13 | End: 2024-03-13

## 2024-03-13 RX ORDER — ALVIMOPAN 12 MG/1
12 CAPSULE ORAL
Status: DISCONTINUED | OUTPATIENT
Start: 2024-03-20 | End: 2024-03-13

## 2024-03-13 RX ORDER — ROCURONIUM BROMIDE 10 MG/ML
INJECTION, SOLUTION INTRAVENOUS
Status: DISCONTINUED | OUTPATIENT
Start: 2024-03-13 | End: 2024-03-13

## 2024-03-13 RX ORDER — ACETAMINOPHEN 10 MG/ML
1000 INJECTION, SOLUTION INTRAVENOUS ONCE
Status: COMPLETED | OUTPATIENT
Start: 2024-03-13 | End: 2024-03-13

## 2024-03-13 RX ORDER — HYDROMORPHONE HYDROCHLORIDE 2 MG/ML
0.4 INJECTION, SOLUTION INTRAMUSCULAR; INTRAVENOUS; SUBCUTANEOUS EVERY 5 MIN PRN
Status: DISCONTINUED | OUTPATIENT
Start: 2024-03-13 | End: 2024-03-14

## 2024-03-13 RX ORDER — PROPOFOL 10 MG/ML
VIAL (ML) INTRAVENOUS
Status: DISCONTINUED | OUTPATIENT
Start: 2024-03-13 | End: 2024-03-13

## 2024-03-13 RX ORDER — MUPIROCIN 20 MG/G
OINTMENT TOPICAL 2 TIMES DAILY
Status: COMPLETED | OUTPATIENT
Start: 2024-03-13 | End: 2024-03-18

## 2024-03-13 RX ORDER — ALVIMOPAN 12 MG/1
12 CAPSULE ORAL
Status: COMPLETED | OUTPATIENT
Start: 2024-03-13 | End: 2024-03-13

## 2024-03-13 RX ORDER — FENTANYL CITRATE 50 UG/ML
INJECTION, SOLUTION INTRAMUSCULAR; INTRAVENOUS
Status: DISCONTINUED | OUTPATIENT
Start: 2024-03-13 | End: 2024-03-13

## 2024-03-13 RX ORDER — HEPARIN SODIUM 5000 [USP'U]/ML
5000 INJECTION, SOLUTION INTRAVENOUS; SUBCUTANEOUS EVERY 8 HOURS
Status: DISCONTINUED | OUTPATIENT
Start: 2024-03-13 | End: 2024-03-14

## 2024-03-13 RX ADMIN — SODIUM CHLORIDE, SODIUM LACTATE, POTASSIUM CHLORIDE, AND CALCIUM CHLORIDE: 600; 310; 30; 20 INJECTION, SOLUTION INTRAVENOUS at 07:03

## 2024-03-13 RX ADMIN — SUGAMMADEX 200 MG: 100 INJECTION, SOLUTION INTRAVENOUS at 05:03

## 2024-03-13 RX ADMIN — DEXAMETHASONE SODIUM PHOSPHATE 8 MG: 4 INJECTION, SOLUTION INTRAMUSCULAR; INTRAVENOUS at 08:03

## 2024-03-13 RX ADMIN — SODIUM CHLORIDE 0.5 MCG/KG/HR: 9 INJECTION, SOLUTION INTRAMUSCULAR; INTRAVENOUS; SUBCUTANEOUS at 08:03

## 2024-03-13 RX ADMIN — ALVIMOPAN 12 MG: 12 CAPSULE ORAL at 06:03

## 2024-03-13 RX ADMIN — ALBUMIN (HUMAN) 200 ML/HR: 12.5 SOLUTION INTRAVENOUS at 08:03

## 2024-03-13 RX ADMIN — ROCURONIUM BROMIDE 40 MG: 10 INJECTION INTRAVENOUS at 08:03

## 2024-03-13 RX ADMIN — HYDROMORPHONE HYDROCHLORIDE 0.4 MG: 2 INJECTION INTRAMUSCULAR; INTRAVENOUS; SUBCUTANEOUS at 05:03

## 2024-03-13 RX ADMIN — HYDROMORPHONE HYDROCHLORIDE 0.4 MG: 2 INJECTION INTRAMUSCULAR; INTRAVENOUS; SUBCUTANEOUS at 04:03

## 2024-03-13 RX ADMIN — BUPIVACAINE HYDROCHLORIDE 40 ML: 2.5 INJECTION, SOLUTION EPIDURAL; INFILTRATION; INTRACAUDAL; PERINEURAL at 07:03

## 2024-03-13 RX ADMIN — CEFOXITIN 2 G: 2 INJECTION, POWDER, FOR SOLUTION INTRAVENOUS at 02:03

## 2024-03-13 RX ADMIN — ROCURONIUM BROMIDE 20 MG: 10 INJECTION INTRAVENOUS at 02:03

## 2024-03-13 RX ADMIN — METHOCARBAMOL 500 MG: 500 TABLET ORAL at 09:03

## 2024-03-13 RX ADMIN — ONDANSETRON HYDROCHLORIDE 4 MG: 2 INJECTION INTRAMUSCULAR; INTRAVENOUS at 08:03

## 2024-03-13 RX ADMIN — INDOCYANINE GREEN 12.5 MG: KIT INTRAVENOUS at 02:03

## 2024-03-13 RX ADMIN — SODIUM CHLORIDE, SODIUM LACTATE, POTASSIUM CHLORIDE, AND CALCIUM CHLORIDE: 600; 310; 30; 20 INJECTION, SOLUTION INTRAVENOUS at 04:03

## 2024-03-13 RX ADMIN — ACETAMINOPHEN 1000 MG: 10 INJECTION INTRAVENOUS at 07:03

## 2024-03-13 RX ADMIN — LIDOCAINE HYDROCHLORIDE 40 MG: 20 INJECTION, SOLUTION INTRAVENOUS at 08:03

## 2024-03-13 RX ADMIN — ROCURONIUM BROMIDE 20 MG: 10 INJECTION INTRAVENOUS at 01:03

## 2024-03-13 RX ADMIN — ROCURONIUM BROMIDE 20 MG: 10 INJECTION INTRAVENOUS at 09:03

## 2024-03-13 RX ADMIN — ONDANSETRON HYDROCHLORIDE 4 MG: 2 INJECTION, SOLUTION INTRAVENOUS at 02:03

## 2024-03-13 RX ADMIN — CEFOXITIN 2 G: 2 INJECTION, POWDER, FOR SOLUTION INTRAVENOUS at 08:03

## 2024-03-13 RX ADMIN — HYDROMORPHONE HYDROCHLORIDE 0.4 MG: 2 INJECTION INTRAMUSCULAR; INTRAVENOUS; SUBCUTANEOUS at 02:03

## 2024-03-13 RX ADMIN — ACETAMINOPHEN 1000 MG: 10 INJECTION, SOLUTION INTRAVENOUS at 02:03

## 2024-03-13 RX ADMIN — MUPIROCIN: 20 OINTMENT TOPICAL at 09:03

## 2024-03-13 RX ADMIN — ALVIMOPAN 12 MG: 12 CAPSULE ORAL at 09:03

## 2024-03-13 RX ADMIN — PROPOFOL 150 MG: 10 INJECTION, EMULSION INTRAVENOUS at 08:03

## 2024-03-13 RX ADMIN — ROCURONIUM BROMIDE 20 MG: 10 INJECTION INTRAVENOUS at 10:03

## 2024-03-13 RX ADMIN — CEFOXITIN 2 G: 2 INJECTION, POWDER, FOR SOLUTION INTRAVENOUS at 10:03

## 2024-03-13 RX ADMIN — KETAMINE HYDROCHLORIDE 10 MG: 50 INJECTION INTRAMUSCULAR; INTRAVENOUS at 09:03

## 2024-03-13 RX ADMIN — ACETAMINOPHEN 1000 MG: 500 TABLET ORAL at 06:03

## 2024-03-13 RX ADMIN — MIDAZOLAM HYDROCHLORIDE 2 MG: 1 INJECTION, SOLUTION INTRAMUSCULAR; INTRAVENOUS at 07:03

## 2024-03-13 RX ADMIN — SODIUM CHLORIDE, SODIUM LACTATE, POTASSIUM CHLORIDE, AND CALCIUM CHLORIDE: 600; 310; 30; 20 INJECTION, SOLUTION INTRAVENOUS at 06:03

## 2024-03-13 RX ADMIN — KETAMINE HYDROCHLORIDE 50 MG: 50 INJECTION INTRAMUSCULAR; INTRAVENOUS at 08:03

## 2024-03-13 RX ADMIN — ROCURONIUM BROMIDE 20 MG: 10 INJECTION INTRAVENOUS at 11:03

## 2024-03-13 RX ADMIN — ROCURONIUM BROMIDE 20 MG: 10 INJECTION INTRAVENOUS at 12:03

## 2024-03-13 RX ADMIN — HEPARIN SODIUM 5000 UNITS: 5000 INJECTION INTRAVENOUS; SUBCUTANEOUS at 06:03

## 2024-03-13 RX ADMIN — FENTANYL CITRATE 100 MCG: 50 INJECTION, SOLUTION INTRAMUSCULAR; INTRAVENOUS at 07:03

## 2024-03-13 RX ADMIN — KETAMINE HYDROCHLORIDE 10 MG: 50 INJECTION INTRAMUSCULAR; INTRAVENOUS at 12:03

## 2024-03-13 RX ADMIN — CEFOXITIN 2 G: 2 INJECTION, POWDER, FOR SOLUTION INTRAVENOUS at 12:03

## 2024-03-13 RX ADMIN — BUPIVACAINE 20 ML: 13.3 INJECTION, SUSPENSION, LIPOSOMAL INFILTRATION at 07:03

## 2024-03-13 RX ADMIN — HYDROMORPHONE HYDROCHLORIDE 0.4 MG: 2 INJECTION INTRAMUSCULAR; INTRAVENOUS; SUBCUTANEOUS at 03:03

## 2024-03-13 RX ADMIN — KETAMINE HYDROCHLORIDE 10 MG: 50 INJECTION INTRAMUSCULAR; INTRAVENOUS at 11:03

## 2024-03-13 RX ADMIN — ROCURONIUM BROMIDE 20 MG: 10 INJECTION INTRAVENOUS at 08:03

## 2024-03-13 RX ADMIN — ALBUMIN (HUMAN) 125 ML/HR: 12.5 SOLUTION INTRAVENOUS at 10:03

## 2024-03-13 RX ADMIN — CEFOXITIN 2 G: 2 INJECTION, POWDER, FOR SOLUTION INTRAVENOUS at 09:03

## 2024-03-13 RX ADMIN — PHENYLEPHRINE HYDROCHLORIDE 0.5 MCG/KG/MIN: 10 INJECTION INTRAVENOUS at 08:03

## 2024-03-13 NOTE — PROGRESS NOTES
Gu staff    Stable in ICU  Af vss  Ab benign  Stoma pink and viable  Good appliance fit.  Wounds intact    Sp robotic radical cystectomy, prostatectomy, lymphadenectomy and intracorporeal ileal conduit    Routine post op  Avoid narcotcs; pt had TAP block by anesthesia preop  Clear liquids.  Irrigate stomal cath with cath tip syringe and 20cc NS prn to keep free of mucous plugs  ALEXANDRA to drainage    Labs pending    Will ask hosp med to assist with post op management; thank you

## 2024-03-13 NOTE — OP NOTE
[facility]   Operative Note     Date: 03/13/2024    Pre-Op Diagnosis:  1. Small cell muscle invasive  bladder cancer  2. Meatal stenosis     Patient Active Problem List   Diagnosis    Elevated PSA    Benign non-nodular prostatic hyperplasia with lower urinary tract symptoms    Multifocal pneumonia    Hypokalemia    Malignant neoplasm of dome of urinary bladder    Normocytic anemia       Post-Op Diagnosis: same     Procedure(s) Performed:   1.  Robotic-assisted laparoscopic bilateral pelvic lymph node dissection  2.  Robotic-assisted laparoscopic radical cystoprostatectomy   3.  Robotic creation of ileal conduit urinary diversion  4.  Bilateral ureteral stent placement  5.  Urethral dilation with sounds.     Specimen(s):   1.  Bladder, prostate, seminal vesicles and left iliac node  2.  Right pelvic lymph nodes  3.  Left pelvic lymph nodes  4.  Left distal ureteral margin  5.  Right distal ureteral margin    Staff Surgeon: Yinka Jimenez MD     Assistant Residents: MD Sid Valero MD    Bedside Assistant: Twyla Wallis PA-C (no qualified resident was available to assist)    Anesthesia: General endotracheal anesthesia     Indications: 68 y.o. male with bladder cancer, presenting for surgical extirpation.    Findings:   - bladder and prostate removed without complication. Left iliac node with grossly apparent disease. This was contiguous with the perivesical fat and thus the lymph node was removed en bloc with the specimen.   - Negative margins on frozen section of bilateral distal ureters.  - bilateral pelvic lymph node dissection up to bilateral bifurcation of common iliac arteries  - mae style uretero-enteric anastomoses bilaterally, watertight  - Bilateral 6x22 cm JJ ureteral stents placed.      Estimated Blood Loss: 100 mL    Drains:   1.  15 mm Jacques drain to LLQ  2.  18 Fr red rubber per RLQ urostomy   3. 6X22 cm BILATERAL ureteral double J stents       Procedure in  Detail:  After risks and benefits of the procedure were discussed with the patient, informed consent was obtained.  The patient received heparin preoperatively.  He was brought to the operating suite and placed in the supine position.  General anesthesia was administered.  The stoma site previously marked by wound care was etched into the patient's skin for identification during stoma creation.  The patient was positioned to the supine, prepped, and draped in the usual sterile fashion.  A 18 Fr mitchell catheter was unable to be placed due to meatal stenosis. This required serial dilation with Ethan sounds.  Mitchell catheter was still unable to be placed.  A motion wire was placed through the urethra into the bladder and exchanged for a 5 Malaysian open-ended catheter with good return of urine, confirming placement in the bladder.  The urethra was then serially dilated with Shirley dilators.  A 16 Malaysian [place] tip Mitchell catheter was placed over the wire and into the bladder with good return of urine.    A Veress needle was used to gain access in to the abdomen. There was no succus or blood on aspiration of the Veress needle and initial pressure was <10 mm Hg. Once adequately insufflated, a robotic camera port was placed in a supraumbilical position under direct vision. Inspection of the abdomen and pelvis revealed no abnormalities. The remainder of the robotic and assistant ports were placed under direct vision without complication. The robot was then docked.     We began by incising the peritoneum over the left ureter. Once the ureter was identified it was atraumatically mobilized above the iliac vessels proximally and to the ureterovesical junction distally. The same procedure was performed on the right side.     The peritoneum over the bilateral vasa deferentia and seminal vesicles was then incised. Denonvilliers fascia was dissected until the rectum was encountered. The lateral pedicles to the bladder and prostate  were then taken with the robotic vessel sealer.     The distal ureters were then clipped with two Hemolok clips and divided. They were then mobilized out of the pelvis.     Incisions were then made on the peritoneum lateral to the medial umbilical ligaments. The bladder was fully mobilized from the abdominal wall to the endopelvic fascia. The endopelvic fascia was freed from the prostate. The dorsal venous complex was ligated with a 2-0 V-loc suture. The DVC was then divided. Endopelvic fascia and rectourethralis was athermically divided until the rectum and urethra were . The Hernandez was then removed and the urethra was and divided distally. The specimen was then mobilized out of the pelvis. There was no rectal injury. Hemostasis was excellent.      The right pelvic lymph node dissection was then performed by skeletonizing the common iliac, internal, and external iliac arteries as well as the external iliac vein using the genitofemoral nerve as the extent of our lateral dissection.  Lymphatics were controlled with Hemolok clips.  The same was performed on the left.     A tunnel was bluntly created behind the sigmoid colon just anterior to the bifurcation of the iliac vessels. The left ureter was then passed through this tunnel to the right for creation of our conduit.       The terminal ileum was identified and examined. A single serosal 3-0 silk stitch was then placed in the ileum on the antimesenteric side, 15 cm proximal to the ileocecum. Another 3-0 silk was placed in the ileum 15cm proximal to the first stitch. A 60mm Robotic stapler was used to the incise the bowel between our two preplaced stitches and this demarcated our ileal conduit. The conduit appeared healthy and was placed caudad. The bowel was reconstituted by lining up the two edges of bowel adjacent to one another laterolaterally in an antimesenteric configuration. 3 serosal 3-0 silks were placed on both loops of bowel to line up the two  loops of ileum. Both loops of bowel were incised at the superior edge of the respective staple lines and the robotic stapler was advanced through both limbs and the stapler was fired. A final stable load was fired horizontally to close the surgical enterotomies.   The left ureter was brought towards the proximal limb of the conduit and a 3-0 vicryl holding stitch was used to secure the bowel and the ureter.     A left ureterotomy and ileal conduit enterotomy was made. The distal ureter was cut leaving a small segment as a handle. The ureter was then spatulated with scissors to ensure an adequate size for the anastomosis. A double armed 4-0 Stratafix was used to anchor the apex of the spatulation to the enterotomy. This was then used in a running fashion to allow mucosa-to-mucosa apposition of the ureter and conduit. Prior to closing the anastomosis, a 6x22 cm JJ stent was placed over a wire. There was excellent pooling of urine. The anastomosis was then completed.    This procedure was then repeated with the right ureter, using a 6x22 JJ stent. The robot was then undocked.    The midline incision was extended and the specimens were delivered. The fascia was closed with interrupted with 1-0 Ethibond.        Construction of the stoma then began with the excision of a circular plug of skin around the stoma site on the patient's right. Dissection was carried down to the anterior rectus fascia and an cruciate incision was made in this layer. 2 fingers were able to be placed in this incision.     The camera was then inserted from the left lateral port and under direct vision a Isha was then passed through the  rectus muscle. The conduit was then grasped and brought through the stoma site. The stoma was rosebudded at the 3, 9, 12 and 6 o'clock using interrupted 3-0 Vicryl. The mucosal edges of the ileum were secured to the skin with interrupted 3-0 vicryl sutures.     A 19 Fr amol drain was then placed in the LLQ  The  two 12 mm trocar sites were closed at the level of the fascia with 0 vicryl sutures on a UR 6 needle.     The port sites were closed with 4-0 monocryl in a simple interrupted fashion.  The drain was secured with a 1-0 silk suture. A urostomy appliance was placed over the stoma. A mitchell was placed in the stoma and tied to the adjacent skin with a silk drain stitch.     Disposition:  The patient will be admitted to the Urology service for close observation and post operative recovery.

## 2024-03-13 NOTE — TRANSFER OF CARE
Anesthesia Transfer of Care Note    Patient: Med Palma    Procedure(s) Performed: Procedure(s) (LRB):  XI ROBOTIC CYSTECTOMY, WITH ILEAL CONDUIT CREATION (Bilateral)  XI ROBOTIC PROSTATECTOMY (N/A)  XI ROBOTIC BIOPSY, PELVIC LYMPH NODE (Bilateral)    Patient location: ICU    Anesthesia Type: general    Transport from OR: Transported from OR on 6-10 L/min O2 by face mask with adequate spontaneous ventilation    Post pain: adequate analgesia    Post assessment: no apparent anesthetic complications and tolerated procedure well    Post vital signs: stable    Level of consciousness: awake    Nausea/Vomiting: no nausea/vomiting    Complications: none    Transfer of care protocol was followed      Last vitals: Visit Vitals  /77 (BP Location: Left arm, Patient Position: Lying)   Pulse 79   Temp 36.6 °C (97.9 °F) (Oral)   Resp 20   Ht 6' (1.829 m)   Wt 90.7 kg (200 lb)   SpO2 100%   BMI 27.12 kg/m²

## 2024-03-13 NOTE — ANESTHESIA PROCEDURE NOTES
Arterial    Diagnosis: bladder cancer    Patient location during procedure: holding area    Staffing  Authorizing Provider: Honorio Coulter MD  Performing Provider: Honorio Coulter MD    Staffing  Performed by: Honorio Coulter MD  Authorized by: Honorio Coulter MD    Anesthesiologist was present at the time of the procedure.  Arterial  Skin Prep: chlorhexidine gluconate  Local Infiltration: lidocaine  Orientation: left  Location: radial    Catheter Size: 20 G  Catheter placement by Ultrasound guidance. Heme positive aspiration all ports.   Vessel Caliber: medium, patent, compressibility normal  Vascular Doppler:  not done  Needle advanced into vessel with real time Ultrasound guidance.Insertion Attempts: 1  Assessment  Dressing: secured with tape and tegaderm  Patient: Tolerated well

## 2024-03-13 NOTE — ANESTHESIA POSTPROCEDURE EVALUATION
Anesthesia Post Evaluation    Patient: Med Palma    Procedure(s) Performed: Procedure(s) (LRB):  XI ROBOTIC CYSTECTOMY, WITH ILEAL CONDUIT CREATION (Bilateral)  XI ROBOTIC PROSTATECTOMY (N/A)  XI ROBOTIC BIOPSY, PELVIC LYMPH NODE (Bilateral)    Final Anesthesia Type: general      Patient location during evaluation: PACU  Patient participation: Yes- Able to Participate  Level of consciousness: awake and alert  Post-procedure vital signs: reviewed and stable  Pain management: adequate  Airway patency: patent    PONV status at discharge: No PONV  Anesthetic complications: no      Cardiovascular status: blood pressure returned to baseline  Respiratory status: unassisted  Hydration status: euvolemic  Follow-up not needed.              Vitals Value Taken Time   /82 03/13/24 1748   Temp  03/13/24 1815   Pulse 71 03/13/24 1815   Resp 10 03/13/24 1815   SpO2 100 % 03/13/24 1815   Vitals shown include unvalidated device data.      No case tracking events are documented in the log.      Pain/Mirna Score: Pain Rating Prior to Med Admin: 0 (3/13/2024  6:46 AM)

## 2024-03-13 NOTE — ANESTHESIA PROCEDURE NOTES
Peripheral Block    Patient location during procedure: holding area   Block not for primary anesthetic.  Reason for block: at surgeon's request and post-op pain management   Post-op Pain Location: bladder and pelvis   Timeout: 3/13/2024 7:12 AM     Staffing  Authorizing Provider: Honorio Coulter MD  Performing Provider: Honorio Coulter MD    Staffing  Performed by: Honorio Coulter MD  Authorized by: Honorio Coulter MD    Preanesthetic Checklist  Completed: patient identified, IV checked, site marked, risks and benefits discussed, surgical consent, monitors and equipment checked, pre-op evaluation and timeout performed  Peripheral Block  Patient position: supine  Prep: ChloraPrep  Patient monitoring: heart rate, cardiac monitor, continuous pulse ox and frequent blood pressure checks  Block type: TAP  Laterality: bilateral  Injection technique: single shot  Needle  Needle gauge: 22 G  Needle length: 4 in  Needle localization: ultrasound guidance   -ultrasound image captured on disc.  Assessment  Injection assessment: negative aspiration, negative parasthesia and local visualized surrounding nerve  Paresthesia pain: none  Heart rate change: no  Slow fractionated injection: yes  Pain Tolerance: comfortable throughout block and no complaints  Medications:    Medications: bupivacaine (pf) (MARCAINE) injection 0.25% - Perineural   40 mL - 3/13/2024 7:17:00 AM    Additional Notes  Bilateral TAP using bupivacaine 0.25% 20cc PLUS Exparel 10cc to each

## 2024-03-13 NOTE — ANESTHESIA PROCEDURE NOTES
Intubation    Date/Time: 3/13/2024 8:13 AM    Performed by: Peyton Coyle  Authorized by: Honorio Coulter MD    Intubation:     Induction:  Intravenous    Intubated:  Postinduction    Mask Ventilation:  Easy with oral airway    Attempts:  1    Attempted By:  CRNA    Method of Intubation:  Video laryngoscopy    Blade:  Diaz 3    Laryngeal View Grade: Grade I - full view of cords      Difficult Airway Encountered?: No      Complications:  None    Airway Device:  Oral endotracheal tube    Airway Device Size:  8.0    Style/Cuff Inflation:  Cuffed (inflated to minimal occlusive pressure)    Tube secured:  22    Secured at:  The lips    Placement Verified By:  Capnometry    Complicating Factors:  None    Findings Post-Intubation:  BS equal bilateral and atraumatic/condition of teeth unchanged

## 2024-03-13 NOTE — INTERVAL H&P NOTE
The patient has been examined and the H&P has been reviewed:    I concur with the findings and no changes have occurred since H&P was written.    Surgery risks, benefits and alternative options discussed and understood by patient/family.      Patient Active Problem List   Diagnosis    Elevated PSA    Benign non-nodular prostatic hyperplasia with lower urinary tract symptoms    Multifocal pneumonia    Hypokalemia    Malignant neoplasm of dome of urinary bladder    Normocytic anemia

## 2024-03-14 PROBLEM — E87.6 HYPOKALEMIA: Status: RESOLVED | Noted: 2021-08-08 | Resolved: 2024-03-14

## 2024-03-14 PROBLEM — J18.9 MULTIFOCAL PNEUMONIA: Status: RESOLVED | Noted: 2021-08-08 | Resolved: 2024-03-14

## 2024-03-14 LAB
ANION GAP SERPL CALC-SCNC: 8 MMOL/L (ref 8–16)
BASOPHILS # BLD AUTO: 0.03 K/UL (ref 0–0.2)
BASOPHILS NFR BLD: 0.2 % (ref 0–1.9)
BUN SERPL-MCNC: 21 MG/DL (ref 8–23)
CALCIUM SERPL-MCNC: 9 MG/DL (ref 8.7–10.5)
CHLORIDE SERPL-SCNC: 107 MMOL/L (ref 95–110)
CO2 SERPL-SCNC: 22 MMOL/L (ref 23–29)
CREAT SERPL-MCNC: 1.3 MG/DL (ref 0.5–1.4)
DIFFERENTIAL METHOD BLD: ABNORMAL
EOSINOPHIL # BLD AUTO: 0 K/UL (ref 0–0.5)
EOSINOPHIL NFR BLD: 0 % (ref 0–8)
ERYTHROCYTE [DISTWIDTH] IN BLOOD BY AUTOMATED COUNT: 15.9 % (ref 11.5–14.5)
EST. GFR  (NO RACE VARIABLE): >60 ML/MIN/1.73 M^2
GLUCOSE SERPL-MCNC: 119 MG/DL (ref 70–110)
HCT VFR BLD AUTO: 26.3 % (ref 40–54)
HGB BLD-MCNC: 8.7 G/DL (ref 14–18)
IMM GRANULOCYTES # BLD AUTO: 0.05 K/UL (ref 0–0.04)
IMM GRANULOCYTES NFR BLD AUTO: 0.4 % (ref 0–0.5)
LYMPHOCYTES # BLD AUTO: 1.1 K/UL (ref 1–4.8)
LYMPHOCYTES NFR BLD: 7.6 % (ref 18–48)
MAGNESIUM SERPL-MCNC: 1.8 MG/DL (ref 1.6–2.6)
MCH RBC QN AUTO: 31.2 PG (ref 27–31)
MCHC RBC AUTO-ENTMCNC: 33.1 G/DL (ref 32–36)
MCV RBC AUTO: 94 FL (ref 82–98)
MONOCYTES # BLD AUTO: 1.1 K/UL (ref 0.3–1)
MONOCYTES NFR BLD: 7.7 % (ref 4–15)
NEUTROPHILS # BLD AUTO: 11.7 K/UL (ref 1.8–7.7)
NEUTROPHILS NFR BLD: 84.1 % (ref 38–73)
NRBC BLD-RTO: 0 /100 WBC
PHOSPHATE SERPL-MCNC: 3.3 MG/DL (ref 2.7–4.5)
PLATELET # BLD AUTO: 219 K/UL (ref 150–450)
PMV BLD AUTO: 10.8 FL (ref 9.2–12.9)
POTASSIUM SERPL-SCNC: 4.2 MMOL/L (ref 3.5–5.1)
RBC # BLD AUTO: 2.79 M/UL (ref 4.6–6.2)
SODIUM SERPL-SCNC: 137 MMOL/L (ref 136–145)
WBC # BLD AUTO: 13.9 K/UL (ref 3.9–12.7)

## 2024-03-14 PROCEDURE — 83735 ASSAY OF MAGNESIUM: CPT | Performed by: STUDENT IN AN ORGANIZED HEALTH CARE EDUCATION/TRAINING PROGRAM

## 2024-03-14 PROCEDURE — 85025 COMPLETE CBC W/AUTO DIFF WBC: CPT | Performed by: STUDENT IN AN ORGANIZED HEALTH CARE EDUCATION/TRAINING PROGRAM

## 2024-03-14 PROCEDURE — 36415 COLL VENOUS BLD VENIPUNCTURE: CPT | Performed by: STUDENT IN AN ORGANIZED HEALTH CARE EDUCATION/TRAINING PROGRAM

## 2024-03-14 PROCEDURE — 97535 SELF CARE MNGMENT TRAINING: CPT

## 2024-03-14 PROCEDURE — 25000003 PHARM REV CODE 250: Performed by: PHYSICIAN ASSISTANT

## 2024-03-14 PROCEDURE — 97162 PT EVAL MOD COMPLEX 30 MIN: CPT

## 2024-03-14 PROCEDURE — 80048 BASIC METABOLIC PNL TOTAL CA: CPT | Performed by: STUDENT IN AN ORGANIZED HEALTH CARE EDUCATION/TRAINING PROGRAM

## 2024-03-14 PROCEDURE — 94761 N-INVAS EAR/PLS OXIMETRY MLT: CPT

## 2024-03-14 PROCEDURE — 84100 ASSAY OF PHOSPHORUS: CPT | Performed by: STUDENT IN AN ORGANIZED HEALTH CARE EDUCATION/TRAINING PROGRAM

## 2024-03-14 PROCEDURE — 63600175 PHARM REV CODE 636 W HCPCS: Performed by: ANESTHESIOLOGY

## 2024-03-14 PROCEDURE — 25000003 PHARM REV CODE 250

## 2024-03-14 PROCEDURE — 20000000 HC ICU ROOM

## 2024-03-14 PROCEDURE — 97116 GAIT TRAINING THERAPY: CPT

## 2024-03-14 PROCEDURE — 63600175 PHARM REV CODE 636 W HCPCS: Performed by: UROLOGY

## 2024-03-14 PROCEDURE — 63600175 PHARM REV CODE 636 W HCPCS

## 2024-03-14 PROCEDURE — 25000003 PHARM REV CODE 250: Performed by: UROLOGY

## 2024-03-14 PROCEDURE — 97165 OT EVAL LOW COMPLEX 30 MIN: CPT

## 2024-03-14 RX ORDER — MAGNESIUM SULFATE 1 G/100ML
1 INJECTION INTRAVENOUS ONCE
Status: COMPLETED | OUTPATIENT
Start: 2024-03-14 | End: 2024-03-14

## 2024-03-14 RX ORDER — PROCHLORPERAZINE EDISYLATE 5 MG/ML
5 INJECTION INTRAMUSCULAR; INTRAVENOUS EVERY 6 HOURS PRN
Status: DISCONTINUED | OUTPATIENT
Start: 2024-03-14 | End: 2024-03-20 | Stop reason: HOSPADM

## 2024-03-14 RX ORDER — PREGABALIN 75 MG/1
75 CAPSULE ORAL NIGHTLY
Status: DISCONTINUED | OUTPATIENT
Start: 2024-03-14 | End: 2024-03-20 | Stop reason: HOSPADM

## 2024-03-14 RX ORDER — ACETAMINOPHEN 325 MG/1
TABLET ORAL
Status: DISPENSED
Start: 2024-03-14 | End: 2024-03-14

## 2024-03-14 RX ORDER — SODIUM CHLORIDE, SODIUM LACTATE, POTASSIUM CHLORIDE, CALCIUM CHLORIDE 600; 310; 30; 20 MG/100ML; MG/100ML; MG/100ML; MG/100ML
INJECTION, SOLUTION INTRAVENOUS CONTINUOUS
Status: DISCONTINUED | OUTPATIENT
Start: 2024-03-14 | End: 2024-03-19

## 2024-03-14 RX ORDER — POLYETHYLENE GLYCOL 3350 17 G/17G
17 POWDER, FOR SOLUTION ORAL DAILY
Status: DISCONTINUED | OUTPATIENT
Start: 2024-03-14 | End: 2024-03-17

## 2024-03-14 RX ORDER — ACETAMINOPHEN 325 MG/1
650 TABLET ORAL ONCE
Status: COMPLETED | OUTPATIENT
Start: 2024-03-14 | End: 2024-03-14

## 2024-03-14 RX ORDER — FAMOTIDINE 20 MG/1
20 TABLET, FILM COATED ORAL 2 TIMES DAILY
Status: DISCONTINUED | OUTPATIENT
Start: 2024-03-14 | End: 2024-03-20 | Stop reason: HOSPADM

## 2024-03-14 RX ORDER — KETOROLAC TROMETHAMINE 30 MG/ML
15 INJECTION, SOLUTION INTRAMUSCULAR; INTRAVENOUS EVERY 6 HOURS
Status: DISCONTINUED | OUTPATIENT
Start: 2024-03-14 | End: 2024-03-16

## 2024-03-14 RX ORDER — ONDANSETRON HYDROCHLORIDE 2 MG/ML
4 INJECTION, SOLUTION INTRAVENOUS EVERY 6 HOURS PRN
Status: DISCONTINUED | OUTPATIENT
Start: 2024-03-14 | End: 2024-03-20 | Stop reason: HOSPADM

## 2024-03-14 RX ADMIN — PREGABALIN 75 MG: 75 CAPSULE ORAL at 09:03

## 2024-03-14 RX ADMIN — METHOCARBAMOL 500 MG: 500 TABLET ORAL at 09:03

## 2024-03-14 RX ADMIN — KETOROLAC TROMETHAMINE 15 MG: 30 INJECTION, SOLUTION INTRAMUSCULAR at 05:03

## 2024-03-14 RX ADMIN — METHOCARBAMOL 500 MG: 500 TABLET ORAL at 05:03

## 2024-03-14 RX ADMIN — HYDROMORPHONE HYDROCHLORIDE 0.4 MG: 2 INJECTION INTRAMUSCULAR; INTRAVENOUS; SUBCUTANEOUS at 10:03

## 2024-03-14 RX ADMIN — CEFOXITIN 2 G: 2 INJECTION, POWDER, FOR SOLUTION INTRAVENOUS at 02:03

## 2024-03-14 RX ADMIN — CEFOXITIN 2 G: 2 INJECTION, POWDER, FOR SOLUTION INTRAVENOUS at 01:03

## 2024-03-14 RX ADMIN — SODIUM CHLORIDE, POTASSIUM CHLORIDE, SODIUM LACTATE AND CALCIUM CHLORIDE: 600; 310; 30; 20 INJECTION, SOLUTION INTRAVENOUS at 08:03

## 2024-03-14 RX ADMIN — ONDANSETRON 4 MG: 2 INJECTION INTRAMUSCULAR; INTRAVENOUS at 10:03

## 2024-03-14 RX ADMIN — MUPIROCIN: 20 OINTMENT TOPICAL at 09:03

## 2024-03-14 RX ADMIN — POLYETHYLENE GLYCOL 3350 17 G: 17 POWDER, FOR SOLUTION ORAL at 09:03

## 2024-03-14 RX ADMIN — HEPARIN SODIUM 5000 UNITS: 5000 INJECTION INTRAVENOUS; SUBCUTANEOUS at 06:03

## 2024-03-14 RX ADMIN — FAMOTIDINE 20 MG: 20 TABLET ORAL at 09:03

## 2024-03-14 RX ADMIN — MAGNESIUM SULFATE IN DEXTROSE 1 G: 10 INJECTION, SOLUTION INTRAVENOUS at 07:03

## 2024-03-14 RX ADMIN — SODIUM CHLORIDE, SODIUM LACTATE, POTASSIUM CHLORIDE, AND CALCIUM CHLORIDE: 600; 310; 30; 20 INJECTION, SOLUTION INTRAVENOUS at 02:03

## 2024-03-14 RX ADMIN — KETOROLAC TROMETHAMINE 15 MG: 30 INJECTION, SOLUTION INTRAMUSCULAR at 11:03

## 2024-03-14 RX ADMIN — METHOCARBAMOL 500 MG: 500 TABLET ORAL at 01:03

## 2024-03-14 RX ADMIN — ACETAMINOPHEN 1000 MG: 500 TABLET ORAL at 07:03

## 2024-03-14 RX ADMIN — ALVIMOPAN 12 MG: 12 CAPSULE ORAL at 09:03

## 2024-03-14 RX ADMIN — ACETAMINOPHEN 650 MG: 325 TABLET ORAL at 03:03

## 2024-03-14 RX ADMIN — SODIUM CHLORIDE, POTASSIUM CHLORIDE, SODIUM LACTATE AND CALCIUM CHLORIDE: 600; 310; 30; 20 INJECTION, SOLUTION INTRAVENOUS at 11:03

## 2024-03-14 RX ADMIN — CEFOXITIN 2 G: 2 INJECTION, POWDER, FOR SOLUTION INTRAVENOUS at 09:03

## 2024-03-14 NOTE — CONSULTS
[facility] - Intensive Care ([place])  Wound Care    Patient Name:  Med Palma   MRN:  [id number]  Date: 3/14/2024  Diagnosis: Malignant neoplasm of dome of urinary bladder    History:     Past Medical History:   Diagnosis Date    Allergy     Arthritis     Elevated PSA     Hypertension     Malignant tumor of urinary bladder     per patient removed with last surg       Social History     Socioeconomic History    Marital status:    Occupational History     Employer: [employer]   Tobacco Use    Smoking status: Former     Types: Cigarettes    Smokeless tobacco: Never   Substance and Sexual Activity    Alcohol use: Yes     Alcohol/week: 3.0 standard drinks of alcohol     Types: 3 Cans of beer per week     Comment: none 24 hr prior to surg    Drug use: No    Sexual activity: Yes     Partners: Female     Social Determinants of Health     Financial Resource Strain: Low Risk  (3/14/2024)    Overall Financial Resource Strain (CARDIA)     Difficulty of Paying Living Expenses: Not hard at all   Food Insecurity: No Food Insecurity (3/14/2024)    Hunger Vital Sign     Worried About Running Out of Food in the Last Year: Never true     Ran Out of Food in the Last Year: Never true   Transportation Needs: No Transportation Needs (3/14/2024)    PRAPARE - Transportation     Lack of Transportation (Medical): No     Lack of Transportation (Non-Medical): No   Physical Activity: Inactive (3/14/2024)    Exercise Vital Sign     Days of Exercise per Week: 0 days     Minutes of Exercise per Session: 0 min   Stress: No Stress Concern Present (3/14/2024)    [place] [employer] - Occupational Stress Questionnaire     Feeling of Stress : Not at all   Social Connections: Socially Integrated (3/14/2024)    Social Connection and Isolation Panel [NHANES]     Frequency of Communication with Friends and Family: More than three times a week     Frequency of Social Gatherings with Friends and Family: More  than three times a week     Attends Voodoo Services: More than 4 times per year     Active Member of Clubs or Organizations: Yes     Attends Club or Organization Meetings: More than 4 times per year     Marital Status:    Housing Stability: Low Risk  (3/14/2024)    Housing Stability Vital Sign     Unable to Pay for Housing in the Last Year: No     Number of Places Lived in the Last Year: 1     Unstable Housing in the Last Year: No       Precautions:     Allergies as of 02/27/2024    (No Known Allergies)       [facility] Assessment Details/Treatment     Patient seen for new ostomy consult. Initial ostomy education begun.  Goals of education include:    Pouch emptying, sizing/cuting pouch, and applying pouch  Stoma and jes-stomal care  Obtaining supplies    Discussed and demonstrated cutting ostomy pouch and emptying pouch of urine. Post op pouch in place with red rubber in stoma. Will not be able to change patient into a standard urostomy pouch until red rubber is removed. Completed ostomy lesson using a fake stoma.   Discussed importance of hydration and increasing fluid intake.  Explained process of ordering supplies to be delivered to patient's home. Provided reading materials regarding today's training and will follow up with patient tomorrow.     03/14/2024     Pt w Severe Sepsis 2/2 Group A Strep bacteremia likely from skin due to rash. Meeting SIRS on admission (, Leukocytosis) likely 2/2 skin breakdown due to rash. TTE completed with potential valvular mass w r/o veg    - Sneha Clx (4/10) +GAS, Sneha Clx (4/11 and 4/12) NGTD   - Plan for midline placement likely 4/14  - ID following; c/w penicillin likely 2 wk course (discontinued clina)   - f/u JENSEN to r/o vegetation (mitral valve - per findings on TTE) Pt w Severe Sepsis 2/2 Group A Strep bacteremia likely from skin due to rash. Meeting SIRS on admission (, Leukocytosis) likely 2/2 skin breakdown due to rash. TTE completed with potential valvular mass w r/o veg    - Sneha Clx (4/10) +GAS, Sneha Clx (4/11 and 4/12) NGTD   - PICC placed 4/14  - ID following; c/w penicillin likely 2 wk course (discontinued clina)   - JENSEN:  mitral valve severely thickened w/ mild bileaflet MVP. Small cleft seen between P2 and P3. There is mild MR    4. Thickened and redundant chordae (mitral subvalvular apparatus). There is a 0.8 cm x 0.7 mobile echodensity noted on one of redundant chords. Vegetation cannot be ruled out in the setting of bacteremia.   5. Aortic sclerosis without significant stenosis.   6. Mild AR   7. Intracardiac shunt is present; most consistent with a patent foramen ovale.    8. There is mild non-mobile plaque seen in the visualized portion of the descending aorta. There is mild non-mobile plaque seen in the visualized portion of the aortic arch.   9. Mildly dilated aortic root.

## 2024-03-14 NOTE — SUBJECTIVE & OBJECTIVE
Interval History: NAOE. Patient complaining abdominal pain and belching overnight making it difficult to sleep.   He is tolerating clears, denies N/V. No flatulence.   He has not gotten out of bed.   Pulling 2000 on IS.       Objective:     Temp:  [97.8 °F (36.6 °C)-98.2 °F (36.8 °C)] 98.2 °F (36.8 °C)  Pulse:  [] 88  Resp:  [7-16] 13  SpO2:  [94 %-100 %] 97 %  BP: (115-146)/(65-82) 135/77  Arterial Line BP: (110-149)/(61-73) 138/61     Body mass index is 28.75 kg/m².           Drains       Drain  Duration                  Closed/Suction Drain 03/13/24 1712 Left LLQ Bulb 19 Fr. <1 day         Urostomy 03/13/24 1720 ileal conduit RLQ <1 day                     Physical Exam  Constitutional:       General: He is not in acute distress.  HENT:      Head: Normocephalic.   Cardiovascular:      Rate and Rhythm: Normal rate.   Pulmonary:      Effort: Pulmonary effort is normal.   Abdominal:      General: There is no distension.      Palpations: Abdomen is soft.      Tenderness: There is no abdominal tenderness.      Comments: Midline abd incision with some SS drainage from inferior aspect.   RLQ urostomy pink, patent, moist, with red rubber visible through stoma. Clear thin maroon urine in bag without clot.  Abd benign, soft, mildly tender around incision sites. Non distended.   Drain SS   Musculoskeletal:         General: Normal range of motion.   Skin:     General: Skin is warm and dry.   Neurological:      General: No focal deficit present.      Mental Status: He is alert and oriented to person, place, and time.      Coordination: Coordination normal.   Psychiatric:         Mood and Affect: Mood normal.         Behavior: Behavior normal.           Significant Labs:    BMP:  Recent Labs   Lab 03/13/24 2012 03/14/24  0455   * 137   K 4.9 4.2    107   CO2 19* 22*   BUN 22 21   CREATININE 1.6* 1.3   CALCIUM 8.8 9.0       CBC:   Recent Labs   Lab 03/13/24 2012 03/14/24  0455   WBC 15.53* 13.90*   HGB 8.9*  8.7*   HCT 27.3* 26.3*    219       All pertinent labs results from the past 24 hours have been reviewed.    Significant Imaging:  All pertinent imaging results/findings from the past 24 hours have been reviewed.

## 2024-03-14 NOTE — ASSESSMENT & PLAN NOTE
Noted history, followed by Dr. Spicer-Hem/Onc.  Found to have elevated PSA outpatient .  He is POD 0 s/p robotic radical cystectoprostatectomy and PLND and ileal conduit with Dr don- Urology    -continue to follow Urology recommendations  -Pain management per urology-avoid narcotics per Urology  -Trend CBC and transfuse if needed  -wound care consulted for urostomy care

## 2024-03-14 NOTE — ASSESSMENT & PLAN NOTE
Med Palma is a 68 y.o. 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      - Tylenol, PO oxycodone for pain control, robaxin QID  - clear liquids  - IVF at 84 mL/hr today  - CBC, CMP, Mg, Phos          - replete lytes PRN  - Ambulate QID, PT/OT consult  - Wound care ostomy consult  - Heparin 5000 SQ  - Drains: maintain ALEXANDRA until discharge   - Prophylaxis: IS, SCDs, GI ppx

## 2024-03-14 NOTE — HPI
Med Palma is a 68-year-old male with a past medical history of arthritis, elevated PSA, hypertension and bladder cancer who presents for scheduled robotic radical cystectoprostatectomy and PLND and ileal conduit vs neobladder with Dr. Jimenez, urology.  Patient underwent TURBT 10/26/23 and was diagnosed with bladder cancer.  Patient is followed by , Hem/Onc and received 4 rounds of chemotherapy with last treatment completed 1 month ago.  Patient was seen by Dr. Jimenez, outpatient and cleared for surgery per heme Onc.  Hospital Medicine was consulted for medical comanagement when patient was seen postoperatively.  Patient states his pain is currently controlled and denies fever, chills, nausea, vomiting, shortness of breath and chest pain.  Hospital Medicine will continue to follow and remain available for medical comanagement.

## 2024-03-14 NOTE — SUBJECTIVE & OBJECTIVE
Past Medical History:   Diagnosis Date    Allergy     Arthritis     Elevated PSA     Hypertension     Malignant tumor of urinary bladder     per patient removed with last surg       Past Surgical History:   Procedure Laterality Date    TURBT (TRANSURETHRAL RESECTION OF BLADDER TUMOR) N/A 10/26/2023    Procedure: TURBT (TRANSURETHRAL RESECTION OF BLADDER TUMOR);  Surgeon: Yinka Jimenez MD;  Location: [facility];  Service: Urology;  Laterality: N/A;       Review of patient's allergies indicates:  No Known Allergies    No current facility-administered medications on file prior to encounter.     Current Outpatient Medications on File Prior to Encounter   Medication Sig    LIDOcaine-prilocaine (EMLA) cream Apply topically as needed (place to port site 45-60 minutes prior to chemotherapy).    tadalafiL (CIALIS) 20 MG Tab Take 1 tablet (20 mg total) by mouth daily as needed (ED).    tamsulosin (FLOMAX) 0.4 mg Cap Take 1 capsule (0.4 mg total) by mouth once daily.    prochlorperazine (COMPAZINE) 10 MG tablet Take 1 tablet (10 mg total) by mouth every 6 (six) hours as needed (nausea).    SHINGRIX, PF, 50 mcg/0.5 mL injection      Family History       Problem Relation (Age of Onset)    Cancer Father    Hypertension Mother          Tobacco Use    Smoking status: Former     Types: Cigarettes    Smokeless tobacco: Never   Substance and Sexual Activity    Alcohol use: Yes     Alcohol/week: 3.0 standard drinks of alcohol     Types: 3 Cans of beer per week     Comment: none 24 hr prior to surg    Drug use: No    Sexual activity: Yes     Partners: Female     Review of Systems   Constitutional:  Negative for activity change, appetite change, chills and fever.   HENT:  Negative for congestion, sore throat and trouble swallowing.    Eyes:  Negative for photophobia and visual disturbance.   Respiratory:  Negative for cough, chest tightness and shortness of breath.    Cardiovascular:  Negative for chest pain, palpitations and leg  swelling.   Gastrointestinal:  Negative for abdominal pain, diarrhea and nausea.   Genitourinary:  Positive for hematuria. Negative for dysuria and flank pain.   Musculoskeletal:  Negative for back pain.   Neurological:  Negative for dizziness, weakness and headaches.   Psychiatric/Behavioral:  Negative for confusion.      Objective:     Vital Signs (Most Recent):  Temp: 97.9 °F (36.6 °C) (03/13/24 1901)  Pulse: 102 (03/13/24 1926)  Resp: (!) 9 (03/13/24 1926)  BP: 135/73 (03/13/24 1901)  SpO2: 100 % (03/13/24 1926) Vital Signs (24h Range):  Temp:  [97.9 °F (36.6 °C)] 97.9 °F (36.6 °C)  Pulse:  [] 102  Resp:  [7-20] 9  SpO2:  [100 %] 100 %  BP: (119-146)/(73-81) 135/73  Arterial Line BP: (126-139)/(63-68) 139/68     Weight: 96.2 kg (212 lb)  Body mass index is 28.75 kg/m².     Physical Exam  Vitals reviewed.   Constitutional:       Appearance: Normal appearance. He is normal weight.   HENT:      Head: Normocephalic.      Mouth/Throat:      Mouth: Mucous membranes are moist.      Pharynx: Oropharynx is clear.   Eyes:      Pupils: Pupils are equal, round, and reactive to light.   Cardiovascular:      Rate and Rhythm: Normal rate and regular rhythm.      Pulses: Normal pulses.      Heart sounds: Normal heart sounds.   Pulmonary:      Effort: Pulmonary effort is normal.      Breath sounds: Normal breath sounds.   Abdominal:      General: Bowel sounds are normal.      Palpations: Abdomen is soft.      Comments: RLQ urostomy   Surgical incision site intact.   Drain in place   Genitourinary:     Comments: Hernandez cath with blood tinged urine  Musculoskeletal:         General: Normal range of motion.      Cervical back: Normal range of motion.   Skin:     General: Skin is warm and dry.   Neurological:      Mental Status: He is alert and oriented to person, place, and time. Mental status is at baseline.   Psychiatric:         Mood and Affect: Mood normal.          Significant Labs: All pertinent labs within the past 24  hours have been reviewed.  CBC:   Recent Labs   Lab 03/13/24 2012   WBC 15.53*   HGB 8.9*   HCT 27.3*        CMP:   Recent Labs   Lab 03/13/24 2012   *   K 4.9      CO2 19*   *   BUN 22   CREATININE 1.6*   CALCIUM 8.8   ANIONGAP 9       Significant Imaging: I have reviewed all pertinent imaging results/findings within the past 24 hours.

## 2024-03-14 NOTE — PLAN OF CARE
Medicare Message     Important Message from Medicare regarding Discharge Appeal Rights Given to patient/caregiver; Explained to patient/caregiver; Other (comments)Important Message from Medicare regarding Discharge Appeal Rights. Given to patient/caregiver; Explained to patient/caregiver; Other (comments). The comment is Verbal Consent. Taken on 3/14/24 1216   Date IMM was signed 3/14/2024   Time IMM was signed 0922

## 2024-03-14 NOTE — PROGRESS NOTES
[facility] Intensive Care [facility] Medicine  Progress Note    Patient Name: Med Palma  MRN: [id number]  Patient Class: IP- Inpatient   Admission Date: 3/13/2024  Length of Stay: 1 days  Attending Physician: Yinka Jimenez MD  Primary Care Provider: Jah Wilson MD        Subjective:     Principal Problem:Malignant neoplasm of dome of urinary bladder        HPI:  Med Palma is a 68-year-old male with a past medical history of arthritis, elevated PSA, hypertension and bladder cancer who presents for scheduled robotic radical cystectoprostatectomy and PLND and ileal conduit vs neobladder with Dr. Jimenez, urology.  Patient underwent TURBT 10/26/23 and was diagnosed with bladder cancer.  Patient is followed by , Hem/Onc and received 4 rounds of chemotherapy with last treatment completed 1 month ago.  Patient was seen by Dr. Jimenez, outpatient and cleared for surgery per heme Onc.  Hospital Medicine was consulted for medical comanagement when patient was seen postoperatively.  Patient states his pain is currently controlled and denies fever, chills, nausea, vomiting, shortness of breath and chest pain.  Hospital Medicine will continue to follow and remain available for medical comanagement.    Interval History: No acute events overnight. Feeling relatively well. Some abdominal and L shoulder discomfort. Notes some tingling to feet bilaterally which he states has been going on since prior surgery. No other concerns at this time.    Review of Systems   Constitutional:  Negative for chills and fever.   Respiratory:  Negative for cough and shortness of breath.    Cardiovascular:  Negative for chest pain and palpitations.   Gastrointestinal:  Positive for abdominal pain. Negative for nausea and vomiting.   Musculoskeletal:  Positive for arthralgias.     Objective:     Vital Signs (Most Recent):  Temp: 98.2 °F (36.8 °C) (03/14/24 0301)  Pulse: 90 (03/14/24 0601)  Resp: 16 (03/14/24  0601)  BP: (!) 147/75 (03/14/24 0601)  SpO2: 96 % (03/14/24 0601) Vital Signs (24h Range):  Temp:  [97.8 °F (36.6 °C)-98.2 °F (36.8 °C)] 98.2 °F (36.8 °C)  Pulse:  [] 90  Resp:  [7-16] 16  SpO2:  [94 %-100 %] 96 %  BP: (115-147)/(65-82) 147/75  Arterial Line BP: (110-149)/(61-73) 138/61     Weight: 96.2 kg (212 lb)  Body mass index is 28.75 kg/m².    Intake/Output Summary (Last 24 hours) at 3/14/2024 0823  Last data filed at 3/14/2024 0614  Gross per 24 hour   Intake 3382.39 ml   Output 1130 ml   Net 2252.39 ml         Physical Exam  Vitals and nursing note reviewed.   Constitutional:       General: He is not in acute distress.  HENT:      Head: Normocephalic and atraumatic.      Mouth/Throat:      Mouth: Mucous membranes are dry.      Pharynx: Oropharynx is clear.   Eyes:      General:         Right eye: No discharge.         Left eye: No discharge.   Cardiovascular:      Rate and Rhythm: Normal rate.      Pulses: Normal pulses.   Pulmonary:      Effort: Pulmonary effort is normal. No respiratory distress.   Abdominal:      Palpations: Abdomen is soft.      Tenderness: There is abdominal tenderness.      Comments: Incisions C/D/I. Drain in place. Urostomy with some bloody output.   Musculoskeletal:      Right lower leg: No edema.      Left lower leg: No edema.   Skin:     General: Skin is warm and dry.   Neurological:      Mental Status: He is alert and oriented to person, place, and time.           Significant Labs:   CBC:  Recent Labs   Lab 03/13/24 2012 03/14/24  0455   WBC 15.53* 13.90*   HGB 8.9* 8.7*   HCT 27.3* 26.3*    219   GRAN 88.5*  13.7* 84.1*  11.7*   LYMPH 5.2*  0.8* 7.6*  1.1   MONO 5.9  0.9 7.7  1.1*   EOS 0.0 0.0   BASO 0.02 0.03   BMP:  Recent Labs   Lab 03/13/24 2012 03/14/24  0455   * 137   K 4.9 4.2    107   CO2 19* 22*   BUN 22 21   CREATININE 1.6* 1.3   * 119*   CALCIUM 8.8 9.0   MG  --  1.8   PHOS  --  3.3     Significant Imaging: I have reviewed and  interpreted all pertinent imaging results/findings within the past 24 hours.      Assessment/Plan:      * Malignant neoplasm of dome of urinary bladder  - Primary management, pain control as per Urology.    Normocytic anemia  - Mild decrease since OR but stable overnight.  - Monitor.    Benign non-nodular prostatic hyperplasia with lower urinary tract symptoms  - As per primary.      VTE Risk Mitigation (From admission, onward)           Ordered     heparin (porcine) injection 5,000 Units  Every 8 hours         03/13/24 1740     IP VTE HIGH RISK PATIENT  Once         03/13/24 1740     Place sequential compression device  Until discontinued         03/13/24 0601     Place MCKENZIE hose  Until discontinued         03/13/24 0601                  DAJA Damico MD  Department of Hospital Medicine   [facility] - Intensive Care ([place])

## 2024-03-14 NOTE — PROGRESS NOTES
[facility] Intensive Care [facility]  Urology  Progress Note    Patient Name: Med Palma  MRN: [id number]  Admission Date: 3/13/2024  Hospital Length of Stay: 1 days  Code Status: Full Code   Attending Provider: Yinka Jimenez MD   Primary Care Physician: Jah Wilson MD    Subjective:     HPI:  Med Palma is a 68 y.o. 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     Interval History: NAOE. Patient complaining abdominal pain and belching overnight making it difficult to sleep.   He is tolerating clears, denies N/V. No flatulence.   He has not gotten out of bed.   Pulling 2000 on IS.       Objective:     Temp:  [97.8 °F (36.6 °C)-98.2 °F (36.8 °C)] 98.2 °F (36.8 °C)  Pulse:  [] 88  Resp:  [7-16] 13  SpO2:  [94 %-100 %] 97 %  BP: (115-146)/(65-82) 135/77  Arterial Line BP: (110-149)/(61-73) 138/61     Body mass index is 28.75 kg/m².           Drains       Drain  Duration                  Closed/Suction Drain 03/13/24 1712 Left LLQ Bulb 19 Fr. <1 day         Urostomy 03/13/24 1720 ileal conduit RLQ <1 day                     Physical Exam  Constitutional:       General: He is not in acute distress.  HENT:      Head: Normocephalic.   Cardiovascular:      Rate and Rhythm: Normal rate.   Pulmonary:      Effort: Pulmonary effort is normal.   Abdominal:      General: There is no distension.      Palpations: Abdomen is soft.      Tenderness: There is no abdominal tenderness.      Comments: Midline abd incision with some SS drainage from inferior aspect.   RLQ urostomy pink, patent, moist, with red rubber visible through stoma. Clear thin maroon urine in bag without clot.  Abd benign, soft, mildly tender around incision sites. Non distended.   Drain SS   Musculoskeletal:         General: Normal range of motion.   Skin:     General: Skin is warm and dry.   Neurological:       General: No focal deficit present.      Mental Status: He is alert and oriented to person, place, and time.      Coordination: Coordination normal.   Psychiatric:         Mood and Affect: Mood normal.         Behavior: Behavior normal.           Significant Labs:    BMP:  Recent Labs   Lab 03/13/24 2012 03/14/24  0455   * 137   K 4.9 4.2    107   CO2 19* 22*   BUN 22 21   CREATININE 1.6* 1.3   CALCIUM 8.8 9.0       CBC:   Recent Labs   Lab 03/13/24 2012 03/14/24  0455   WBC 15.53* 13.90*   HGB 8.9* 8.7*   HCT 27.3* 26.3*    219       All pertinent labs results from the past 24 hours have been reviewed.    Significant Imaging:  All pertinent imaging results/findings from the past 24 hours have been reviewed.                  Assessment/Plan:     * Malignant neoplasm of dome of urinary bladder  Med Palma is a 68 y.o. 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      - Tylenol, PO oxycodone for pain control, robaxin QID, toradol, lyrica  - clear liquids  - Entereg until ROBF  - Miralax daily  - IVF at 125 mL/hr today  - replete lytes PRN 4/3/2  - Ambulate QID, PT/OT consult  - Wound care ostomy consult  - Heparin 5000 SQ  - Drains: maintain ALEXANDRA until discharge   - Prophylaxis: IS, SCDs, GI ppx          VTE Risk Mitigation (From admission, onward)           Ordered     heparin (porcine) injection 5,000 Units  Every 8 hours         03/13/24 1740     IP VTE HIGH RISK PATIENT  Once         03/13/24 1740     Place sequential compression device  Until discontinued         03/13/24 0601     Place MCKENZIE hose  Until discontinued         03/13/24 0601                    KACY NUÑEZ MD  Urology  [facility] - Intensive Care ([place])

## 2024-03-14 NOTE — PLAN OF CARE
Lives with wife - prior to admit independent in ADLs - wife will provide transportation home & assist needed during recovery     [facility] - Intensive Care ([place])  Initial Discharge Assessment       Primary Care Provider: Jah Wilson MD    Admission Diagnosis: Malignant neoplasm of urinary bladder, unspecified site [C67.9]  Bladder cancer [C67.9]    Admission Date: 3/13/2024  Expected Discharge Date:     Transition of Care Barriers: None    Payor: [employer] MANAGED MEDICARE / Plan: [employer] MEDICARE HMO / Product Type: Capitation /     Extended Emergency Contact Information  Primary Emergency Contact: Carol [place] [address] [facility]  Home Phone: [phone]  Mobile Phone: [phone]  Relation: Spouse    Discharge Plan A: Home with family         [facility] [place] - [address]  [address] [address]  Phone: [phone] Fax: [phone]    [facility]  [address]  [place] [facility] [address]  Phone: [phone] Fax: [phone]      Initial Assessment (most recent)       Adult Discharge Assessment - 03/14/24 0850          Discharge Assessment    Assessment Type Discharge Planning Assessment     Confirmed/corrected address, phone number and insurance Yes     Confirmed Demographics Correct on Facesheet     Source of Information patient     Communicated GEN with patient/caregiver Yes     People in Home spouse     Do you expect to return to your current living situation? Yes     Do you have help at home or someone to help you manage your care at home? Yes     Prior to hospitilization cognitive status: Alert/Oriented     Current cognitive status: Alert/Oriented     Walking or Climbing Stairs Difficulty no     Dressing/Bathing Difficulty no     Equipment Currently Used at Home none     Readmission within 30 days? No     Patient currently being followed by outpatient case management? No     Do you currently  have service(s) that help you manage your care at home? No     Do you take prescription medications? Yes     Do you have prescription coverage? Yes     Do you have any problems affording any of your prescribed medications? No     Is the patient taking medications as prescribed? yes     Who is going to help you get home at discharge? wife     How do you get to doctors appointments? car, drives self     Are you on dialysis? No     Discharge Plan A Home with family     DME Needed Upon Discharge  none     Discharge Plan discussed with: Patient     Transition of Care Barriers None        Physical Activity    On average, how many days per week do you engage in moderate to strenuous exercise (like a brisk walk)? 0 days     On average, how many minutes do you engage in exercise at this level? 0 min        Financial Resource Strain    How hard is it for you to pay for the very basics like food, housing, medical care, and heating? Not hard at all        Housing Stability    In the last 12 months, was there a time when you were not able to pay the mortgage or rent on time? No     In the last 12 months, how many places have you lived? 1     In the last 12 months, was there a time when you did not have a steady place to sleep or slept in a shelter (including now)? No        Transportation Needs    In the past 12 months, has lack of transportation kept you from medical appointments or from getting medications? No     In the past 12 months, has lack of transportation kept you from meetings, work, or from getting things needed for daily living? No        Food Insecurity    Within the past 12 months, you worried that your food would run out before you got the money to buy more. Never true     Within the past 12 months, the food you bought just didn't last and you didn't have money to get more. Never true        Stress    Do you feel stress - tense, restless, nervous, or anxious, or unable to sleep at night because your mind is  troubled all the time - these days? Not at all        Social Connections    In a typical week, how many times do you talk on the phone with family, friends, or neighbors? More than three times a week     How often do you get together with friends or relatives? More than three times a week     How often do you attend Mandaeism or Gnosticism services? More than 4 times per year     Do you belong to any clubs or organizations such as Mandaeism groups, unions, fraternal or athletic groups, or school groups? Yes     How often do you attend meetings of the clubs or organizations you belong to? More than 4 times per year     Are you , , , , never , or living with a partner?         Alcohol Use    Q1: How often do you have a drink containing alcohol? Never     Q2: How many drinks containing alcohol do you have on a typical day when you are drinking? Patient does not drink     Q3: How often do you have six or more drinks on one occasion? Never

## 2024-03-14 NOTE — ASSESSMENT & PLAN NOTE
Noted history, followed by Dr. Spicer-Hem/Onc and is s/p robotic radical cystectoprostatectomy and PLND and ileal conduit with Dr don- Urology    -continue to follow Urology recommendations  -Pain management per urology  -Trend CBC and transfuse if needed  -wound care consulted for urostomy care

## 2024-03-14 NOTE — CONSULTS
Please see consult note dated 3/13 by DEENA Gordon. Garret Deutsch MD  Department of Hospital Medicine   [facility]

## 2024-03-14 NOTE — CONSULTS
[facility] Medicine  Consult Note    Patient Name: Med Palma  MRN: [id number]  Admission Date: 3/13/2024  Hospital Length of Stay: 0 days  Attending Physician: Yinka Jimenez MD   Primary Care Provider: Jah Wilson MD           Patient information was obtained from patient, past medical records, and ER records.     Consults  Subjective:     Principal Problem: Malignant neoplasm of dome of urinary bladder    Chief Complaint: No chief complaint on file.       HPI: Med Palma is a 68-year-old male with a past medical history of arthritis, elevated PSA, hypertension and bladder cancer who presents for scheduled robotic radical cystectoprostatectomy and PLND and ileal conduit vs neobladder with Dr. Jimenez, urology.  Patient underwent TURBT 10/26/23 and was diagnosed with bladder cancer.  Patient is followed by , Hem/Onc and received 4 rounds of chemotherapy with last treatment completed 1 month ago.  Patient was seen by Dr. Jimenez, outpatient and cleared for surgery per heme Onc.  Hospital Medicine was consulted for medical comanagement when patient was seen postoperatively.  Patient states his pain is currently controlled and denies fever, chills, nausea, vomiting, shortness of breath and chest pain.  Hospital Medicine will continue to follow and remain available for medical comanagement.    Past Medical History:   Diagnosis Date    Allergy     Arthritis     Elevated PSA     Hypertension     Malignant tumor of urinary bladder     per patient removed with last surg       Past Surgical History:   Procedure Laterality Date    TURBT (TRANSURETHRAL RESECTION OF BLADDER TUMOR) N/A 10/26/2023    Procedure: TURBT (TRANSURETHRAL RESECTION OF BLADDER TUMOR);  Surgeon: Yinka Jimenez MD;  Location: [facility];  Service: Urology;  Laterality: N/A;       Review of patient's allergies indicates:  No Known Allergies    No current facility-administered medications  on file prior to encounter.     Current Outpatient Medications on File Prior to Encounter   Medication Sig    LIDOcaine-prilocaine (EMLA) cream Apply topically as needed (place to port site 45-60 minutes prior to chemotherapy).    tadalafiL (CIALIS) 20 MG Tab Take 1 tablet (20 mg total) by mouth daily as needed (ED).    tamsulosin (FLOMAX) 0.4 mg Cap Take 1 capsule (0.4 mg total) by mouth once daily.    prochlorperazine (COMPAZINE) 10 MG tablet Take 1 tablet (10 mg total) by mouth every 6 (six) hours as needed (nausea).    SHINGRIX, PF, 50 mcg/0.5 mL injection      Family History       Problem Relation (Age of Onset)    Cancer Father    Hypertension Mother          Tobacco Use    Smoking status: Former     Types: Cigarettes    Smokeless tobacco: Never   Substance and Sexual Activity    Alcohol use: Yes     Alcohol/week: 3.0 standard drinks of alcohol     Types: 3 Cans of beer per week     Comment: none 24 hr prior to surg    Drug use: No    Sexual activity: Yes     Partners: Female     Review of Systems   Constitutional:  Negative for activity change, appetite change, chills and fever.   HENT:  Negative for congestion, sore throat and trouble swallowing.    Eyes:  Negative for photophobia and visual disturbance.   Respiratory:  Negative for cough, chest tightness and shortness of breath.    Cardiovascular:  Negative for chest pain, palpitations and leg swelling.   Gastrointestinal:  Negative for abdominal pain, diarrhea and nausea.   Genitourinary:  Positive for hematuria. Negative for dysuria and flank pain.   Musculoskeletal:  Negative for back pain.   Neurological:  Negative for dizziness, weakness and headaches.   Psychiatric/Behavioral:  Negative for confusion.      Objective:     Vital Signs (Most Recent):  Temp: 97.9 °F (36.6 °C) (03/13/24 1901)  Pulse: 102 (03/13/24 1926)  Resp: (!) 9 (03/13/24 1926)  BP: 135/73 (03/13/24 1901)  SpO2: 100 % (03/13/24 1926) Vital Signs (24h Range):  Temp:  [97.9 °F (36.6 °C)]  97.9 °F (36.6 °C)  Pulse:  [] 102  Resp:  [7-20] 9  SpO2:  [100 %] 100 %  BP: (119-146)/(73-81) 135/73  Arterial Line BP: (126-139)/(63-68) 139/68     Weight: 96.2 kg (212 lb)  Body mass index is 28.75 kg/m².     Physical Exam  Vitals reviewed.   Constitutional:       Appearance: Normal appearance. He is normal weight.   HENT:      Head: Normocephalic.      Mouth/Throat:      Mouth: Mucous membranes are moist.      Pharynx: Oropharynx is clear.   Eyes:      Pupils: Pupils are equal, round, and reactive to light.   Cardiovascular:      Rate and Rhythm: Normal rate and regular rhythm.      Pulses: Normal pulses.      Heart sounds: Normal heart sounds.   Pulmonary:      Effort: Pulmonary effort is normal.      Breath sounds: Normal breath sounds.   Abdominal:      General: Bowel sounds are normal.      Palpations: Abdomen is soft.      Comments: RLQ urostomy   Surgical incision site intact.   Drain in place   Genitourinary:     Comments: Hernandez cath with blood tinged urine  Musculoskeletal:         General: Normal range of motion.      Cervical back: Normal range of motion.   Skin:     General: Skin is warm and dry.   Neurological:      Mental Status: He is alert and oriented to person, place, and time. Mental status is at baseline.   Psychiatric:         Mood and Affect: Mood normal.          Significant Labs: All pertinent labs within the past 24 hours have been reviewed.  CBC:   Recent Labs   Lab 03/13/24 2012   WBC 15.53*   HGB 8.9*   HCT 27.3*        CMP:   Recent Labs   Lab 03/13/24 2012   *   K 4.9      CO2 19*   *   BUN 22   CREATININE 1.6*   CALCIUM 8.8   ANIONGAP 9       Significant Imaging: I have reviewed all pertinent imaging results/findings within the past 24 hours.  Assessment/Plan:     * Malignant neoplasm of dome of urinary bladder  Noted history, followed by Dr. Spicer-Naman/Onc.  Found to have elevated PSA outpatient .  He is POD 0 s/p robotic radical  cystectoprostatectomy and PLND and ileal conduit with Dr don- Urology    -continue to follow Urology recommendations  -Pain management per urology-avoid narcotics per Urology  -Trend CBC and transfuse if needed  -wound care consulted for urostomy care    Benign non-nodular prostatic hyperplasia with lower urinary tract symptoms  History noted, followed by urology    -continue to follow recommendations of urology      Normocytic anemia  H/H 8.9/27.3 baseline near 11-12/32-38    -trend CBC and transfuse if hgb <8       VTE Risk Mitigation (From admission, onward)           Ordered     heparin (porcine) injection 5,000 Units  Every 8 hours         03/13/24 1740     IP VTE HIGH RISK PATIENT  Once         03/13/24 1740     Place sequential compression device  Until discontinued         03/13/24 0601     Place MCKENZIE hose  Until discontinued         03/13/24 0601                        Thank you for your consult. I will follow-up with patient. Please contact us if you have any additional questions.    Juany Das NP  Department of Hospital Medicine   [facility] ([place])

## 2024-03-14 NOTE — HPI
Med JACEK Palma is a 68 y.o. 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

## 2024-03-14 NOTE — PLAN OF CARE
D/C Rec: Ongoing assessment pending progress with gait and improvement in B ankle/foot strength/ROM/sensory deficits    DME Rec: RW, R AFO, may also benefit from L AFO    Pt evaluated, presents with B foot drop post-op, R worst than L as well as sensory deficits. Pt able to complete OOB t/f to chair and ambulate short distance in room with RW Min A however. Pt would benefit from continued P.T. services to address mobility and strength deficits and restore PLOF.      Problem: Physical Therapy  Goal: Physical Therapy Goal  Description: P.T goals to be met in 1 month as follows:  1. Mod I Bed Mobility  2. Mod I T/F with LRAD    3. Gait 200' SBA with LRAD    4. I with HEP for ankle/foot ROM/strengthening therex   Outcome: Ongoing, Progressing

## 2024-03-14 NOTE — SUBJECTIVE & OBJECTIVE
Interval History: No acute events overnight. Feeling relatively well. Some abdominal and L shoulder discomfort. Notes some tingling to feet bilaterally which he states has been going on since prior surgery. No other concerns at this time.    Review of Systems   Constitutional:  Negative for chills and fever.   Respiratory:  Negative for cough and shortness of breath.    Cardiovascular:  Negative for chest pain and palpitations.   Gastrointestinal:  Positive for abdominal pain. Negative for nausea and vomiting.   Musculoskeletal:  Positive for arthralgias.     Objective:     Vital Signs (Most Recent):  Temp: 98.2 °F (36.8 °C) (03/14/24 0301)  Pulse: 90 (03/14/24 0601)  Resp: 16 (03/14/24 0601)  BP: (!) 147/75 (03/14/24 0601)  SpO2: 96 % (03/14/24 0601) Vital Signs (24h Range):  Temp:  [97.8 °F (36.6 °C)-98.2 °F (36.8 °C)] 98.2 °F (36.8 °C)  Pulse:  [] 90  Resp:  [7-16] 16  SpO2:  [94 %-100 %] 96 %  BP: (115-147)/(65-82) 147/75  Arterial Line BP: (110-149)/(61-73) 138/61     Weight: 96.2 kg (212 lb)  Body mass index is 28.75 kg/m².    Intake/Output Summary (Last 24 hours) at 3/14/2024 0823  Last data filed at 3/14/2024 0614  Gross per 24 hour   Intake 3382.39 ml   Output 1130 ml   Net 2252.39 ml         Physical Exam  Vitals and nursing note reviewed.   Constitutional:       General: He is not in acute distress.  HENT:      Head: Normocephalic and atraumatic.      Mouth/Throat:      Mouth: Mucous membranes are dry.      Pharynx: Oropharynx is clear.   Eyes:      General:         Right eye: No discharge.         Left eye: No discharge.   Cardiovascular:      Rate and Rhythm: Normal rate.      Pulses: Normal pulses.   Pulmonary:      Effort: Pulmonary effort is normal. No respiratory distress.   Abdominal:      Palpations: Abdomen is soft.      Tenderness: There is abdominal tenderness.      Comments: Incisions C/D/I. Drain in place. Urostomy with some bloody output.   Musculoskeletal:      Right lower leg: No  edema.      Left lower leg: No edema.   Skin:     General: Skin is warm and dry.   Neurological:      Mental Status: He is alert and oriented to person, place, and time.           Significant Labs:   CBC:  Recent Labs   Lab 03/13/24 2012 03/14/24  0455   WBC 15.53* 13.90*   HGB 8.9* 8.7*   HCT 27.3* 26.3*    219   GRAN 88.5*  13.7* 84.1*  11.7*   LYMPH 5.2*  0.8* 7.6*  1.1   MONO 5.9  0.9 7.7  1.1*   EOS 0.0 0.0   BASO 0.02 0.03   BMP:  Recent Labs   Lab 03/13/24 2012 03/14/24  0455   * 137   K 4.9 4.2    107   CO2 19* 22*   BUN 22 21   CREATININE 1.6* 1.3   * 119*   CALCIUM 8.8 9.0   MG  --  1.8   PHOS  --  3.3     Significant Imaging: I have reviewed and interpreted all pertinent imaging results/findings within the past 24 hours.

## 2024-03-14 NOTE — PT/OT/SLP EVAL
Physical Therapy Evaluation    Patient Name:  Med Palma   MRN:  [id number]    Recommendations:     Discharge Recommendations:  Ongoing assessment pending progress with gait and improvement in B ankle/foot strength/ROM/sensory deficits    Discharge Equipment Recommendations: other (see comments), walker, rolling, R AFO, may also benefit from L AFO  Barriers to discharge:  Mobility deficits    Assessment:     Med Palma is a 68 y.o. male admitted with a medical diagnosis of Malignant neoplasm of dome of urinary bladder.  He presents with the following impairments/functional limitations: weakness, impaired functional mobility, gait instability, impaired balance, decreased lower extremity function, pain, decreased ROM, impaired sensation.    Pt evaluated, presents with B foot drop post-op, R worst than L as well as sensory deficits. Pt able to complete OOB t/f to chair and ambulate short distance in room with RW Min A however. Pt would benefit from continued P.T. services to address mobility and strength deficits and restore PLOF.      The mobility limitation cannot be sufficiently resolved by the use of a cane.  Patient's functional mobility deficit can be sufficiently resolved with the use of a rolling walker. Patient's mobility limitation significantly impairs their ability to participate in one of more activities of daily living. The use of a rolling walker will significantly improve the patient's ability to participate in MRADLS and the patient will use it on regular basis in the home.       Rehab Prognosis: Good; patient would benefit from acute skilled PT services to address these deficits and reach maximum level of function.    Recent Surgery: Procedure(s) (LRB):  XI ROBOTIC CYSTECTOMY, WITH ILEAL CONDUIT CREATION (Bilateral)  XI ROBOTIC PROSTATECTOMY (N/A)  XI ROBOTIC BIOPSY, PELVIC LYMPH NODE (Bilateral) 1 Day Post-Op    Plan:     During this hospitalization, patient to be seen 5 x/week to address  the identified rehab impairments via gait training, therapeutic activities, therapeutic exercises, neuromuscular re-education and progress toward the following goals:    Plan of Care Expires:  04/14/24    Subjective     Chief Complaint: Pt reports no pain at rest but reports difficulty moving legs in bed since surgery  Patient/Family Comments/goals: Pt agreeable to therapy, eager to get OOB   Pain/Comfort:  Pain Rating 1: 3/10  Location 1: abdomen  Pain Addressed 1: Cessation of Activity, Reposition    Patients cultural, spiritual, Jew conflicts given the current situation: no    Living Environment:  [facility] with threshold to enter  Prior to admission, patients level of function was I with mobility. Pt reports he has had sensory deficits (tingling) in L foot following tumor removal sx last October but denies any mobility deficits.  Equipment used at home: none.  DME owned (not currently used): none.  Upon discharge, patient will have assistance from lives with wife.    Objective:     Communicated with BRETT Santana prior to session.  Patient found supine with ALEXANDRA drain, peripheral IV, blood pressure cuff, telemetry, pulse ox (continuous), Other (comments)- urostomy  upon PT entry to room.    General Precautions: Standard, fall, other (see comments)- abdominal  Orthopedic Precautions:N/A   Braces: N/A  Respiratory Status: Room air    Exams:  Sensation:    -       Impaired  Intact light touch sensation B LE but pt reports tingling in B feet, L worst than R   RLE ROM: WFL except R foot drop present, also with limited eversion AROM  RLE Strength: WFL except R DF/eversion 1/5  LLE ROM: WFL except lacks full L ankle DF ROM  LLE Strength: WFL except L ankle DF 2+/5    Functional Mobility:  Bed Mobility:     Rolling Right: contact guard assistance  Supine to Sit: minimum assistance and cueing for log roll technique  Transfers:     Sit to Stand:  minimum assistance with rolling walker  Bed to Chair: minimum assistance with   rolling walker  using  Step Transfer  Gait: 20' Min A with RW, limited heelstrike on L and absent on R, compensating with excessive R knee flexion to clear foot      AM-PAC 6 CLICK MOBILITY  Total Score:17       Treatment & Education:  Pt educated on role of P.T, abdominal precautions/log rolling, POC, Mobility training     Patient left up in chair with all lines intact and family and RN present.    GOALS:   Multidisciplinary Problems       Physical Therapy Goals          Problem: Physical Therapy    Goal Priority Disciplines Outcome Goal Variances Interventions   Physical Therapy Goal     PT, PT/OT Ongoing, Progressing     Description: P.T goals to be met in 1 month as follows:  1. Mod I Bed Mobility  2. Mod I T/F with LRAD    3. Gait 200' SBA with LRAD    4. I with HEP for ankle/foot ROM/strengthening therex                        History:     Past Medical History:   Diagnosis Date    Allergy     Arthritis     Elevated PSA     Hypertension     Malignant tumor of urinary bladder     per patient removed with last surg       Past Surgical History:   Procedure Laterality Date    CYSTECTOMY, ROBOT-ASSISTED, LAPAROSCOPIC, USING DA SHARI XI, WITH ILEAL CONDUIT CREATION Bilateral 3/13/2024    Procedure: XI ROBOTIC CYSTECTOMY, WITH ILEAL CONDUIT CREATION;  Surgeon: Yinka Jimenez MD;  Location: [facility];  Service: Urology;  Laterality: Bilateral;  6 hrs / SURGERY ADMIT DUE TO INSURANCE    ROBOT-ASSISTED LAPAROSCOPIC PROSTATECTOMY USING DA SHARI XI N/A 3/13/2024    Procedure: XI ROBOTIC PROSTATECTOMY;  Surgeon: Yinka Jimenez MD;  Location: [facility];  Service: Urology;  Laterality: N/A;    ROBOTIC BIOPSY, LYMPH NODE Bilateral 3/13/2024    Procedure: XI ROBOTIC BIOPSY, PELVIC LYMPH NODE;  Surgeon: Yinka Jimenez MD;  Location: [facility];  Service: Urology;  Laterality: Bilateral;    TURBT (TRANSURETHRAL RESECTION OF BLADDER TUMOR) N/A 10/26/2023    Procedure: TURBT (TRANSURETHRAL RESECTION OF BLADDER TUMOR);  Surgeon:  Yinka Jimenez MD;  Location: [facility];  Service: Urology;  Laterality: N/A;       Time Tracking:     PT Received On: 03/14/24  PT Start Time: 1259     PT Stop Time: 1324  PT Total Time (min): 25 min     Billable Minutes: Evaluation 14 and Gait Training 9      03/14/2024

## 2024-03-15 PROBLEM — M21.379 FOOT DROP: Status: ACTIVE | Noted: 2024-03-15

## 2024-03-15 LAB
ANION GAP SERPL CALC-SCNC: 7 MMOL/L (ref 8–16)
BASOPHILS # BLD AUTO: 0.06 K/UL (ref 0–0.2)
BASOPHILS NFR BLD: 0.4 % (ref 0–1.9)
BLD PROD TYP BPU: NORMAL
BLD PROD TYP BPU: NORMAL
BLOOD UNIT EXPIRATION DATE: NORMAL
BLOOD UNIT EXPIRATION DATE: NORMAL
BLOOD UNIT TYPE CODE: 5100
BLOOD UNIT TYPE CODE: 5100
BLOOD UNIT TYPE: NORMAL
BLOOD UNIT TYPE: NORMAL
BUN SERPL-MCNC: 26 MG/DL (ref 8–23)
CALCIUM SERPL-MCNC: 8.4 MG/DL (ref 8.7–10.5)
CHLORIDE SERPL-SCNC: 107 MMOL/L (ref 95–110)
CO2 SERPL-SCNC: 22 MMOL/L (ref 23–29)
CODING SYSTEM: NORMAL
CODING SYSTEM: NORMAL
CREAT SERPL-MCNC: 1.3 MG/DL (ref 0.5–1.4)
CROSSMATCH INTERPRETATION: NORMAL
CROSSMATCH INTERPRETATION: NORMAL
DIFFERENTIAL METHOD BLD: ABNORMAL
DISPENSE STATUS: NORMAL
DISPENSE STATUS: NORMAL
EOSINOPHIL # BLD AUTO: 0 K/UL (ref 0–0.5)
EOSINOPHIL NFR BLD: 0.1 % (ref 0–8)
ERYTHROCYTE [DISTWIDTH] IN BLOOD BY AUTOMATED COUNT: 16.2 % (ref 11.5–14.5)
EST. GFR  (NO RACE VARIABLE): >60 ML/MIN/1.73 M^2
GLUCOSE SERPL-MCNC: 101 MG/DL (ref 70–110)
HCT VFR BLD AUTO: 24.4 % (ref 40–54)
HGB BLD-MCNC: 8 G/DL (ref 14–18)
IMM GRANULOCYTES # BLD AUTO: 0.05 K/UL (ref 0–0.04)
IMM GRANULOCYTES NFR BLD AUTO: 0.4 % (ref 0–0.5)
LYMPHOCYTES # BLD AUTO: 1.1 K/UL (ref 1–4.8)
LYMPHOCYTES NFR BLD: 7.6 % (ref 18–48)
MAGNESIUM SERPL-MCNC: 2 MG/DL (ref 1.6–2.6)
MCH RBC QN AUTO: 31.6 PG (ref 27–31)
MCHC RBC AUTO-ENTMCNC: 32.8 G/DL (ref 32–36)
MCV RBC AUTO: 96 FL (ref 82–98)
MONOCYTES # BLD AUTO: 0.9 K/UL (ref 0.3–1)
MONOCYTES NFR BLD: 6.3 % (ref 4–15)
NEUTROPHILS # BLD AUTO: 12 K/UL (ref 1.8–7.7)
NEUTROPHILS NFR BLD: 85.2 % (ref 38–73)
NRBC BLD-RTO: 0 /100 WBC
NUM UNITS TRANS PACKED RBC: NORMAL
PHOSPHATE SERPL-MCNC: 2.4 MG/DL (ref 2.7–4.5)
PLATELET # BLD AUTO: 177 K/UL (ref 150–450)
PMV BLD AUTO: 10.7 FL (ref 9.2–12.9)
POTASSIUM SERPL-SCNC: 4.5 MMOL/L (ref 3.5–5.1)
RBC # BLD AUTO: 2.53 M/UL (ref 4.6–6.2)
SODIUM SERPL-SCNC: 136 MMOL/L (ref 136–145)
TRANS ERYTHROCYTES VOL PATIENT: NORMAL ML
WBC # BLD AUTO: 14.13 K/UL (ref 3.9–12.7)

## 2024-03-15 PROCEDURE — 20000000 HC ICU ROOM

## 2024-03-15 PROCEDURE — 63600175 PHARM REV CODE 636 W HCPCS

## 2024-03-15 PROCEDURE — 63600175 PHARM REV CODE 636 W HCPCS: Performed by: INTERNAL MEDICINE

## 2024-03-15 PROCEDURE — 25000003 PHARM REV CODE 250

## 2024-03-15 PROCEDURE — 84100 ASSAY OF PHOSPHORUS: CPT | Performed by: STUDENT IN AN ORGANIZED HEALTH CARE EDUCATION/TRAINING PROGRAM

## 2024-03-15 PROCEDURE — 97535 SELF CARE MNGMENT TRAINING: CPT

## 2024-03-15 PROCEDURE — 97116 GAIT TRAINING THERAPY: CPT

## 2024-03-15 PROCEDURE — 25000003 PHARM REV CODE 250: Performed by: UROLOGY

## 2024-03-15 PROCEDURE — 80048 BASIC METABOLIC PNL TOTAL CA: CPT | Performed by: STUDENT IN AN ORGANIZED HEALTH CARE EDUCATION/TRAINING PROGRAM

## 2024-03-15 PROCEDURE — 83735 ASSAY OF MAGNESIUM: CPT | Performed by: STUDENT IN AN ORGANIZED HEALTH CARE EDUCATION/TRAINING PROGRAM

## 2024-03-15 PROCEDURE — 36415 COLL VENOUS BLD VENIPUNCTURE: CPT | Performed by: STUDENT IN AN ORGANIZED HEALTH CARE EDUCATION/TRAINING PROGRAM

## 2024-03-15 PROCEDURE — 97112 NEUROMUSCULAR REEDUCATION: CPT

## 2024-03-15 PROCEDURE — 94761 N-INVAS EAR/PLS OXIMETRY MLT: CPT

## 2024-03-15 PROCEDURE — 25000003 PHARM REV CODE 250: Performed by: INTERNAL MEDICINE

## 2024-03-15 PROCEDURE — 25000003 PHARM REV CODE 250: Performed by: NURSE PRACTITIONER

## 2024-03-15 PROCEDURE — 85025 COMPLETE CBC W/AUTO DIFF WBC: CPT | Performed by: STUDENT IN AN ORGANIZED HEALTH CARE EDUCATION/TRAINING PROGRAM

## 2024-03-15 RX ORDER — SODIUM,POTASSIUM PHOSPHATES 280-250MG
2 POWDER IN PACKET (EA) ORAL ONCE
Status: COMPLETED | OUTPATIENT
Start: 2024-03-15 | End: 2024-03-15

## 2024-03-15 RX ORDER — CALCIUM CARBONATE 200(500)MG
500 TABLET,CHEWABLE ORAL ONCE
Status: COMPLETED | OUTPATIENT
Start: 2024-03-15 | End: 2024-03-15

## 2024-03-15 RX ORDER — METHOCARBAMOL 500 MG/1
500 TABLET, FILM COATED ORAL EVERY 4 HOURS PRN
Status: DISCONTINUED | OUTPATIENT
Start: 2024-03-15 | End: 2024-03-20 | Stop reason: HOSPADM

## 2024-03-15 RX ADMIN — PREGABALIN 75 MG: 75 CAPSULE ORAL at 09:03

## 2024-03-15 RX ADMIN — FAMOTIDINE 20 MG: 20 TABLET ORAL at 09:03

## 2024-03-15 RX ADMIN — CALCIUM CARBONATE 500 MG: 500 TABLET, CHEWABLE ORAL at 09:03

## 2024-03-15 RX ADMIN — MUPIROCIN: 20 OINTMENT TOPICAL at 09:03

## 2024-03-15 RX ADMIN — METHOCARBAMOL 500 MG: 500 TABLET ORAL at 06:03

## 2024-03-15 RX ADMIN — POLYETHYLENE GLYCOL 3350 17 G: 17 POWDER, FOR SOLUTION ORAL at 08:03

## 2024-03-15 RX ADMIN — KETOROLAC TROMETHAMINE 15 MG: 30 INJECTION, SOLUTION INTRAMUSCULAR at 02:03

## 2024-03-15 RX ADMIN — ONDANSETRON 4 MG: 2 INJECTION INTRAMUSCULAR; INTRAVENOUS at 06:03

## 2024-03-15 RX ADMIN — ALVIMOPAN 12 MG: 12 CAPSULE ORAL at 08:03

## 2024-03-15 RX ADMIN — SODIUM CHLORIDE, POTASSIUM CHLORIDE, SODIUM LACTATE AND CALCIUM CHLORIDE: 600; 310; 30; 20 INJECTION, SOLUTION INTRAVENOUS at 09:03

## 2024-03-15 RX ADMIN — FAMOTIDINE 20 MG: 20 TABLET ORAL at 08:03

## 2024-03-15 RX ADMIN — KETOROLAC TROMETHAMINE 15 MG: 30 INJECTION, SOLUTION INTRAMUSCULAR at 12:03

## 2024-03-15 RX ADMIN — KETOROLAC TROMETHAMINE 15 MG: 30 INJECTION, SOLUTION INTRAMUSCULAR at 11:03

## 2024-03-15 RX ADMIN — POTASSIUM & SODIUM PHOSPHATES POWDER PACK 280-160-250 MG 2 PACKET: 280-160-250 PACK at 08:03

## 2024-03-15 RX ADMIN — ALVIMOPAN 12 MG: 12 CAPSULE ORAL at 09:03

## 2024-03-15 NOTE — PT/OT/SLP EVAL
Occupational Therapy   Evaluation and Treatment    Name: Med Palma  MRN: [id number]  Admitting Diagnosis: Malignant neoplasm of dome of urinary bladder  Recent Surgery: Procedure(s) (LRB):  XI ROBOTIC CYSTECTOMY, WITH ILEAL CONDUIT CREATION (Bilateral)  XI ROBOTIC PROSTATECTOMY (N/A)  XI ROBOTIC BIOPSY, PELVIC LYMPH NODE (Bilateral) 2 Days Post-Op    Recommendations:     Discharge Recommendations: Low Intensity Therapy  Discharge Equipment Recommendations:  walker, rolling  Barriers to discharge:   (Medical status, Current functional level)    Assessment:     Med Palma is a 68 y.o. male with a medical diagnosis of Malignant neoplasm of dome of urinary bladder.  He presents alert and oriented in ICU room.  Performance deficits affecting function: weakness, impaired self care skills, impaired endurance, impaired functional mobility, gait instability, impaired balance, decreased ROM, impaired sensation, impaired coordination, impaired skin.      Rehab Prognosis: Good; patient would benefit from acute skilled OT services to address these deficits and reach maximum level of function.       Plan:     Patient to be seen 4 x/week to address the above listed problems via self-care/home management, therapeutic activities  Plan of Care Expires: 04/14/24  Plan of Care Reviewed with: patient    Subjective     Chief Complaint: None  Patient/Family Comments/goals: Pt reporting he was supposed to be in the [employer] parade this weekend.    Occupational Profile:  Living Environment: Lives with wife in [facility] with threshold step to enter, walk-in shower  Previous level of function: Indep  Equipment Used at Home:  (BP machine)  Assistance upon Discharge: Wife    Pain/Comfort:   0/10    Patients cultural, spiritual, Yazidi conflicts given the current situation: no    Objective:     Communicated with: nurseMax prior to session.  Patient found HOB elevated with blood pressure cuff, telemetry, pulse ox (continuous),  peripheral IV (urostomy) upon OT entry to room.    General Precautions: Standard, fall, Log roll/abdominal precautions  Orthopedic Precautions: N/A  Braces: N/A  Respiratory Status: Room air    Occupational Performance:    Bed Mobility:    Supine to sit: Min A    Functional Mobility/Transfers:  Sit to stand: SBA with RW, cues for hand placement  Transfers: Bed to chair with RW at CGA   Functional Mobility: No LOB, good safety awareness    Activities of Daily Living:  Feeding: Liquid diet, Indep  Grooming: Set up  LB Dressing: Education/training on adaptive techniques, pt able to cross foot over opposite knee     Cognitive/Visual Perceptual:  Intact, no deficits note    Physical Exam:  UE Function: AROM, Strength and Coordination are WFL for self care tasks  Sitting Balance: Good  Standing Balance: SBA with RW - static; CGA with RW - dynamic for safety  Bilateral foot impaired sensation (tingling/numbness), right drop foot - MD and nurse aware    [facility] 6 Click ADL:  AMPAC Total Score:  19    Treatment & Education:  Role of OT, POC, LB dressing techniques, safety with ADL and ADL mobility, dicharge recs     Patient left up in chair with all lines intact, call button in reach, nurse notified, and family present    GOALS:   Multidisciplinary Problems       Occupational Therapy Goals          Problem: Occupational Therapy    Goal Priority Disciplines Outcome Interventions   Occupational Therapy Goal     OT, PT/OT Ongoing, Progressing    Description: Goals to be met by: 3/22/24     Patient will increase functional independence with ADLs by performing:    Grooming standing at sink at Supervision level.  UB Dressing at Supervision level, including retrieval of clothing.  LB Dressing at Supervision level, including retrieval of clothing.  Sponge bathing seated at sink at Set up/Supervision level.  Toileting at Supervision level.  Toilet transfers at Supervision level.                         History:     Past Medical  History:   Diagnosis Date    Allergy     Arthritis     Elevated PSA     Hypertension     Malignant tumor of urinary bladder     per patient removed with last surg         Past Surgical History:   Procedure Laterality Date    CYSTECTOMY, ROBOT-ASSISTED, LAPAROSCOPIC, USING DA SHARI XI, WITH ILEAL CONDUIT CREATION Bilateral 3/13/2024    Procedure: XI ROBOTIC CYSTECTOMY, WITH ILEAL CONDUIT CREATION;  Surgeon: Yinka Jimenez MD;  Location: [facility];  Service: Urology;  Laterality: Bilateral;  6 hrs / SURGERY ADMIT DUE TO INSURANCE    ROBOT-ASSISTED LAPAROSCOPIC PROSTATECTOMY USING DA SHARI XI N/A 3/13/2024    Procedure: XI ROBOTIC PROSTATECTOMY;  Surgeon: Yinka Jimenez MD;  Location: [facility];  Service: Urology;  Laterality: N/A;    ROBOTIC BIOPSY, LYMPH NODE Bilateral 3/13/2024    Procedure: XI ROBOTIC BIOPSY, PELVIC LYMPH NODE;  Surgeon: Yinka Jimenez MD;  Location: [facility];  Service: Urology;  Laterality: Bilateral;    TURBT (TRANSURETHRAL RESECTION OF BLADDER TUMOR) N/A 10/26/2023    Procedure: TURBT (TRANSURETHRAL RESECTION OF BLADDER TUMOR);  Surgeon: Yinka Jimenez MD;  Location: [facility];  Service: Urology;  Laterality: N/A;       Time Tracking:     OT Date of Treatment: 03/14/24  OT Start Time: 1650  OT Stop Time: 1715  OT Total Time (min): 25 min    Billable Minutes:Evaluation 15  Self Care/Home Management 10    3/15/2024

## 2024-03-15 NOTE — ASSESSMENT & PLAN NOTE
Med Palma is a 68 y.o. 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      - Tylenol, PO oxycodone for pain control, robaxin QID, toradol, lyrica  - clear liquids  - IVF at 125 mL/hr today   - replete lytes PRN 4/3/2  - Ambulate QID, PT/OT consult  - Wound care ostomy consult  - Drains: maintain ALEXNADRA until discharge   - Prophylaxis: IS, SCDs, GI ppx

## 2024-03-15 NOTE — PT/OT/SLP PROGRESS
Physical Therapy Treatment    Patient Name:  Med Palam   MRN:  [id number]    Recommendations:     Discharge Recommendations: Low Intensity Therapy  Discharge Equipment Recommendations: walker, rolling (AFO)  Barriers to discharge: Inaccessible home    Assessment:     Med Palma is a 68 y.o. male admitted with a medical diagnosis of Malignant neoplasm of dome of urinary bladder.  He presents with the following impairments/functional limitations: weakness, impaired sensation, impaired self care skills, impaired functional mobility, gait instability, impaired balance, decreased lower extremity function, pain, impaired fine motor, impaired coordination, impaired skin, edema.    Patient with continued weakness to R DF (improved L foot and R PF) and gait training continued with use of AFO. Great improvement in gait stability with use of AFO and discussed use at home. Rec for dc to home with OP neuro PT when finished with HH.     Rehab Prognosis: Good; patient would benefit from acute skilled PT services to address these deficits and reach maximum level of function.    Recent Surgery: Procedure(s) (LRB):  XI ROBOTIC CYSTECTOMY, WITH ILEAL CONDUIT CREATION (Bilateral)  XI ROBOTIC PROSTATECTOMY (N/A)  XI ROBOTIC BIOPSY, PELVIC LYMPH NODE (Bilateral) 2 Days Post-Op    Plan:     During this hospitalization, patient to be seen 5 x/week to address the identified rehab impairments via gait training, therapeutic activities, therapeutic exercises, neuromuscular re-education and progress toward the following goals:    Plan of Care Expires:  04/14/24    Subjective     Chief Complaint: Continued foot weakness, pain in abd with bed mobility and standing  Patient/Family Comments/goals: Goal to get AFO for home; Patient agreeable to PT treatment.  Pain/Comfort:  Pain Rating 1:  (some abd pain, katlin with standing and bed mobility - increased with longer gait bout)      Objective:     Communicated with BRETT Santana prior to session.   Patient found HOB elevated with blood pressure cuff, telemetry, pulse ox (continuous), ALEXANDRA drain (urostomy with mitchell bag) upon PT entry to room.     General Precautions: Standard, fall  Orthopedic Precautions: N/A  Braces:  (trial-ed AFO)  Respiratory Status: Room air     Patient donned non slip socks and gait belt for OOB mobility.    Functional Mobility:  Bed Mobility:     Supine to Sit: minimum assistance  Sit to Supine: minimum assistance  Transfers:     Sit to Stand:  stand by assistance with rolling walker  Gait: x320 ft with RW and CGA progressing to SBA.   Initial forward flexion of trunk that improved after ~100 ft then regressed with increased pain in last 75 ft.   Donned AFO prior to gait trial with immediate improvement in stability and ease of gait  Demo's initial excessive steppage compensation that improved with practice with AFO donned      AM-PAC 6 CLICK MOBILITY  Turning over in bed (including adjusting bedclothes, sheets and blankets)?: 3  Sitting down on and standing up from a chair with arms (e.g., wheelchair, bedside commode, etc.): 3  Moving from lying on back to sitting on the side of the bed?: 3  Moving to and from a bed to a chair (including a wheelchair)?: 3  Need to walk in hospital room?: 3  Climbing 3-5 steps with a railing?: 3  Basic Mobility Total Score: 18       Treatment & Education:  Instructed in use of AFO - wearing in secure shoe, potential need for 1/2 size up in R shoe, checking skin after wearing AFO to ensure no skin breakdown with decreased sensation to B feet  Neuro re-ed: eccentric strengthening of DF with eyes closed for visualization of activity  R DF 1/5, L DF 4/5    Patient left HOB elevated with all lines intact, call button in reach, and CM notified..    GOALS:   Multidisciplinary Problems       Physical Therapy Goals          Problem: Physical Therapy    Goal Priority Disciplines Outcome Goal Variances Interventions   Physical Therapy Goal     PT, PT/OT Ongoing,  Progressing     Description: P.T goals to be met in 1 month as follows:  1. Mod I Bed Mobility  2. Mod I T/F with LRAD    3. Gait 200' SBA with LRAD    4. I with HEP for ankle/foot ROM/strengthening therex                        Time Tracking:     PT Received On: 03/15/24  PT Start Time: 1159     PT Stop Time: 1228  PT Total Time (min): 29 min     Billable Minutes: Gait Training 18 and Neuromuscular Re-education 11    Treatment Type: Treatment  PT/PTA: PT     Number of PTA visits since last PT visit: 0     03/15/2024

## 2024-03-15 NOTE — SUBJECTIVE & OBJECTIVE
Interval History: No acute events overnight. Feeling well; walking at time of evaluation with walker. Mild postop tenderness. No other concerns at this time.    Review of Systems   Constitutional:  Negative for chills and fever.   Respiratory:  Negative for cough and shortness of breath.    Cardiovascular:  Negative for chest pain and palpitations.   Gastrointestinal:  Negative for abdominal pain, nausea and vomiting.     Objective:     Vital Signs (Most Recent):  Temp: 98 °F (36.7 °C) (03/15/24 0303)  Pulse: 79 (03/15/24 0500)  Resp: (!) 26 (03/15/24 0500)  BP: 115/77 (03/15/24 0500)  SpO2: 98 % (03/15/24 0500) Vital Signs (24h Range):  Temp:  [97.9 °F (36.6 °C)-98.5 °F (36.9 °C)] 98 °F (36.7 °C)  Pulse:  [76-96] 79  Resp:  [11-26] 26  SpO2:  [93 %-99 %] 98 %  BP: ()/(58-77) 115/77     Weight: 96.2 kg (212 lb)  Body mass index is 28.75 kg/m².    Intake/Output Summary (Last 24 hours) at 3/15/2024 1030  Last data filed at 3/15/2024 0705  Gross per 24 hour   Intake 640 ml   Output 1015 ml   Net -375 ml         Physical Exam  Vitals and nursing note reviewed.   Constitutional:       General: He is not in acute distress.  HENT:      Head: Normocephalic and atraumatic.      Mouth/Throat:      Mouth: Mucous membranes are dry.      Pharynx: Oropharynx is clear.   Eyes:      General:         Right eye: No discharge.         Left eye: No discharge.   Cardiovascular:      Rate and Rhythm: Normal rate.      Pulses: Normal pulses.   Pulmonary:      Effort: Pulmonary effort is normal. No respiratory distress.   Abdominal:      Palpations: Abdomen is soft.      Comments: Incisions C/D/I. Drain in place. Urostomy in place.   Musculoskeletal:      Right lower leg: No edema.      Left lower leg: No edema.   Skin:     General: Skin is warm and dry.   Neurological:      Mental Status: He is alert and oriented to person, place, and time.         Significant Labs:   CBC:  Recent Labs   Lab 03/13/24 2012 03/14/24  6470  03/15/24  0522   WBC 15.53* 13.90* 14.13*   HGB 8.9* 8.7* 8.0*   HCT 27.3* 26.3* 24.4*    219 177   GRAN 88.5*  13.7* 84.1*  11.7* 85.2*  12.0*   LYMPH 5.2*  0.8* 7.6*  1.1 7.6*  1.1   MONO 5.9  0.9 7.7  1.1* 6.3  0.9   EOS 0.0 0.0 0.0   BASO 0.02 0.03 0.06   BMP:  Recent Labs   Lab 03/13/24 2012 03/14/24  0455 03/15/24  0522   * 137 136   K 4.9 4.2 4.5    107 107   CO2 19* 22* 22*   BUN 22 21 26*   CREATININE 1.6* 1.3 1.3   * 119* 101   CALCIUM 8.8 9.0 8.4*   MG  --  1.8 2.0   PHOS  --  3.3 2.4*   ANIONGAP 9 8 7*      Significant Imaging:   No new imaging this morning.

## 2024-03-15 NOTE — ASSESSMENT & PLAN NOTE
- Noted with ambulation; suspect 2/2 positioning in case though patient reports has had some issues since prior chemo.  - Agree with ortho eval. Participating well with therapy.

## 2024-03-15 NOTE — PROGRESS NOTES
[facility] - Intensive Care ([place])  Wound Care    Patient Name:  Med Palma   MRN:  [id number]  Date: 3/15/2024  Diagnosis: Malignant neoplasm of dome of urinary bladder    History:     Past Medical History:   Diagnosis Date    Allergy     Arthritis     Elevated PSA     Hypertension     Malignant tumor of urinary bladder     per patient removed with last surg       Social History     Socioeconomic History    Marital status:    Occupational History     Employer: [employer]   Tobacco Use    Smoking status: Former     Types: Cigarettes    Smokeless tobacco: Never   Substance and Sexual Activity    Alcohol use: Yes     Alcohol/week: 3.0 standard drinks of alcohol     Types: 3 Cans of beer per week     Comment: none 24 hr prior to surg    Drug use: No    Sexual activity: Yes     Partners: Female     Social Determinants of Health     Financial Resource Strain: Low Risk  (3/14/2024)    Overall Financial Resource Strain (CARDIA)     Difficulty of Paying Living Expenses: Not hard at all   Food Insecurity: No Food Insecurity (3/14/2024)    Hunger Vital Sign     Worried About Running Out of Food in the Last Year: Never true     Ran Out of Food in the Last Year: Never true   Transportation Needs: No Transportation Needs (3/14/2024)    PRAPARE - Transportation     Lack of Transportation (Medical): No     Lack of Transportation (Non-Medical): No   Physical Activity: Inactive (3/14/2024)    Exercise Vital Sign     Days of Exercise per Week: 0 days     Minutes of Exercise per Session: 0 min   Stress: No Stress Concern Present (3/14/2024)    [place] [employer] - Occupational Stress Questionnaire     Feeling of Stress : Not at all   Social Connections: Socially Integrated (3/14/2024)    Social Connection and Isolation Panel [NHANES]     Frequency of Communication with Friends and Family: More than three times a week     Frequency of Social Gatherings with Friends and Family: More  than three times a week     Attends Ep[employer] Services: More than 4 times per year     Active Member of Clubs or Organizations: Yes     Attends Club or Organization Meetings: More than 4 times per year     Marital Status:    Housing Stability: Low Risk  (3/14/2024)    Housing Stability Vital Sign     Unable to Pay for Housing in the Last Year: No     Number of Places Lived in the Last Year: 1     Unstable Housing in the Last Year: No       Precautions:     Allergies as of 02/27/2024    (No Known Allergies)       [facility] Assessment Details/Treatment     Ostomy care follow up:     Met with patient for ostomy training lesson #2. Wife participated in lesson. Removed patient's ostomy pouch and discussed stoma and jes-stomal care.  Demonstrated cutting pouch to fit stoma allowing 1/8 inch clearance. Ostomy pouch applied to site without difficulty. Reviewed reading materials answering all questions. Pt verbalized understanding of when to alert MD of issues with stoma. Discussed follow up plan of care and provided patient with contact name and numbers. Will follow up with patient.        03/15/24 1319        Urostomy 03/13/24 1720 ileal conduit RLQ   Date of First Assessment/Time of First Assessment: 03/13/24 1720   Present Prior to Hospital Arrival?: No  Inserted by: MD  Urostomy Type: ileal conduit  Location: RLQ  Initial Stoma Size (in): 1 in  Additional Comments: 18fr red rubber inserted per D...   Stoma Appearance round;rosebud appearance;pink   Stoma Size (in) 35 mm   Stomal Appliance 1 piece;Changed;To drainage bag to dependent drainage   Accessories/Skin Care cleansed w/ water;skin barrier ring   Stoma Function red urine   Tolerance no signs/symptoms of discomfort;assisted with appliance change   Output (mL) 100 mL     03/15/2024

## 2024-03-15 NOTE — PLAN OF CARE
[facility] Intensive Care ([place])    HOME HEALTH ORDERS  FACE TO FACE ENCOUNTER    Patient Name: Med Palma  YOB: 1955    PCP: Jah Wilson MD   PCP Address: [address] [place] [address] / [place] [address]  PCP Phone Number: [phone]  PCP Fax: [phone]    Encounter Date: 03/15/2024    Admit to Home Health    Diagnoses:  Active Hospital Problems    Diagnosis  POA    *Malignant neoplasm of dome of urinary bladder [C67.1]  Yes    Foot drop [M21.379]  Yes    Normocytic anemia [D64.9]  Yes    Benign non-nodular prostatic hyperplasia with lower urinary tract symptoms [N40.1]  Yes      Resolved Hospital Problems   No resolved problems to display.       Future Appointments   Date Time Provider Department Center   4/9/2024  4:00 PM Mary Spicer MD [facility] [place]           I have seen and examined this patient face to face today. My clinical findings that support the need for the home health skilled services and home bound status are the following:  Weakness/numbness causing balance and gait disturbance due to Surgery making it taxing to leave home.    Allergies:Review of patient's allergies indicates:  No Known Allergies    Diet: regular diet    Activities: activity as tolerated and no heavy lifting for 6 weeks    Nursing:   SN to complete comprehensive assessment including routine vital signs. Instruct on disease process and s/s of complications to report to MD. Review/verify medication list sent home with the patient at time of discharge  and instruct patient/caregiver as needed. Frequency may be adjusted depending on start of care date.    Notify MD if SBP > 160 or < 90; DBP > 90 or < 50; HR > 120 or < 50; Temp > 101.    CONSULTS:    Physical Therapy to evaluate and treat. Evaluate for home safety and equipment needs; Establish/upgrade home exercise program. Perform / instruct on therapeutic exercises, gait training, transfer training, and Range of  Motion.  Occupational Therapy to evaluate and treat. Evaluate home environment for safety and equipment needs. Perform/Instruct on transfers, ADL training, ROM, and therapeutic exercises.    Urostomy Care:  Instruct patient/caregiver to empty bag when full and PRN. Teach patient to change bag and clean site as needed.     Lovenox subQ injection   - inject 40mg subQ daily for 30 days     Medications: Review discharge medications with patient and family and provide education.      Current Discharge Medication List        CONTINUE these medications which have NOT CHANGED    Details   fluticasone propionate (FLONASE) 50 mcg/actuation nasal spray 1 spray (50 mcg total) by Each Nostril route as needed for Allergies.  Qty: 16 g, Refills: 3      LIDOcaine-prilocaine (EMLA) cream Apply topically as needed (place to port site 45-60 minutes prior to chemotherapy).  Qty: 30 g, Refills: 5    Associated Diagnoses: Malignant neoplasm of dome of urinary bladder      tadalafiL (CIALIS) 20 MG Tab Take 1 tablet (20 mg total) by mouth daily as needed (ED).  Qty: 30 tablet, Refills: 11      tamsulosin (FLOMAX) 0.4 mg Cap Take 1 capsule (0.4 mg total) by mouth once daily.  Qty: 90 capsule, Refills: 3      phenazopyridine (PYRIDIUM) 100 MG tablet Take 2 tablets (200 mg total) by mouth 3 (three) times daily as needed for Pain.  Qty: 30 tablet, Refills: 0      prochlorperazine (COMPAZINE) 10 MG tablet Take 1 tablet (10 mg total) by mouth every 6 (six) hours as needed (nausea).  Qty: 30 tablet, Refills: 3    Associated Diagnoses: Malignant neoplasm of dome of urinary bladder      SHINGRIX, PF, 50 mcg/0.5 mL injection            STOP taking these medications       sulfamethoxazole-trimethoprim 400-80mg (BACTRIM,SEPTRA) 400-80 mg per tablet Comments:   Reason for Stopping:               I certify that this patient is confined to his home and needs intermittent skilled nursing care, physical therapy, and occupational therapy.

## 2024-03-15 NOTE — SUBJECTIVE & OBJECTIVE
Interval History: NAOE. Pain controlled better with toradol today. States he slept well overnight.   Tolerating clears. Denies N/V. Reports occasional belching. No flatulence.   Pulling 2000 on IS  Ambulated in room with walker. Right foot drop feels slightly improved from yesterday.       Objective:     Temp:  [97.9 °F (36.6 °C)-98.5 °F (36.9 °C)] 98 °F (36.7 °C)  Pulse:  [] 79  Resp:  [11-26] 26  SpO2:  [93 %-99 %] 98 %  BP: ()/(58-77) 115/77     Body mass index is 28.75 kg/m².           Drains       Drain  Duration                  Closed/Suction Drain 03/13/24 1712 Left LLQ Bulb 19 Fr. 1 day         Urostomy 03/13/24 1720 ileal conduit RLQ 1 day                     Physical Exam  Constitutional:       General: He is not in acute distress.  HENT:      Head: Normocephalic.   Cardiovascular:      Rate and Rhythm: Normal rate.   Pulmonary:      Effort: Pulmonary effort is normal.   Abdominal:      General: There is no distension.      Palpations: Abdomen is soft.      Tenderness: There is no abdominal tenderness.      Comments: Midline abd incision with some SS drainage from inferior aspect.   RLQ urostomy pink, patent, moist, with red rubber visible through stoma. Clear thin pink urine in bag with small amount of clot and mucus  Abd benign, soft.  Non distended.   Drain SS   Musculoskeletal:         General: Normal range of motion.   Skin:     General: Skin is warm and dry.   Neurological:      Mental Status: He is alert and oriented to person, place, and time.      Coordination: Coordination normal.      Comments: R foot weakness with dorsiflexion   Psychiatric:         Mood and Affect: Mood normal.         Behavior: Behavior normal.           Significant Labs:    BMP:  Recent Labs   Lab 03/13/24 2012 03/14/24  0455 03/15/24  0522   * 137 136   K 4.9 4.2 4.5    107 107   CO2 19* 22* 22*   BUN 22 21 26*   CREATININE 1.6* 1.3 1.3   CALCIUM 8.8 9.0 8.4*       CBC:   Recent Labs   Lab  03/13/24 2012 03/14/24  0455 03/15/24  0522   WBC 15.53* 13.90* 14.13*   HGB 8.9* 8.7* 8.0*   HCT 27.3* 26.3* 24.4*    219 177       All pertinent labs results from the past 24 hours have been reviewed.    Significant Imaging:  All pertinent imaging results/findings from the past 24 hours have been reviewed.

## 2024-03-15 NOTE — PLAN OF CARE
Problem: Occupational Therapy  Goal: Occupational Therapy Goal  Description: Goals to be met by: 3/22/24     Patient will increase functional independence with ADLs by performing:    Grooming standing at sink at Supervision level.  UB Dressing at Supervision level, including retrieval of clothing.  LB Dressing at Supervision level, including retrieval of clothing.  Sponge bathing seated at sink at Set up/Supervision level.  Toileting at Supervision level.  Toilet transfers at Supervision level.    3/15/2024 1011 by Gricelda Chaves LOTR  Outcome: Ongoing, Progressing

## 2024-03-15 NOTE — PLAN OF CARE
Problem: Adult Inpatient Plan of Care  Goal: Plan of Care Review  Outcome: Ongoing, Progressing  Flowsheets (Taken 3/15/2024 0536)  Plan of Care Reviewed With: patient     Problem: Fluid Imbalance (Pneumonia)  Goal: Fluid Balance  Outcome: Ongoing, Progressing  Intervention: Monitor and Manage Fluid Balance  Flowsheets (Taken 3/15/2024 0536)  Fluid/Electrolyte Management: intravenous fluid replacement initiated     Problem: Infection (Pneumonia)  Goal: Resolution of Infection Signs and Symptoms  Outcome: Ongoing, Progressing  Intervention: Prevent Infection Progression  Flowsheets (Taken 3/15/2024 0536)  Fever Reduction/Comfort Measures:   lightweight bedding   lightweight clothing  Infection Management: aseptic technique maintained  Isolation Precautions:   precautions maintained   protective     Problem: Fall Injury Risk  Goal: Absence of Fall and Fall-Related Injury  Outcome: Ongoing, Progressing  Intervention: Identify and Manage Contributors  Flowsheets (Taken 3/15/2024 0536)  Self-Care Promotion:   independence encouraged   BADL personal objects within reach  Medication Review/Management: medications reviewed     Problem: Skin Injury Risk Increased  Goal: Skin Health and Integrity  Outcome: Ongoing, Progressing  Intervention: Optimize Skin Protection  Flowsheets (Taken 3/15/2024 0536)  Pressure Reduction Techniques: frequent weight shift encouraged  Pressure Reduction Devices: pressure-redistributing mattress utilized  Skin Protection:   adhesive use limited   incontinence pads utilized   tubing/devices free from skin contact   skin-to-skin areas padded   transparent dressing maintained  Head of Bed (HOB) Positioning: HOB at 30 degrees

## 2024-03-15 NOTE — PROGRESS NOTES
[facility] Intensive Care [facility]  Urology  Progress Note    Patient Name: Med Palma  MRN: [id number]  Admission Date: 3/13/2024  Hospital Length of Stay: 2 days  Code Status: Full Code   Attending Provider: Yinka Jimenez MD   Primary Care Physician: Jah Wilson MD    Subjective:     HPI:  Med Palma is a 68 y.o. 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     Interval History: NAOE. Pain controlled better with toradol today. States he slept well overnight.   Tolerating clears. Denies N/V. Reports occasional belching. No flatulence.   Pulling 2000 on IS  Ambulated in room with walker. Right foot drop feels slightly improved from yesterday.       Objective:     Temp:  [97.9 °F (36.6 °C)-98.5 °F (36.9 °C)] 98 °F (36.7 °C)  Pulse:  [] 79  Resp:  [11-26] 26  SpO2:  [93 %-99 %] 98 %  BP: ()/(58-77) 115/77     Body mass index is 28.75 kg/m².           Drains       Drain  Duration                  Closed/Suction Drain 03/13/24 1712 Left LLQ Bulb 19 Fr. 1 day         Urostomy 03/13/24 1720 ileal conduit RLQ 1 day                     Physical Exam  Constitutional:       General: He is not in acute distress.  HENT:      Head: Normocephalic.   Cardiovascular:      Rate and Rhythm: Normal rate.   Pulmonary:      Effort: Pulmonary effort is normal.   Abdominal:      General: There is no distension.      Palpations: Abdomen is soft.      Tenderness: There is no abdominal tenderness.      Comments: Midline abd incision with some SS drainage from inferior aspect.   RLQ urostomy pink, patent, moist, with red rubber visible through stoma. Clear thin pink urine in bag with small amount of clot and mucus  Abd benign, soft.  Non distended.   Drain SS   Musculoskeletal:         General: Normal range of motion.   Skin:     General: Skin is warm and dry.   Neurological:       Mental Status: He is alert and oriented to person, place, and time.      Coordination: Coordination normal.      Comments: R foot weakness with dorsiflexion   Psychiatric:         Mood and Affect: Mood normal.         Behavior: Behavior normal.           Significant Labs:    BMP:  Recent Labs   Lab 03/13/24  2012 03/14/24  0455 03/15/24  0522   * 137 136   K 4.9 4.2 4.5    107 107   CO2 19* 22* 22*   BUN 22 21 26*   CREATININE 1.6* 1.3 1.3   CALCIUM 8.8 9.0 8.4*       CBC:   Recent Labs   Lab 03/13/24  2012 03/14/24  0455 03/15/24  0522   WBC 15.53* 13.90* 14.13*   HGB 8.9* 8.7* 8.0*   HCT 27.3* 26.3* 24.4*    219 177       All pertinent labs results from the past 24 hours have been reviewed.    Significant Imaging:  All pertinent imaging results/findings from the past 24 hours have been reviewed.                  Assessment/Plan:     * Malignant neoplasm of dome of urinary bladder  Med Palma is a 68 y.o. 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      - Tylenol, PO oxycodone for pain control, robaxin QID, toradol, lyrica  - clear liquids  - Will continue to hold heparin given hematuria  - IVF at 125 mL/hr today   - replete lytes PRN 4/3/2  - Ambulate QID, PT/OT consult  - Wound care ostomy consult  - Drains: maintain ALEXANDRA until discharge   - Will remove red rubber today  - Prophylaxis: IS, SCDs, GI ppx            VTE Risk Mitigation (From admission, onward)           Ordered     IP VTE HIGH RISK PATIENT  Once         03/13/24 1740     Place sequential compression device  Until discontinued         03/13/24 0601     Place MCKENZIE hose  Until discontinued         03/13/24 0601                    KACY NUÑEZ MD  Urology  [facility] - Intensive Care ([place])

## 2024-03-15 NOTE — PT/OT/SLP PROGRESS
Occupational Therapy   Treatment    Name: Med Palma  MRN: [id number]  Admitting Diagnosis:  Malignant neoplasm of dome of urinary bladder  2 Days Post-Op    Recommendations:     Discharge Recommendations:  (Outpatient Neuro PT)  Discharge Equipment Recommendations:  walker, rolling (Right AFO)  Barriers to discharge:   (Defer to MD)    Assessment:     Med Palma is a 68 y.o. male with a medical diagnosis of Malignant neoplasm of dome of urinary bladder.  He presents with wife and friend in ICU room.  Pt now has right AFO and reporting how much of a difference it has made in his quality of gait. Performance deficits affecting function are weakness, impaired sensation, impaired self care skills, impaired functional mobility, gait instability, impaired balance, decreased ROM, decreased lower extremity function, impaired skin, impaired coordination, edema.     Rehab Prognosis:  Good; patient would benefit from acute skilled OT services to address these deficits and reach maximum level of function.       Plan:     Patient to be seen 4 x/week to address the above listed problems via self-care/home management, therapeutic activities  Plan of Care Expires: 04/14/24  Plan of Care Reviewed with: patient, spouse, friend    Subjective     Chief Complaint: No complaints.  Patient/Family Comments/goals: Pt was honored today as the Man of the Year with [employer] Day parade at [employer].  His family accepted his award and made a speech.  Pt was able to watch it on the computer his nurse brought into the room for him watch since he wasn't able to make the mass.  Pain/Comfort:  Pain Rating 1: 0/10  Pain Rating Post-Intervention 1: 0/10    Objective:     Communicated with: nurseMax, prior to session.  Patient found HOB elevated with blood pressure cuff, peripheral IV, pulse ox (continuous), telemetry, ALEXANDRA drain (Urostomy) upon OT entry to room.    General Precautions: Standard, fall (Right drop foot, sensory  impairment bilateral feet)    Orthopedic Precautions:N/A  Braces: AFO  Respiratory Status: Room air     Occupational Performance:     Bed Mobility:    Supine to sit: SBA with HOB elevated and pt using bed rail  Sit to supine: Min A to lift right LE into bed  Scooting to HOB: SBA with pt pulling with arms on top bed rails and bridging     Functional Mobility/Transfers:  Sit to stand: SBA with RW and right AFO donned  Transfers: CGA<>SBA with RW  Functional Mobility: CGA<>SBA with RW in room and hallway in ICU, education on safety with ADL mobility and focus on quality of ADL mobility rather than speed.     Activities of Daily Living:  Education and training on donning of right AFO and fit with shoe.  Wife to take AFO home today to shoe selection from pt's shoes at home and bring shoes and AFO back to hospital tomorrow.   Education on weight bearing for proprioceptive input of bilateral feet.   Education on LB self care tasks and adaptive techniques    [facility] 6 Click ADL: 19    Treatment & Education:  Role of OT, POC, safety with ADL mobiity using RW, ADL training, discharge recs     Patient left HOB elevated with all lines intact, call button in reach, and nurse present    GOALS:   Multidisciplinary Problems       Occupational Therapy Goals          Problem: Occupational Therapy    Goal Priority Disciplines Outcome Interventions   Occupational Therapy Goal     OT, PT/OT Ongoing, Progressing    Description: Goals to be met by: 3/22/24     Patient will increase functional independence with ADLs by performing:    Grooming standing at sink at Supervision level.  UB Dressing at Supervision level, including retrieval of clothing.  LB Dressing at Supervision level, including retrieval of clothing.  Sponge bathing seated at sink at Set up/Supervision level.  Toileting at Supervision level.  Toilet transfers at Supervision level.                         Time Tracking:     OT Date of Treatment: 03/15/24  OT Start Time: 1540  OT  Stop Time: 1618  OT Total Time (min): 38 min    Billable Minutes:Self Care/Home Management 38    OT/DANE: OT          3/15/2024

## 2024-03-15 NOTE — PROGRESS NOTES
staff    Doing better  Walked with boot supporting his right foot  He feels that his dorsiflexion strength is improving  He changed the urostomy appliance with enterostomal therapy  Hungry  Santiago clear well  Ab soft and benign  Stoma pink and viable    Soft diet  Spoke with Dr Haines, ortho.  He will see him later today.  Appreciate him seeing Mr Palma  Dr Campbell will be seeing him for urology this weekend    Anticipate discharge on Monday morning with home health and home PT>

## 2024-03-15 NOTE — CONSULTS
Orthopedic inpatient consult  Chief complaint -right foot drop    68-year-old male postop day 2. From a robotic cystectomy, prostatectomy and lymphadenectomy presently in ICU.  Consult obtained for a right foot drop.  The procedure took about 9 hours and he was then a frog leg like position.  He had some previous problems with his left foot which may have been secondary to his chemotherapy.  No obvious cause for his foot drop at this time.    Past medical history-bladder cancer, BPH, multifocal pneumonia, hypokalemia    Allergies-no known drug allergies    Exam-exam of the right foot and lower leg demonstrates no obvious swelling or erythema.  He is nontender over the fibular head.  He does have an obvious perineal nerve palsy and decreased sensation over the dorsum of his foot consistent with a foot drop.    Impression-perineal nerve palsy right foot    Plan-he presently has an AFO and has ambulated with it today.  No obvious trauma to the nerve and I would expect resolution of his foot drop.  I discussed it with the patient and told him that there is no specific timeline for this.  He will continue with the AFO while ambulating until his palsy resolves.

## 2024-03-15 NOTE — ASSESSMENT & PLAN NOTE
Med Palma is a 68 y.o. 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      - Tylenol, PO oxycodone for pain control, robaxin QID, toradol, lyrica  - clear liquids - not ready to advance yet  - Start SC heparin  - IVF, consider decrease when more PO. Monitor Cr.   - Ambulate QID, PT/OT consult  - Wound care ostomy consulted  - Drains: maintain ALEXANDRA until discharge. ALEXANDRA with high output. If output remains high, will send for Cr. Dressing changed.   - Prophylaxis: IS, SCDs, GI ppx    Appreciate hospital medicine input.   Will monitor WBC. Consider pan-culture/abx if fever.  Appreciate ortho input for peroneal nerve palsy/foot drop.

## 2024-03-15 NOTE — PROGRESS NOTES
[facility] Intensive Care [place] [facility] Medicine  Progress Note    Patient Name: Med Palma  MRN: [id number]  Patient Class: IP- Inpatient   Admission Date: 3/13/2024  Length of Stay: 2 days  Attending Physician: Yinka Jimenez MD  Primary Care Provider: Jah Wilson MD        Subjective:     Principal Problem:Malignant neoplasm of dome of urinary bladder        HPI:  Med Palma is a 68-year-old male with a past medical history of arthritis, elevated PSA, hypertension and bladder cancer who presents for scheduled robotic radical cystectoprostatectomy and PLND and ileal conduit vs neobladder with Dr. Jimenez, urology.  Patient underwent TURBT 10/26/23 and was diagnosed with bladder cancer.  Patient is followed by , Hem/Onc and received 4 rounds of chemotherapy with last treatment completed 1 month ago.  Patient was seen by Dr. Jimenez, outpatient and cleared for surgery per heme Onc.  Hospital Medicine was consulted for medical comanagement when patient was seen postoperatively.  Patient states his pain is currently controlled and denies fever, chills, nausea, vomiting, shortness of breath and chest pain.  Hospital Medicine will continue to follow and remain available for medical comanagement.    Interval History: No acute events overnight. Feeling well; walking at time of evaluation with walker. Mild postop tenderness. No other concerns at this time.    Review of Systems   Constitutional:  Negative for chills and fever.   Respiratory:  Negative for cough and shortness of breath.    Cardiovascular:  Negative for chest pain and palpitations.   Gastrointestinal:  Negative for abdominal pain, nausea and vomiting.     Objective:     Vital Signs (Most Recent):  Temp: 98 °F (36.7 °C) (03/15/24 0303)  Pulse: 79 (03/15/24 0500)  Resp: (!) 26 (03/15/24 0500)  BP: 115/77 (03/15/24 0500)  SpO2: 98 % (03/15/24 0500) Vital Signs (24h Range):  Temp:  [97.9 °F (36.6 °C)-98.5 °F (36.9 °C)] 98  °F (36.7 °C)  Pulse:  [76-96] 79  Resp:  [11-26] 26  SpO2:  [93 %-99 %] 98 %  BP: ()/(58-77) 115/77     Weight: 96.2 kg (212 lb)  Body mass index is 28.75 kg/m².    Intake/Output Summary (Last 24 hours) at 3/15/2024 1030  Last data filed at 3/15/2024 0705  Gross per 24 hour   Intake 640 ml   Output 1015 ml   Net -375 ml         Physical Exam  Vitals and nursing note reviewed.   Constitutional:       General: He is not in acute distress.  HENT:      Head: Normocephalic and atraumatic.      Mouth/Throat:      Mouth: Mucous membranes are dry.      Pharynx: Oropharynx is clear.   Eyes:      General:         Right eye: No discharge.         Left eye: No discharge.   Cardiovascular:      Rate and Rhythm: Normal rate.      Pulses: Normal pulses.   Pulmonary:      Effort: Pulmonary effort is normal. No respiratory distress.   Abdominal:      Palpations: Abdomen is soft.      Comments: Incisions C/D/I. Drain in place. Urostomy in place.   Musculoskeletal:      Right lower leg: No edema.      Left lower leg: No edema.   Skin:     General: Skin is warm and dry.   Neurological:      Mental Status: He is alert and oriented to person, place, and time.         Significant Labs:   CBC:  Recent Labs   Lab 03/13/24  2012 03/14/24  0455 03/15/24  0522   WBC 15.53* 13.90* 14.13*   HGB 8.9* 8.7* 8.0*   HCT 27.3* 26.3* 24.4*    219 177   GRAN 88.5*  13.7* 84.1*  11.7* 85.2*  12.0*   LYMPH 5.2*  0.8* 7.6*  1.1 7.6*  1.1   MONO 5.9  0.9 7.7  1.1* 6.3  0.9   EOS 0.0 0.0 0.0   BASO 0.02 0.03 0.06   BMP:  Recent Labs   Lab 03/13/24  2012 03/14/24  0455 03/15/24  0522   * 137 136   K 4.9 4.2 4.5    107 107   CO2 19* 22* 22*   BUN 22 21 26*   CREATININE 1.6* 1.3 1.3   * 119* 101   CALCIUM 8.8 9.0 8.4*   MG  --  1.8 2.0   PHOS  --  3.3 2.4*   ANIONGAP 9 8 7*      Significant Imaging:   No new imaging this morning.      Assessment/Plan:      * Malignant neoplasm of dome of urinary bladder  - Primary  management, pain control as per Urology.    Foot drop  - Noted with ambulation; suspect 2/2 positioning in case though patient reports has had some issues since prior chemo.  - Agree with ortho eval. Participating well with therapy.    Normocytic anemia  - Mild decrease since OR but stable overnight.  - Monitor.    Benign non-nodular prostatic hyperplasia with lower urinary tract symptoms  - As per primary.      VTE Risk Mitigation (From admission, onward)           Ordered     IP VTE HIGH RISK PATIENT  Once         03/13/24 1740     Place sequential compression device  Until discontinued         03/13/24 0601     Place MCKENZIE hose  Until discontinued         03/13/24 0601                  DAJA Damico MD  Department of Hospital Medicine   [facility] - Intensive Care ([place])

## 2024-03-16 PROBLEM — D72.829 LEUKOCYTOSIS: Status: ACTIVE | Noted: 2024-03-16

## 2024-03-16 PROBLEM — R79.89 ELEVATED SERUM CREATININE: Status: ACTIVE | Noted: 2024-03-16

## 2024-03-16 LAB
ANION GAP SERPL CALC-SCNC: 9 MMOL/L (ref 8–16)
BASOPHILS # BLD AUTO: 0.06 K/UL (ref 0–0.2)
BASOPHILS NFR BLD: 0.3 % (ref 0–1.9)
BUN SERPL-MCNC: 33 MG/DL (ref 8–23)
CALCIUM SERPL-MCNC: 8.6 MG/DL (ref 8.7–10.5)
CHLORIDE SERPL-SCNC: 105 MMOL/L (ref 95–110)
CO2 SERPL-SCNC: 23 MMOL/L (ref 23–29)
CREAT SERPL-MCNC: 1.5 MG/DL (ref 0.5–1.4)
DIFFERENTIAL METHOD BLD: ABNORMAL
EOSINOPHIL # BLD AUTO: 0 K/UL (ref 0–0.5)
EOSINOPHIL NFR BLD: 0 % (ref 0–8)
ERYTHROCYTE [DISTWIDTH] IN BLOOD BY AUTOMATED COUNT: 15.9 % (ref 11.5–14.5)
EST. GFR  (NO RACE VARIABLE): 51 ML/MIN/1.73 M^2
GLUCOSE SERPL-MCNC: 120 MG/DL (ref 70–110)
HCT VFR BLD AUTO: 26.8 % (ref 40–54)
HGB BLD-MCNC: 8.7 G/DL (ref 14–18)
IMM GRANULOCYTES # BLD AUTO: 0.13 K/UL (ref 0–0.04)
IMM GRANULOCYTES NFR BLD AUTO: 0.6 % (ref 0–0.5)
LYMPHOCYTES # BLD AUTO: 1.7 K/UL (ref 1–4.8)
LYMPHOCYTES NFR BLD: 7.9 % (ref 18–48)
MAGNESIUM SERPL-MCNC: 2.1 MG/DL (ref 1.6–2.6)
MCH RBC QN AUTO: 31.4 PG (ref 27–31)
MCHC RBC AUTO-ENTMCNC: 32.5 G/DL (ref 32–36)
MCV RBC AUTO: 97 FL (ref 82–98)
MONOCYTES # BLD AUTO: 1.1 K/UL (ref 0.3–1)
MONOCYTES NFR BLD: 5.1 % (ref 4–15)
NEUTROPHILS # BLD AUTO: 18.1 K/UL (ref 1.8–7.7)
NEUTROPHILS NFR BLD: 86.1 % (ref 38–73)
NRBC BLD-RTO: 0 /100 WBC
PHOSPHATE SERPL-MCNC: 3.5 MG/DL (ref 2.7–4.5)
PLATELET # BLD AUTO: 217 K/UL (ref 150–450)
PMV BLD AUTO: 11.1 FL (ref 9.2–12.9)
POTASSIUM SERPL-SCNC: 4.9 MMOL/L (ref 3.5–5.1)
RBC # BLD AUTO: 2.77 M/UL (ref 4.6–6.2)
SODIUM SERPL-SCNC: 137 MMOL/L (ref 136–145)
WBC # BLD AUTO: 21.01 K/UL (ref 3.9–12.7)

## 2024-03-16 PROCEDURE — 80048 BASIC METABOLIC PNL TOTAL CA: CPT | Performed by: STUDENT IN AN ORGANIZED HEALTH CARE EDUCATION/TRAINING PROGRAM

## 2024-03-16 PROCEDURE — 94799 UNLISTED PULMONARY SVC/PX: CPT | Mod: XB

## 2024-03-16 PROCEDURE — 36415 COLL VENOUS BLD VENIPUNCTURE: CPT | Performed by: STUDENT IN AN ORGANIZED HEALTH CARE EDUCATION/TRAINING PROGRAM

## 2024-03-16 PROCEDURE — 85025 COMPLETE CBC W/AUTO DIFF WBC: CPT | Performed by: STUDENT IN AN ORGANIZED HEALTH CARE EDUCATION/TRAINING PROGRAM

## 2024-03-16 PROCEDURE — 25000003 PHARM REV CODE 250

## 2024-03-16 PROCEDURE — 63600175 PHARM REV CODE 636 W HCPCS

## 2024-03-16 PROCEDURE — 94761 N-INVAS EAR/PLS OXIMETRY MLT: CPT

## 2024-03-16 PROCEDURE — 25000003 PHARM REV CODE 250: Performed by: UROLOGY

## 2024-03-16 PROCEDURE — 63600175 PHARM REV CODE 636 W HCPCS: Performed by: INTERNAL MEDICINE

## 2024-03-16 PROCEDURE — 25000003 PHARM REV CODE 250: Performed by: INTERNAL MEDICINE

## 2024-03-16 PROCEDURE — 99024 POSTOP FOLLOW-UP VISIT: CPT | Mod: ,,, | Performed by: UROLOGY

## 2024-03-16 PROCEDURE — 84100 ASSAY OF PHOSPHORUS: CPT | Performed by: STUDENT IN AN ORGANIZED HEALTH CARE EDUCATION/TRAINING PROGRAM

## 2024-03-16 PROCEDURE — 20000000 HC ICU ROOM

## 2024-03-16 PROCEDURE — 83735 ASSAY OF MAGNESIUM: CPT | Performed by: STUDENT IN AN ORGANIZED HEALTH CARE EDUCATION/TRAINING PROGRAM

## 2024-03-16 RX ORDER — ALUMINUM HYDROXIDE, MAGNESIUM HYDROXIDE, AND SIMETHICONE 1200; 120; 1200 MG/30ML; MG/30ML; MG/30ML
30 SUSPENSION ORAL EVERY 4 HOURS PRN
Status: DISCONTINUED | OUTPATIENT
Start: 2024-03-16 | End: 2024-03-20 | Stop reason: HOSPADM

## 2024-03-16 RX ORDER — ACETAMINOPHEN 500 MG
1000 TABLET ORAL 3 TIMES DAILY
Status: DISCONTINUED | OUTPATIENT
Start: 2024-03-16 | End: 2024-03-20 | Stop reason: HOSPADM

## 2024-03-16 RX ADMIN — PROCHLORPERAZINE EDISYLATE 5 MG: 5 INJECTION INTRAMUSCULAR; INTRAVENOUS at 05:03

## 2024-03-16 RX ADMIN — ALVIMOPAN 12 MG: 12 CAPSULE ORAL at 09:03

## 2024-03-16 RX ADMIN — ONDANSETRON 4 MG: 2 INJECTION INTRAMUSCULAR; INTRAVENOUS at 09:03

## 2024-03-16 RX ADMIN — KETOROLAC TROMETHAMINE 15 MG: 30 INJECTION, SOLUTION INTRAMUSCULAR at 06:03

## 2024-03-16 RX ADMIN — PREGABALIN 75 MG: 75 CAPSULE ORAL at 09:03

## 2024-03-16 RX ADMIN — MUPIROCIN: 20 OINTMENT TOPICAL at 09:03

## 2024-03-16 RX ADMIN — FAMOTIDINE 20 MG: 20 TABLET ORAL at 09:03

## 2024-03-16 RX ADMIN — KETOROLAC TROMETHAMINE 15 MG: 30 INJECTION, SOLUTION INTRAMUSCULAR at 12:03

## 2024-03-16 RX ADMIN — ONDANSETRON 4 MG: 2 INJECTION INTRAMUSCULAR; INTRAVENOUS at 06:03

## 2024-03-16 RX ADMIN — ONDANSETRON 4 MG: 2 INJECTION INTRAMUSCULAR; INTRAVENOUS at 02:03

## 2024-03-16 RX ADMIN — ACETAMINOPHEN 1000 MG: 500 TABLET ORAL at 04:03

## 2024-03-16 RX ADMIN — ALUMINUM HYDROXIDE, MAGNESIUM HYDROXIDE, AND DIMETHICONE 30 ML: 200; 20; 200 SUSPENSION ORAL at 03:03

## 2024-03-16 RX ADMIN — SODIUM CHLORIDE, POTASSIUM CHLORIDE, SODIUM LACTATE AND CALCIUM CHLORIDE: 600; 310; 30; 20 INJECTION, SOLUTION INTRAVENOUS at 11:03

## 2024-03-16 RX ADMIN — ALUMINUM HYDROXIDE, MAGNESIUM HYDROXIDE, AND DIMETHICONE 30 ML: 200; 20; 200 SUSPENSION ORAL at 09:03

## 2024-03-16 RX ADMIN — SODIUM CHLORIDE, POTASSIUM CHLORIDE, SODIUM LACTATE AND CALCIUM CHLORIDE: 600; 310; 30; 20 INJECTION, SOLUTION INTRAVENOUS at 09:03

## 2024-03-16 RX ADMIN — ACETAMINOPHEN 1000 MG: 500 TABLET ORAL at 09:03

## 2024-03-16 NOTE — PLAN OF CARE
Problem: Adult Inpatient Plan of Care  Goal: Plan of Care Review  Outcome: Ongoing, Progressing  Flowsheets (Taken 3/16/2024 0542)  Plan of Care Reviewed With: patient     Problem: Fall Injury Risk  Goal: Absence of Fall and Fall-Related Injury  Outcome: Ongoing, Progressing  Intervention: Identify and Manage Contributors  Flowsheets (Taken 3/16/2024 0542)  Self-Care Promotion: BADL personal objects within reach  Medication Review/Management: medications reviewed  Intervention: Promote Injury-Free Environment  Flowsheets (Taken 3/16/2024 0542)  Safety Promotion/Fall Prevention:   assistive device/personal item within reach   bed alarm set   Fall Risk reviewed with patient/family   Fall Risk signage in place   pulse ox   medications reviewed   nonskid shoes/socks when out of bed   side rails raised x 2   toileting scheduled     Problem: Skin Injury Risk Increased  Goal: Skin Health and Integrity  Outcome: Ongoing, Progressing  Intervention: Optimize Skin Protection  Flowsheets (Taken 3/16/2024 0542)  Pressure Reduction Techniques: pressure points protected  Pressure Reduction Devices: positioning supports utilized  Skin Protection:   adhesive use limited   incontinence pads utilized   tubing/devices free from skin contact  Head of Bed (HOB) Positioning: HOB at 30 degrees

## 2024-03-16 NOTE — ASSESSMENT & PLAN NOTE
- Noted with ambulation; suspect 2/2 positioning in case though patient reports has had some issues since prior chemo.  - Appreciate orthopedics assistance. Doing well with AFO.

## 2024-03-16 NOTE — SUBJECTIVE & OBJECTIVE
Interval History: Ambulated well with boot. Small amount flatus this AM. +belching and vomiting this AM. Pain well controlled. WBC significantly elevated. No fevers. Cr trending up.     Review of Systems   Constitutional:  Negative for chills and fever.   HENT:  Negative for hearing loss, sore throat and trouble swallowing.    Eyes: Negative.    Respiratory:  Negative for cough and shortness of breath.    Cardiovascular:  Negative for chest pain.   Gastrointestinal:  Positive for abdominal pain. Negative for abdominal distention, constipation, diarrhea, nausea and vomiting.   Genitourinary:  Positive for hematuria. Negative for flank pain.   Musculoskeletal:  Negative for arthralgias and neck pain.   Skin:  Negative for color change, pallor and rash.   Neurological:  Negative for dizziness and seizures.   Hematological:  Negative for adenopathy.   Psychiatric/Behavioral:  Negative for confusion.    All other systems reviewed and are negative.    Objective:     Temp:  [98 °F (36.7 °C)-98.9 °F (37.2 °C)] 98.7 °F (37.1 °C)  Pulse:  [] 83  Resp:  [13-28] 20  SpO2:  [92 %-100 %] 94 %  BP: (102-137)/(64-92) 114/73     Body mass index is 28.75 kg/m².           Drains       Drain  Duration                  Closed/Suction Drain 03/13/24 1712 Left LLQ Bulb 19 Fr. 2 days         Urostomy 03/13/24 1720 ileal conduit RLQ 2 days                     Physical Exam  Constitutional:       General: He is not in acute distress.  HENT:      Head: Normocephalic.   Cardiovascular:      Rate and Rhythm: Normal rate.   Pulmonary:      Effort: Pulmonary effort is normal.   Abdominal:      General: There is no distension.      Palpations: Abdomen is soft.      Tenderness: There is abdominal tenderness. There is no right CVA tenderness or left CVA tenderness.      Comments: Midline abd incision c/d/i  RLQ urostomy pink, patent, moist, with red rubber visible through stoma. Clear thin pink urine in bag with small amount of clot and  "mucus  Abd benign, soft.  Non distended.   Drain SS, dressing saturated around ALEXANDRA. Changed.    Musculoskeletal:         General: Normal range of motion.   Skin:     General: Skin is warm and dry.   Neurological:      Mental Status: He is alert and oriented to person, place, and time.      Coordination: Coordination normal.      Comments: R foot weakness with dorsiflexion   Psychiatric:         Mood and Affect: Mood normal.         Behavior: Behavior normal.           Significant Labs:    BMP:  Recent Labs   Lab 03/14/24  0455 03/15/24  0522 03/16/24  0339    136 137   K 4.2 4.5 4.9    107 105   CO2 22* 22* 23   BUN 21 26* 33*   CREATININE 1.3 1.3 1.5*   CALCIUM 9.0 8.4* 8.6*       CBC:   Recent Labs   Lab 03/14/24  0455 03/15/24  0522 03/16/24  0339   WBC 13.90* 14.13* 21.01*   HGB 8.7* 8.0* 8.7*   HCT 26.3* 24.4* 26.8*    177 217       Blood Culture: No results for input(s): "LABBLOO" in the last 168 hours.  Urine Culture: No results for input(s): "LABURIN" in the last 168 hours.  Urine Studies: No results for input(s): "COLORU", "APPEARANCEUA", "PHUR", "SPECGRAV", "PROTEINUA", "GLUCUA", "KETONESU", "BILIRUBINUA", "OCCULTUA", "NITRITE", "UROBILINOGEN", "LEUKOCYTESUR", "RBCUA", "WBCUA", "BACTERIA", "SQUAMEPITHEL", "HYALINECASTS" in the last 168 hours.    Invalid input(s): "WRIGHTSUR"    Significant Imaging:  All pertinent imaging results/findings from the past 24 hours have been reviewed.                "

## 2024-03-16 NOTE — ASSESSMENT & PLAN NOTE
- WBCs with gradual uptrend; clinically appears well, no fevers or focal signs/symptoms of infection. Denies SOB, cough, fevers/chills, urinary symptoms and incision sites appear clear.  - If develops fever will check blood cultures, CXR; consider empiric antibiotic therapy if develops focal symptoms.

## 2024-03-16 NOTE — NURSING
Pt c/o belching d/t eating tomato soup for dinner. Pt denied any complaints of nausea, cramping, and/or pressure. 1 x dose of Tums ordered.

## 2024-03-16 NOTE — PROGRESS NOTES
[facility] Intensive Care [facility] Medicine  Progress Note    Patient Name: Med Palma  MRN: [id number]  Patient Class: IP- Inpatient   Admission Date: 3/13/2024  Length of Stay: 3 days  Attending Physician: Yinka Jimenez MD  Primary Care Provider: Jah Wilson MD        Subjective:     Principal Problem:Malignant neoplasm of dome of urinary bladder        HPI:  Med Palma is a 68-year-old male with a past medical history of arthritis, elevated PSA, hypertension and bladder cancer who presents for scheduled robotic radical cystectoprostatectomy and PLND and ileal conduit vs neobladder with Dr. Jimenez, urology.  Patient underwent TURBT 10/26/23 and was diagnosed with bladder cancer.  Patient is followed by , Hem/Onc and received 4 rounds of chemotherapy with last treatment completed 1 month ago.  Patient was seen by Dr. Jimenez, outpatient and cleared for surgery per heme Onc.  Hospital Medicine was consulted for medical comanagement when patient was seen postoperatively.  Patient states his pain is currently controlled and denies fever, chills, nausea, vomiting, shortness of breath and chest pain.  Hospital Medicine will continue to follow and remain available for medical comanagement.    Interval History: Had some clear liquids last night but subsequent nausea / indigestion and episode of vomiting. Some flatus this AM. Discussed at bedside with patient / Dr. Campbell.    Review of Systems   Constitutional:  Negative for chills and fever.   Respiratory:  Negative for cough and shortness of breath.    Cardiovascular:  Negative for chest pain and palpitations.   Gastrointestinal:  Positive for nausea and vomiting. Negative for abdominal pain.     Objective:     Vital Signs (Most Recent):  Temp: 98.7 °F (37.1 °C) (03/16/24 0700)  Pulse: 96 (03/16/24 1000)  Resp: 17 (03/16/24 1000)  BP: 104/77 (03/16/24 1000)  SpO2: 97 % (03/16/24 1000) Vital Signs (24h Range):  Temp:  [98 °F  (36.7 °C)-98.9 °F (37.2 °C)] 98.7 °F (37.1 °C)  Pulse:  [] 96  Resp:  [13-28] 17  SpO2:  [94 %-97 %] 97 %  BP: (102-127)/(64-92) 104/77     Weight: 96.2 kg (212 lb)  Body mass index is 28.75 kg/m².    Intake/Output Summary (Last 24 hours) at 3/16/2024 1403  Last data filed at 3/16/2024 1020  Gross per 24 hour   Intake 3717.26 ml   Output 1101 ml   Net 2616.26 ml         Physical Exam  Vitals and nursing note reviewed.   Constitutional:       General: He is not in acute distress.     Appearance: He is well-developed.   HENT:      Head: Normocephalic and atraumatic.   Eyes:      General:         Right eye: No discharge.         Left eye: No discharge.      Conjunctiva/sclera: Conjunctivae normal.   Cardiovascular:      Rate and Rhythm: Normal rate.      Pulses: Normal pulses.   Pulmonary:      Effort: Pulmonary effort is normal. No respiratory distress.   Abdominal:      Palpations: Abdomen is soft.      Tenderness: There is no abdominal tenderness.      Comments: Incisions clean and intact. Drain site with some seepage; no erythema, induration or purulence noted at incision sites. Ostomy intact.   Musculoskeletal:         General: Normal range of motion.      Right lower leg: No edema.      Left lower leg: No edema.   Skin:     General: Skin is warm and dry.   Neurological:      Mental Status: He is alert and oriented to person, place, and time.           Significant Labs:   CBC:  Recent Labs   Lab 03/14/24  0455 03/15/24  0522 03/16/24  0339   WBC 13.90* 14.13* 21.01*   HGB 8.7* 8.0* 8.7*   HCT 26.3* 24.4* 26.8*    177 217   GRAN 84.1*  11.7* 85.2*  12.0* 86.1*  18.1*   LYMPH 7.6*  1.1 7.6*  1.1 7.9*  1.7   MONO 7.7  1.1* 6.3  0.9 5.1  1.1*   EOS 0.0 0.0 0.0   BASO 0.03 0.06 0.06   BMP:  Recent Labs   Lab 03/14/24  0455 03/15/24  0522 03/16/24  0339    136 137   K 4.2 4.5 4.9    107 105   CO2 22* 22* 23   BUN 21 26* 33*   CREATININE 1.3 1.3 1.5*   * 101 120*   CALCIUM 9.0  8.4* 8.6*   MG 1.8 2.0 2.1   PHOS 3.3 2.4* 3.5     Significant Imaging: I have reviewed and interpreted all pertinent imaging results/findings within the past 24 hours.      Assessment/Plan:      * Malignant neoplasm of dome of urinary bladder  - Primary management, pain control as per Urology.    Elevated serum creatinine  - Mild upward trend.  - Continue IVFs; hold ketorolac for time being, substitute acetaminophen 1000mg PO TID.  - Avoid nephrotoxins, renally dose medications. Trend creatinine and adjust further as required.    Leukocytosis  - WBCs with gradual uptrend; clinically appears well, no fevers or focal signs/symptoms of infection. Denies SOB, cough, fevers/chills, urinary symptoms and incision sites appear clear.  - If develops fever will check blood cultures, CXR; consider empiric antibiotic therapy if develops focal symptoms.    Foot drop  - Noted with ambulation; suspect 2/2 positioning in case though patient reports has had some issues since prior chemo.  - Appreciate orthopedics assistance. Doing well with AFO.    Normocytic anemia  - Mild decrease since OR but stable overnight.  - Monitor.    Benign non-nodular prostatic hyperplasia with lower urinary tract symptoms  - As per primary.      VTE Risk Mitigation (From admission, onward)           Ordered     IP VTE HIGH RISK PATIENT  Once         03/13/24 1740     Place sequential compression device  Until discontinued         03/13/24 0601     Place MCKENZIE hose  Until discontinued         03/13/24 0601                  DAJA Damico MD  Department of Hospital Medicine   [facility] Intensive Care [facility]

## 2024-03-16 NOTE — PROGRESS NOTES
[facility] Intensive Care [facility]  Urology  Progress Note    Patient Name: Med Palma  MRN: [id number]  Admission Date: 3/13/2024  Hospital Length of Stay: 3 days  Code Status: Full Code   Attending Provider: Yinka Jimenez MD   Primary Care Physician: Jah Wilson MD    Subjective:     HPI:  Med Palma is a 68 y.o. 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     Interval History: Ambulated well with boot. Small amount flatus this AM. +belching and vomiting this AM. Pain well controlled. WBC significantly elevated. No fevers. Cr trending up.     Review of Systems   Constitutional:  Negative for chills and fever.   HENT:  Negative for hearing loss, sore throat and trouble swallowing.    Eyes: Negative.    Respiratory:  Negative for cough and shortness of breath.    Cardiovascular:  Negative for chest pain.   Gastrointestinal:  Positive for abdominal pain. Negative for abdominal distention, constipation, diarrhea, nausea and vomiting.   Genitourinary:  Positive for hematuria. Negative for flank pain.   Musculoskeletal:  Negative for arthralgias and neck pain.   Skin:  Negative for color change, pallor and rash.   Neurological:  Negative for dizziness and seizures.   Hematological:  Negative for adenopathy.   Psychiatric/Behavioral:  Negative for confusion.    All other systems reviewed and are negative.    Objective:     Temp:  [98 °F (36.7 °C)-98.9 °F (37.2 °C)] 98.7 °F (37.1 °C)  Pulse:  [] 83  Resp:  [13-28] 20  SpO2:  [92 %-100 %] 94 %  BP: (102-137)/(64-92) 114/73     Body mass index is 28.75 kg/m².           Drains       Drain  Duration                  Closed/Suction Drain 03/13/24 1712 Left LLQ Bulb 19 Fr. 2 days         Urostomy 03/13/24 1720 ileal conduit RLQ 2 days                     Physical Exam  Constitutional:       General: He is not in acute  "distress.  HENT:      Head: Normocephalic.   Cardiovascular:      Rate and Rhythm: Normal rate.   Pulmonary:      Effort: Pulmonary effort is normal.   Abdominal:      General: There is no distension.      Palpations: Abdomen is soft.      Tenderness: There is abdominal tenderness. There is no right CVA tenderness or left CVA tenderness.      Comments: Midline abd incision c/d/i  RLQ urostomy pink, patent, moist, with red rubber visible through stoma. Clear thin pink urine in bag with small amount of clot and mucus  Abd benign, soft.  Non distended.   Drain SS, dressing saturated around ALEXANDRA. Changed.    Musculoskeletal:         General: Normal range of motion.   Skin:     General: Skin is warm and dry.   Neurological:      Mental Status: He is alert and oriented to person, place, and time.      Coordination: Coordination normal.      Comments: R foot weakness with dorsiflexion   Psychiatric:         Mood and Affect: Mood normal.         Behavior: Behavior normal.           Significant Labs:    BMP:  Recent Labs   Lab 03/14/24  0455 03/15/24  0522 03/16/24  0339    136 137   K 4.2 4.5 4.9    107 105   CO2 22* 22* 23   BUN 21 26* 33*   CREATININE 1.3 1.3 1.5*   CALCIUM 9.0 8.4* 8.6*       CBC:   Recent Labs   Lab 03/14/24  0455 03/15/24  0522 03/16/24  0339   WBC 13.90* 14.13* 21.01*   HGB 8.7* 8.0* 8.7*   HCT 26.3* 24.4* 26.8*    177 217       Blood Culture: No results for input(s): "LABBLOO" in the last 168 hours.  Urine Culture: No results for input(s): "LABURIN" in the last 168 hours.  Urine Studies: No results for input(s): "COLORU", "APPEARANCEUA", "PHUR", "SPECGRAV", "PROTEINUA", "GLUCUA", "KETONESU", "BILIRUBINUA", "OCCULTUA", "NITRITE", "UROBILINOGEN", "LEUKOCYTESUR", "RBCUA", "WBCUA", "BACTERIA", "SQUAMEPITHEL", "HYALINECASTS" in the last 168 hours.    Invalid input(s): "WRIGHTSUR"    Significant Imaging:  All pertinent imaging results/findings from the past 24 hours have been " reviewed.                  Assessment/Plan:     * Malignant neoplasm of dome of urinary bladder  Med Palma is a 68 y.o. 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      - Tylenol, PO oxycodone for pain control, robaxin QID, toradol, lyrica  - clear liquids - not ready to advance yet  - Start SC heparin  - IVF, consider decrease when more PO. Monitor Cr.   - Ambulate QID, PT/OT consult  - Wound care ostomy consulted  - Drains: maintain ALEXANDRA until discharge. ALEXANDRA with high output. If output remains high, will send for Cr. Dressing changed.   - Prophylaxis: IS, SCDs, GI ppx    Appreciate hospital medicine input.   Will monitor WBC. Consider pan-culture/abx if fever.  Appreciate ortho input for peroneal nerve palsy/foot drop.          VTE Risk Mitigation (From admission, onward)           Ordered     IP VTE HIGH RISK PATIENT  Once         03/13/24 1740     Place sequential compression device  Until discontinued         03/13/24 0601     Place MCKENZIE hose  Until discontinued         03/13/24 0601                    Cecilia Campbell MD  Urology  [facility] Int[facility] ([place])

## 2024-03-16 NOTE — PROGRESS NOTES
Progress Note  Orthopedics    Admit Date: 3/13/2024   Patient ID: Med Palma is a 68 y.o. male.  Orthopedic follow-up.  Postop day 3.  Status post cystectomy.  Right foot drop still present.  Instructed him on AFO.  I told him that this may take weeks or months to resolve.  I will let Dr. Jimenez know that he could have EMG and nerve conduction studies 3-6 weeks from now if desired to see the progress.  We will follow-up as needed.            Joel BERG [facility]      Vital Sign (recent):  /73   Pulse 83   Temp 98.7 °F (37.1 °C) (Oral)   Resp 20   Ht 6' (1.829 m)   Wt 96.2 kg (212 lb)   SpO2 (!) 94%   BMI 28.75 kg/m²       Laboratory:    CBC:   Recent Labs   Lab 03/16/24  0339   WBC 21.01*   RBC 2.77*   HGB 8.7*   HCT 26.8*      MCV 97   MCH 31.4*   MCHC 32.5       CMP:   Recent Labs   Lab 03/16/24  0339   *   CALCIUM 8.6*      K 4.9   CO2 23      BUN 33*   CREATININE 1.5*           Intake/Output Summary (Last 24 hours) at 3/16/2024 0945  Last data filed at 3/16/2024 0614  Gross per 24 hour   Intake 3717.26 ml   Output 1161 ml   Net 2556.26 ml         Current Medications:   alvimopan  12 mg Oral BID    famotidine  20 mg Oral BID    ketorolac  15 mg Intravenous Q6H    mupirocin   Nasal BID    polyethylene glycol  17 g Oral Daily    pregabalin  75 mg Oral QHS       Continuous Infusions:   lactated ringers 75 mL/hr at 03/16/24 0003     PRN Meds:.0.9%  NaCl infusion (for blood administration), diphenhydrAMINE, methocarbamoL, ondansetron, oxyCODONE, oxyCODONE, prochlorperazine, sodium chloride 0.9%

## 2024-03-16 NOTE — ASSESSMENT & PLAN NOTE
- Mild upward trend.  - Continue IVFs; hold ketorolac for time being, substitute acetaminophen 1000mg PO TID.  - Avoid nephrotoxins, renally dose medications. Trend creatinine and adjust further as required.

## 2024-03-16 NOTE — NURSING
Notified Dr. Damico of patient's stomach discomfort-  belching, gas pains, & nausea.. PRN meds given throughout the afternoon with no relief. KUB ordered.

## 2024-03-16 NOTE — SUBJECTIVE & OBJECTIVE
Interval History: Had some clear liquids last night but subsequent nausea / indigestion and episode of vomiting. Some flatus this AM. Discussed at bedside with patient / Dr. Campbell.    Review of Systems   Constitutional:  Negative for chills and fever.   Respiratory:  Negative for cough and shortness of breath.    Cardiovascular:  Negative for chest pain and palpitations.   Gastrointestinal:  Positive for nausea and vomiting. Negative for abdominal pain.     Objective:     Vital Signs (Most Recent):  Temp: 98.7 °F (37.1 °C) (03/16/24 0700)  Pulse: 96 (03/16/24 1000)  Resp: 17 (03/16/24 1000)  BP: 104/77 (03/16/24 1000)  SpO2: 97 % (03/16/24 1000) Vital Signs (24h Range):  Temp:  [98 °F (36.7 °C)-98.9 °F (37.2 °C)] 98.7 °F (37.1 °C)  Pulse:  [] 96  Resp:  [13-28] 17  SpO2:  [94 %-97 %] 97 %  BP: (102-127)/(64-92) 104/77     Weight: 96.2 kg (212 lb)  Body mass index is 28.75 kg/m².    Intake/Output Summary (Last 24 hours) at 3/16/2024 1403  Last data filed at 3/16/2024 1020  Gross per 24 hour   Intake 3717.26 ml   Output 1101 ml   Net 2616.26 ml         Physical Exam  Vitals and nursing note reviewed.   Constitutional:       General: He is not in acute distress.     Appearance: He is well-developed.   HENT:      Head: Normocephalic and atraumatic.   Eyes:      General:         Right eye: No discharge.         Left eye: No discharge.      Conjunctiva/sclera: Conjunctivae normal.   Cardiovascular:      Rate and Rhythm: Normal rate.      Pulses: Normal pulses.   Pulmonary:      Effort: Pulmonary effort is normal. No respiratory distress.   Abdominal:      Palpations: Abdomen is soft.      Tenderness: There is no abdominal tenderness.      Comments: Incisions clean and intact. Drain site with some seepage; no erythema, induration or purulence noted at incision sites. Ostomy intact.   Musculoskeletal:         General: Normal range of motion.      Right lower leg: No edema.      Left lower leg: No edema.   Skin:      General: Skin is warm and dry.   Neurological:      Mental Status: He is alert and oriented to person, place, and time.           Significant Labs:   CBC:  Recent Labs   Lab 03/14/24  0455 03/15/24  0522 03/16/24  0339   WBC 13.90* 14.13* 21.01*   HGB 8.7* 8.0* 8.7*   HCT 26.3* 24.4* 26.8*    177 217   GRAN 84.1*  11.7* 85.2*  12.0* 86.1*  18.1*   LYMPH 7.6*  1.1 7.6*  1.1 7.9*  1.7   MONO 7.7  1.1* 6.3  0.9 5.1  1.1*   EOS 0.0 0.0 0.0   BASO 0.03 0.06 0.06   BMP:  Recent Labs   Lab 03/14/24  0455 03/15/24  0522 03/16/24  0339    136 137   K 4.2 4.5 4.9    107 105   CO2 22* 22* 23   BUN 21 26* 33*   CREATININE 1.3 1.3 1.5*   * 101 120*   CALCIUM 9.0 8.4* 8.6*   MG 1.8 2.0 2.1   PHOS 3.3 2.4* 3.5     Significant Imaging: I have reviewed and interpreted all pertinent imaging results/findings within the past 24 hours.   [Time Spent: ___ minutes] : I have spent [unfilled] minutes of time on the encounter.

## 2024-03-17 PROBLEM — K56.7 ILEUS: Status: ACTIVE | Noted: 2024-03-17

## 2024-03-17 LAB
ANION GAP SERPL CALC-SCNC: 10 MMOL/L (ref 8–16)
BASOPHILS # BLD AUTO: 0.08 K/UL (ref 0–0.2)
BASOPHILS NFR BLD: 0.4 % (ref 0–1.9)
BODY FLUID SOURCE, CREATININE: NORMAL
BUN SERPL-MCNC: 46 MG/DL (ref 8–23)
CALCIUM SERPL-MCNC: 8.5 MG/DL (ref 8.7–10.5)
CHLORIDE SERPL-SCNC: 104 MMOL/L (ref 95–110)
CO2 SERPL-SCNC: 25 MMOL/L (ref 23–29)
CREAT FLD-MCNC: 1.9 MG/DL
CREAT SERPL-MCNC: 1.5 MG/DL (ref 0.5–1.4)
CREAT UR-MCNC: 124 MG/DL (ref 23–375)
DIFFERENTIAL METHOD BLD: ABNORMAL
EOSINOPHIL # BLD AUTO: 0 K/UL (ref 0–0.5)
EOSINOPHIL NFR BLD: 0 % (ref 0–8)
ERYTHROCYTE [DISTWIDTH] IN BLOOD BY AUTOMATED COUNT: 15.7 % (ref 11.5–14.5)
EST. GFR  (NO RACE VARIABLE): 51 ML/MIN/1.73 M^2
GLUCOSE SERPL-MCNC: 110 MG/DL (ref 70–110)
HCT VFR BLD AUTO: 24.9 % (ref 40–54)
HGB BLD-MCNC: 8.1 G/DL (ref 14–18)
IMM GRANULOCYTES # BLD AUTO: 0.16 K/UL (ref 0–0.04)
IMM GRANULOCYTES NFR BLD AUTO: 0.8 % (ref 0–0.5)
LYMPHOCYTES # BLD AUTO: 1.7 K/UL (ref 1–4.8)
LYMPHOCYTES NFR BLD: 8.4 % (ref 18–48)
MAGNESIUM SERPL-MCNC: 2.2 MG/DL (ref 1.6–2.6)
MCH RBC QN AUTO: 31.5 PG (ref 27–31)
MCHC RBC AUTO-ENTMCNC: 32.5 G/DL (ref 32–36)
MCV RBC AUTO: 97 FL (ref 82–98)
MONOCYTES # BLD AUTO: 1.6 K/UL (ref 0.3–1)
MONOCYTES NFR BLD: 7.5 % (ref 4–15)
NEUTROPHILS # BLD AUTO: 17.2 K/UL (ref 1.8–7.7)
NEUTROPHILS NFR BLD: 82.9 % (ref 38–73)
NRBC BLD-RTO: 0 /100 WBC
PHOSPHATE SERPL-MCNC: 3.3 MG/DL (ref 2.7–4.5)
PLATELET # BLD AUTO: 227 K/UL (ref 150–450)
PMV BLD AUTO: 10.3 FL (ref 9.2–12.9)
POTASSIUM SERPL-SCNC: 4.9 MMOL/L (ref 3.5–5.1)
RBC # BLD AUTO: 2.57 M/UL (ref 4.6–6.2)
SODIUM SERPL-SCNC: 139 MMOL/L (ref 136–145)
SODIUM UR-SCNC: 34 MMOL/L (ref 20–250)
WBC # BLD AUTO: 20.7 K/UL (ref 3.9–12.7)

## 2024-03-17 PROCEDURE — 36415 COLL VENOUS BLD VENIPUNCTURE: CPT | Performed by: STUDENT IN AN ORGANIZED HEALTH CARE EDUCATION/TRAINING PROGRAM

## 2024-03-17 PROCEDURE — 99900035 HC TECH TIME PER 15 MIN (STAT)

## 2024-03-17 PROCEDURE — 85025 COMPLETE CBC W/AUTO DIFF WBC: CPT | Performed by: STUDENT IN AN ORGANIZED HEALTH CARE EDUCATION/TRAINING PROGRAM

## 2024-03-17 PROCEDURE — 0D9670Z DRAINAGE OF STOMACH WITH DRAINAGE DEVICE, VIA NATURAL OR ARTIFICIAL OPENING: ICD-10-PCS | Performed by: UROLOGY

## 2024-03-17 PROCEDURE — 25000003 PHARM REV CODE 250: Performed by: INTERNAL MEDICINE

## 2024-03-17 PROCEDURE — 80048 BASIC METABOLIC PNL TOTAL CA: CPT | Performed by: STUDENT IN AN ORGANIZED HEALTH CARE EDUCATION/TRAINING PROGRAM

## 2024-03-17 PROCEDURE — 84300 ASSAY OF URINE SODIUM: CPT | Performed by: INTERNAL MEDICINE

## 2024-03-17 PROCEDURE — 99024 POSTOP FOLLOW-UP VISIT: CPT | Mod: ,,, | Performed by: UROLOGY

## 2024-03-17 PROCEDURE — 63600175 PHARM REV CODE 636 W HCPCS

## 2024-03-17 PROCEDURE — 63600175 PHARM REV CODE 636 W HCPCS: Performed by: INTERNAL MEDICINE

## 2024-03-17 PROCEDURE — 20000000 HC ICU ROOM

## 2024-03-17 PROCEDURE — 94761 N-INVAS EAR/PLS OXIMETRY MLT: CPT

## 2024-03-17 PROCEDURE — 82570 ASSAY OF URINE CREATININE: CPT | Mod: 91 | Performed by: UROLOGY

## 2024-03-17 PROCEDURE — 25000003 PHARM REV CODE 250: Performed by: UROLOGY

## 2024-03-17 PROCEDURE — 82570 ASSAY OF URINE CREATININE: CPT | Performed by: INTERNAL MEDICINE

## 2024-03-17 PROCEDURE — 84100 ASSAY OF PHOSPHORUS: CPT | Performed by: STUDENT IN AN ORGANIZED HEALTH CARE EDUCATION/TRAINING PROGRAM

## 2024-03-17 PROCEDURE — 83735 ASSAY OF MAGNESIUM: CPT | Performed by: STUDENT IN AN ORGANIZED HEALTH CARE EDUCATION/TRAINING PROGRAM

## 2024-03-17 PROCEDURE — 25000003 PHARM REV CODE 250

## 2024-03-17 PROCEDURE — 63600175 PHARM REV CODE 636 W HCPCS: Performed by: UROLOGY

## 2024-03-17 RX ORDER — HEPARIN SODIUM 5000 [USP'U]/ML
5000 INJECTION, SOLUTION INTRAVENOUS; SUBCUTANEOUS EVERY 8 HOURS
Status: DISCONTINUED | OUTPATIENT
Start: 2024-03-17 | End: 2024-03-19

## 2024-03-17 RX ADMIN — ACETAMINOPHEN 1000 MG: 500 TABLET ORAL at 08:03

## 2024-03-17 RX ADMIN — POLYETHYLENE GLYCOL 3350 17 G: 17 POWDER, FOR SOLUTION ORAL at 08:03

## 2024-03-17 RX ADMIN — FAMOTIDINE 20 MG: 20 TABLET ORAL at 08:03

## 2024-03-17 RX ADMIN — METHOCARBAMOL 500 MG: 500 TABLET ORAL at 06:03

## 2024-03-17 RX ADMIN — SODIUM CHLORIDE, POTASSIUM CHLORIDE, SODIUM LACTATE AND CALCIUM CHLORIDE: 600; 310; 30; 20 INJECTION, SOLUTION INTRAVENOUS at 11:03

## 2024-03-17 RX ADMIN — DIPHENHYDRAMINE HYDROCHLORIDE 12.5 MG: 50 INJECTION, SOLUTION INTRAMUSCULAR; INTRAVENOUS at 08:03

## 2024-03-17 RX ADMIN — HEPARIN SODIUM 5000 UNITS: 5000 INJECTION INTRAVENOUS; SUBCUTANEOUS at 02:03

## 2024-03-17 RX ADMIN — ALUMINUM HYDROXIDE, MAGNESIUM HYDROXIDE, AND DIMETHICONE 30 ML: 200; 20; 200 SUSPENSION ORAL at 06:03

## 2024-03-17 RX ADMIN — HEPARIN SODIUM 5000 UNITS: 5000 INJECTION INTRAVENOUS; SUBCUTANEOUS at 09:03

## 2024-03-17 RX ADMIN — ONDANSETRON 4 MG: 2 INJECTION INTRAMUSCULAR; INTRAVENOUS at 05:03

## 2024-03-17 RX ADMIN — MUPIROCIN: 20 OINTMENT TOPICAL at 08:03

## 2024-03-17 RX ADMIN — SODIUM CHLORIDE, POTASSIUM CHLORIDE, SODIUM LACTATE AND CALCIUM CHLORIDE: 600; 310; 30; 20 INJECTION, SOLUTION INTRAVENOUS at 10:03

## 2024-03-17 RX ADMIN — ONDANSETRON 4 MG: 2 INJECTION INTRAMUSCULAR; INTRAVENOUS at 03:03

## 2024-03-17 RX ADMIN — ALVIMOPAN 12 MG: 12 CAPSULE ORAL at 08:03

## 2024-03-17 RX ADMIN — ACETAMINOPHEN 1000 MG: 500 TABLET ORAL at 02:03

## 2024-03-17 NOTE — PROGRESS NOTES
[facility] Intensive [facility] Medicine  Progress Note    Patient Name: Med Palma  MRN: [id number]  Patient Class: IP- Inpatient   Admission Date: 3/13/2024  Length of Stay: 4 days  Attending Physician: Yinka Jimenez MD  Primary Care Provider: Jah Wilson MD        Subjective:     Principal Problem:Malignant neoplasm of dome of urinary bladder        HPI:  Med Palma is a 68-year-old male with a past medical history of arthritis, elevated PSA, hypertension and bladder cancer who presents for scheduled robotic radical cystectoprostatectomy and PLND and ileal conduit vs neobladder with Dr. Jimenez, urology.  Patient underwent TURBT 10/26/23 and was diagnosed with bladder cancer.  Patient is followed by , Hem/Onc and received 4 rounds of chemotherapy with last treatment completed 1 month ago.  Patient was seen by Dr. Jimenez, outpatient and cleared for surgery per heme Onc.  Hospital Medicine was consulted for medical comanagement when patient was seen postoperatively.  Patient states his pain is currently controlled and denies fever, chills, nausea, vomiting, shortness of breath and chest pain.  Hospital Medicine will continue to follow and remain available for medical comanagement.    Interval History: Reports liquid BM overnight, but had repeat episode of N/V this morning. Stomach feels better after emesis. Otherwise feeling well; no other focal symptoms and pain controlled.    Review of Systems   Constitutional:  Negative for chills and fever.   Respiratory:  Negative for cough and shortness of breath.    Cardiovascular:  Negative for chest pain and palpitations.   Gastrointestinal:  Positive for nausea and vomiting. Negative for abdominal pain.     Objective:     Vital Signs (Most Recent):  Temp: 98.6 °F (37 °C) (03/17/24 0302)  Pulse: 88 (03/17/24 0721)  Resp: 17 (03/17/24 0721)  BP: 131/85 (03/17/24 0602)  SpO2: (!) 93 % (03/17/24 0721) Vital Signs (24h  Range):  Temp:  [98.3 °F (36.8 °C)-98.7 °F (37.1 °C)] 98.6 °F (37 °C)  Pulse:  [] 88  Resp:  [13-26] 17  SpO2:  [93 %-97 %] 93 %  BP: (104-138)/(75-87) 131/85     Weight: 96.2 kg (212 lb)  Body mass index is 28.75 kg/m².    Intake/Output Summary (Last 24 hours) at 3/17/2024 0832  Last data filed at 3/17/2024 0427  Gross per 24 hour   Intake 2210.18 ml   Output 1355 ml   Net 855.18 ml         Physical Exam  Vitals and nursing note reviewed.   Constitutional:       General: He is not in acute distress.     Appearance: He is well-developed.   HENT:      Head: Normocephalic and atraumatic.   Eyes:      General:         Right eye: No discharge.         Left eye: No discharge.      Conjunctiva/sclera: Conjunctivae normal.   Cardiovascular:      Rate and Rhythm: Normal rate.      Pulses: Normal pulses.   Pulmonary:      Effort: Pulmonary effort is normal. No respiratory distress.   Abdominal:      Palpations: Abdomen is soft.      Tenderness: There is no abdominal tenderness.      Comments: Incisions C/D/I. Ostomy in place.   Musculoskeletal:         General: Normal range of motion.      Right lower leg: No edema.      Left lower leg: No edema.   Skin:     General: Skin is warm and dry.   Neurological:      Mental Status: He is alert and oriented to person, place, and time.           Significant Labs:   CBC:  Recent Labs   Lab 03/15/24  0522 03/16/24  0339 03/17/24  0537   WBC 14.13* 21.01* 20.70*   HGB 8.0* 8.7* 8.1*   HCT 24.4* 26.8* 24.9*    217 227   GRAN 85.2*  12.0* 86.1*  18.1* 82.9*  17.2*   LYMPH 7.6*  1.1 7.9*  1.7 8.4*  1.7   MONO 6.3  0.9 5.1  1.1* 7.5  1.6*   EOS 0.0 0.0 0.0   BASO 0.06 0.06 0.08   BMP:  Recent Labs   Lab 03/15/24  0522 03/16/24  0339 03/17/24  0537    137 139   K 4.5 4.9 4.9    105 104   CO2 22* 23 25   BUN 26* 33* 46*   CREATININE 1.3 1.5* 1.5*    120* 110   CALCIUM 8.4* 8.6* 8.5*   MG 2.0 2.1 2.2   PHOS 2.4* 3.5 3.3     Significant Imaging: I have  reviewed and interpreted all pertinent imaging results/findings within the past 24 hours.      Assessment/Plan:      * Malignant neoplasm of dome of urinary bladder  - Primary management, pain control as per Urology.    Elevated serum creatinine  - Mild upward trend, appears to be plateauing. Likely a combination of fluid status and NSAID use. Check urine lytes to further evaluate.  - Continue IVFs; hold ketorolac for time being, substitute acetaminophen 1000mg PO TID.  - Avoid nephrotoxins, renally dose medications. Trend creatinine and adjust further as required.    Leukocytosis  Ileus  - WBCs with gradual uptrend; clinically appears well, no fevers or focal signs/symptoms of infection. Denies SOB, cough, fevers/chills, urinary symptoms and incision sites appear clear.  - Suspect related to KUB findings suspicious of ileus/SBO; reports BM this morning. Okay to continue clears for now but if he has repeat nausea/vomiting will make NPO other than medications and increase IVFs slightly. If still having issues may require NGT placement.  - If develops fever will check blood cultures, CXR; consider empiric antibiotic therapy if develops focal symptoms.    Foot drop  - Noted with ambulation; suspect 2/2 positioning in case though patient reports has had some issues since prior chemo.  - Appreciate orthopedics assistance. Doing well with AFO.    Normocytic anemia  - Mild decrease since OR but stable overnight.  - Monitor.    Benign non-nodular prostatic hyperplasia with lower urinary tract symptoms  - As per primary.      VTE Risk Mitigation (From admission, onward)           Ordered     IP VTE HIGH RISK PATIENT  Once         03/13/24 1740     Place sequential compression device  Until discontinued         03/13/24 0601     Place MCKENZIE hose  Until discontinued         03/13/24 0601                    DAJA Damico MD  Department of Hospital Medicine   [facility] Intensive Care ([place])

## 2024-03-17 NOTE — SUBJECTIVE & OBJECTIVE
Interval History: +vomiting this AM, feeling better now. Small liquid BM this AM. +flatus, +belching. Ambulating. Foot drop stable. KUB shows likely ileus. Cr still elevated - urine lytes sent. WBC stably elevated, no fevers. ALEXANDRA with 610cc output.     Review of Systems   Constitutional:  Negative for chills and fever.   HENT:  Negative for hearing loss, sore throat and trouble swallowing.    Eyes: Negative.    Respiratory:  Negative for cough and shortness of breath.    Cardiovascular:  Negative for chest pain.   Gastrointestinal:  Positive for abdominal pain. Negative for abdominal distention, constipation, diarrhea, nausea and vomiting.   Genitourinary:  Positive for hematuria. Negative for flank pain.   Musculoskeletal:  Negative for arthralgias and neck pain.   Skin:  Negative for color change, pallor and rash.   Neurological:  Negative for dizziness and seizures.   Hematological:  Negative for adenopathy.   Psychiatric/Behavioral:  Negative for confusion.    All other systems reviewed and are negative.    Objective:     Temp:  [97.9 °F (36.6 °C)-98.7 °F (37.1 °C)] 97.9 °F (36.6 °C)  Pulse:  [] 94  Resp:  [13-26] 24  SpO2:  [93 %-97 %] 97 %  BP: (115-138)/(75-87) 131/85     Body mass index is 28.75 kg/m².    Date 03/17/24 0700 - 03/18/24 0659   Shift 4823-0175 2474-6443 6550-7446 24 Hour Total   INTAKE   Shift Total(mL/kg)       OUTPUT   Urine(mL/kg/hr) 150   150   Drains 70   70   Shift Total(mL/kg) 220(2.3)   220(2.3)   Weight (kg) 96.2 96.2 96.2 96.2          Drains       Drain  Duration                  Closed/Suction Drain 03/13/24 1712 Left LLQ Bulb 19 Fr. 3 days         Urostomy 03/13/24 1720 ileal conduit RLQ 3 days                     Physical Exam  Constitutional:       General: He is not in acute distress.  HENT:      Head: Normocephalic.   Cardiovascular:      Rate and Rhythm: Normal rate.   Pulmonary:      Effort: Pulmonary effort is normal.   Abdominal:      General: There is no distension.  "     Palpations: Abdomen is soft.      Tenderness: There is abdominal tenderness. There is no right CVA tenderness or left CVA tenderness.      Comments: Midline abd incision c/d/i  RLQ urostomy pink, patent, moist, with red rubber visible through stoma. Clear thin pink urine in bag with small amount of clot and mucus  Abd benign, soft.  Non distended.   Drain SS, dressing in place.     Musculoskeletal:         General: Normal range of motion.   Skin:     General: Skin is warm and dry.   Neurological:      Mental Status: He is alert and oriented to person, place, and time.      Coordination: Coordination normal.      Comments: R foot weakness with dorsiflexion   Psychiatric:         Mood and Affect: Mood normal.         Behavior: Behavior normal.         Significant Labs:    BMP:  Recent Labs   Lab 03/15/24  0522 03/16/24  0339 03/17/24  0537    137 139   K 4.5 4.9 4.9    105 104   CO2 22* 23 25   BUN 26* 33* 46*   CREATININE 1.3 1.5* 1.5*   CALCIUM 8.4* 8.6* 8.5*       CBC:   Recent Labs   Lab 03/15/24  0522 03/16/24  0339 03/17/24  0537   WBC 14.13* 21.01* 20.70*   HGB 8.0* 8.7* 8.1*   HCT 24.4* 26.8* 24.9*    217 227       Blood Culture: No results for input(s): "LABBLOO" in the last 168 hours.  Urine Culture: No results for input(s): "LABURIN" in the last 168 hours.  Urine Studies: No results for input(s): "COLORU", "APPEARANCEUA", "PHUR", "SPECGRAV", "PROTEINUA", "GLUCUA", "KETONESU", "BILIRUBINUA", "OCCULTUA", "NITRITE", "UROBILINOGEN", "LEUKOCYTESUR", "RBCUA", "WBCUA", "BACTERIA", "SQUAMEPITHEL", "HYALINECASTS" in the last 168 hours.    Invalid input(s): "WRIGHTSUR"    Significant Imaging:  All pertinent imaging results/findings from the past 24 hours have been reviewed.                  "

## 2024-03-17 NOTE — PLAN OF CARE
Patient complained of belching, some nausea, and mild abd discomfort. PRN Zofran and Maalox administered. Patient verbalized mild relief. KUB results pending. Patient had a liquid bowel movement which relieved some abd discomfort. ALEXANDRA drain and urostomy output monitored. Fall precautions maintained, bed in lowest position, and call bell within reach. Plan of care reviewed w/ patient. Patient verbalized understanding.     Problem: Adult Inpatient Plan of Care  Goal: Plan of Care Review  Outcome: Ongoing, Progressing  Flowsheets (Taken 3/17/2024 0517)  Plan of Care Reviewed With: patient     Problem: Fall Injury Risk  Goal: Absence of Fall and Fall-Related Injury  Outcome: Ongoing, Progressing  Intervention: Identify and Manage Contributors  Flowsheets (Taken 3/17/2024 0517)  Self-Care Promotion: BADL personal objects within reach  Medication Review/Management: medications reviewed  Intervention: Promote Injury-Free Environment  Flowsheets (Taken 3/17/2024 0517)  Safety Promotion/Fall Prevention:   assistive device/personal item within reach   bed alarm set   Fall Risk signage in place   Fall Risk reviewed with patient/family   lighting adjusted   medications reviewed   nonskid shoes/socks when out of bed   pulse ox   Supervised toileting - stay within arms reach

## 2024-03-17 NOTE — ASSESSMENT & PLAN NOTE
- Mild upward trend, appears to be plateauing. Likely a combination of fluid status and NSAID use. Check urine lytes to further evaluate.  - Continue IVFs; hold ketorolac for time being, substitute acetaminophen 1000mg PO TID.  - Avoid nephrotoxins, renally dose medications. Trend creatinine and adjust further as required.

## 2024-03-17 NOTE — PROGRESS NOTES
[facility] Intensive Care [facility]  Urology  Progress Note    Patient Name: Med Palma  MRN: [id number]  Admission Date: 3/13/2024  Hospital Length of Stay: 4 days  Code Status: Full Code   Attending Provider: Yinka Jimenez MD   Primary Care Physician: Jah Wilson MD    Subjective:     HPI:  Med Palma is a 68 y.o. 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     Interval History: +vomiting this AM, feeling better now. Small liquid BM this AM. +flatus, +belching. Ambulating. Foot drop stable. KUB shows likely ileus. Cr still elevated - urine lytes sent. WBC stably elevated, no fevers. ALEXANDRA with 610cc output.     Review of Systems   Constitutional:  Negative for chills and fever.   HENT:  Negative for hearing loss, sore throat and trouble swallowing.    Eyes: Negative.    Respiratory:  Negative for cough and shortness of breath.    Cardiovascular:  Negative for chest pain.   Gastrointestinal:  Positive for abdominal pain. Negative for abdominal distention, constipation, diarrhea, nausea and vomiting.   Genitourinary:  Positive for hematuria. Negative for flank pain.   Musculoskeletal:  Negative for arthralgias and neck pain.   Skin:  Negative for color change, pallor and rash.   Neurological:  Negative for dizziness and seizures.   Hematological:  Negative for adenopathy.   Psychiatric/Behavioral:  Negative for confusion.    All other systems reviewed and are negative.    Objective:     Temp:  [97.9 °F (36.6 °C)-98.7 °F (37.1 °C)] 97.9 °F (36.6 °C)  Pulse:  [] 94  Resp:  [13-26] 24  SpO2:  [93 %-97 %] 97 %  BP: (115-138)/(75-87) 131/85     Body mass index is 28.75 kg/m².    Date 03/17/24 0700 - 03/18/24 0659   Shift 6904-8862 1709-8786 5442-1894 24 Hour Total   INTAKE   Shift Total(mL/kg)       OUTPUT   Urine(mL/kg/hr) 150   150   Drains 70   70   Shift  "Total(mL/kg) 220(2.3)   220(2.3)   Weight (kg) 96.2 96.2 96.2 96.2          Drains       Drain  Duration                  Closed/Suction Drain 03/13/24 1712 Left LLQ Bulb 19 Fr. 3 days         Urostomy 03/13/24 1720 ileal conduit RLQ 3 days                     Physical Exam  Constitutional:       General: He is not in acute distress.  HENT:      Head: Normocephalic.   Cardiovascular:      Rate and Rhythm: Normal rate.   Pulmonary:      Effort: Pulmonary effort is normal.   Abdominal:      General: There is no distension.      Palpations: Abdomen is soft.      Tenderness: There is abdominal tenderness. There is no right CVA tenderness or left CVA tenderness.      Comments: Midline abd incision c/d/i  RLQ urostomy pink, patent, moist, with red rubber visible through stoma. Clear thin pink urine in bag with small amount of clot and mucus  Abd benign, soft.  Non distended.   Drain SS, dressing in place.     Musculoskeletal:         General: Normal range of motion.   Skin:     General: Skin is warm and dry.   Neurological:      Mental Status: He is alert and oriented to person, place, and time.      Coordination: Coordination normal.      Comments: R foot weakness with dorsiflexion   Psychiatric:         Mood and Affect: Mood normal.         Behavior: Behavior normal.         Significant Labs:    BMP:  Recent Labs   Lab 03/15/24  0522 03/16/24 0339 03/17/24  0537    137 139   K 4.5 4.9 4.9    105 104   CO2 22* 23 25   BUN 26* 33* 46*   CREATININE 1.3 1.5* 1.5*   CALCIUM 8.4* 8.6* 8.5*       CBC:   Recent Labs   Lab 03/15/24  0522 03/16/24  0339 03/17/24  0537   WBC 14.13* 21.01* 20.70*   HGB 8.0* 8.7* 8.1*   HCT 24.4* 26.8* 24.9*    217 227       Blood Culture: No results for input(s): "LABBLOO" in the last 168 hours.  Urine Culture: No results for input(s): "LABURIN" in the last 168 hours.  Urine Studies: No results for input(s): "COLORU", "APPEARANCEUA", "PHUR", "SPECGRAV", "PROTEINUA", "GLUCUA", " ""KETONESU", "BILIRUBINUA", "OCCULTUA", "NITRITE", "UROBILINOGEN", "LEUKOCYTESUR", "RBCUA", "WBCUA", "BACTERIA", "SQUAMEPITHEL", "HYALINECASTS" in the last 168 hours.    Invalid input(s): "WRIGHTSUR"    Significant Imaging:  All pertinent imaging results/findings from the past 24 hours have been reviewed.                    Assessment/Plan:     * Malignant neoplasm of dome of urinary bladder  Med Palma is a 68 y.o. 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      - Tylenol, PO oxycodone for pain control, robaxin QID, Lyrica. Toradol held due to elevated Cr.  - clear liquids - not ready to advance yet, if further emesis will make NPO. Discussed possible NGT.   - Start SC heparin  - IVF, consider decrease when more PO. Monitor Cr.   - Ambulate QID, PT/OT consult  - Wound care ostomy consulted  - Drains: maintain ALEXANDRA until discharge. ALEXANDRA with high output. Will send ALEXANDRA Cr now.   - Prophylaxis: IS, SCDs, GI ppx    Appreciate hospital medicine input.   Will monitor WBC. Consider pan-culture/abx if fever.  Appreciate ortho input for peroneal nerve palsy/foot drop.  +ileus - will continue clears for now as +flatus. If further emesis, will make NPO.           VTE Risk Mitigation (From admission, onward)           Ordered     IP VTE HIGH RISK PATIENT  Once         03/13/24 1740     Place sequential compression device  Until discontinued         03/13/24 0601     Place MCKENZIE hose  Until discontinued         03/13/24 0601                    Cecilia Campbell MD  Urology  [facility] - Intensive Care ([place])  "

## 2024-03-17 NOTE — SUBJECTIVE & OBJECTIVE
Interval History: Reports liquid BM overnight, but had repeat episode of N/V this morning. Stomach feels better after emesis. Otherwise feeling well; no other focal symptoms and pain controlled.    Review of Systems   Constitutional:  Negative for chills and fever.   Respiratory:  Negative for cough and shortness of breath.    Cardiovascular:  Negative for chest pain and palpitations.   Gastrointestinal:  Positive for nausea and vomiting. Negative for abdominal pain.     Objective:     Vital Signs (Most Recent):  Temp: 98.6 °F (37 °C) (03/17/24 0302)  Pulse: 88 (03/17/24 0721)  Resp: 17 (03/17/24 0721)  BP: 131/85 (03/17/24 0602)  SpO2: (!) 93 % (03/17/24 0721) Vital Signs (24h Range):  Temp:  [98.3 °F (36.8 °C)-98.7 °F (37.1 °C)] 98.6 °F (37 °C)  Pulse:  [] 88  Resp:  [13-26] 17  SpO2:  [93 %-97 %] 93 %  BP: (104-138)/(75-87) 131/85     Weight: 96.2 kg (212 lb)  Body mass index is 28.75 kg/m².    Intake/Output Summary (Last 24 hours) at 3/17/2024 0832  Last data filed at 3/17/2024 0427  Gross per 24 hour   Intake 2210.18 ml   Output 1355 ml   Net 855.18 ml         Physical Exam  Vitals and nursing note reviewed.   Constitutional:       General: He is not in acute distress.     Appearance: He is well-developed.   HENT:      Head: Normocephalic and atraumatic.   Eyes:      General:         Right eye: No discharge.         Left eye: No discharge.      Conjunctiva/sclera: Conjunctivae normal.   Cardiovascular:      Rate and Rhythm: Normal rate.      Pulses: Normal pulses.   Pulmonary:      Effort: Pulmonary effort is normal. No respiratory distress.   Abdominal:      Palpations: Abdomen is soft.      Tenderness: There is no abdominal tenderness.      Comments: Incisions C/D/I. Ostomy in place.   Musculoskeletal:         General: Normal range of motion.      Right lower leg: No edema.      Left lower leg: No edema.   Skin:     General: Skin is warm and dry.   Neurological:      Mental Status: He is alert and  oriented to person, place, and time.           Significant Labs:   CBC:  Recent Labs   Lab 03/15/24  0522 03/16/24  0339 03/17/24  0537   WBC 14.13* 21.01* 20.70*   HGB 8.0* 8.7* 8.1*   HCT 24.4* 26.8* 24.9*    217 227   GRAN 85.2*  12.0* 86.1*  18.1* 82.9*  17.2*   LYMPH 7.6*  1.1 7.9*  1.7 8.4*  1.7   MONO 6.3  0.9 5.1  1.1* 7.5  1.6*   EOS 0.0 0.0 0.0   BASO 0.06 0.06 0.08   BMP:  Recent Labs   Lab 03/15/24  0522 03/16/24  0339 03/17/24  0537    137 139   K 4.5 4.9 4.9    105 104   CO2 22* 23 25   BUN 26* 33* 46*   CREATININE 1.3 1.5* 1.5*    120* 110   CALCIUM 8.4* 8.6* 8.5*   MG 2.0 2.1 2.2   PHOS 2.4* 3.5 3.3     Significant Imaging: I have reviewed and interpreted all pertinent imaging results/findings within the past 24 hours.

## 2024-03-17 NOTE — PROGRESS NOTES
Recurrent emesis approx. 900mL this afternoon. Discontinue diet, alvimopan, polyethylene glycol with suspected progression to SBO: NGT placement ordered, will place to low intermittent suction.

## 2024-03-17 NOTE — NURSING
Patient ambulated to toilet in room with RN assistance and walker. Patient was able to have a liquid bowel movement w/ small solid pieces in it. Patient ambulated back to bed. No issues noted. Patient stated stomach feels better now.

## 2024-03-17 NOTE — PLAN OF CARE
Pt ambulated in evans prior to NG tube placement. NG tube to low intermittent suction. No signs of distress or discomfort. No further complaints of nausea or belching. Pt was able to sit up in the chair for a few hours before returning to bed. Plan of care reviewed with pt. Pt voiced understanding. NSR on monitor. No acute distress noted. Side rails X2, bed in lowest position, call bell within reach, pt advised to call for assistance. Maintain bed alarm for pt safety.

## 2024-03-17 NOTE — ASSESSMENT & PLAN NOTE
Med Palma is a 68 y.o. 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      - Tylenol, PO oxycodone for pain control, robaxin QID, Lyrica. Toradol held due to elevated Cr.  - clear liquids - not ready to advance yet, if further emesis will make NPO. Discussed possible NGT.   - Start SC heparin  - IVF, consider decrease when more PO. Monitor Cr.   - Ambulate QID, PT/OT consult  - Wound care ostomy consulted  - Drains: maintain ALEXANDRA until discharge. ALEXANDRA with high output. Will send ALEXANDRA Cr now.   - Prophylaxis: IS, SCDs, GI ppx    Appreciate hospital medicine input.   Will monitor WBC. Consider pan-culture/abx if fever.  Appreciate ortho input for peroneal nerve palsy/foot drop.  +ileus - will continue clears for now as +flatus. If further emesis, will make NPO.

## 2024-03-17 NOTE — ASSESSMENT & PLAN NOTE
Ileus  - WBCs with gradual uptrend; clinically appears well, no fevers or focal signs/symptoms of infection. Denies SOB, cough, fevers/chills, urinary symptoms and incision sites appear clear.  - Suspect related to KUB findings suspicious of ileus/SBO; reports BM this morning. Okay to continue clears for now but if he has repeat nausea/vomiting will make NPO other than medications and increase IVFs slightly. If still having issues may require NGT placement.  - If develops fever will check blood cultures, CXR; consider empiric antibiotic therapy if develops focal symptoms.

## 2024-03-17 NOTE — NURSING
1603: Pt had an episode of emesis 900cc. Zofran given.  1653: During placement of NG tube, pt had another episode of emesis, 200cc. NG tube placed, awaiting xray to confirm placement.

## 2024-03-18 PROBLEM — K56.609 SBO (SMALL BOWEL OBSTRUCTION): Status: ACTIVE | Noted: 2024-03-17

## 2024-03-18 LAB
ANION GAP SERPL CALC-SCNC: 8 MMOL/L (ref 8–16)
BASOPHILS # BLD AUTO: 0.06 K/UL (ref 0–0.2)
BASOPHILS NFR BLD: 0.4 % (ref 0–1.9)
BUN SERPL-MCNC: 39 MG/DL (ref 8–23)
CALCIUM SERPL-MCNC: 8.8 MG/DL (ref 8.7–10.5)
CHLORIDE SERPL-SCNC: 105 MMOL/L (ref 95–110)
CO2 SERPL-SCNC: 27 MMOL/L (ref 23–29)
CREAT SERPL-MCNC: 1.2 MG/DL (ref 0.5–1.4)
DIFFERENTIAL METHOD BLD: ABNORMAL
EOSINOPHIL # BLD AUTO: 0 K/UL (ref 0–0.5)
EOSINOPHIL NFR BLD: 0.1 % (ref 0–8)
ERYTHROCYTE [DISTWIDTH] IN BLOOD BY AUTOMATED COUNT: 15.4 % (ref 11.5–14.5)
EST. GFR  (NO RACE VARIABLE): >60 ML/MIN/1.73 M^2
GLUCOSE SERPL-MCNC: 101 MG/DL (ref 70–110)
HCT VFR BLD AUTO: 24.1 % (ref 40–54)
HGB BLD-MCNC: 7.8 G/DL (ref 14–18)
IMM GRANULOCYTES # BLD AUTO: 0.14 K/UL (ref 0–0.04)
IMM GRANULOCYTES NFR BLD AUTO: 1 % (ref 0–0.5)
LYMPHOCYTES # BLD AUTO: 1.8 K/UL (ref 1–4.8)
LYMPHOCYTES NFR BLD: 12.7 % (ref 18–48)
MAGNESIUM SERPL-MCNC: 2.1 MG/DL (ref 1.6–2.6)
MCH RBC QN AUTO: 31.3 PG (ref 27–31)
MCHC RBC AUTO-ENTMCNC: 32.4 G/DL (ref 32–36)
MCV RBC AUTO: 97 FL (ref 82–98)
MONOCYTES # BLD AUTO: 1.2 K/UL (ref 0.3–1)
MONOCYTES NFR BLD: 8.4 % (ref 4–15)
NEUTROPHILS # BLD AUTO: 11 K/UL (ref 1.8–7.7)
NEUTROPHILS NFR BLD: 77.4 % (ref 38–73)
NRBC BLD-RTO: 0 /100 WBC
PHOSPHATE SERPL-MCNC: 3.1 MG/DL (ref 2.7–4.5)
PLATELET # BLD AUTO: 243 K/UL (ref 150–450)
PMV BLD AUTO: 10.6 FL (ref 9.2–12.9)
POTASSIUM SERPL-SCNC: 4.4 MMOL/L (ref 3.5–5.1)
RBC # BLD AUTO: 2.49 M/UL (ref 4.6–6.2)
SODIUM SERPL-SCNC: 140 MMOL/L (ref 136–145)
WBC # BLD AUTO: 14.23 K/UL (ref 3.9–12.7)

## 2024-03-18 PROCEDURE — 83735 ASSAY OF MAGNESIUM: CPT | Performed by: STUDENT IN AN ORGANIZED HEALTH CARE EDUCATION/TRAINING PROGRAM

## 2024-03-18 PROCEDURE — 97116 GAIT TRAINING THERAPY: CPT

## 2024-03-18 PROCEDURE — 97535 SELF CARE MNGMENT TRAINING: CPT

## 2024-03-18 PROCEDURE — 25000003 PHARM REV CODE 250

## 2024-03-18 PROCEDURE — 84100 ASSAY OF PHOSPHORUS: CPT | Performed by: STUDENT IN AN ORGANIZED HEALTH CARE EDUCATION/TRAINING PROGRAM

## 2024-03-18 PROCEDURE — 85025 COMPLETE CBC W/AUTO DIFF WBC: CPT | Performed by: STUDENT IN AN ORGANIZED HEALTH CARE EDUCATION/TRAINING PROGRAM

## 2024-03-18 PROCEDURE — 63600175 PHARM REV CODE 636 W HCPCS

## 2024-03-18 PROCEDURE — 94799 UNLISTED PULMONARY SVC/PX: CPT | Mod: XB

## 2024-03-18 PROCEDURE — 94761 N-INVAS EAR/PLS OXIMETRY MLT: CPT

## 2024-03-18 PROCEDURE — 20000000 HC ICU ROOM

## 2024-03-18 PROCEDURE — 63600175 PHARM REV CODE 636 W HCPCS: Performed by: INTERNAL MEDICINE

## 2024-03-18 PROCEDURE — 36415 COLL VENOUS BLD VENIPUNCTURE: CPT | Performed by: STUDENT IN AN ORGANIZED HEALTH CARE EDUCATION/TRAINING PROGRAM

## 2024-03-18 PROCEDURE — 63600175 PHARM REV CODE 636 W HCPCS: Performed by: UROLOGY

## 2024-03-18 PROCEDURE — 25000003 PHARM REV CODE 250: Performed by: INTERNAL MEDICINE

## 2024-03-18 PROCEDURE — 97110 THERAPEUTIC EXERCISES: CPT

## 2024-03-18 PROCEDURE — 80048 BASIC METABOLIC PNL TOTAL CA: CPT | Performed by: STUDENT IN AN ORGANIZED HEALTH CARE EDUCATION/TRAINING PROGRAM

## 2024-03-18 RX ADMIN — SODIUM CHLORIDE, POTASSIUM CHLORIDE, SODIUM LACTATE AND CALCIUM CHLORIDE: 600; 310; 30; 20 INJECTION, SOLUTION INTRAVENOUS at 11:03

## 2024-03-18 RX ADMIN — FAMOTIDINE 20 MG: 20 TABLET ORAL at 09:03

## 2024-03-18 RX ADMIN — DIPHENHYDRAMINE HYDROCHLORIDE 12.5 MG: 50 INJECTION, SOLUTION INTRAMUSCULAR; INTRAVENOUS at 09:03

## 2024-03-18 RX ADMIN — MUPIROCIN: 20 OINTMENT TOPICAL at 09:03

## 2024-03-18 RX ADMIN — ONDANSETRON 4 MG: 2 INJECTION INTRAMUSCULAR; INTRAVENOUS at 09:03

## 2024-03-18 RX ADMIN — HEPARIN SODIUM 5000 UNITS: 5000 INJECTION INTRAVENOUS; SUBCUTANEOUS at 09:03

## 2024-03-18 RX ADMIN — ACETAMINOPHEN 1000 MG: 500 TABLET ORAL at 02:03

## 2024-03-18 RX ADMIN — HEPARIN SODIUM 5000 UNITS: 5000 INJECTION INTRAVENOUS; SUBCUTANEOUS at 02:03

## 2024-03-18 RX ADMIN — SODIUM CHLORIDE, POTASSIUM CHLORIDE, SODIUM LACTATE AND CALCIUM CHLORIDE: 600; 310; 30; 20 INJECTION, SOLUTION INTRAVENOUS at 02:03

## 2024-03-18 RX ADMIN — SODIUM CHLORIDE, POTASSIUM CHLORIDE, SODIUM LACTATE AND CALCIUM CHLORIDE: 600; 310; 30; 20 INJECTION, SOLUTION INTRAVENOUS at 06:03

## 2024-03-18 RX ADMIN — ACETAMINOPHEN 1000 MG: 500 TABLET ORAL at 09:03

## 2024-03-18 RX ADMIN — PREGABALIN 75 MG: 75 CAPSULE ORAL at 09:03

## 2024-03-18 RX ADMIN — HEPARIN SODIUM 5000 UNITS: 5000 INJECTION INTRAVENOUS; SUBCUTANEOUS at 06:03

## 2024-03-18 NOTE — ASSESSMENT & PLAN NOTE
Med Palma is a 68 y.o. 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      - Tylenol, PO oxycodone for pain control, robaxin QID, Lyrica. Toradol held due to elevated Cr.  - NGT to LIWS.   - NPO, sips with meds  - SC heparin  - IVF  - Ambulate QID, PT/OT consult  - Wound care ostomy consulted  - Drains: maintain ALEXANDRA until discharge. J  - ALEXANDRA Cr 1.9 3/17  - Prophylaxis: IS, SCDs, GI ppx    Appreciate hospital medicine input.   Appreciate ortho input for peroneal nerve palsy/foot drop.  +ileus - will continue NGT, NPO until return of bowel function

## 2024-03-18 NOTE — PROGRESS NOTES
[facility] - Intensive Care ([place])  Wound Care    Patient Name:  Med Palma   MRN:  [id number]  Date: 3/18/2024  Diagnosis: Malignant neoplasm of dome of urinary bladder    History:     Past Medical History:   Diagnosis Date    Allergy     Arthritis     Elevated PSA     Hypertension     Malignant tumor of urinary bladder     per patient removed with last surg       Social History     Socioeconomic History    Marital status:    Occupational History     Employer: [employer]   Tobacco Use    Smoking status: Former     Types: Cigarettes    Smokeless tobacco: Never   Substance and Sexual Activity    Alcohol use: Yes     Alcohol/week: 3.0 standard drinks of alcohol     Types: 3 Cans of beer per week     Comment: none 24 hr prior to surg    Drug use: No    Sexual activity: Yes     Partners: Female     Social Determinants of Health     Financial Resource Strain: Low Risk  (3/14/2024)    Overall Financial Resource Strain (CARDIA)     Difficulty of Paying Living Expenses: Not hard at all   Food Insecurity: No Food Insecurity (3/14/2024)    Hunger Vital Sign     Worried About Running Out of Food in the Last Year: Never true     Ran Out of Food in the Last Year: Never true   Transportation Needs: No Transportation Needs (3/14/2024)    PRAPARE - Transportation     Lack of Transportation (Medical): No     Lack of Transportation (Non-Medical): No   Physical Activity: Inactive (3/14/2024)    Exercise Vital Sign     Days of Exercise per Week: 0 days     Minutes of Exercise per Session: 0 min   Stress: No Stress Concern Present (3/14/2024)    [employer] [place] [employer] - Occupational Stress Questionnaire     Feeling of Stress : Not at all   Social Connections: Socially Integrated (3/14/2024)    Social Connection and Isolation Panel [NHANES]     Frequency of Communication with Friends and Family: More than three times a week     Frequency of Social Gatherings with Friends and Family: More  than three times a week     Attends [employer] Services: More than 4 times per year     Active Member of Clubs or Organizations: Yes     Attends Club or Organization Meetings: More than 4 times per year     Marital Status:    Housing Stability: Low Risk  (3/14/2024)    Housing Stability Vital Sign     Unable to Pay for Housing in the Last Year: No     Number of Places Lived in the Last Year: 1     Unstable Housing in the Last Year: No       Precautions:     Allergies as of 02/27/2024    (No Known Allergies)       [facility] Assessment Details/Treatment     Ostomy care follow up:     Met with patient for ostomy training lesson #3.  Wife at bedside. Patient's wife completed ostomy care and applied pouch to stoma site without difficulty. Reviewed reading materials answering all questions. Additional ostomy supplies given to patient to take upon discharge. Will follow up tomorrow.      03/18/24 1130        Urostomy 03/13/24 1720 ileal conduit RLQ   Date of First Assessment/Time of First Assessment: 03/13/24 1720   Present Prior to Hospital Arrival?: No  Inserted by: MD  Urostomy Type: ileal conduit  Location: RLQ  Initial Stoma Size (in): 1 in  Additional Comments: 18fr red rubber inserted per D...   Wound Image    Stoma Appearance rosebud appearance   Stoma Size (in) 30-35 mm   Stomal Appliance 1 piece;Clean;Dry;Intact;Changed;To drainage bag to dependent drainage   Accessories/Skin Care cleansed w/ water;barrier substance over peristomal skin   Stoma Function pink urine   Tolerance no signs/symptoms of discomfort;assisted with stoma care   Output (mL) 75 mL           03/18/2024

## 2024-03-18 NOTE — PROGRESS NOTES
[facility] Intensive Care [facility] Medicine  Progress Note    Patient Name: Med Palma  MRN: [id number]  Patient Class: IP- Inpatient   Admission Date: 3/13/2024  Length of Stay: 5 days  Attending Physician: Yinka Jimenez MD  Primary Care Provider: Jah Wilson MD        Subjective:     Principal Problem:Malignant neoplasm of dome of urinary bladder        HPI:  Med Palma is a 68-year-old male with a past medical history of arthritis, elevated PSA, hypertension and bladder cancer who presents for scheduled robotic radical cystectoprostatectomy and PLND and ileal conduit vs neobladder with Dr. Jimenez, urology.  Patient underwent TURBT 10/26/23 and was diagnosed with bladder cancer.  Patient is followed by , Hem/Onc and received 4 rounds of chemotherapy with last treatment completed 1 month ago.  Patient was seen by Dr. Jimenez, outpatient and cleared for surgery per heme Onc.  Hospital Medicine was consulted for medical comanagement when patient was seen postoperatively.  Patient states his pain is currently controlled and denies fever, chills, nausea, vomiting, shortness of breath and chest pain.  Hospital Medicine will continue to follow and remain available for medical comanagement.    Interval History: No acute events overnight; feeling improved this morning. +BM this morning. No further nausea/vomiting so far.    Review of Systems   Constitutional:  Negative for chills and fever.   Respiratory:  Negative for cough and shortness of breath.    Cardiovascular:  Negative for chest pain and palpitations.   Gastrointestinal:  Negative for abdominal pain, nausea and vomiting.     Objective:     Vital Signs (Most Recent):  Temp: 98.4 °F (36.9 °C) (03/18/24 0949)  Pulse: 84 (03/18/24 1200)  Resp: (!) 21 (03/18/24 1200)  BP: 128/80 (03/18/24 1200)  SpO2: (!) 85 % (03/18/24 1200) Vital Signs (24h Range):  Temp:  [98.1 °F (36.7 °C)-98.7 °F (37.1 °C)] 98.4 °F (36.9 °C)  Pulse:   [] 84  Resp:  [12-40] 21  SpO2:  [85 %-98 %] 85 %  BP: (116-142)/(73-90) 128/80     Weight: 96.2 kg (212 lb)  Body mass index is 28.75 kg/m².    Intake/Output Summary (Last 24 hours) at 3/18/2024 1301  Last data filed at 3/18/2024 1130  Gross per 24 hour   Intake 1715.55 ml   Output 3790 ml   Net -2074.45 ml           Physical Exam  Vitals and nursing note reviewed.   Constitutional:       General: He is not in acute distress.     Appearance: He is well-developed.   HENT:      Head: Normocephalic and atraumatic.      Nose:      Comments: NGT in place.  Eyes:      General:         Right eye: No discharge.         Left eye: No discharge.      Conjunctiva/sclera: Conjunctivae normal.   Cardiovascular:      Rate and Rhythm: Normal rate.      Pulses: Normal pulses.   Pulmonary:      Effort: Pulmonary effort is normal. No respiratory distress.   Abdominal:      Palpations: Abdomen is soft.      Tenderness: There is no abdominal tenderness.      Comments: Incisions C/D/I. Ostomy in place.   Musculoskeletal:         General: Normal range of motion.      Right lower leg: No edema.      Left lower leg: No edema.   Skin:     General: Skin is warm and dry.   Neurological:      Mental Status: He is alert and oriented to person, place, and time.           Significant Labs:   CBC:  Recent Labs   Lab 03/16/24  0339 03/17/24  0537 03/18/24  0404   WBC 21.01* 20.70* 14.23*   HGB 8.7* 8.1* 7.8*   HCT 26.8* 24.9* 24.1*    227 243   GRAN 86.1*  18.1* 82.9*  17.2* 77.4*  11.0*   LYMPH 7.9*  1.7 8.4*  1.7 12.7*  1.8   MONO 5.1  1.1* 7.5  1.6* 8.4  1.2*   EOS 0.0 0.0 0.0   BASO 0.06 0.08 0.06     BMP:  Recent Labs   Lab 03/16/24  0339 03/17/24  0537 03/18/24  0404    139 140   K 4.9 4.9 4.4    104 105   CO2 23 25 27   BUN 33* 46* 39*   CREATININE 1.5* 1.5* 1.2   * 110 101   CALCIUM 8.6* 8.5* 8.8   MG 2.1 2.2 2.1   PHOS 3.5 3.3 3.1       Significant Imaging: I have reviewed and interpreted all  pertinent imaging results/findings within the past 24 hours.      Assessment/Plan:      * Malignant neoplasm of dome of urinary bladder  - Primary management, pain control as per Urology.    SBO (small bowel obstruction)  Leukocytosis  - WBCs improving; no signs/symptoms of focal infectious process. Suspect reactive with procedure / SBO.  - NPO except sips with medications; otherwise maintain NGT to low intermittent suction pending improvement in bowel status.  - Continue IVFs with NS. Decrease when able to advance PO intake. Monitor for resumption of bowel function.    Elevated serum creatinine  - Mild upward trend now resolving. Likely a combination of fluid status and NSAID use. Urine lytes most consistent with prerenal etiology.  - Hold ketorolac for time being, continue with acetaminophen 1000mg PO TID in its stead. IVFs as under SBO.  - Avoid nephrotoxins, renally dose medications. Trend creatinine and adjust further as required.    Leukocytosis  - As above.    Foot drop  - Noted with ambulation; suspect 2/2 positioning in case though patient reports has had some issues since prior chemo.  - Appreciate orthopedics assistance. Doing well with AFO.    Normocytic anemia  - Mild decrease since OR but otherwise appears to be holding around Hgb 8.  - Monitor.    Benign non-nodular prostatic hyperplasia with lower urinary tract symptoms  - As per primary.      VTE Risk Mitigation (From admission, onward)           Ordered     heparin (porcine) injection 5,000 Units  Every 8 hours         03/17/24 1122     IP VTE HIGH RISK PATIENT  Once         03/13/24 1740     Place sequential compression device  Until discontinued         03/13/24 0601     Place MCKENZIE hose  Until discontinued         03/13/24 0601                  DAJA Damico MD  Department of Hospital Medicine   [facility] Intensive Care ([facility]

## 2024-03-18 NOTE — ASSESSMENT & PLAN NOTE
- Mild upward trend now resolving. Likely a combination of fluid status and NSAID use. Urine lytes most consistent with prerenal etiology.  - Hold ketorolac for time being, continue with acetaminophen 1000mg PO TID in its stead. IVFs as under SBO.  - Avoid nephrotoxins, renally dose medications. Trend creatinine and adjust further as required.

## 2024-03-18 NOTE — SUBJECTIVE & OBJECTIVE
Interval History: No acute events overnight; feeling improved this morning. +BM this morning. No further nausea/vomiting so far.    Review of Systems   Constitutional:  Negative for chills and fever.   Respiratory:  Negative for cough and shortness of breath.    Cardiovascular:  Negative for chest pain and palpitations.   Gastrointestinal:  Negative for abdominal pain, nausea and vomiting.     Objective:     Vital Signs (Most Recent):  Temp: 98.4 °F (36.9 °C) (03/18/24 0949)  Pulse: 84 (03/18/24 1200)  Resp: (!) 21 (03/18/24 1200)  BP: 128/80 (03/18/24 1200)  SpO2: (!) 85 % (03/18/24 1200) Vital Signs (24h Range):  Temp:  [98.1 °F (36.7 °C)-98.7 °F (37.1 °C)] 98.4 °F (36.9 °C)  Pulse:  [] 84  Resp:  [12-40] 21  SpO2:  [85 %-98 %] 85 %  BP: (116-142)/(73-90) 128/80     Weight: 96.2 kg (212 lb)  Body mass index is 28.75 kg/m².    Intake/Output Summary (Last 24 hours) at 3/18/2024 1301  Last data filed at 3/18/2024 1130  Gross per 24 hour   Intake 1715.55 ml   Output 3790 ml   Net -2074.45 ml           Physical Exam  Vitals and nursing note reviewed.   Constitutional:       General: He is not in acute distress.     Appearance: He is well-developed.   HENT:      Head: Normocephalic and atraumatic.      Nose:      Comments: NGT in place.  Eyes:      General:         Right eye: No discharge.         Left eye: No discharge.      Conjunctiva/sclera: Conjunctivae normal.   Cardiovascular:      Rate and Rhythm: Normal rate.      Pulses: Normal pulses.   Pulmonary:      Effort: Pulmonary effort is normal. No respiratory distress.   Abdominal:      Palpations: Abdomen is soft.      Tenderness: There is no abdominal tenderness.      Comments: Incisions C/D/I. Ostomy in place.   Musculoskeletal:         General: Normal range of motion.      Right lower leg: No edema.      Left lower leg: No edema.   Skin:     General: Skin is warm and dry.   Neurological:      Mental Status: He is alert and oriented to person, place, and  time.           Significant Labs:   CBC:  Recent Labs   Lab 03/16/24  0339 03/17/24  0537 03/18/24  0404   WBC 21.01* 20.70* 14.23*   HGB 8.7* 8.1* 7.8*   HCT 26.8* 24.9* 24.1*    227 243   GRAN 86.1*  18.1* 82.9*  17.2* 77.4*  11.0*   LYMPH 7.9*  1.7 8.4*  1.7 12.7*  1.8   MONO 5.1  1.1* 7.5  1.6* 8.4  1.2*   EOS 0.0 0.0 0.0   BASO 0.06 0.08 0.06     BMP:  Recent Labs   Lab 03/16/24 0339 03/17/24  0537 03/18/24  0404    139 140   K 4.9 4.9 4.4    104 105   CO2 23 25 27   BUN 33* 46* 39*   CREATININE 1.5* 1.5* 1.2   * 110 101   CALCIUM 8.6* 8.5* 8.8   MG 2.1 2.2 2.1   PHOS 3.5 3.3 3.1       Significant Imaging: I have reviewed and interpreted all pertinent imaging results/findings within the past 24 hours.

## 2024-03-18 NOTE — PROGRESS NOTES
[facility] Intensive Care [facility]  Urology  Progress Note    Patient Name: Med Palma  MRN: [id number]  Admission Date: 3/13/2024  Hospital Length of Stay: 5 days  Code Status: Full Code   Attending Provider: Yinka Jimenez MD   Primary Care Physician: Jah Wilson MD    Subjective:     HPI:  Med Palma is a 68 y.o. 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     Interval History: NAOE. NG tube placed yesterday. No abdominal pain. No flatulence.   Continues to ambulate with boot. Foot drop remains stable.   WBC 14 from 20. Cr 1.2 from 1.5. ALEXANDRA Cr 1.9 yesterday.      overnight  Abd drain 270/180 SS  Urostomy 700/1100 clear yellow    Objective:     Temp:  [97.9 °F (36.6 °C)-98.7 °F (37.1 °C)] 98.5 °F (36.9 °C)  Pulse:  [] 94  Resp:  [12-40] 15  SpO2:  [93 %-98 %] 94 %  BP: (115-142)/(73-87) 133/84     Body mass index is 28.75 kg/m².           Drains       Drain  Duration                  Closed/Suction Drain 03/13/24 1712 Left LLQ Bulb 19 Fr. 4 days         Urostomy 03/13/24 1720 ileal conduit RLQ 4 days         NG/OG Tube 03/17/24 1654 Right nostril <1 day                     Physical Exam  Constitutional:       General: He is not in acute distress.  HENT:      Head: Normocephalic.   Cardiovascular:      Rate and Rhythm: Normal rate.   Pulmonary:      Effort: Pulmonary effort is normal.   Abdominal:      General: There is no distension.      Palpations: Abdomen is soft.      Tenderness: There is abdominal tenderness. There is no right CVA tenderness or left CVA tenderness.      Comments: Midline abd incision c/d/i  RLQ urostomy pink, moist.  Clear thin pink urine with small amount of mucus in bag  Abd benign, soft.  Non distended.   Drain SS, dressing in place.     Musculoskeletal:         General: Normal range of motion.   Skin:     General: Skin is warm  and dry.   Neurological:      Mental Status: He is alert and oriented to person, place, and time.      Coordination: Coordination normal.      Comments: R foot weakness with dorsiflexion   Psychiatric:         Mood and Affect: Mood normal.         Behavior: Behavior normal.           Significant Labs:    BMP:  Recent Labs   Lab 03/16/24  0339 03/17/24  0537 03/18/24  0404    139 140   K 4.9 4.9 4.4    104 105   CO2 23 25 27   BUN 33* 46* 39*   CREATININE 1.5* 1.5* 1.2   CALCIUM 8.6* 8.5* 8.8       CBC:   Recent Labs   Lab 03/16/24  0339 03/17/24  0537 03/18/24  0404   WBC 21.01* 20.70* 14.23*   HGB 8.7* 8.1* 7.8*   HCT 26.8* 24.9* 24.1*    227 243       All pertinent labs results from the past 24 hours have been reviewed.    Significant Imaging:  All pertinent imaging results/findings from the past 24 hours have been reviewed.                  Assessment/Plan:     * Malignant neoplasm of dome of urinary bladder  Med Palma is a 68 y.o. 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      - Tylenol, PO oxycodone for pain control, robaxin QID, Lyrica. Toradol held due to elevated Cr.  - NGT to LIWS.   - NPO, sips with meds  - SC heparin  - IVF  - Ambulate QID, PT/OT consult  - Wound care ostomy consulted  - Drains: maintain ALEXANDRA until discharge. J  - ALEXANDRA Cr 1.9 3/17  - Prophylaxis: IS, SCDs, GI ppx    Appreciate hospital medicine input.   Appreciate ortho input for peroneal nerve palsy/foot drop.  +ileus - will continue NGT, NPO until return of bowel function          VTE Risk Mitigation (From admission, onward)           Ordered     heparin (porcine) injection 5,000 Units  Every 8 hours         03/17/24 1122     IP VTE HIGH RISK PATIENT  Once         03/13/24 1740     Place sequential compression device  Until discontinued         03/13/24 0601     Place MCKENZIE hose   Until discontinued         03/13/24 0601                    KACY NUÑEZ MD  Urology  [facility] - Intensive Care ([place])

## 2024-03-18 NOTE — PLAN OF CARE
Patient had NGT placed on day-shift. No issues noted over-night, only complaint was of sore throat. NGT output monitored. Heparin subq restarted. No issues with bleeding. Urostomy and ALEXANDRA drain output monitored. ALEXANDRA dressing changed. Continuous LR infusing at 125 ml/hr. Fall precautions maintained, bed in lowest position, call bell within reach. Plan of care reviewed w/ patient, reinforcement needed.    Problem: Adult Inpatient Plan of Care  Goal: Plan of Care Review  Outcome: Ongoing, Progressing  Flowsheets (Taken 3/18/2024 0550)  Plan of Care Reviewed With: patient     Problem: Fall Injury Risk  Goal: Absence of Fall and Fall-Related Injury  Outcome: Ongoing, Progressing  Intervention: Identify and Manage Contributors  Flowsheets (Taken 3/18/2024 0550)  Self-Care Promotion: BADL personal objects within reach  Medication Review/Management: medications reviewed  Intervention: Promote Injury-Free Environment  Flowsheets (Taken 3/18/2024 0550)  Safety Promotion/Fall Prevention:   assistive device/personal item within reach   bed alarm set   Fall Risk reviewed with patient/family   Fall Risk signage in place   medications reviewed   nonskid shoes/socks when out of bed   pulse ox   side rails raised x 3     Problem: Skin Injury Risk Increased  Goal: Skin Health and Integrity  Outcome: Ongoing, Progressing  Intervention: Optimize Skin Protection  Flowsheets (Taken 3/18/2024 0550)  Pressure Reduction Techniques:   frequent weight shift encouraged   pressure points protected  Pressure Reduction Devices: foam padding utilized  Skin Protection:   adhesive use limited   incontinence pads utilized   tubing/devices free from skin contact   transparent dressing maintained  Head of Bed (HOB) Positioning: HOB at 30 degrees

## 2024-03-18 NOTE — PROGRESS NOTES
[facility], [place], operated by [facility] - Intensive Care ([place])  Adult Nutrition  Progress Note    SUMMARY       Recommendations  1) Continue Clear Liquid Diet as tolerated - add Boost Breeze TID while on CLD    2) Advance to oral diet when appropriate   3) If unable to adv diet, nutrition support may be warranted - rec initiation of PN   4) Monitor weights and labs    5) RD to monitor and follow up    Goals:   Pt will have diet adv and able to tolerate 50% EEN/EPN by RD follow up  Nutrition Goal Status: new  Communication of RD Recs:  (POC)    Assessment and Plan  Nutrition Problem  Increased nutrient needs    Related to (etiology):   Conditions associated with medical diagnosis    Signs and Symptoms (as evidenced by):   Malignant neoplasm of dome of urinary bladder   S/p urostomy (ileal conduit)   Inability to tolerate PO intake 2/2 Ileus   NPO status, adv to CLD 3/18    Interventions  (treatment strategy):  Collaboration with other providers    Nutrition Diagnosis Status:   New      Malnutrition Assessment             Energy Intake (Malnutrition): less than or equal to 50% for greater than or equal to 5 days (NPO/CLD/FLD day 5)                         Reason for Assessment    Reason For Assessment: identified at risk by screening criteria  Diagnosis:  (Malignant neoplasm of dome of urinary bladder)  Relevant Medical History:   Patient Active Problem List   Diagnosis    Elevated PSA    Benign non-nodular prostatic hyperplasia with lower urinary tract symptoms    Malignant neoplasm of dome of urinary bladder    Normocytic anemia    Foot drop    Leukocytosis    Elevated serum creatinine    SBO (small bowel obstruction)     Interdisciplinary Rounds: did not attend  General Information Comments: Pt MST - identified at risk by screening criteria. Pt weight stable x 8 months with current weight gain -  lb,  lb. Pt with history of small cell bladder cancer s/p robotic cystoprostatectomy, bilateral standard pelvic lymph node  dissection, intracorporal ileal conduit on 3/13, + ileus s/p surgery. NGT placed 3/17. Noted to be NPO/CLD/FLD day 5. Diet adv today to CLD limited to small sips - 25% intake recorded. Lactated ringers @ 125 mL/hr. Port A Cath, PIV, drain, urostomy in place. Roni 19 - perineum, umbilical laproscopic puncture. Pt does not meet positive criteria for NFPE at this time. RD to continue to monitor for nutrition risks at follow ups.  Nutrition Discharge Planning: pending medical course    Nutrition Risk Screen    Nutrition Risk Screen: no indicators present    Nutrition/Diet History    Spiritual, Cultural Beliefs, Latter day Practices, Values that Affect Care: no  Factors Affecting Nutritional Intake: altered gastrointestinal function, nausea/vomiting    Anthropometrics    Temp: 98.4 °F (36.9 °C)  Height Method: Stated  Height: 6' (182.9 cm)  Height (inches): 72 in  Weight Method: Bed Scale  Weight: 96.2 kg (212 lb 1.3 oz)  Weight (lb): 212.08 lb  Ideal Body Weight (IBW), Male: 178 lb  % Ideal Body Weight, Male (lb): 119.15 %  BMI (Calculated): 28.8  BMI Grade: 25 - 29.9 - overweight       Lab/Procedures/Meds    Pertinent Labs Reviewed: reviewed  CBC:  Recent Labs   Lab 03/18/24  0404   WBC 14.23*   HGB 7.8*   HCT 24.1*        CMP:  Recent Labs   Lab 03/18/24  0404   CALCIUM 8.8      K 4.4   CO2 27      BUN 39*   CREATININE 1.2     BMP:   Recent Labs   Lab 03/18/24  0404         K 4.4      CO2 27   BUN 39*   CREATININE 1.2   CALCIUM 8.8   MG 2.1       Pertinent Medications Reviewed: reviewed  Scheduled Meds:   acetaminophen  1,000 mg Oral TID    famotidine  20 mg Oral BID    heparin (porcine)  5,000 Units Subcutaneous Q8H    pregabalin  75 mg Oral QHS     Continuous Infusions:   lactated ringers 125 mL/hr at 03/18/24 0608     PRN Meds:.0.9%  NaCl infusion (for blood administration), aluminum-magnesium hydroxide-simethicone, diphenhydrAMINE, methocarbamoL, ondansetron, oxyCODONE,  oxyCODONE, prochlorperazine, sodium chloride 0.9%    Estimated/Assessed Needs    Weight Used For Calorie Calculations: 96.2 kg (212 lb 1.3 oz)  Energy Calorie Requirements (kcal): 5412-1115 (20-25 kcal/kg)  Energy Need Method: Kcal/kg (20-25)  Protein Requirements:  g (1-1.2 g/kg)  Weight Used For Protein Calculations: 96.2 kg (212 lb 1.3 oz)  Fluid Requirements (mL): 1 mL/kcal  Estimated Fluid Requirement Method:  (or per MD)  RDA Method (mL): 1924  CHO Requirement: 240 g (50% total kcal)      Nutrition Prescription Ordered    Current Diet Order: clear liquid diet: limit to small sips    Evaluation of Received Nutrient/Fluid Intake    IV Fluid (mL): 3000  I/O: + 2.7 L since admit  Energy Calories Required: not meeting needs  Protein Required: not meeting needs  Fluid Required: meeting needs  Comments: LBM: 3/18/24  Tolerance:  (monitoring)  % Intake of Estimated Energy Needs: 0 - 25 %  % Meal Intake: 0 - 25 %    Intake/Output - Last 3 Shifts         03/16 0700  03/17 0659 03/17 0700  03/18 0659 03/18 0700  03/19 0659    P.O. 50 0 320    I.V. (mL/kg) 2160.2 (22.5) 1395.6 (14.5)     Total Intake(mL/kg) 2210.2 (23) 1395.6 (14.5) 320 (3.3)    Urine (mL/kg/hr) 900 (0.4) 1800 (0.8) 425 (0.6)    Emesis/NG output  1100     Drains 610 900 65    Stool 0  0    Total Output 1510 3800 490    Net +700.2 -2404.5 -170           Stool Occurrence 1 x  1 x            Nutrition Risk    Level of Risk/Frequency of Follow-up:  (1-2 x/week)     Nutrition Related Social Determinants of Health: SDOH: Adequate food in home environment      Monitor and Evaluation    Food and Nutrient Intake: energy intake, food and beverage intake  Food and Nutrient Adminstration: diet order  Physical Activity and Function: nutrition-related ADLs and IADLs  Anthropometric Measurements: weight, weight change  Biochemical Data, Medical Tests and Procedures: electrolyte and renal panel, gastrointestinal profile, glucose/endocrine profile, inflammatory  profile, lipid profile     Nutrition Follow-Up    RD Follow-up?: Yes    Irma Boyd, ABNERN, LDN

## 2024-03-18 NOTE — PT/OT/SLP PROGRESS
Occupational Therapy   Treatment    Name: Med Palma  MRN: [id number]  Admitting Diagnosis:  Malignant neoplasm of dome of urinary bladder  5 Days Post-Op    Recommendations:     Discharge Recommendations: Low Intensity Therapy (Outpatient Neuro PT)  Discharge Equipment Recommendations:  walker, rolling (Right AFO)  Barriers to discharge:   (Defer to MD/medical status)    Assessment:     Med Palma is a 68 y.o. male with a medical diagnosis of Malignant neoplasm of dome of urinary bladder.  He presents in ICU alert and oriented.  Decrease in therapy frequency due to progress.  Pt receptive to all education and training on ADL and ADL mobility including safety strategies to reduce fall risk.   Performance deficits affecting function are weakness, impaired endurance, impaired sensation, impaired self care skills, impaired functional mobility, gait instability, impaired balance, decreased ROM, decreased lower extremity function, impaired skin, impaired coordination.     Rehab Prognosis:  Good; patient would benefit from acute skilled OT services to address these deficits and reach maximum level of function.       Plan:     Patient to be seen 2 x/week to address the above listed problems via self-care/home management, therapeutic activities  Plan of Care Expires: 04/14/24  Plan of Care Reviewed with: patient    Subjective     Chief Complaint: None  Patient/Family Comments/goals: Pt reporting he is moving his right foot.  Pain/Comfort:  Pain Rating 1: 0/10  Pain Rating Post-Intervention 1: 0/10    Objective:     Communicated with: nurse prior to session.  Patient found HOB elevated with blood pressure cuff, pulse ox (continuous), telemetry, peripheral IV, ALEXANDRA drain upon OT entry to room.    General Precautions: Standard, fall    Orthopedic Precautions:N/A  Braces: N/A  Respiratory Status: Room air     Occupational Performance:       Functional Mobility/Transfers:  Functional Mobility: Education on rest breaks  with fatigue of right LE muscles to decrease fall risk, especially if not using right AFO.  Education and demo on correct RW placement when standing at sink for grooming tasks.  Pt staying inside RW and not pushing RW to side when approaching activity surface.    Activities of Daily Living:  Education and demo on adaptive techniques and safety strategies.  Recommend pt sit in chair with arm rests for dressing and perform UB and LB dressing in sitting.    [facility] 6 Click ADL: 19    Treatment & Education:  Role of OT, POC, ADL and ADL mobiity training and safety education, discharge recs     Patient left HOB elevated with all lines intact, call button in reach, and nurse notified    GOALS:   Multidisciplinary Problems       Occupational Therapy Goals          Problem: Occupational Therapy    Goal Priority Disciplines Outcome Interventions   Occupational Therapy Goal     OT, PT/OT Ongoing, Progressing    Description: Goals to be met by: 3/22/24     Patient will increase functional independence with ADLs by performing:    Grooming standing at sink at Supervision level.  UB Dressing at Supervision level, including retrieval of clothing.  LB Dressing at Supervision level, including retrieval of clothing.  Sponge bathing seated at sink at Set up/Supervision level.  Toileting at Supervision level.  Toilet transfers at Supervision level.                         Time Tracking:     OT Date of Treatment: 03/18/24  OT Start Time: 1546  OT Stop Time: 1556  OT Total Time (min): 10 min    Billable Minutes:Self Care/Home Management 10    OT/DANE: OT          3/18/2024

## 2024-03-18 NOTE — ASSESSMENT & PLAN NOTE
Leukocytosis  - WBCs improving; no signs/symptoms of focal infectious process. Suspect reactive with procedure / SBO.  - NPO except sips with medications; otherwise maintain NGT to low intermittent suction pending improvement in bowel status.  - Continue IVFs with NS. Decrease when able to advance PO intake. Monitor for resumption of bowel function.

## 2024-03-18 NOTE — SUBJECTIVE & OBJECTIVE
Interval History: NAOE. NG tube placed yesterday. No abdominal pain. No flatulence.   Continues to ambulate with boot. Foot drop remains stable.   WBC 14 from 20. Cr 1.2 from 1.5. ALEXANDRA Cr 1.9 yesterday.      overnight  Abd drain 270/180 SS  Urostomy 700/1100 clear yellow    Objective:     Temp:  [97.9 °F (36.6 °C)-98.7 °F (37.1 °C)] 98.5 °F (36.9 °C)  Pulse:  [] 94  Resp:  [12-40] 15  SpO2:  [93 %-98 %] 94 %  BP: (115-142)/(73-87) 133/84     Body mass index is 28.75 kg/m².           Drains       Drain  Duration                  Closed/Suction Drain 03/13/24 1712 Left LLQ Bulb 19 Fr. 4 days         Urostomy 03/13/24 1720 ileal conduit RLQ 4 days         NG/OG Tube 03/17/24 1654 Right nostril <1 day                     Physical Exam  Constitutional:       General: He is not in acute distress.  HENT:      Head: Normocephalic.   Cardiovascular:      Rate and Rhythm: Normal rate.   Pulmonary:      Effort: Pulmonary effort is normal.   Abdominal:      General: There is no distension.      Palpations: Abdomen is soft.      Tenderness: There is abdominal tenderness. There is no right CVA tenderness or left CVA tenderness.      Comments: Midline abd incision c/d/i  RLQ urostomy pink, moist.  Clear thin pink urine with small amount of mucus in bag  Abd benign, soft.  Non distended.   Drain SS, dressing in place.     Musculoskeletal:         General: Normal range of motion.   Skin:     General: Skin is warm and dry.   Neurological:      Mental Status: He is alert and oriented to person, place, and time.      Coordination: Coordination normal.      Comments: R foot weakness with dorsiflexion   Psychiatric:         Mood and Affect: Mood normal.         Behavior: Behavior normal.           Significant Labs:    BMP:  Recent Labs   Lab 03/16/24  0339 03/17/24  0537 03/18/24  0404    139 140   K 4.9 4.9 4.4    104 105   CO2 23 25 27   BUN 33* 46* 39*   CREATININE 1.5* 1.5* 1.2   CALCIUM 8.6* 8.5* 8.8        CBC:   Recent Labs   Lab 03/16/24  0339 03/17/24  0537 03/18/24  0404   WBC 21.01* 20.70* 14.23*   HGB 8.7* 8.1* 7.8*   HCT 26.8* 24.9* 24.1*    227 243       All pertinent labs results from the past 24 hours have been reviewed.    Significant Imaging:  All pertinent imaging results/findings from the past 24 hours have been reviewed.

## 2024-03-18 NOTE — PT/OT/SLP PROGRESS
Physical Therapy Treatment    Patient Name:  Med Palma   MRN:  [id number]    Recommendations:     Discharge Recommendations: Low Intensity Therapy  Discharge Equipment Recommendations: walker, rolling (AFO)  Barriers to discharge: None    Assessment:     Med Palma is a 68 y.o. male admitted with a medical diagnosis of Malignant neoplasm of dome of urinary bladder.  He presents with the following impairments/functional limitations: weakness, gait instability, impaired cardiopulmonary response to activity, impaired endurance, impaired sensation, impaired self care skills, impaired functional mobility, decreased coordination, decreased lower extremity function, impaired joint extensibility, impaired coordination, impaired balance, decreased ROM .      Patient tolerated session well with improved right ankle dorsiflexion strength noted.   Patient was able to ambulate 400 feet with RW and no AFO.     Rehab Prognosis: Good; patient would benefit from acute skilled PT services to address these deficits and reach maximum level of function.    Recent Surgery: Procedure(s) (LRB):  XI ROBOTIC CYSTECTOMY, WITH ILEAL CONDUIT CREATION (Bilateral)  XI ROBOTIC PROSTATECTOMY (N/A)  XI ROBOTIC BIOPSY, PELVIC LYMPH NODE (Bilateral) 5 Days Post-Op    Plan:     During this hospitalization, patient to be seen 5 x/week to address the identified rehab impairments via gait training, therapeutic activities, therapeutic exercises and progress toward the following goals:    Plan of Care Expires:  04/14/24    Subjective     Chief Complaint: none  Patient/Family Comments/goals: Patient agrees to participate in PT intervention   Pain/Comfort:  Pain Rating 1: 0/10      Objective:     Communicated with nursing prior to session.  Patient found supine with peripheral IV, blood pressure cuff, telemetry, pulse ox (continuous), ALEXANDRA drain upon PT entry to room.     General Precautions: Standard, fall  Orthopedic Precautions: N/A  Braces:  N/A  Respiratory Status: Room air     Functional Mobility:  Bed Mobility:     Supine to Sit: stand by assistance  Sit to Supine: stand by assistance  Transfers:     Sit to Stand:  stand by assistance with rolling walker  Gait: Patient ambulated 400 feet with RW and SBA. AFO not donned. No LOB or instability noted with ambulation. Patient continues to demonstrate moderate foot drop      AM-PAC 6 CLICK MOBILITY  Turning over in bed (including adjusting bedclothes, sheets and blankets)?: 4  Sitting down on and standing up from a chair with arms (e.g., wheelchair, bedside commode, etc.): 3  Moving from lying on back to sitting on the side of the bed?: 3  Moving to and from a bed to a chair (including a wheelchair)?: 3  Need to walk in hospital room?: 3  Climbing 3-5 steps with a railing?: 3  Basic Mobility Total Score: 19       Treatment & Education:  Patient educated to use AFO as needed for safety, especially when ambulating longer community distances but to continue to work on seated and supine ankle dorsiflexion exercises. Patient verbalizes/ demonstrates understanding     Patient left supine with all lines intact and call button in reach..    GOALS:   Multidisciplinary Problems       Physical Therapy Goals          Problem: Physical Therapy    Goal Priority Disciplines Outcome Goal Variances Interventions   Physical Therapy Goal     PT, PT/OT Ongoing, Progressing     Description: P.T goals to be met in 1 month as follows:  1. Mod I Bed Mobility  2. Mod I T/F with LRAD    3. Gait 200' SBA with LRAD    4. I with HEP for ankle/foot ROM/strengthening therex                        Time Tracking:     PT Received On: 03/18/24  PT Start Time: 1339     PT Stop Time: 1402  PT Total Time (min): 23 min     Billable Minutes: Gait Training 23 minutes     Treatment Type: Treatment  PT/PTA: PT     Number of PTA visits since last PT visit: 0     03/18/2024

## 2024-03-18 NOTE — PLAN OF CARE
Recommendations  1) Continue Clear Liquid Diet as tolerated - add Boost Breeze TID while on CLD    2) Advance to oral diet when appropriate   3) If unable to adv diet, nutrition support may be warranted - rec initiation of PN   4) Monitor weights and labs    5) RD to monitor and follow up    Goals:   Pt will have diet adv and able to tolerate 50% EEN/EPN by RD follow up  Nutrition Goal Status: new  Communication of RD Recs:  (POC)

## 2024-03-19 LAB
ABO + RH BLD: NORMAL
ANION GAP SERPL CALC-SCNC: 9 MMOL/L (ref 8–16)
BASOPHILS # BLD AUTO: 0.04 K/UL (ref 0–0.2)
BASOPHILS NFR BLD: 0.3 % (ref 0–1.9)
BLD GP AB SCN CELLS X3 SERPL QL: NORMAL
BLD PROD TYP BPU: NORMAL
BLD PROD TYP BPU: NORMAL
BLOOD UNIT EXPIRATION DATE: NORMAL
BLOOD UNIT EXPIRATION DATE: NORMAL
BLOOD UNIT TYPE CODE: 9500
BLOOD UNIT TYPE CODE: 9500
BLOOD UNIT TYPE: NORMAL
BLOOD UNIT TYPE: NORMAL
BODY FLUID SOURCE, CREATININE: NORMAL
BUN SERPL-MCNC: 28 MG/DL (ref 8–23)
CALCIUM SERPL-MCNC: 8.1 MG/DL (ref 8.7–10.5)
CHLORIDE SERPL-SCNC: 107 MMOL/L (ref 95–110)
CO2 SERPL-SCNC: 23 MMOL/L (ref 23–29)
CODING SYSTEM: NORMAL
CODING SYSTEM: NORMAL
CREAT FLD-MCNC: 0.8 MG/DL
CREAT SERPL-MCNC: 0.9 MG/DL (ref 0.5–1.4)
CROSSMATCH INTERPRETATION: NORMAL
CROSSMATCH INTERPRETATION: NORMAL
DIFFERENTIAL METHOD BLD: ABNORMAL
DISPENSE STATUS: NORMAL
DISPENSE STATUS: NORMAL
EOSINOPHIL # BLD AUTO: 0 K/UL (ref 0–0.5)
EOSINOPHIL NFR BLD: 0 % (ref 0–8)
ERYTHROCYTE [DISTWIDTH] IN BLOOD BY AUTOMATED COUNT: 15.3 % (ref 11.5–14.5)
EST. GFR  (NO RACE VARIABLE): >60 ML/MIN/1.73 M^2
GLUCOSE SERPL-MCNC: 92 MG/DL (ref 70–110)
HCT VFR BLD AUTO: 22.8 % (ref 40–54)
HGB BLD-MCNC: 7.3 G/DL (ref 14–18)
IMM GRANULOCYTES # BLD AUTO: 0.16 K/UL (ref 0–0.04)
IMM GRANULOCYTES NFR BLD AUTO: 1.1 % (ref 0–0.5)
LYMPHOCYTES # BLD AUTO: 1 K/UL (ref 1–4.8)
LYMPHOCYTES NFR BLD: 7.1 % (ref 18–48)
MAGNESIUM SERPL-MCNC: 1.8 MG/DL (ref 1.6–2.6)
MCH RBC QN AUTO: 31.2 PG (ref 27–31)
MCHC RBC AUTO-ENTMCNC: 32 G/DL (ref 32–36)
MCV RBC AUTO: 97 FL (ref 82–98)
MONOCYTES # BLD AUTO: 1.1 K/UL (ref 0.3–1)
MONOCYTES NFR BLD: 7.8 % (ref 4–15)
NEUTROPHILS # BLD AUTO: 12.2 K/UL (ref 1.8–7.7)
NEUTROPHILS NFR BLD: 83.7 % (ref 38–73)
NRBC BLD-RTO: 0 /100 WBC
PHOSPHATE SERPL-MCNC: 3.3 MG/DL (ref 2.7–4.5)
PLATELET # BLD AUTO: 252 K/UL (ref 150–450)
PMV BLD AUTO: 10.2 FL (ref 9.2–12.9)
POTASSIUM SERPL-SCNC: 4.3 MMOL/L (ref 3.5–5.1)
RBC # BLD AUTO: 2.34 M/UL (ref 4.6–6.2)
SODIUM SERPL-SCNC: 139 MMOL/L (ref 136–145)
SPECIMEN OUTDATE: NORMAL
TRANS ERYTHROCYTES VOL PATIENT: NORMAL ML
TRANS ERYTHROCYTES VOL PATIENT: NORMAL ML
WBC # BLD AUTO: 14.56 K/UL (ref 3.9–12.7)

## 2024-03-19 PROCEDURE — 83735 ASSAY OF MAGNESIUM: CPT

## 2024-03-19 PROCEDURE — 86850 RBC ANTIBODY SCREEN: CPT

## 2024-03-19 PROCEDURE — 63600175 PHARM REV CODE 636 W HCPCS: Performed by: UROLOGY

## 2024-03-19 PROCEDURE — 36430 TRANSFUSION BLD/BLD COMPNT: CPT

## 2024-03-19 PROCEDURE — 36415 COLL VENOUS BLD VENIPUNCTURE: CPT

## 2024-03-19 PROCEDURE — 80048 BASIC METABOLIC PNL TOTAL CA: CPT

## 2024-03-19 PROCEDURE — 27201040 HC RC 50 FILTER

## 2024-03-19 PROCEDURE — 84100 ASSAY OF PHOSPHORUS: CPT

## 2024-03-19 PROCEDURE — 82570 ASSAY OF URINE CREATININE: CPT

## 2024-03-19 PROCEDURE — 30233N1 TRANSFUSION OF NONAUTOLOGOUS RED BLOOD CELLS INTO PERIPHERAL VEIN, PERCUTANEOUS APPROACH: ICD-10-PCS | Performed by: UROLOGY

## 2024-03-19 PROCEDURE — 25000003 PHARM REV CODE 250

## 2024-03-19 PROCEDURE — 20000000 HC ICU ROOM

## 2024-03-19 PROCEDURE — 86920 COMPATIBILITY TEST SPIN: CPT

## 2024-03-19 PROCEDURE — 63600175 PHARM REV CODE 636 W HCPCS

## 2024-03-19 PROCEDURE — 85025 COMPLETE CBC W/AUTO DIFF WBC: CPT

## 2024-03-19 PROCEDURE — 97116 GAIT TRAINING THERAPY: CPT | Mod: CQ

## 2024-03-19 PROCEDURE — P9021 RED BLOOD CELLS UNIT: HCPCS

## 2024-03-19 PROCEDURE — 25000003 PHARM REV CODE 250: Performed by: INTERNAL MEDICINE

## 2024-03-19 RX ORDER — POLYETHYLENE GLYCOL 3350 17 G/17G
17 POWDER, FOR SOLUTION ORAL DAILY
Qty: 238 G | Refills: 0 | Status: SHIPPED | OUTPATIENT
Start: 2024-03-19 | End: 2024-04-03

## 2024-03-19 RX ORDER — IBUPROFEN 600 MG/1
600 TABLET ORAL 3 TIMES DAILY
Qty: 30 TABLET | Refills: 0 | Status: SHIPPED | OUTPATIENT
Start: 2024-03-19 | End: 2024-03-30

## 2024-03-19 RX ORDER — OXYCODONE HYDROCHLORIDE 5 MG/1
5 TABLET ORAL EVERY 4 HOURS PRN
Qty: 10 TABLET | Refills: 0 | Status: SHIPPED | OUTPATIENT
Start: 2024-03-19

## 2024-03-19 RX ORDER — ENOXAPARIN SODIUM 100 MG/ML
40 INJECTION SUBCUTANEOUS DAILY
Qty: 11.2 ML | Refills: 0 | Status: SHIPPED | OUTPATIENT
Start: 2024-03-19 | End: 2024-04-17

## 2024-03-19 RX ORDER — ACETAMINOPHEN 325 MG/1
325 TABLET ORAL EVERY 6 HOURS PRN
Qty: 40 TABLET | Refills: 0 | Status: SHIPPED | OUTPATIENT
Start: 2024-03-19 | End: 2024-03-30

## 2024-03-19 RX ORDER — ENOXAPARIN SODIUM 100 MG/ML
40 INJECTION SUBCUTANEOUS EVERY 24 HOURS
Status: DISCONTINUED | OUTPATIENT
Start: 2024-03-19 | End: 2024-03-20 | Stop reason: HOSPADM

## 2024-03-19 RX ORDER — HYDROCODONE BITARTRATE AND ACETAMINOPHEN 500; 5 MG/1; MG/1
TABLET ORAL
Status: DISCONTINUED | OUTPATIENT
Start: 2024-03-19 | End: 2024-03-20 | Stop reason: HOSPADM

## 2024-03-19 RX ADMIN — ENOXAPARIN SODIUM 40 MG: 40 INJECTION SUBCUTANEOUS at 05:03

## 2024-03-19 RX ADMIN — ACETAMINOPHEN 1000 MG: 500 TABLET ORAL at 08:03

## 2024-03-19 RX ADMIN — FAMOTIDINE 20 MG: 20 TABLET ORAL at 08:03

## 2024-03-19 RX ADMIN — HEPARIN SODIUM 5000 UNITS: 5000 INJECTION INTRAVENOUS; SUBCUTANEOUS at 06:03

## 2024-03-19 RX ADMIN — PREGABALIN 75 MG: 75 CAPSULE ORAL at 08:03

## 2024-03-19 RX ADMIN — DIPHENHYDRAMINE HYDROCHLORIDE 12.5 MG: 50 INJECTION, SOLUTION INTRAMUSCULAR; INTRAVENOUS at 02:03

## 2024-03-19 NOTE — PLAN OF CARE
CM sent out home health referrals in anticipation for discharge on tomorrow,3/20/24. Urology requested arrangements for discharge.

## 2024-03-19 NOTE — ASSESSMENT & PLAN NOTE
- Mild upward trend now resolving. Likely a combination of fluid status and NSAID use. Urine lytes most consistent with prerenal etiology.  - Hold ketorolac for time being, continue with acetaminophen 1000mg PO TID in its stead. IVFs as under SBO.  - Renal function normalized since 3/18  - Continue to monitor  - Avoid nephrotoxins, renally dose medications. Trend creatinine and adjust further as required.

## 2024-03-19 NOTE — PT/OT/SLP PROGRESS
Physical Therapy Treatment    Patient Name:  Med Palma   MRN:  [id number]    Recommendations:     Discharge Recommendations: Low Intensity Therapy (outpt neuro PT)  Discharge Equipment Recommendations: walker, rolling (R AFO,RW delivered to room)  Barriers to discharge: None    Assessment:     Med Palma is a 68 y.o. male admitted with a medical diagnosis of Malignant neoplasm of dome of urinary bladder.  He presents with the following impairments/functional limitations: weakness, impaired endurance, impaired self care skills, impaired functional mobility, gait instability, impaired balance, decreased lower extremity function, decreased ROM, impaired skin ;pt with good mobility today, amb'd in ICU hallway w/RW and CCGA/SBA, pt did not wear AFO, was moving into DF better (per pt). .    Rehab Prognosis: Good; patient would benefit from acute skilled PT services to address these deficits and reach maximum level of function.    Recent Surgery: Procedure(s) (LRB):  XI ROBOTIC CYSTECTOMY, WITH ILEAL CONDUIT CREATION (Bilateral)  XI ROBOTIC PROSTATECTOMY (N/A)  XI ROBOTIC BIOPSY, PELVIC LYMPH NODE (Bilateral) 6 Days Post-Op    Plan:     During this hospitalization, patient to be seen 5 x/week to address the identified rehab impairments via gait training, therapeutic activities, therapeutic exercises and progress toward the following goals:    Plan of Care Expires:  04/14/24    Subjective     Chief Complaint: none stated  Patient/Family Comments/goals: pt agreeable to session, just a little sleepy (was given Benadryl recently)  Pain/Comfort:  Pain Rating 1: 0/10  Pain Rating Post-Intervention 1: 0/10      Objective:     Communicated with nurse prior to session.  Patient found HOB elevated with blood pressure cuff, telemetry, pulse ox (continuous), peripheral IV upon PT entry to room.     General Precautions: Standard, fall  Orthopedic Precautions: N/A  Braces: N/A  Respiratory Status: Room air     Functional  "Mobility:  Bed Mobility:     Supine to Sit: stand by assistance  Transfers:     Sit to Stand:  stand by assistance with rolling walker  Gait: amb'd ~200' w/ RW and CGA/SBA      AM-PAC 6 CLICK MOBILITY  Turning over in bed (including adjusting bedclothes, sheets and blankets)?: 4  Sitting down on and standing up from a chair with arms (e.g., wheelchair, bedside commode, etc.): 3  Moving from lying on back to sitting on the side of the bed?: 4  Moving to and from a bed to a chair (including a wheelchair)?: 3  Need to walk in hospital room?: 3  Climbing 3-5 steps with a railing?: 3  Basic Mobility Total Score: 20       Treatment & Education:  Pt inst'd in seated LAQ"s and AP's,    Patient left up in chair with all lines intact, call button in reach, nurse notified, and friend present..    GOALS:   Multidisciplinary Problems       Physical Therapy Goals          Problem: Physical Therapy    Goal Priority Disciplines Outcome Goal Variances Interventions   Physical Therapy Goal     PT, PT/OT Ongoing, Progressing     Description: P.T goals to be met in 1 month as follows:  1. Mod I Bed Mobility  2. Mod I T/F with LRAD    3. Gait 200' SBA with LRAD    4. I with HEP for ankle/foot ROM/strengthening therex                        Time Tracking:     PT Received On: 03/19/24  PT Start Time: 1647     PT Stop Time: 1705  PT Total Time (min): 18 min     Billable Minutes: Gait Training 18    Treatment Type: Treatment  PT/PTA: PTA     Number of PTA visits since last PT visit: 1 03/19/2024  "

## 2024-03-19 NOTE — ASSESSMENT & PLAN NOTE
- Noted with ambulation; suspect 2/2 positioning in case though patient reports has had some issues since prior chemo.  - Appreciate orthopedics assistance. Doing well with AFO.  - Appears resolved

## 2024-03-19 NOTE — PT/OT/SLP PROGRESS
Occupational Therapy      Patient Name:  Med Palma   MRN:  [id number]    Patient not seen today secondary to  (Blood transfusion). Will follow-up later today as schedule permits.    3/19/2024

## 2024-03-19 NOTE — PROGRESS NOTES
[facility] - Intensive Care ([place])  Wound Care    Patient Name:  Med Palma   MRN:  [id number]  Date: 3/19/2024  Diagnosis: Malignant neoplasm of dome of urinary bladder    History:     Past Medical History:   Diagnosis Date    Allergy     Arthritis     Elevated PSA     Hypertension     Malignant tumor of urinary bladder     per patient removed with last surg       Social History     Socioeconomic History    Marital status:    Occupational History     Employer: [employer]   Tobacco Use    Smoking status: Former     Types: Cigarettes    Smokeless tobacco: Never   Substance and Sexual Activity    Alcohol use: Yes     Alcohol/week: 3.0 standard drinks of alcohol     Types: 3 Cans of beer per week     Comment: none 24 hr prior to surg    Drug use: No    Sexual activity: Yes     Partners: Female     Social Determinants of Health     Financial Resource Strain: Low Risk  (3/14/2024)    Overall Financial Resource Strain (CARDIA)     Difficulty of Paying Living Expenses: Not hard at all   Food Insecurity: No Food Insecurity (3/14/2024)    Hunger Vital Sign     Worried About Running Out of Food in the Last Year: Never true     Ran Out of Food in the Last Year: Never true   Transportation Needs: No Transportation Needs (3/14/2024)    PRAPARE - Transportation     Lack of Transportation (Medical): No     Lack of Transportation (Non-Medical): No   Physical Activity: Inactive (3/14/2024)    Exercise Vital Sign     Days of Exercise per Week: 0 days     Minutes of Exercise per Session: 0 min   Stress: No Stress Concern Present (3/14/2024)    [employer] [employer] - Occupational Stress Questionnaire     Feeling of Stress : Not at all   Social Connections: Socially Integrated (3/14/2024)    Social Connection and Isolation Panel [NHANES]     Frequency of Communication with Friends and Family: More than three times a week     Frequency of Social Gatherings with Friends and Family: More  than three times a week     Attends Voodoo Services: More than 4 times per year     Active Member of Clubs or Organizations: Yes     Attends Club or Organization Meetings: More than 4 times per year     Marital Status:    Housing Stability: Low Risk  (3/14/2024)    Housing Stability Vital Sign     Unable to Pay for Housing in the Last Year: No     Number of Places Lived in the Last Year: 1     Unstable Housing in the Last Year: No       Precautions:     Allergies as of 02/27/2024    (No Known Allergies)       [facility] Assessment Details/Treatment     Ostomy care follow up:     Met with patient this morning. Ostomy pouch intact. Wife at bedside. No new questions or concerns. Patient comfortable with ostomy care. Supplies given to patient's wife for use upon discharge. Outpatient ostomy APRNs contact for post op appointment. Starter kits ordered from [employer] and [employer]. Will follow.     03/19/2024

## 2024-03-19 NOTE — PROGRESS NOTES
[facility] Intensive Care [facility] Medicine  Progress Note    Patient Name: Med Palma  MRN: [id number]  Patient Class: IP- Inpatient   Admission Date: 3/13/2024  Length of Stay: 6 days  Attending Physician: Yinka Jimenez MD  Primary Care Provider: Jah Wilson MD        Subjective:     Principal Problem:Malignant neoplasm of dome of urinary bladder        HPI:  Med Palma is a 68-year-old male with a past medical history of arthritis, elevated PSA, hypertension and bladder cancer who presents for scheduled robotic radical cystectoprostatectomy and PLND and ileal conduit vs neobladder with Dr. Jimenez, urology.  Patient underwent TURBT 10/26/23 and was diagnosed with bladder cancer.  Patient is followed by , Hem/Onc and received 4 rounds of chemotherapy with last treatment completed 1 month ago.  Patient was seen by Dr. Jimenez, outpatient and cleared for surgery per heme Onc.  Hospital Medicine was consulted for medical comanagement when patient was seen postoperatively.  Patient states his pain is currently controlled and denies fever, chills, nausea, vomiting, shortness of breath and chest pain.  Hospital Medicine will continue to follow and remain available for medical comanagement.        Interval History: No acute events overnight; feeling good, passing flatus and with +BM this morning. No further nausea/vomiting so far, tolerating Boost but awaiting to eat solid food today. Report foot drop resolved.    Review of Systems   Constitutional:  Negative for chills and fever.   Respiratory:  Negative for cough and shortness of breath.    Cardiovascular:  Negative for chest pain and palpitations.   Gastrointestinal:  Negative for abdominal pain, nausea and vomiting.     Objective:     Vital Signs (Most Recent):  Temp: 98.8 °F (37.1 °C) (03/19/24 0701)  Pulse: 87 (03/19/24 0701)  Resp: 11 (03/19/24 0701)  BP: 134/83 (03/19/24 0701)  SpO2: (!) 91 % (03/19/24 0701) Vital  Signs (24h Range):  Temp:  [98.4 °F (36.9 °C)-98.9 °F (37.2 °C)] 98.8 °F (37.1 °C)  Pulse:  [75-89] 87  Resp:  [11-21] 11  SpO2:  [85 %-99 %] 91 %  BP: (113-140)/(69-86) 134/83     Weight: 96.2 kg (212 lb 1.3 oz)  Body mass index is 28.76 kg/m².    Intake/Output Summary (Last 24 hours) at 3/19/2024 0806  Last data filed at 3/19/2024 0701  Gross per 24 hour   Intake 650 ml   Output 2540 ml   Net -1890 ml           Physical Exam  Vitals and nursing note reviewed.   Constitutional:       General: He is not in acute distress.     Appearance: He is well-developed.   HENT:      Head: Normocephalic and atraumatic.      Nose: Nose normal.   Eyes:      General:         Right eye: No discharge.         Left eye: No discharge.      Conjunctiva/sclera: Conjunctivae normal.   Cardiovascular:      Rate and Rhythm: Normal rate.      Pulses: Normal pulses.   Pulmonary:      Effort: Pulmonary effort is normal. No respiratory distress.   Abdominal:      General: Bowel sounds are normal.      Palpations: Abdomen is soft.      Tenderness: There is no abdominal tenderness. There is no guarding or rebound.      Comments: Incisions C/D/I. Ostomy in place.   Musculoskeletal:         General: Normal range of motion.      Cervical back: Normal range of motion.      Right lower leg: No edema.      Left lower leg: No edema.   Skin:     General: Skin is warm and dry.   Neurological:      General: No focal deficit present.      Mental Status: He is alert and oriented to person, place, and time.   Psychiatric:         Mood and Affect: Mood normal.         Behavior: Behavior normal.         Thought Content: Thought content normal.           Significant Labs:   CBC:  Recent Labs   Lab 03/17/24  0537 03/18/24  0404 03/19/24  0644   WBC 20.70* 14.23* 14.56*   HGB 8.1* 7.8* 7.3*   HCT 24.9* 24.1* 22.8*    243 252   GRAN 82.9*  17.2* 77.4*  11.0* 83.7*  12.2*   LYMPH 8.4*  1.7 12.7*  1.8 7.1*  1.0   MONO 7.5  1.6* 8.4  1.2* 7.8  1.1*    EOS 0.0 0.0 0.0   BASO 0.08 0.06 0.04     BMP:  Recent Labs   Lab 03/17/24  0537 03/18/24  0404 03/19/24  0643    140 139   K 4.9 4.4 4.3    105 107   CO2 25 27 23   BUN 46* 39* 28*   CREATININE 1.5* 1.2 0.9    101 92   CALCIUM 8.5* 8.8 8.1*   MG 2.2 2.1 1.8   PHOS 3.3 3.1 3.3       Significant Imaging: I have reviewed and interpreted all pertinent imaging results/findings within the past 24 hours.    Assessment/Plan:      * Malignant neoplasm of dome of urinary bladder  - Primary management, pain control as per Urology.    SBO (small bowel obstruction)  Leukocytosis  - WBCs improving; no signs/symptoms of focal infectious process. Suspect reactive with procedure / SBO.  - s/p NPO and NGT was removed yesterday  - Tolerating Boost and passing flatus/ +BMs, and diet advanced to solid yesterday but did not eat anything  - Will decrease IVFs given improvement in intake  - Monitor for continued improvement/progress    Elevated serum creatinine  - Mild upward trend now resolving. Likely a combination of fluid status and NSAID use. Urine lytes most consistent with prerenal etiology.  - Hold ketorolac for time being, continue with acetaminophen 1000mg PO TID in its stead. IVFs as under SBO.  - Renal function normalized since 3/18  - Continue to monitor  - Avoid nephrotoxins, renally dose medications. Trend creatinine and adjust further as required.    Leukocytosis  - As above.    Foot drop  - Noted with ambulation; suspect 2/2 positioning in case though patient reports has had some issues since prior chemo.  - Appreciate orthopedics assistance. Doing well with AFO.  - Appears resolved    Normocytic anemia  - Mild decrease since OR but otherwise appears to be holding around Hgb 8.  - Monitor.    Benign non-nodular prostatic hyperplasia with lower urinary tract symptoms  - As per primary.      VTE Risk Mitigation (From admission, onward)           Ordered     Place sequential compression device  Until  discontinued         03/19/24 0700     Place MCKENZIE hose  Until discontinued         03/19/24 0700     heparin (porcine) injection 5,000 Units  Every 8 hours         03/17/24 1122     IP VTE HIGH RISK PATIENT  Once         03/13/24 1740     Place sequential compression device  Until discontinued         03/13/24 0601     Place MCKENZIE hose  Until discontinued         03/13/24 0601                          Tg Walker MD  Department of Hospital Medicine   [facility] - Intensive Care [facility]

## 2024-03-19 NOTE — PLAN OF CARE
NGT remove on day-shift. No nausea or vomiting noted. Urostomy and ALEXANDRA drain output monitored. ALEXANDRA dressing changed. LR infusing at 125 ml/hr. Pt's diet changed to soft diet with bite size.  Fall precautions maintained, bed in lowest position, call bell within reach. Plan of care reviewed w/ patient, reinforcement needed.    Problem: Adult Inpatient Plan of Care  Goal: Plan of Care Review  Outcome: Ongoing, Progressing  Flowsheets (Taken 3/19/2024 0539)  Plan of Care Reviewed With: patient     Problem: Fall Injury Risk  Goal: Absence of Fall and Fall-Related Injury  Outcome: Ongoing, Progressing  Intervention: Identify and Manage Contributors  Flowsheets (Taken 3/19/2024 0539)  Self-Care Promotion: BADL personal objects within reach  Medication Review/Management: medications reviewed     Problem: Skin Injury Risk Increased  Goal: Skin Health and Integrity  Outcome: Ongoing, Progressing  Intervention: Optimize Skin Protection  Flowsheets (Taken 3/19/2024 0539)  Pressure Reduction Techniques: frequent weight shift encouraged  Pressure Reduction Devices: positioning supports utilized  Skin Protection:   adhesive use limited   incontinence pads utilized   tubing/devices free from skin contact   transparent dressing maintained  Head of Bed (HOB) Positioning: HOB at 30 degrees  Intervention: Promote and Optimize Oral Intake  Flowsheets (Taken 3/19/2024 0539)  Oral Nutrition Promotion: rest periods promoted

## 2024-03-19 NOTE — PROGRESS NOTES
[facility] Intensive Care [facility]  Urology  Progress Note    Patient Name: Med Palma  MRN: [id number]  Admission Date: 3/13/2024  Hospital Length of Stay: 6 days  Code Status: Full Code   Attending Provider: Yinka Jimenez MD   Primary Care Physician: Jah Wilson MD    Subjective:     HPI:  Med Palma is a 68 y.o. 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     Interval History: NAOE. Patient did not eat ordered soft diet last night. Denies N/V, abd pain. Passing gas. Two Bms yesterday.   Ambulating halls. Foot drop improving.  Hgb 7.3 from 7.8. Remaining labs in process     Drain 410/310 SS  Urostomy 950/1075 clear yellow    Objective:     Temp:  [98.4 °F (36.9 °C)-98.9 °F (37.2 °C)] 98.7 °F (37.1 °C)  Pulse:  [75-97] 88  Resp:  [11-21] 16  SpO2:  [85 %-99 %] 95 %  BP: (113-140)/(69-90) 129/78     Body mass index is 28.76 kg/m².           Drains       Drain  Duration                  Closed/Suction Drain 03/13/24 1712 Left LLQ Bulb 19 Fr. 5 days         Urostomy 03/13/24 1720 ileal conduit RLQ 5 days                     Physical Exam  Constitutional:       General: He is not in acute distress.  HENT:      Head: Normocephalic.   Cardiovascular:      Rate and Rhythm: Normal rate.   Pulmonary:      Effort: Pulmonary effort is normal.   Abdominal:      General: There is no distension.      Palpations: Abdomen is soft.      Tenderness: There is no abdominal tenderness. There is no right CVA tenderness or left CVA tenderness.      Comments: Midline abd incision c/d/i  RLQ urostomy pink, moist.  Clear thin yellow urine with small amount of mucus in bag  Abd benign, soft.  Non distended.   Drain SS, dressing in place.     Musculoskeletal:         General: Normal range of motion.   Skin:     General: Skin is warm and dry.   Neurological:      Mental Status: He is alert and  oriented to person, place, and time.      Coordination: Coordination normal.      Comments: R foot weakness with dorsiflexion, improved from prior    Psychiatric:         Mood and Affect: Mood normal.         Behavior: Behavior normal.           Significant Labs:    BMP:  Recent Labs   Lab 03/16/24 0339 03/17/24  0537 03/18/24  0404    139 140   K 4.9 4.9 4.4    104 105   CO2 23 25 27   BUN 33* 46* 39*   CREATININE 1.5* 1.5* 1.2   CALCIUM 8.6* 8.5* 8.8       CBC:   Recent Labs   Lab 03/16/24 0339 03/17/24  0537 03/18/24  0404   WBC 21.01* 20.70* 14.23*   HGB 8.7* 8.1* 7.8*   HCT 26.8* 24.9* 24.1*    227 243       All pertinent labs results from the past 24 hours have been reviewed.    Significant Imaging:  All pertinent imaging results/findings from the past 24 hours have been reviewed.                  Assessment/Plan:     * Malignant neoplasm of dome of urinary bladder  Med Palma is a 68 y.o. 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      - Tylenol, PO oxycodone for pain control, robaxin QID, Lyrica. Toradol held due to elevated Cr.  - soft diet, boost shakes  - SC heparin  - Hep lock IV  - Ambulate QID, PT/OT consult  - Wound care ostomy consulted  - Drains: maintain ALEXANDRA until discharge.   - ALEXANDRA Cr 1.9 3/17  - Prophylaxis: IS, SCDs, GI ppx    --Hgb 7.3 from 7.8. 2 units pRBCs today.   --Sending ALEXANDRA Cr    Appreciate hospital medicine input.   Soft diet today. Ambulation. PT/OT          VTE Risk Mitigation (From admission, onward)           Ordered     Place sequential compression device  Until discontinued         03/19/24 0700     Place MCKENZIE hose  Until discontinued         03/19/24 0700     heparin (porcine) injection 5,000 Units  Every 8 hours         03/17/24 1122     IP VTE HIGH RISK PATIENT  Once         03/13/24 1740     Place sequential compression  device  Until discontinued         03/13/24 0601     Place MCKENZIE hose  Until discontinued         03/13/24 0601                    KACY NUÑEZ MD  Urology  [facility] - Intensive Care ([place])

## 2024-03-19 NOTE — SUBJECTIVE & OBJECTIVE
Interval History: No acute events overnight; feeling good, passing flatus and with +BM this morning. No further nausea/vomiting so far, tolerating Boost but awaiting to eat solid food today. Report foot drop resolved.    Review of Systems   Constitutional:  Negative for chills and fever.   Respiratory:  Negative for cough and shortness of breath.    Cardiovascular:  Negative for chest pain and palpitations.   Gastrointestinal:  Negative for abdominal pain, nausea and vomiting.     Objective:     Vital Signs (Most Recent):  Temp: 98.8 °F (37.1 °C) (03/19/24 0701)  Pulse: 87 (03/19/24 0701)  Resp: 11 (03/19/24 0701)  BP: 134/83 (03/19/24 0701)  SpO2: (!) 91 % (03/19/24 0701) Vital Signs (24h Range):  Temp:  [98.4 °F (36.9 °C)-98.9 °F (37.2 °C)] 98.8 °F (37.1 °C)  Pulse:  [75-89] 87  Resp:  [11-21] 11  SpO2:  [85 %-99 %] 91 %  BP: (113-140)/(69-86) 134/83     Weight: 96.2 kg (212 lb 1.3 oz)  Body mass index is 28.76 kg/m².    Intake/Output Summary (Last 24 hours) at 3/19/2024 0806  Last data filed at 3/19/2024 0701  Gross per 24 hour   Intake 650 ml   Output 2540 ml   Net -1890 ml           Physical Exam  Vitals and nursing note reviewed.   Constitutional:       General: He is not in acute distress.     Appearance: He is well-developed.   HENT:      Head: Normocephalic and atraumatic.      Nose: Nose normal.   Eyes:      General:         Right eye: No discharge.         Left eye: No discharge.      Conjunctiva/sclera: Conjunctivae normal.   Cardiovascular:      Rate and Rhythm: Normal rate.      Pulses: Normal pulses.   Pulmonary:      Effort: Pulmonary effort is normal. No respiratory distress.   Abdominal:      General: Bowel sounds are normal.      Palpations: Abdomen is soft.      Tenderness: There is no abdominal tenderness. There is no guarding or rebound.      Comments: Incisions C/D/I. Ostomy in place.   Musculoskeletal:         General: Normal range of motion.      Cervical back: Normal range of motion.       Right lower leg: No edema.      Left lower leg: No edema.   Skin:     General: Skin is warm and dry.   Neurological:      General: No focal deficit present.      Mental Status: He is alert and oriented to person, place, and time.   Psychiatric:         Mood and Affect: Mood normal.         Behavior: Behavior normal.         Thought Content: Thought content normal.           Significant Labs:   CBC:  Recent Labs   Lab 03/17/24  0537 03/18/24  0404 03/19/24  0644   WBC 20.70* 14.23* 14.56*   HGB 8.1* 7.8* 7.3*   HCT 24.9* 24.1* 22.8*    243 252   GRAN 82.9*  17.2* 77.4*  11.0* 83.7*  12.2*   LYMPH 8.4*  1.7 12.7*  1.8 7.1*  1.0   MONO 7.5  1.6* 8.4  1.2* 7.8  1.1*   EOS 0.0 0.0 0.0   BASO 0.08 0.06 0.04     BMP:  Recent Labs   Lab 03/17/24  0537 03/18/24  0404 03/19/24  0643    140 139   K 4.9 4.4 4.3    105 107   CO2 25 27 23   BUN 46* 39* 28*   CREATININE 1.5* 1.2 0.9    101 92   CALCIUM 8.5* 8.8 8.1*   MG 2.2 2.1 1.8   PHOS 3.3 3.1 3.3       Significant Imaging: I have reviewed and interpreted all pertinent imaging results/findings within the past 24 hours.

## 2024-03-19 NOTE — ASSESSMENT & PLAN NOTE
Leukocytosis  - WBCs improving; no signs/symptoms of focal infectious process. Suspect reactive with procedure / SBO.  - s/p NPO and NGT was removed yesterday  - Tolerating Boost and passing flatus/ +BMs, and diet advanced to solid yesterday but did not eat anything  - Will decrease IVFs given improvement in intake  - Monitor for continued improvement/progress

## 2024-03-19 NOTE — DISCHARGE INSTRUCTIONS
What to expect with your Cystectomy with urinary Diversion.  [facility]  After surgery  You will have a drain that is shaped like a grenade and put to suction. It will be removed over the next couple of days.  You will have stents that are in your ureters draining your kidneys.  If you have a neobladder or [place] pouch, these will come out on a separate part of your skin and they will eventually be removed through your skin.  If you have an ileal conduit, they will come out through the conduit stoma  You may have a red rubber catheter which is used to drain the pouch or conduit  If you have a an [place] pouch it will be coming out of your catheterizable stoma and this will be removed over a few days  If you have a conduit, it will be coming out of your conduit and will be removed in a few days.  You may also possibly have another drain or catheter from your pouch or neobladder.     NO ONE IS TO REMOVE THIS CATHETER OR CHANGE THE CATHETER OTHER THAN SOMEONE FROM [facility].  If you have to go to the ER because your catheter is not draining, come to the [facility] ER if possible and they will call us if they are not able to irrigate your catheter.  If you live out of town and have to go to a local ER, then DO NOT let that doctor remove your catheter. If they are unable to irrigate the catheter or are having troubles with it, have them page the [facility] resident on call immediately at [phone] to help answer any questions.  You will have a midline incision with staples after surgery where the bladder was removed and the surgery was performed.   Over the next couple days after surgery we expect and hope that you will:  Walk - walking helps get the bowels moving. Also after your surgery, you are at a risk for a deep venous thrombosis (which is a clot in the legs that can form by remaining inactive or still for extended periods of time) and this can travel to your lungs and make  you feel short of breath. This is a very serious condition. Walking helps prevent a DVT from occurring.  Eat - we will advance you slowly and you will have only sips until you start having active bowel sounds and passing gas. It is important to have good nutrition for healing and we will actively encourage you to take in meals with high protein.  Use your incentive spirometer - this is the breathing apparatus that helps you expand your lungs. If and when you have pain you will not want to take deep breaths. But if you dont take deep breaths, you are at risk for pneumonia. The incentive spirometer will help prevent this from occurring by expanding your lungs.  Symptoms you may experience immediately post-op:  Bloating and/or shoulder pain with laparoscopic procedures - when we do this operation, we fill up your abdominal cavity with gas to better help us visualize the organs and allow our instruments to fit. After the surgery, not all the air can be removed and your body will eventually absorb this small amount of air. However this can make you feel bloated. In addition, when you sit up, the air can sit right under a muscle (the diaphragm) which has connecting nerves to the shoulders, which could explain why you have shoulder pain.  Do not expect necessarily to have gas or to have a bowel movement - this goes along with the bloating, you may feel like you want to pass gas or have a bowel movement but you cant. This is normal and you will feel like this for a couple days. There are no pills to help with this. Small walks throughout the day should help with this.  Pain - Immediately post op we will give you a button for your pain control. When you start tolerating a regular diet, we will change you over to medicines by mouth. Your pain should be able to be controlled with medicines by mouth that we prescribe. It is important for you to tell us if you are on any pain medications at home before the surgery as you may  need stronger pain meds while in the hospital.  You can go home when:  Pain is controlled with medicines by mouth  You are able to walk without difficulty or pain  You are tolerating a regulating diet  Your catheters are draining freely  You have learned how to change your pouch (if you have one)  You have learned how to irrigate your pouch (if you have one)  You are having gas and bowel movements  When you go home:  Catheter care  Blood in your urine (hematuria) is normal. However if you are having clots or your catheter stops draining and you are experiencing abdominal pain, go to the ER.  Activity  Continue to walk - small walks throughout the day are better than one long walk.   Do not lift anything greater than 8 pounds for 6 weeks - we want your abdominal wall muscles to heal.  Bowel Movements - Do not strain to have a bowel movement - the pain medicines will make you constipated. That is why we also ask you to take colace 2-3 x per day to help keep your bowels regular. If you are still having trouble, then you can also add Miralax once a day. Do not take any stool softeners if you are having diarrhea.  Drain - If you have a drain (not your catheter, but a separate drain) then record the output and bring it with you to your next appointment  Smoking - If you smoke, we encourage you to STOP. Smoking interferes with the healing process and your prolong your healing with continued smoking.  Driving - Do not drive while you are on pain meds or with your catheter in place.  Bathing - If you do not have a drain, you can shower 48 hours after your surgery. If you do have a drain, sponge bathe only until the drain is out.  Dressing - you can remove the dressings if there is no drainage or change them as needed if there is. The little sterile band-aid strips will fall off on their own in 10-14 days. If they have not fallen off then you can remove them yourself.  Restarting medicines -especially blood thinners  (Aspirin,Plavix, Coumadin), Fish Oil, discuss this with your physician prior to discharge  When to return to the ER  Fever - If you have a fever >101.5, this could be due to a number of reasons such as infection of the urine or incision. If your catheter has been removed, you could possibly have a leak. It would be best to come to the ER so they can better evaluate you.  Severe pain - pain is expected, but severe or new onset of pain is not normal.   Inability to tolerate food or liquid with nausea and vomiting - it would be best to go to the ER for them to better evaluate you.   Weakness  Consents to be obtained - Surgical and Blood.  Before you sign the consent, understand the following risks:  Surgery consent  Bleeding - the prostate and its surrounding vessels are very vascular and bleeding can occur requiring a blood transfusion.  Erectile dysfunction (impotence) - you will not have erections after surgery immediately post-op.  Your potency depends on your age and if you were potent beforehand. Nerve-sparing also helps. However, it does not guarantee erectile function. There are options to discuss after surgery if you still remain impotent.  Blockage of ureters/anastomotic stricture (the drainage tube between the kidney and the bladder)- if this occurrs, you will need another procedure to unblock the ureters.  Stricture of the urethra or Bladder Neck Contracture - any time the urethra is instrumented, there is a risk of having strictures and this would require another procedure to dilate the stricture. Bladder neck contracture can occur after any type of anastomotic procedure. You would present with difficulty voiding or emptying your bladder.  Damage to rectal wall, bowel, liver, spleen or any of the surrounding organs   Infection of urine or incision requiring further treatment  Pulmonary Emboli (blood clots) - this is why we ask you to walk around after surgery and the night of surgery to help prevent  clots  Need for conversion to open procedure   Air embolus - to introduce air into the abdomen a needle is inserted into the abdomen, if this enters a vein or artery it can cause an embolus that travels to the heart and cause death.  Anastomotic leak - there is always a chance that the tissues have not healed when the catheter is removed and you may possibly have a leak. You may possibly present or complain of abdominal pain, weakness or fever. If you have these symptoms return to the ER. If you have a leak you will need a catheter to be replaced, likely under direct vision with a camera.   Small bowel obstruction (SBO) - after surgery, people can have adhesions that form from inflammation caused by surgery and this can cause a SBO, which would present with nausea and vomiting and unable to pass stool.  Ileus - your bowels can be in shock after surgery and you will present with nausea and vomiting. Sometimes this requires a tube that is placed through the nose and into your stomach until your bowels start working again, this is temporary.  Electrolyte abnormalities - resulting in confusion, weakness, abnormal heart rhythms  Wound dehiscence - requiring a wound vac and home health care  Stenosis of the stoma -requiring revision  Acute or chronic pain  Scars  Death, paralysis from the neck or waist down, loss of limb  Blood consents  Risk of HIV and Hepatitis or any other blood borne disease  Risk of allergic reaction to blood treated symptomatically

## 2024-03-19 NOTE — ASSESSMENT & PLAN NOTE
Med Palma is a 68 y.o. 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      - Tylenol, PO oxycodone for pain control, robaxin QID, Lyrica. Toradol held due to elevated Cr.  - soft diet, boost shakes  - SC heparin  - IVF  - Ambulate QID, PT/OT consult  - Wound care ostomy consulted  - Drains: maintain ALEXANDRA until discharge.   - ALEXANDRA Cr 1.9 3/17  - Prophylaxis: IS, SCDs, GI ppx    Appreciate hospital medicine input.   Soft diet today. Ambulation. PT/OT

## 2024-03-19 NOTE — PLAN OF CARE
Medicare Message     Important Message from Medicare regarding Discharge Appeal Rights Given to patient/caregiver; Explained to patient/caregiver; Other (comments)Important Message from Medicare regarding Discharge Appeal Rights. Given to patient/caregiver; Explained to patient/caregiver; Other (comments). The comment is Verbal Consent. Taken on 3/14/24 1216 Given to patient/caregiver; Explained to patient/caregiver; Other (comments)Important Message from Medicare regarding Discharge Appeal Rights. Given to patient/caregiver; Explained to patient/caregiver; Other (comments). The comment is Verbal Consent. Taken on 3/19/24 1028   Date IMM was signed 3/14/2024 3/19/2024   Time IMM was signed [phone]

## 2024-03-20 ENCOUNTER — TELEPHONE (OUTPATIENT)
Dept: UROLOGY | Facility: CLINIC | Age: 69
End: 2024-03-20
Payer: MEDICARE

## 2024-03-20 VITALS
DIASTOLIC BLOOD PRESSURE: 90 MMHG | TEMPERATURE: 98 F | RESPIRATION RATE: 11 BRPM | BODY MASS INDEX: 28.72 KG/M2 | WEIGHT: 212.06 LBS | SYSTOLIC BLOOD PRESSURE: 133 MMHG | HEART RATE: 94 BPM | OXYGEN SATURATION: 98 % | HEIGHT: 72 IN

## 2024-03-20 LAB
ANION GAP SERPL CALC-SCNC: 8 MMOL/L (ref 8–16)
BASOPHILS # BLD AUTO: 0.04 K/UL (ref 0–0.2)
BASOPHILS NFR BLD: 0.2 % (ref 0–1.9)
BUN SERPL-MCNC: 20 MG/DL (ref 8–23)
CALCIUM SERPL-MCNC: 8 MG/DL (ref 8.7–10.5)
CHLORIDE SERPL-SCNC: 107 MMOL/L (ref 95–110)
CO2 SERPL-SCNC: 22 MMOL/L (ref 23–29)
CREAT SERPL-MCNC: 0.8 MG/DL (ref 0.5–1.4)
DIFFERENTIAL METHOD BLD: ABNORMAL
EOSINOPHIL # BLD AUTO: 0 K/UL (ref 0–0.5)
EOSINOPHIL NFR BLD: 0 % (ref 0–8)
ERYTHROCYTE [DISTWIDTH] IN BLOOD BY AUTOMATED COUNT: 15.3 % (ref 11.5–14.5)
EST. GFR  (NO RACE VARIABLE): >60 ML/MIN/1.73 M^2
GLUCOSE SERPL-MCNC: 117 MG/DL (ref 70–110)
HCT VFR BLD AUTO: 28.1 % (ref 40–54)
HGB BLD-MCNC: 9.4 G/DL (ref 14–18)
IMM GRANULOCYTES # BLD AUTO: 0.23 K/UL (ref 0–0.04)
IMM GRANULOCYTES NFR BLD AUTO: 1.3 % (ref 0–0.5)
LYMPHOCYTES # BLD AUTO: 1.1 K/UL (ref 1–4.8)
LYMPHOCYTES NFR BLD: 6 % (ref 18–48)
MAGNESIUM SERPL-MCNC: 1.6 MG/DL (ref 1.6–2.6)
MCH RBC QN AUTO: 31.5 PG (ref 27–31)
MCHC RBC AUTO-ENTMCNC: 33.5 G/DL (ref 32–36)
MCV RBC AUTO: 94 FL (ref 82–98)
MONOCYTES # BLD AUTO: 1.1 K/UL (ref 0.3–1)
MONOCYTES NFR BLD: 6.4 % (ref 4–15)
NEUTROPHILS # BLD AUTO: 15.1 K/UL (ref 1.8–7.7)
NEUTROPHILS NFR BLD: 86.1 % (ref 38–73)
NRBC BLD-RTO: 0 /100 WBC
PHOSPHATE SERPL-MCNC: 3.3 MG/DL (ref 2.7–4.5)
PLATELET # BLD AUTO: 264 K/UL (ref 150–450)
PMV BLD AUTO: 10.4 FL (ref 9.2–12.9)
POTASSIUM SERPL-SCNC: 3.9 MMOL/L (ref 3.5–5.1)
RBC # BLD AUTO: 2.98 M/UL (ref 4.6–6.2)
SODIUM SERPL-SCNC: 137 MMOL/L (ref 136–145)
WBC # BLD AUTO: 17.53 K/UL (ref 3.9–12.7)

## 2024-03-20 PROCEDURE — 36415 COLL VENOUS BLD VENIPUNCTURE: CPT

## 2024-03-20 PROCEDURE — 80048 BASIC METABOLIC PNL TOTAL CA: CPT

## 2024-03-20 PROCEDURE — 25000003 PHARM REV CODE 250

## 2024-03-20 PROCEDURE — 97116 GAIT TRAINING THERAPY: CPT | Mod: CQ

## 2024-03-20 PROCEDURE — 97530 THERAPEUTIC ACTIVITIES: CPT | Mod: CO

## 2024-03-20 PROCEDURE — 99900035 HC TECH TIME PER 15 MIN (STAT)

## 2024-03-20 PROCEDURE — 25000003 PHARM REV CODE 250: Performed by: INTERNAL MEDICINE

## 2024-03-20 PROCEDURE — 84100 ASSAY OF PHOSPHORUS: CPT

## 2024-03-20 PROCEDURE — 83735 ASSAY OF MAGNESIUM: CPT

## 2024-03-20 PROCEDURE — 99024 POSTOP FOLLOW-UP VISIT: CPT | Mod: ,,, | Performed by: UROLOGY

## 2024-03-20 PROCEDURE — 97535 SELF CARE MNGMENT TRAINING: CPT | Mod: CO

## 2024-03-20 PROCEDURE — 85025 COMPLETE CBC W/AUTO DIFF WBC: CPT

## 2024-03-20 RX ADMIN — ACETAMINOPHEN 1000 MG: 500 TABLET ORAL at 08:03

## 2024-03-20 RX ADMIN — FAMOTIDINE 20 MG: 20 TABLET ORAL at 08:03

## 2024-03-20 NOTE — ASSESSMENT & PLAN NOTE
Leukocytosis  - WBCs improving; no signs/symptoms of focal infectious process. Suspect reactive with procedure / SBO.  - s/p NPO and NGT was removed yesterday  - Tolerating Boost and passing flatus/ +BMs, and diet advanced to solid   - Stopped IV fluids, tolerating diet and having multiple BMs  - Resolved

## 2024-03-20 NOTE — PLAN OF CARE
Problem: Adult Inpatient Plan of Care  Goal: Plan of Care Review  Outcome: Ongoing, Progressing  Flowsheets (Taken 3/20/2024 0501)  Plan of Care Reviewed With: patient     Problem: Fall Injury Risk  Goal: Absence of Fall and Fall-Related Injury  Outcome: Ongoing, Progressing  Intervention: Identify and Manage Contributors  Flowsheets (Taken 3/20/2024 0501)  Self-Care Promotion: BADL personal objects within reach  Medication Review/Management: medications reviewed  Intervention: Promote Injury-Free Environment  Flowsheets (Taken 3/20/2024 0501)  Safety Promotion/Fall Prevention:   assistive device/personal item within reach   bed alarm set   Fall Risk reviewed with patient/family   Fall Risk signage in place   lighting adjusted   medications reviewed   nonskid shoes/socks when out of bed   pulse ox   room near unit station   side rails raised x 3   supervised activity     Problem: Skin Injury Risk Increased  Goal: Skin Health and Integrity  Outcome: Ongoing, Progressing  Intervention: Optimize Skin Protection  Flowsheets (Taken 3/20/2024 0501)  Pressure Reduction Techniques:   frequent weight shift encouraged   pressure points protected  Pressure Reduction Devices: foam padding utilized  Skin Protection:   adhesive use limited   incontinence pads utilized   transparent dressing maintained   tubing/devices free from skin contact  Head of Bed (HOB) Positioning: HOB at 30 degrees  Intervention: Promote and Optimize Oral Intake  Flowsheets (Taken 3/20/2024 0501)  Oral Nutrition Promotion: rest periods promoted

## 2024-03-20 NOTE — SUBJECTIVE & OBJECTIVE
Interval History: No acute events overnight; transfused 2U PRBC yesterday. Feeling good, passing flatus and with +BM x 3, ate some solid food but not much due to not liking the food.. No further nausea/vomiting.    Review of Systems   Constitutional:  Negative for chills and fever.   Respiratory:  Negative for cough and shortness of breath.    Cardiovascular:  Negative for chest pain and palpitations.   Gastrointestinal:  Negative for abdominal pain, nausea and vomiting.     Objective:     Vital Signs (Most Recent):  Temp: 98.7 °F (37.1 °C) (03/20/24 0302)  Pulse: 92 (03/20/24 0701)  Resp: 13 (03/20/24 0701)  BP: 124/84 (03/20/24 0701)  SpO2: 97 % (03/20/24 0701) Vital Signs (24h Range):  Temp:  [98.2 °F (36.8 °C)-98.7 °F (37.1 °C)] 98.7 °F (37.1 °C)  Pulse:  [] 92  Resp:  [12-23] 13  SpO2:  [93 %-98 %] 97 %  BP: (110-142)/(63-86) 124/84     Weight: 96.2 kg (212 lb 1.3 oz)  Body mass index is 28.76 kg/m².    Intake/Output Summary (Last 24 hours) at 3/20/2024 0706  Last data filed at 3/20/2024 0348  Gross per 24 hour   Intake 1809.23 ml   Output 1525 ml   Net 284.23 ml           Physical Exam  Vitals and nursing note reviewed.   Constitutional:       General: He is not in acute distress.     Appearance: He is well-developed.   HENT:      Head: Normocephalic and atraumatic.      Nose: Nose normal.   Eyes:      General:         Right eye: No discharge.         Left eye: No discharge.      Conjunctiva/sclera: Conjunctivae normal.   Cardiovascular:      Rate and Rhythm: Normal rate.      Pulses: Normal pulses.   Pulmonary:      Effort: Pulmonary effort is normal. No respiratory distress.   Abdominal:      General: Bowel sounds are normal.      Palpations: Abdomen is soft.      Tenderness: There is no abdominal tenderness. There is no guarding or rebound.      Comments: Incisions C/D/I. Ostomy in place.   Musculoskeletal:         General: Normal range of motion.      Cervical back: Normal range of motion.      Right  lower leg: No edema.      Left lower leg: No edema.   Skin:     General: Skin is warm and dry.   Neurological:      General: No focal deficit present.      Mental Status: He is alert and oriented to person, place, and time.   Psychiatric:         Mood and Affect: Mood normal.         Behavior: Behavior normal.         Thought Content: Thought content normal.           Significant Labs:   CBC:  Recent Labs   Lab 03/18/24  0404 03/19/24  0644 03/20/24  0250   WBC 14.23* 14.56* 17.53*   HGB 7.8* 7.3* 9.4*   HCT 24.1* 22.8* 28.1*    252 264   GRAN 77.4*  11.0* 83.7*  12.2* 86.1*  15.1*   LYMPH 12.7*  1.8 7.1*  1.0 6.0*  1.1   MONO 8.4  1.2* 7.8  1.1* 6.4  1.1*   EOS 0.0 0.0 0.0   BASO 0.06 0.04 0.04     BMP:  Recent Labs   Lab 03/18/24  0404 03/19/24  0643 03/20/24  0250    139 137   K 4.4 4.3 3.9    107 107   CO2 27 23 22*   BUN 39* 28* 20   CREATININE 1.2 0.9 0.8    92 117*   CALCIUM 8.8 8.1* 8.0*   MG 2.1 1.8 1.6   PHOS 3.1 3.3 3.3       Significant Imaging: I have reviewed and interpreted all pertinent imaging results/findings within the past 24 hours.

## 2024-03-20 NOTE — ASSESSMENT & PLAN NOTE
Med Palma is a 68 y.o. 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      - Tylenol PRN. Narcotics held. Toradol held due to elevated Cr.  - Regular diet  - SC heparin. Home w Lovenox.  - IVF stopped  - 2u PRBCs yesterday  - Ambulate QID, PT/OT consult  - Wound care ostomy consulted  - Drains: ALEXANDRA removed  - ALEXANDRA Cr c/w serum  - Prophylaxis: IS, SCDs, GI ppx    Okay for d/c home today

## 2024-03-20 NOTE — PHYSICIAN QUERY
PT Name: Med Palma  MR #: [id number]    DOCUMENTATION CLARIFICATION   Glenda Medellin RN, CCDS   Documentation [employer]  [email or web address]     This form is a permanent document in the medical record.     Query Date: March 20, 2024    By submitting this query, we are merely seeking further clarification of documentation.   Please utilize your independent clinical judgment when addressing the question(s) below.      The Medical Record contains the following:   Indicators   Supporting Clinical Findings Location in Medical Record   x Postoperative Ileus documented KUB shows Likely ileus   Suspect related to KUB findings suspicious of ileus/SBO  suspected progression to SBO   Ileus  SBO (small bowel obstruction)   3/17 Urology PN      3/18 Urology  3/18 -20 Hosp PN   x Subjective/Objective GI assessment clear liquids last night but subsequent nausea / indigestion, episode of  vomiting. Some flatus this AM    Hosp PN 3/16   x Radiology Reports KUB findings suspicious of ileus/SBO  KUB shows likely ileus    3/16 Xray  Findings may rest absent small bowel obstruction. Ileus may have a similar appearance. Recommend clinical correlation.    3/17 Hosp PN  3/17 Urology PN      Radiology       x Surgical Procedure Op Note 3/13    Yinka Jimenez MD        Pre/post op dx:  1. Small cell muscle invasive  bladder cancer    2. Meatal stenosis   Proc:    1.  Robotic-assisted laparoscopic bilateral pelvic lymph node dissection  2.  Robotic-assisted laparoscopic radical cystoprostatectomy   3.  Robotic creation of ileal conduit urinary diversion  4.  Bilateral ureteral stent placement  5.  Urethral dilation with sounds.    Op Note   x Treatment/Medication Entereg until ROBF   Continue clears   make NPO  Discontinue diet, alvimopan, polyethylene glycol  NGT placement ordered, will place to low intermittent suction.   Small BM after NG was removed  passing flatus and with +BM x 3  3/14 Urology PN  3/17 urology PN  3/17 Hosp  "PN  3/17 Hosp PN   3/19 Urology PN  3/20 Hosp PN       Other       Please further specify the "SBO":    [   ] Postoperative partial or incomplete intestinal obstruction, a complication of procedure     [   ] Postoperative partial or incomplete intestinal obstruction, not a complication of procedure     [   ] Postoperative complete intestinal obstruction, a complication of procedure     [   ] Postoperative complete intestinal obstruction, not a complication of procedure     [ x  ] Other intestinal condition (please specify): ___mild post op ileus resolved without sequelae; anticipated with ileal anastomosis; not a complication__________________     [   ]  Clinically Undetermined      Please document in your progress notes daily for the duration of treatment until resolved, and include in your discharge summary.    Form No. [id number]               "

## 2024-03-20 NOTE — PROGRESS NOTES
[facility] - Intensive Care ([place])  Wound Care    Patient Name:  Med Palma   MRN:  [id number]  Date: 3/20/2024  Diagnosis: Malignant neoplasm of dome of urinary bladder    History:     Past Medical History:   Diagnosis Date    Allergy     Arthritis     Elevated PSA     Hypertension     Malignant tumor of urinary bladder     per patient removed with last surg       Social History     Socioeconomic History    Marital status:    Occupational History     Employer: [employer]   Tobacco Use    Smoking status: Former     Types: Cigarettes    Smokeless tobacco: Never   Substance and Sexual Activity    Alcohol use: Yes     Alcohol/week: 3.0 standard drinks of alcohol     Types: 3 Cans of beer per week     Comment: none 24 hr prior to surg    Drug use: No    Sexual activity: Yes     Partners: Female     Social Determinants of Health     Financial Resource Strain: Low Risk  (3/14/2024)    Overall Financial Resource Strain (CARDIA)     Difficulty of Paying Living Expenses: Not hard at all   Food Insecurity: No Food Insecurity (3/14/2024)    Hunger Vital Sign     Worried About Running Out of Food in the Last Year: Never true     Ran Out of Food in the Last Year: Never true   Transportation Needs: No Transportation Needs (3/14/2024)    PRAPARE - Transportation     Lack of Transportation (Medical): No     Lack of Transportation (Non-Medical): No   Physical Activity: Inactive (3/14/2024)    Exercise Vital Sign     Days of Exercise per Week: 0 days     Minutes of Exercise per Session: 0 min   Stress: No Stress Concern Present (3/14/2024)    [place] [employer] - Occupational Stress Questionnaire     Feeling of Stress : Not at all   Social Connections: Socially Integrated (3/14/2024)    Social Connection and Isolation Panel [NHANES]     Frequency of Communication with Friends and Family: More than three times a week     Frequency of Social Gatherings with Friends and Family: More  than three times a week     Attends Rastafari Services: More than 4 times per year     Active Member of Clubs or Organizations: Yes     Attends Club or Organization Meetings: More than 4 times per year     Marital Status:    Housing Stability: Low Risk  (3/14/2024)    Housing Stability Vital Sign     Unable to Pay for Housing in the Last Year: No     Number of Places Lived in the Last Year: 1     Unstable Housing in the Last Year: No       Precautions:     Allergies as of 02/27/2024    (No Known Allergies)       WOC Assessment Details/Treatment     Ostomy care follow up:      Met with patient this morning. Ostomy pouch intact. Wife at bedside. No new questions or concerns. Patient comfortable with ostomy care. Supplies given and post op ostomy appointment made for April 4th. Will follow.         03/20/2024

## 2024-03-20 NOTE — PLAN OF CARE
Problem: Physical Therapy  Goal: Physical Therapy Goal  Description: P.T goals to be met in 1 month as follows:  1. Mod I Bed Mobility  2. Mod I T/F with LRAD    3. Gait 200' SBA with LRAD    4. I with HEP for ankle/foot ROM/strengthening therex   Outcome: Ongoing, Progressing   Pt sit to stand SBA w/ RW, amb'd ~200' w/ RW and SBA. Recommend cont PT in OPPT for R foot drop.

## 2024-03-20 NOTE — ASSESSMENT & PLAN NOTE
- Noted with ambulation; suspect 2/2 positioning in case though patient reports has had some issues since prior chemo.  - Appreciate Orthopedics assistance. Doing well with AFO.  - Appears resolved and working with PT/OT, plan for home health

## 2024-03-20 NOTE — PT/OT/SLP PROGRESS
Physical Therapy Treatment    Patient Name:  Med Palma   MRN:  [id number]    Recommendations:     Discharge Recommendations: Low Intensity Therapy (outpt neuro PT)  Discharge Equipment Recommendations: walker, rolling (delivered to room already)  Barriers to discharge: None (wife present in room)    Assessment:     Med Palma is a 68 y.o. male admitted with a medical diagnosis of Malignant neoplasm of dome of urinary bladder.  He presents with the following impairments/functional limitations: weakness, impaired endurance, impaired self care skills, impaired functional mobility, gait instability, impaired balance, decreased lower extremity function, decreased coordination, pain, decreased ROM, impaired skin ;pt with good mobility today, amb'd in hallway of ICU w/ RW and SBA ~200', no LOB or SOB noted.    Rehab Prognosis: Good; patient would benefit from acute skilled PT services to address these deficits and reach maximum level of function.    Recent Surgery: Procedure(s) (LRB):  XI ROBOTIC CYSTECTOMY, WITH ILEAL CONDUIT CREATION (Bilateral)  XI ROBOTIC PROSTATECTOMY (N/A)  XI ROBOTIC BIOPSY, PELVIC LYMPH NODE (Bilateral) 7 Days Post-Op    Plan:     During this hospitalization, patient to be seen 5 x/week to address the identified rehab impairments via gait training, therapeutic activities, therapeutic exercises and progress toward the following goals:    Plan of Care Expires:  04/14/24    Subjective     Chief Complaint: none stated, though pt w/ some abd pain noted  Patient/Family Comments/goals: pt agreeable to session, wife present, brother present  Pain/Comfort:  Pain Rating 1:  (pt did not rate, but grimacing a little upon arrival and holding abdomen)  Location 1: abdomen  Pain Addressed 1: Reposition, Distraction      Objective:     Communicated with nurse prior to session.  Patient found up in chair with telemetry upon PT entry to room.     General Precautions: Standard, fall  Orthopedic Precautions:  N/A  Braces: N/A  Respiratory Status: Room air     Functional Mobility:  Transfers:     Sit to Stand:  stand by assistance with rolling walker  Gait: amb'd ~200' w/ RW and SBA, some cueing for upright posture. Pt wearing tennis shoes, though choosing not to amb. W/ AFO at this time.       AM-PAC 6 CLICK MOBILITY  Turning over in bed (including adjusting bedclothes, sheets and blankets)?: 4  Sitting down on and standing up from a chair with arms (e.g., wheelchair, bedside commode, etc.): 3  Moving from lying on back to sitting on the side of the bed?: 4  Moving to and from a bed to a chair (including a wheelchair)?: 3  Need to walk in hospital room?: 3  Climbing 3-5 steps with a railing?: 3  Basic Mobility Total Score: 20       Treatment & Education:  Pt w/ some active DF on RLE    Patient left up in chair with all lines intact, call button in reach, and family present..    GOALS:   Multidisciplinary Problems       Physical Therapy Goals          Problem: Physical Therapy    Goal Priority Disciplines Outcome Goal Variances Interventions   Physical Therapy Goal     PT, PT/OT Ongoing, Progressing     Description: P.T goals to be met in 1 month as follows:  1. Mod I Bed Mobility  2. Mod I T/F with LRAD    3. Gait 200' SBA with LRAD    4. I with HEP for ankle/foot ROM/strengthening therex                        Time Tracking:     PT Received On: 03/20/24  PT Start Time: 1340     PT Stop Time: 1352  PT Total Time (min): 12 min     Billable Minutes: Gait Training 12    Treatment Type: Treatment  PT/PTA: PTA     Number of PTA visits since last PT visit: 2     03/20/2024

## 2024-03-20 NOTE — ASSESSMENT & PLAN NOTE
- Mild upward trend now resolving. Likely a combination of fluid status and NSAID use. Urine lytes most consistent with prerenal etiology.  - Hold ketorolac for time being, continue with acetaminophen 1000mg PO TID in its stead. IVFs as under SBO.  - Renal function normalized since 3/18  - Resolved

## 2024-03-20 NOTE — DISCHARGE SUMMARY
[facility] Intensive Care [facility]  Urology  Discharge Summary      Patient Name: Med Palma  MRN: [id number]  Admission Date: 3/13/2024  Hospital Length of Stay: 7 days  Discharge Date and Time:  03/20/2024 12:24 PM  Attending Physician: Yinka Jimenez MD   Discharging Provider: Cecilia Campbell MD  Primary Care Physician: Jah Wilson MD    HPI:   Med Palma is a 68 y.o. 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     Procedure(s) (LRB):  XI ROBOTIC CYSTECTOMY, WITH ILEAL CONDUIT CREATION (Bilateral)  XI ROBOTIC PROSTATECTOMY (N/A)  XI ROBOTIC BIOPSY, PELVIC LYMPH NODE (Bilateral)     Indwelling Lines/Drains at time of discharge:   Lines/Drains/Airways       Central Venous Catheter Line  Duration                  PowerPort A Cath Single Lumen 11/29/23 0750 Internal Jugular Right 112 days              Drain  Duration                  Urostomy 03/13/24 1720 ileal conduit RLQ 6 days                    Hospital Course (synopsis of major diagnoses, care, treatment, and services provided during the course of the hospital stay): Admitted after uncomplicated robotic assisted cystoprostatectomy for small-cell carcinoma of the urinary bladder.  He was admitted to the ICU for postoperative recovery.  Peroneal nerve palsy noted with foot drop managed with brace.  Orthopedic surgery consulted.  Postoperative ileus requiring NG tube.  At time of discharge patient was tolerating regular diet with + flatus/BM.  Wound care assisting with ileal conduit dressing, proficient in care at time of discharge.  He was deemed appropriate for discharge home as he was now tolerating a regular diet with return of bowel function, ambulating well, proficient with stoma care, and meeting all other discharge criteria.  He will be discharged home with subQ Lovenox.    Goals of Care Treatment  "Preferences:  Code Status: Full Code      Consults:   Consults (From admission, onward)          Status Ordering Provider     Inpatient consult to Orthopedic Surgery  Once        Provider:  Joel Haines MD    Acknowledged GRISELDA ARTIS     Inpatient consult to Hospital Medicine  Once        Provider:  DENNIS Damico MD    Completed GRISELDA ARTIS            Significant Diagnostic Studies: KUB    Pending Diagnostic Studies:       Procedure Component Value Units Date/Time    Specimen to Pathology, Surgery Urology [[id number]] Collected: 03/13/24 0931    Order Status: Sent Lab Status: In process Updated: 03/14/24 0834    Specimen: Tissue             Final Active Diagnoses:    Diagnosis Date Noted POA    PRINCIPAL PROBLEM:  Malignant neoplasm of dome of urinary bladder [C67.1] 10/27/2023 Yes    SBO (small bowel obstruction) [K56.609] 03/17/2024 No    Leukocytosis [D72.829] 03/16/2024 No    Elevated serum creatinine [R79.89] 03/16/2024 No    Foot drop [M21.379] 03/15/2024 Yes    Normocytic anemia [D64.9] 11/07/2023 Yes    Benign non-nodular prostatic hyperplasia with lower urinary tract symptoms [N40.1] 12/14/2015 Yes      Problems Resolved During this Admission:         Discharged Condition: good    Disposition: Home-Health Care [facility]    Follow Up:   Follow-up Information       Inc[facility] Follow up.    Specialty: Home Health Services  Why: YOU WILL BE CALLED TO ARRANGE A VISIT FROM THE [facility]  Contact information:  [address] [address]  [phone]                           Patient Instructions:      WALKER FOR HOME USE     Order Specific Question Answer Comments   Type of Walker: Adult (5'4"-6'6")    With wheels? Yes    Height: 6' (1.829 m)    Weight: 96.2 kg (212 lb)    Length of need (1-99 months): 99    Please check all that apply: Patient's condition impairs ambulation.    Vendor: [employer]    CRM Resolution Date: 3/15/2024    Expected Time of " Delivery: 3:15 PM      Medications:  Reconciled Home Medications:      Medication List        START taking these medications      acetaminophen 325 MG tablet  Commonly known as: TYLENOL  Take 1 tablet (325 mg total) by mouth every 6 (six) hours as needed for Pain.     enoxaparin 40 mg/0.4 mL Syrg  Commonly known as: LOVENOX  Inject 0.4 mLs (40 mg total) into the skin once daily.     ibuprofen 600 MG tablet  Commonly known as: ADVIL,MOTRIN  Take 1 tablet (600 mg total) by mouth 3 (three) times daily. for 10 days     oxyCODONE 5 MG immediate release tablet  Commonly known as: ROXICODONE  Take 1 tablet (5 mg total) by mouth every 4 (four) hours as needed for Pain.     polyethylene glycol 17 gram/dose powder  Commonly known as: GLYCOLAX  Take 17 g by mouth once daily. for 14 days            CONTINUE taking these medications      fluticasone propionate 50 mcg/actuation nasal spray  Commonly known as: FLONASE  1 spray (50 mcg total) by Each Nostril route as needed for Allergies.     LIDOcaine-prilocaine cream  Commonly known as: EMLA  Apply topically as needed (place to port site 45-60 minutes prior to chemotherapy).     prochlorperazine 10 MG tablet  Commonly known as: COMPAZINE  Take 1 tablet (10 mg total) by mouth every 6 (six) hours as needed (nausea).     SHINGRIX (PF) 50 mcg/0.5 mL injection  Generic drug: varicella-zoster [id number] (PF)     tadalafiL 20 MG Tab  Commonly known as: CIALIS  Take 1 tablet (20 mg total) by mouth daily as needed (ED).            STOP taking these medications      tamsulosin 0.4 mg Cap  Commonly known as: FLOMAX            ASK your doctor about these medications      phenazopyridine 100 MG tablet  Commonly known as: PYRIDIUM  Take 2 tablets (200 mg total) by mouth 3 (three) times daily as needed for Pain.  Ask about: Should I take this medication?     sulfamethoxazole-trimethoprim 400-80mg 400-80 mg per tablet  Commonly known as: BACTRIM,SEPTRA  Take 1 tablet by mouth 2 (two) times daily.  for 7 days  Ask about: Should I take this medication?              Time spent on the discharge of patient: 20 minutes    Cecilia Campbell MD  Urology  [facility] - Intensive Care ([place])

## 2024-03-20 NOTE — PHYSICIAN QUERY
PT Name: Med Palma  MR #: [id number]    DOCUMENTATION CLARIFICATION    Glenda Medellin RN, CCDS   Documentation Excellence  [email or web address]   This form is a permanent document in the medical record.      Query Date: March 20, 2024    By submitting this query, we are merely seeking further clarification of documentation.   Please utilize your independent clinical judgment when addressing the question(s) below.    The Medical Record contains the following:   Indicators  Supporting Clinical Findings Location in Medical Record   x Anemia documented Normocytic anemia    3/13 [facility]   x H&H 2/27 H&H 10.5/32.1   H/H 8.9/27.3 baseline near 11-12/32-38        03/13/24 20:12 03/14/24 04:55 03/15/24 05:22 03/16/24 03:39 03/17/24 05:37 03/18/24 04:04 03/19 03/20/24 02:50   Hemoglobin 8.9 (L) 8.7 (L) 8.0 (L) 8.7 (L) 8.1 (L) 7.8 (L) 7.3 (L) 9.4 (L)   Hematocrit 27.3 (L) 26.3 (L) 24.4 (L) 26.8 (L) 24.9 (L) 24.1 (L) 22.8 (L) 28.1 (L)    H&P VO  3/13 [facility]      lab       x BP                    HR 3/18   T 98.7 °F   P    R 12-40   BP: (116-142)/(73-90)    3/19   T 98.9        P 75-89    R 11-21    BP: (113-140)/(69-86)     Vs flow sheet  Provider and nurse notes    Bleeding     x Procedure/  Surgery Performed/EBL Op Note 3/13   Yinka Jimenez MD            mL  Pre/post op dx:  1. Small cell muscle invasive  bladder cancer    2. Meatal stenosis   Proc:    1.  Robotic-assisted laparoscopic bilateral pelvic lymph node dissection  2.  Robotic-assisted laparoscopic radical cystoprostatectomy   3.  Robotic creation of ileal conduit urinary diversion  4.  Bilateral ureteral stent placement  5.  Urethral dilation with sounds.  Op Note 3/13   Yinka Jimenez MD        x Transfusion(s) 03/19/24   Transfuse RBC 2 Units       Blood Bank   x Acute/Chronic illness Malignant neoplasm of urinary bladder, Small cell cT3 cN1 cMx   Cisplatin/etoposide per Dr Spicer completed Feb 2024    H&P VO    x Treatments  -trend CBC  & transfuse if hgb <8   transfused 2U PRBC yesterday    3/13 [facility]  3/20 [facility]    Other       Provider, please specify diagnosis or diagnoses associated with above clinical findings.                      - casimiro all that apply -   [   ] Acute blood loss anemia -  complication of surgery   [   ] Acute blood loss anemia expected post-operatively    [   ] Anemia due to neoplastic disease   [   ] Anemia due to antineoplastic chemotherapy   [   ] Normocytic anemia, not further specified   [  x ] Other anemia type - specify:  __ anticipated decrease in HH with major surgery and with pre-exisiting chemo-associated anemia.  Not a complication_______________   [   ] Clinically Undetermined     Please document in your progress notes daily for the duration of treatment, until resolved, and include in your discharge summary.    Form No. 74550

## 2024-03-20 NOTE — PROGRESS NOTES
[facility] Intensive Care [facility]  Urology  Progress Note    Patient Name: Med Palma  MRN: [id number]  Admission Date: 3/13/2024  Hospital Length of Stay: 7 days  Code Status: Full Code   Attending Provider: Yinka Jimenez MD   Primary Care Physician: Jah Wilson MD    Subjective:     HPI:  Med Palma is a 68 y.o. 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     Interval History: +flatus/BM. Tolerating diet. Pain well controlled. Ready for discharge home.     Review of Systems   Constitutional:  Negative for chills and fever.   HENT:  Negative for hearing loss, sore throat and trouble swallowing.    Eyes: Negative.    Respiratory:  Negative for cough and shortness of breath.    Cardiovascular:  Negative for chest pain.   Gastrointestinal:  Positive for abdominal pain. Negative for abdominal distention, constipation, diarrhea, nausea and vomiting.   Genitourinary:  Negative for flank pain and hematuria.   Musculoskeletal:  Negative for arthralgias and neck pain.   Skin:  Negative for color change, pallor and rash.   Neurological:  Negative for dizziness and seizures.   Hematological:  Negative for adenopathy.   Psychiatric/Behavioral:  Negative for confusion.    All other systems reviewed and are negative.    Objective:     Temp:  [98.3 °F (36.8 °C)-98.7 °F (37.1 °C)] 98.3 °F (36.8 °C)  Pulse:  [80-98] 94  Resp:  [11-23] 11  SpO2:  [94 %-98 %] 98 %  BP: (110-142)/(72-90) 133/90     Body mass index is 28.76 kg/m².           Drains       Drain  Duration                  Urostomy 03/13/24 1720 ileal conduit RLQ 6 days                     Physical Exam  Constitutional:       General: He is not in acute distress.  HENT:      Head: Normocephalic.   Cardiovascular:      Rate and Rhythm: Normal rate.   Pulmonary:      Effort: Pulmonary effort is normal.   Abdominal:       "General: There is no distension.      Palpations: Abdomen is soft.      Tenderness: There is abdominal tenderness. There is no right CVA tenderness or left CVA tenderness.      Comments: Midline abd incision c/d/i  RLQ urostomy pink, patent, moist, with red rubber visible through stoma. Clear thin cesar urine in bag with small amount of clot and mucus  Abd benign, soft.  Non distended.   Drain removed, site dry   Musculoskeletal:         General: Normal range of motion.   Skin:     General: Skin is warm and dry.   Neurological:      Mental Status: He is alert and oriented to person, place, and time.      Coordination: Coordination normal.      Comments: R foot weakness with dorsiflexion   Psychiatric:         Mood and Affect: Mood normal.         Behavior: Behavior normal.           Significant Labs:    BMP:  Recent Labs   Lab 03/18/24  0404 03/19/24  0643 03/20/24  0250    139 137   K 4.4 4.3 3.9    107 107   CO2 27 23 22*   BUN 39* 28* 20   CREATININE 1.2 0.9 0.8   CALCIUM 8.8 8.1* 8.0*       CBC:   Recent Labs   Lab 03/18/24  0404 03/19/24  0644 03/20/24  0250   WBC 14.23* 14.56* 17.53*   HGB 7.8* 7.3* 9.4*   HCT 24.1* 22.8* 28.1*    252 264       Blood Culture: No results for input(s): "LABBLOO" in the last 168 hours.  Urine Culture: No results for input(s): "LABURIN" in the last 168 hours.  Urine Studies: No results for input(s): "COLORU", "APPEARANCEUA", "PHUR", "SPECGRAV", "PROTEINUA", "GLUCUA", "KETONESU", "BILIRUBINUA", "OCCULTUA", "NITRITE", "UROBILINOGEN", "LEUKOCYTESUR", "RBCUA", "WBCUA", "BACTERIA", "SQUAMEPITHEL", "HYALINECASTS" in the last 168 hours.    Invalid input(s): "WRIGHTSUR"    Significant Imaging:  All pertinent imaging results/findings from the past 24 hours have been reviewed.                  Assessment/Plan:     * Malignant neoplasm of dome of urinary bladder  Med Palma is a 68 y.o. M with history of T3N1M0 small cell bladder cancer s/p robotic radical " cystoprostatectomy, bilateral standard pelvic lymph node dissection, intracorporal ileal conduit on 3/13.      - Tylenol PRN. Narcotics held. Toradol held due to elevated Cr.  - Regular diet  - SC heparin. Home w Lovenox.  - IVF stopped  - 2u PRBCs yesterday  - Ambulate QID, PT/OT consult  - Wound care ostomy consulted  - Drains: ALEXANDRA removed  - ALEXANDRA Cr c/w serum  - Prophylaxis: IS, SCDs, GI ppx    Okay for d/c home today          VTE Risk Mitigation (From admission, onward)           Ordered     enoxaparin injection 40 mg  Every 24 hours         03/19/24 1335     Place sequential compression device  Until discontinued         03/19/24 0700     Place MCKENZIE hose  Until discontinued         03/19/24 0700     IP VTE HIGH RISK PATIENT  Once         03/13/24 1740     Place sequential compression device  Until discontinued         03/13/24 0601     Place MCKENZIE hose  Until discontinued         03/13/24 0601                    Cecilia Campbell MD  Urology  [facility] - Intensive Care ([place])

## 2024-03-20 NOTE — PROGRESS NOTES
[facility] Intensive Care [facility] Medicine  Progress Note    Patient Name: Med Palma  MRN: [id number]  Patient Class: IP- Inpatient   Admission Date: 3/13/2024  Length of Stay: 7 days  Attending Physician: Yinka Jimenez MD  Primary Care Provider: Jah Wilson MD        Subjective:     Principal Problem:Malignant neoplasm of dome of urinary bladder        HPI:  Med Palma is a 68-year-old male with a past medical history of arthritis, elevated PSA, hypertension and bladder cancer who presents for scheduled robotic radical cystectoprostatectomy and PLND and ileal conduit vs neobladder with Dr. Jimenez, urology.  Patient underwent TURBT 10/26/23 and was diagnosed with bladder cancer.  Patient is followed by , Hem/Onc and received 4 rounds of chemotherapy with last treatment completed 1 month ago.  Patient was seen by Dr. Jimenez, outpatient and cleared for surgery per heme Onc.  Hospital Medicine was consulted for medical comanagement when patient was seen postoperatively.  Patient states his pain is currently controlled and denies fever, chills, nausea, vomiting, shortness of breath and chest pain.  Hospital Medicine will continue to follow and remain available for medical comanagement.        Interval History: No acute events overnight; transfused 2U PRBC yesterday. Feeling good, passing flatus and with +BM x 3, ate some solid food but not much due to not liking the food.. No further nausea/vomiting.    Review of Systems   Constitutional:  Negative for chills and fever.   Respiratory:  Negative for cough and shortness of breath.    Cardiovascular:  Negative for chest pain and palpitations.   Gastrointestinal:  Negative for abdominal pain, nausea and vomiting.     Objective:     Vital Signs (Most Recent):  Temp: 98.7 °F (37.1 °C) (03/20/24 0302)  Pulse: 92 (03/20/24 0701)  Resp: 13 (03/20/24 0701)  BP: 124/84 (03/20/24 0701)  SpO2: 97 % (03/20/24 0701) Vital Signs (24h  Range):  Temp:  [98.2 °F (36.8 °C)-98.7 °F (37.1 °C)] 98.7 °F (37.1 °C)  Pulse:  [] 92  Resp:  [12-23] 13  SpO2:  [93 %-98 %] 97 %  BP: (110-142)/(63-86) 124/84     Weight: 96.2 kg (212 lb 1.3 oz)  Body mass index is 28.76 kg/m².    Intake/Output Summary (Last 24 hours) at 3/20/2024 0706  Last data filed at 3/20/2024 0348  Gross per 24 hour   Intake 1809.23 ml   Output 1525 ml   Net 284.23 ml           Physical Exam  Vitals and nursing note reviewed.   Constitutional:       General: He is not in acute distress.     Appearance: He is well-developed.   HENT:      Head: Normocephalic and atraumatic.      Nose: Nose normal.   Eyes:      General:         Right eye: No discharge.         Left eye: No discharge.      Conjunctiva/sclera: Conjunctivae normal.   Cardiovascular:      Rate and Rhythm: Normal rate.      Pulses: Normal pulses.   Pulmonary:      Effort: Pulmonary effort is normal. No respiratory distress.   Abdominal:      General: Bowel sounds are normal.      Palpations: Abdomen is soft.      Tenderness: There is no abdominal tenderness. There is no guarding or rebound.      Comments: Incisions C/D/I. Ostomy in place.   Musculoskeletal:         General: Normal range of motion.      Cervical back: Normal range of motion.      Right lower leg: No edema.      Left lower leg: No edema.   Skin:     General: Skin is warm and dry.   Neurological:      General: No focal deficit present.      Mental Status: He is alert and oriented to person, place, and time.   Psychiatric:         Mood and Affect: Mood normal.         Behavior: Behavior normal.         Thought Content: Thought content normal.           Significant Labs:   CBC:  Recent Labs   Lab 03/18/24  0404 03/19/24  0644 03/20/24  0250   WBC 14.23* 14.56* 17.53*   HGB 7.8* 7.3* 9.4*   HCT 24.1* 22.8* 28.1*    252 264   GRAN 77.4*  11.0* 83.7*  12.2* 86.1*  15.1*   LYMPH 12.7*  1.8 7.1*  1.0 6.0*  1.1   MONO 8.4  1.2* 7.8  1.1* 6.4  1.1*   EOS  0.0 0.0 0.0   BASO 0.06 0.04 0.04     BMP:  Recent Labs   Lab 03/18/24  0404 03/19/24  0643 03/20/24  0250    139 137   K 4.4 4.3 3.9    107 107   CO2 27 23 22*   BUN 39* 28* 20   CREATININE 1.2 0.9 0.8    92 117*   CALCIUM 8.8 8.1* 8.0*   MG 2.1 1.8 1.6   PHOS 3.1 3.3 3.3       Significant Imaging: I have reviewed and interpreted all pertinent imaging results/findings within the past 24 hours.    Assessment/Plan:      * Malignant neoplasm of dome of urinary bladder  - Primary management, pain control as per Urology.    SBO (small bowel obstruction)  Leukocytosis  - WBCs improving; no signs/symptoms of focal infectious process. Suspect reactive with procedure / SBO.  - s/p NPO and NGT was removed yesterday  - Tolerating Boost and passing flatus/ +BMs, and diet advanced to solid   - Stopped IV fluids, tolerating diet and having multiple BMs  - Resolved    Elevated serum creatinine  - Mild upward trend now resolving. Likely a combination of fluid status and NSAID use. Urine lytes most consistent with prerenal etiology.  - Hold ketorolac for time being, continue with acetaminophen 1000mg PO TID in its stead. IVFs as under SBO.  - Renal function normalized since 3/18  - Resolved    Leukocytosis  - As above.    Foot drop  - Noted with ambulation; suspect 2/2 positioning in case though patient reports has had some issues since prior chemo.  - Appreciate Orthopedics assistance. Doing well with AFO.  - Appears resolved and working with PT/OT, plan for home health    Normocytic anemia  - Mild decrease since OR   - Transfused 2U PRBC yesterday and H/H with appropriate rise  - Elevated WBC trend up likely reactive to transfusion and not infection given no fever  - Monitor.    Benign non-nodular prostatic hyperplasia with lower urinary tract symptoms  - As per primary.      VTE Risk Mitigation (From admission, onward)           Ordered     enoxaparin injection 40 mg  Every 24 hours         03/19/24 2345      Place sequential compression device  Until discontinued         03/19/24 0700     Place MCKENZIE hose  Until discontinued         03/19/24 0700     IP VTE HIGH RISK PATIENT  Once         03/13/24 1740     Place sequential compression device  Until discontinued         03/13/24 0601     Place MCKENZIE hose  Until discontinued         03/13/24 0601                        Tg Walker MD  Department of Hospital Medicine   [facility] - Intensive Care [facility]

## 2024-03-20 NOTE — ASSESSMENT & PLAN NOTE
- Mild decrease since OR   - Transfused 2U PRBC yesterday and H/H with appropriate rise  - Elevated WBC trend up likely reactive to transfusion and not infection given no fever  - Monitor.

## 2024-03-20 NOTE — PT/OT/SLP PROGRESS
Occupational Therapy   Treatment    Name: Med Palma  MRN: [id number]  Admitting Diagnosis:  Malignant neoplasm of dome of urinary bladder  7 Days Post-Op    Recommendations:     Discharge Recommendations: Low Intensity Therapy  Discharge Equipment Recommendations:  walker, rolling  Barriers to discharge:       Assessment:     Med Palma is a 68 y.o. male with a medical diagnosis of Malignant neoplasm of dome of urinary bladder.  He presents with wife and friends during session. Performance deficits affecting function are weakness, impaired endurance, impaired self care skills, impaired functional mobility, gait instability, impaired balance, decreased lower extremity function, decreased ROM, impaired skin. Patient with SBA,RW for functional mobility for distance of 200ft x2. SBA to don tennis shoes seated EOB.     Rehab Prognosis:  Good; patient would benefit from acute skilled OT services to address these deficits and reach maximum level of function.       Plan:     Patient to be seen 2 x/week to address the above listed problems via self-care/home management, therapeutic activities  Plan of Care Expires: 04/14/24  Plan of Care Reviewed with: patient, spouse, friend    Subjective     Chief Complaint: none stated   Patient/Family Comments/goals:  return home   Pain/Comfort:  Pain Rating 1: 0/10    Objective:     Communicated with: RN prior to session.  Patient found supine with blood pressure cuff, telemetry, pulse ox (continuous), peripheral IV, upon OT entry to room.    General Precautions: Standard, fall    Orthopedic Precautions:N/A  Braces: N/A  Respiratory Status: Room air     Occupational Performance:     Bed Mobility:    Supine <> Sit: Supervision      Functional Mobility/Transfers:  Sit <> Stand: Supervision   Functional Mobility: SBA, RW no AFO donned with distance for 200ft x2. Cues given for slow pace for safety and energy conservation    Activities of Daily Living:  LB Dressing: SBA to don  tennis shoes seated EOB. Pt defer donning AFO and mesh underwear   UB Dressing: SBA to don back gown as robe seated EOB   Toileting: patient defer needing to use the bathroom at this time       [facility] 6 Click ADL: 19    Treatment & Education  OT role, plan of care, progression of goals, importance of continued OOB activity, ADL/functional transfer and mobility retraining, discharge recommendation, call don't fall, safety precautions, fall prevention.     Patient left up in chair with all lines intact, call button in reach, RN notified, and wife and friend  present    GOALS:   Multidisciplinary Problems       Occupational Therapy Goals          Problem: Occupational Therapy    Goal Priority Disciplines Outcome Interventions   Occupational Therapy Goal     OT, PT/OT Ongoing, Progressing    Description: Goals to be met by: 3/22/24     Patient will increase functional independence with ADLs by performing:    Grooming standing at sink at Supervision level.  UB Dressing at Supervision level, including retrieval of clothing.  LB Dressing at Supervision level, including retrieval of clothing.  Sponge bathing seated at sink at Set up/Supervision level.  Toileting at Supervision level.  Toilet transfers at Supervision level.                         Time Tracking:     OT Date of Treatment: 03/20/24  OT Start Time: 1135  OT Stop Time: 1159  OT Total Time (min): 24 min    Billable Minutes:Self Care/Home Management 14 min  Therapeutic Activity 10 min    OT/DANE: DANE     Number of DANE visits since last OT visit: 1    3/20/2024

## 2024-03-20 NOTE — NURSING
Patient had 14 beat run of V-Tach. Patient was sleeping, asymptomatic. VSS. NP Corky Plamer notified. Instructed to monitor for now and notify if it occurs again.

## 2024-03-20 NOTE — SUBJECTIVE & OBJECTIVE
Interval History: +flatus/BM. Tolerating diet. Pain well controlled. Ready for discharge home.     Review of Systems   Constitutional:  Negative for chills and fever.   HENT:  Negative for hearing loss, sore throat and trouble swallowing.    Eyes: Negative.    Respiratory:  Negative for cough and shortness of breath.    Cardiovascular:  Negative for chest pain.   Gastrointestinal:  Positive for abdominal pain. Negative for abdominal distention, constipation, diarrhea, nausea and vomiting.   Genitourinary:  Negative for flank pain and hematuria.   Musculoskeletal:  Negative for arthralgias and neck pain.   Skin:  Negative for color change, pallor and rash.   Neurological:  Negative for dizziness and seizures.   Hematological:  Negative for adenopathy.   Psychiatric/Behavioral:  Negative for confusion.    All other systems reviewed and are negative.    Objective:     Temp:  [98.3 °F (36.8 °C)-98.7 °F (37.1 °C)] 98.3 °F (36.8 °C)  Pulse:  [80-98] 94  Resp:  [11-23] 11  SpO2:  [94 %-98 %] 98 %  BP: (110-142)/(72-90) 133/90     Body mass index is 28.76 kg/m².           Drains       Drain  Duration                  Urostomy 03/13/24 1720 ileal conduit RLQ 6 days                     Physical Exam  Constitutional:       General: He is not in acute distress.  HENT:      Head: Normocephalic.   Cardiovascular:      Rate and Rhythm: Normal rate.   Pulmonary:      Effort: Pulmonary effort is normal.   Abdominal:      General: There is no distension.      Palpations: Abdomen is soft.      Tenderness: There is abdominal tenderness. There is no right CVA tenderness or left CVA tenderness.      Comments: Midline abd incision c/d/i  RLQ urostomy pink, patent, moist, with red rubber visible through stoma. Clear thin cesar urine in bag with small amount of clot and mucus  Abd benign, soft.  Non distended.   Drain removed, site dry   Musculoskeletal:         General: Normal range of motion.   Skin:     General: Skin is warm and dry.  "  Neurological:      Mental Status: He is alert and oriented to person, place, and time.      Coordination: Coordination normal.      Comments: R foot weakness with dorsiflexion   Psychiatric:         Mood and Affect: Mood normal.         Behavior: Behavior normal.           Significant Labs:    BMP:  Recent Labs   Lab 03/18/24  0404 03/19/24  0643 03/20/24  0250    139 137   K 4.4 4.3 3.9    107 107   CO2 27 23 22*   BUN 39* 28* 20   CREATININE 1.2 0.9 0.8   CALCIUM 8.8 8.1* 8.0*       CBC:   Recent Labs   Lab 03/18/24  0404 03/19/24  0644 03/20/24  0250   WBC 14.23* 14.56* 17.53*   HGB 7.8* 7.3* 9.4*   HCT 24.1* 22.8* 28.1*    252 264       Blood Culture: No results for input(s): "LABBLOO" in the last 168 hours.  Urine Culture: No results for input(s): "LABURIN" in the last 168 hours.  Urine Studies: No results for input(s): "COLORU", "APPEARANCEUA", "PHUR", "SPECGRAV", "PROTEINUA", "GLUCUA", "KETONESU", "BILIRUBINUA", "OCCULTUA", "NITRITE", "UROBILINOGEN", "LEUKOCYTESUR", "RBCUA", "WBCUA", "BACTERIA", "SQUAMEPITHEL", "HYALINECASTS" in the last 168 hours.    Invalid input(s): "WRIGHTSUR"    Significant Imaging:  All pertinent imaging results/findings from the past 24 hours have been reviewed.                "

## 2024-03-21 ENCOUNTER — PATIENT MESSAGE (OUTPATIENT)
Dept: UROLOGY | Facility: CLINIC | Age: 69
End: 2024-03-21
Payer: MEDICARE

## 2024-03-21 PROCEDURE — G0180 MD CERTIFICATION HHA PATIENT: HCPCS | Mod: ,,, | Performed by: UROLOGY

## 2024-03-22 ENCOUNTER — PATIENT OUTREACH (OUTPATIENT)
Dept: ADMINISTRATIVE | Facility: CLINIC | Age: 69
End: 2024-03-22
Payer: MEDICARE

## 2024-03-25 ENCOUNTER — OFFICE VISIT (OUTPATIENT)
Dept: UROLOGY | Facility: CLINIC | Age: 69
End: 2024-03-25
Payer: MEDICARE

## 2024-03-25 VITALS — HEART RATE: 102 BPM | DIASTOLIC BLOOD PRESSURE: 64 MMHG | SYSTOLIC BLOOD PRESSURE: 97 MMHG

## 2024-03-25 DIAGNOSIS — C67.9 MALIGNANT NEOPLASM OF URINARY BLADDER, UNSPECIFIED SITE: Primary | ICD-10-CM

## 2024-03-25 LAB
FINAL PATHOLOGIC DIAGNOSIS: NORMAL
FROZEN SECTION DIAGNOSIS: NORMAL
FROZEN SECTION FOOTNOTE: NORMAL
GROSS: NORMAL
Lab: NORMAL

## 2024-03-25 PROCEDURE — 3074F SYST BP LT 130 MM HG: CPT | Mod: HCNC,CPTII,S$GLB, | Performed by: NURSE PRACTITIONER

## 2024-03-25 PROCEDURE — 1126F AMNT PAIN NOTED NONE PRSNT: CPT | Mod: HCNC,CPTII,S$GLB, | Performed by: NURSE PRACTITIONER

## 2024-03-25 PROCEDURE — 1160F RVW MEDS BY RX/DR IN RCRD: CPT | Mod: HCNC,CPTII,S$GLB, | Performed by: NURSE PRACTITIONER

## 2024-03-25 PROCEDURE — 1159F MED LIST DOCD IN RCRD: CPT | Mod: HCNC,CPTII,S$GLB, | Performed by: NURSE PRACTITIONER

## 2024-03-25 PROCEDURE — 99024 POSTOP FOLLOW-UP VISIT: CPT | Mod: HCNC,S$GLB,, | Performed by: NURSE PRACTITIONER

## 2024-03-25 PROCEDURE — 3078F DIAST BP <80 MM HG: CPT | Mod: HCNC,CPTII,S$GLB, | Performed by: NURSE PRACTITIONER

## 2024-03-25 NOTE — PROGRESS NOTES
Subjective:      Med Palma is a 68 y.o. male who returns today for wound check. Established patient of Dr. Campbell and Dr. Jimenez' and new to me today.     The patient has a history of T3N1M0 small cell bladder cancer s/p robotic cystoprostatectomy, bilateral standard pelvic lymph node dissection, intracorporal ileal conduit on 3/13 with DR. Jimenez. Post op recovery was complicated by peroneal nerve palsy noted with foot drop- managed with brace. Also post op ileus requiring NG tube.     Doing fantastic. Appetite is slowly improving. His wife is changing his bag- doing fantastic with good fit. Denies flank pain and fever/chills. Reports bilateral foot pain (tingling/burning pain) but range of motion/control is improving. Having BMs and gas.     The following portions of the patient's history were reviewed and updated as appropriate: allergies, current medications, past family history, past medical history, past social history, past surgical history and problem list.    Review of Systems  Constitutional: no fever or chills  ENT: no nasal congestion or sore throat  Respiratory: no cough or shortness of breath  Cardiovascular: no chest pain or palpitations  Gastrointestinal: no nausea or vomiting, tolerating diet  Genitourinary: as per HPI  Hematologic/Lymphatic: no easy bruising or lymphadenopathy  Musculoskeletal: no arthralgias or myalgias  Neurological: no seizures or tremors  Behavioral/Psych: no auditory or visual hallucinations     Objective:   Vitals:   Vitals:    03/25/24 0726   BP: 97/64   Pulse: 102     Physical Exam   General: alert and oriented, no acute distress  Head: normocephalic, atraumatic  Neck: supple, normal ROM  Respiratory: Symmetric expansion, non-labored breathing  Cardiovascular: regular rate and rhythm  Abdomen: soft, non tender, non distended; urostomy pink with well fitting appliance draining yellow urine + mucous; incisions CDI dressed with dermabond, healing well    Genitourinary: deferred   Skin: normal coloration and turgor, no rashes, no suspicious skin lesions noted  Neuro: alert and oriented x3, no gross deficits  Psych: normal judgment and insight, normal mood/affect, and non-anxious    Lab Review   Urinalysis demonstrates: no sample   Lab Results   Component Value Date    WBC 17.53 (H) 03/20/2024    HGB 9.4 (L) 03/20/2024    HCT 28.1 (L) 03/20/2024    MCV 94 03/20/2024     03/20/2024     Lab Results   Component Value Date    CREATININE 0.8 03/20/2024    BUN 20 03/20/2024     Lab Results   Component Value Date    PSA 0.96 10/10/2011     Imaging   None    Assessment:     1. Malignant neoplasm of urinary bladder, unspecified site      Plan:   Diagnoses and all orders for this visit:    Malignant neoplasm of urinary bladder, unspecified site  -     Cystoscopy; Future  -     Urine culture; Standing    Plan:  --Dr. Jimenez will notify with path results when available  --Continue PT. Discussed Neurontin, pt declines for now   --Advance diet as tolerate  --Avoid straining for BM and heavy lifting for full 6 weeks  --Cysto with stent removal in 6 weeks, standing order for urine culture 1 week prior   --Follow up with Karin and Dr. Spicer as scheduled

## 2024-03-26 ENCOUNTER — TUMOR BOARD CONFERENCE (OUTPATIENT)
Dept: UROLOGY | Facility: HOSPITAL | Age: 69
End: 2024-03-26
Payer: MEDICARE

## 2024-03-26 NOTE — PROGRESS NOTES
Oncology History   Malignant neoplasm of dome of urinary bladder        PATIENT SUMMARY:   68 y.o. male has a history of T3N1M0 small cell bladder cancer s/p robotic cystoprostatectomy, bilateral standard pelvic lymph node dissection, intracorporeal ileal conduit on 3/13 with Dr. Jimenez.      PERFORMANCE STATUS:  ECOG 0    Estimated GFR/CKD Stage: >60 (Cr 0.8)    Clinical/Pathologic Stage (TNM): ypT3b N1 Mx    DISCUSSION:  Consideration for adjuvant therapy after NAC and radical cystectomy in patient with small cell muscle invasive bladder cancer.    FACULTY IN ATTENDANCE:    Urologic Oncology: John Vidales MD [x], Yinka Jimenez MD [x] , Codey Case MD [], Jeffy Morgan MD [x], Feliz Damon MD [x]     Medical Oncology: Tyler Grullon MD [], Mary Spicer MD [x] , Osman Goldman MD [x], Isidro Read MD [] Juan M Cruz MD [], Kasie Alexander MD [] , Evelyn Paul MD [], Zia Da Silva MD [] Zunilda Hall MD []     Radiation Oncology: Dandy Rodriguez MD [x], Lee Taylor MD [x]     CONSULT NEEDED:     [] Urologic Oncology    [] Med Onc    [] Rad/Onc  [] CRS [] Surg Onc    [x] Treatment Guidelines (NCCN and AUA) reviewed and care planned is consistent with guidelines.    TUMOR BOARD RECOMMENDATIONS/PLAN/CONSENSUS:     Case discussed among group. Pathology and radiologic images were reviewed (if applicable).    Patient with history of small cell bladder cancer now s/p NAC (cis + etoposide) and radical cystectomy with lissette disease. Recommend adjuvant immunotherapy.

## 2024-03-26 NOTE — PHYSICIAN QUERY
PT Name: Med Palma  MR #: [id number]    DOCUMENTATION CLARIFICATION   Glenda Medellin RN, CCDS   Documentation Excellence  [email or web address]   This form is a permanent document in the medical record.     Query Date: March 26, 2024    By submitting this query, we are merely seeking further clarification of documentation.    Please utilize your independent clinical judgment when addressing the question(s) below.    The medical record contains the following:  Pathology Findings Location in Medical Record    1. Left ureter, excision: - Portion of ureter without significant pathologic change      2. Right ureter, excision: - Portion of ureter without significant pathologic change      3. Bladder, prostate and left iliac lymph node, radical cystoprostatectomy: - Small cell neuroendocrine carcinoma - Tumor size: 1.5 cm - Resection margins: free of tumor - Prostate with atrophy and benign prostatic hyperplasia (BPH) - One of one lymph node, positive for carcinoma (1/1) - Size of Largest Metastatic Deposit: at least 2.0 cm - Extranodal Extension: not identified     4. Right pelvic lymph nodes, excision:   - Fifteen lymph nodes, negative for carcinoma (0/15)   - No treatment effect seen     5. Left pelvic lymph nodes, excision:   - Ten lymph nodes, negative for carcinoma (0/10)   - No treatment effect seen     Note: Cytokeratin AE1/AE3 on parts 4 and 5 support the above diagnosis. Immunostains performed with  appropriate positive and negative controls.     URINARY BLADDER: Cystectomy, Anterior Exenteration: SPECIMEN Procedure Radical cystoprostatectomy   TUMOR Tumor Site Dome   Histologic Type Small cell neuroendocrine carcinoma   Tumor Extension Tumor invades perivesical soft tissue   Tumor Size Greatest Dimension (Centimeters) 1.5 cm   Tumor Extent: Macroscopically (extravesical mass)   Lymphovascular Invasion Present   Tumor Configuration Solid / nodule   MARGINS Margins Uninvolved by invasive carcinoma   LYMPH  NODES Regional Lymph Node Status: : Regional Lymph Node Status: Tumor present in regional lymph node(s): Number of Lymph Nodes Involved 1   Size of Largest Gordon Metastatic Deposit: At least 2.0 cm   Size of Largest Lymph Node with Tumor: 2.2 cm   Number of Lymph Nodes Examined 26   TNM Descriptors y (post-treatment)   pT Category: pT3b   pN Category: pN1  Block for potential molecular or ancillary studies:  Tumor block: 3-E, 3-G, 3-I   Final Path Collected: 03/13/24          Please clarify the pathology findings.    [ x ] Pathology findings noted above are ruled in/confirmed as diagnoses     [  ] Pathology findings noted above are not confirmed as diagnoses     [  ] Pathology findings noted above are incidental     [  ] Other diagnosis (please specify): ___________     [  ]     Clinically Undetermined     Please document in your progress notes daily for the duration of treatment until resolved and include in your discharge summary.    Form No. [id number]

## 2024-03-28 NOTE — PROGRESS NOTES
"Dr Jimenez called Mr Palma with path results  He is doing great.  He foot is "50-80%" and he is eating a roast beef po-boy  We discussed the possibility of adjuvant immunotherapy; he will discuss this further with Dr Spicer"

## 2024-04-02 DIAGNOSIS — G62.9 NEUROPATHY: Primary | ICD-10-CM

## 2024-04-02 RX ORDER — GABAPENTIN 100 MG/1
100 CAPSULE ORAL 3 TIMES DAILY
Qty: 90 CAPSULE | Refills: 11 | Status: SHIPPED | OUTPATIENT
Start: 2024-04-02 | End: 2025-04-02

## 2024-04-04 ENCOUNTER — OFFICE VISIT (OUTPATIENT)
Dept: WOUND CARE | Facility: CLINIC | Age: 69
End: 2024-04-04
Payer: MEDICARE

## 2024-04-04 DIAGNOSIS — Z71.89 ENCOUNTER FOR OSTOMY CARE EDUCATION: ICD-10-CM

## 2024-04-04 DIAGNOSIS — Z43.6 ATTENTION TO UROSTOMY: Primary | ICD-10-CM

## 2024-04-04 PROCEDURE — 1111F DSCHRG MED/CURRENT MED MERGE: CPT | Mod: HCNC,CPTII,S$GLB, | Performed by: CLINICAL NURSE SPECIALIST

## 2024-04-04 PROCEDURE — 99999 PR PBB SHADOW E&M-EST. PATIENT-LVL II: CPT | Mod: PBBFAC,HCNC,, | Performed by: CLINICAL NURSE SPECIALIST

## 2024-04-04 PROCEDURE — 1160F RVW MEDS BY RX/DR IN RCRD: CPT | Mod: HCNC,CPTII,S$GLB, | Performed by: CLINICAL NURSE SPECIALIST

## 2024-04-04 PROCEDURE — 1159F MED LIST DOCD IN RCRD: CPT | Mod: HCNC,CPTII,S$GLB, | Performed by: CLINICAL NURSE SPECIALIST

## 2024-04-04 PROCEDURE — 99214 OFFICE O/P EST MOD 30 MIN: CPT | Mod: HCNC,S$GLB,, | Performed by: CLINICAL NURSE SPECIALIST

## 2024-04-04 NOTE — PROGRESS NOTES
"Subjective     Patient ID: Med Palma is a 68 y.o. male.    Chief Complaint: Urostomy    This is my first meeting with pt and he is now 3 weeks (3/13) post op cystectomy at [facility] with Dr Jimenez.  He is home with home health which he says he does not really need at this time , and getting stronger each day, His only real post op complaint is the nerve pain in his feet. Doing well with ostomy , his wife is changing pouches for him so far, he leaked several times last week and she found a convex version to try and this has worked much better .  He has internal stents.       Review of Systems   Constitutional:  Positive for activity change. Negative for appetite change and fever.   Respiratory: Negative.     Cardiovascular: Negative.    Gastrointestinal:  Negative for constipation and diarrhea.   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   Pulmonary:      Effort: Pulmonary effort is normal. No respiratory distress.   Abdominal:      General: Abdomen is flat. There is no distension.      Palpations: Abdomen is soft.   Skin:     Findings: Rash present.   Neurological:      Mental Status: He is alert.         Nice budded stoma 25 mm now,   it is low budded but convexity will improve its height and prevent pouch failures. He has slight rash appears yeasty so showed them how to apply antifungal powder OTC  and applied a 1" cut light convex,.   Showed them the [employer] night bag which is preferable to one they have ( bag from ICU with urometer )  Sent home 4 pouches and bedside bag today   Will send RX to [facility] today and they plan to order asap  [address] [employer]      Assessment and Plan     1. Attention to urostomy    2. Encounter for ostomy care education        Ostomy teaching and refitting of pouch , convexity and belt suggested   Script to [facility]    [facility] new " pt guide sent with pt   Fu as needed   I spent a total of 45 minutes on the day of the visit.  This includes face to face time and non-face to face time preparing to see the patient (eg, review of tests), obtaining and/or reviewing separately obtained history, documenting clinical information in the electronic or other health record, independently interpreting results and communicating results to the patient/family/caregiver, or care coordinator.            No follow-ups on file.

## 2024-04-09 ENCOUNTER — OFFICE VISIT (OUTPATIENT)
Dept: HEMATOLOGY/ONCOLOGY | Facility: CLINIC | Age: 69
End: 2024-04-09
Payer: MEDICARE

## 2024-04-09 VITALS
WEIGHT: 184.94 LBS | HEIGHT: 72 IN | SYSTOLIC BLOOD PRESSURE: 120 MMHG | TEMPERATURE: 97 F | DIASTOLIC BLOOD PRESSURE: 74 MMHG | OXYGEN SATURATION: 98 % | HEART RATE: 99 BPM | RESPIRATION RATE: 17 BRPM | BODY MASS INDEX: 25.05 KG/M2

## 2024-04-09 DIAGNOSIS — R49.0 HOARSENESS: ICD-10-CM

## 2024-04-09 DIAGNOSIS — G62.9 NEUROPATHY: ICD-10-CM

## 2024-04-09 DIAGNOSIS — C67.1 MALIGNANT NEOPLASM OF DOME OF URINARY BLADDER: Primary | ICD-10-CM

## 2024-04-09 PROCEDURE — 3008F BODY MASS INDEX DOCD: CPT | Mod: HCNC,CPTII,S$GLB, | Performed by: INTERNAL MEDICINE

## 2024-04-09 PROCEDURE — 1101F PT FALLS ASSESS-DOCD LE1/YR: CPT | Mod: HCNC,CPTII,S$GLB, | Performed by: INTERNAL MEDICINE

## 2024-04-09 PROCEDURE — 3078F DIAST BP <80 MM HG: CPT | Mod: HCNC,CPTII,S$GLB, | Performed by: INTERNAL MEDICINE

## 2024-04-09 PROCEDURE — 3288F FALL RISK ASSESSMENT DOCD: CPT | Mod: HCNC,CPTII,S$GLB, | Performed by: INTERNAL MEDICINE

## 2024-04-09 PROCEDURE — 1126F AMNT PAIN NOTED NONE PRSNT: CPT | Mod: HCNC,CPTII,S$GLB, | Performed by: INTERNAL MEDICINE

## 2024-04-09 PROCEDURE — 99214 OFFICE O/P EST MOD 30 MIN: CPT | Mod: HCNC,S$GLB,, | Performed by: INTERNAL MEDICINE

## 2024-04-09 PROCEDURE — 1159F MED LIST DOCD IN RCRD: CPT | Mod: HCNC,CPTII,S$GLB, | Performed by: INTERNAL MEDICINE

## 2024-04-09 PROCEDURE — 3074F SYST BP LT 130 MM HG: CPT | Mod: HCNC,CPTII,S$GLB, | Performed by: INTERNAL MEDICINE

## 2024-04-09 PROCEDURE — 99999 PR PBB SHADOW E&M-EST. PATIENT-LVL III: CPT | Mod: PBBFAC,HCNC,, | Performed by: INTERNAL MEDICINE

## 2024-04-09 PROCEDURE — 1160F RVW MEDS BY RX/DR IN RCRD: CPT | Mod: HCNC,CPTII,S$GLB, | Performed by: INTERNAL MEDICINE

## 2024-04-09 PROCEDURE — 1111F DSCHRG MED/CURRENT MED MERGE: CPT | Mod: HCNC,CPTII,S$GLB, | Performed by: INTERNAL MEDICINE

## 2024-04-09 NOTE — PROGRESS NOTES
Subjective     Patient ID: Med Palma is a 68 y.o. male.    Chief Complaint: Malignant neoplasm of dome of urinary bladder    HPI    Diagnosis: Small cell bladder cancer    He has a history of T3N1M0 small cell bladder cancer and received preoperative chemotherapy and is now s/p robotic cystoprostatectomy, bilateral standard pelvic lymph node dissection, and intracorporal ileal conduit on 3/13/2024.  Returns post operatively for assessment     He has done very well  We did discuss continued neuropathy worse after surgery and his hearing change  He also was noted when we talked to he hoarse and does acknowledge a cough at times with swallowing.- we discussed likely related to intubation    We reviewed final pathology:  1. Left ureter, excision:      - Portion of ureter without significant pathologic change  2. Right ureter, excision:      - Portion of ureter without significant pathologic change  3. Bladder, prostate and left iliac lymph node, radical cystoprostatectomy:      - Small cell neuroendocrine carcinoma      - Tumor size: 1.5 cm      - Resection margins: free of tumor      - Prostate with atrophy and benign prostatic hyperplasia (BPH)      - One of one lymph node, positive for carcinoma (1/1)          - Size of Largest Metastatic Deposit: at least 2.0 cm          - Extranodal Extension: not identified  4. Right pelvic lymph nodes, excision:      - Fifteen lymph nodes, negative for carcinoma (0/15)      - No treatment effect seen  5. Left pelvic lymph nodes, excision:      - Ten lymph nodes, negative for carcinoma (0/10)      - No treatment effect seen  Note: Cytokeratin AE1/AE3 on parts 4 and 5 support the above diagnosis. Immunostains performed with appropriate positive and negative controls.    Final staging:  pT3b pN1     Oncology History:  - presented with gross hematuria in September 2023 while traveling in [place] (initially wondered if related to exercise and poor hydration) and resolved on  return home he was due for annual PCP visit and he recommended some additional blood work  That same night he had another episode and again resolved with hydration but after occurred several times over a few weeks he sought further evaluation  Minimal discomfort noted with this (states he had a prior bladder infection and it felt like this)  Over summer months had noted occasional mild dysuria preceding this   --Notes prior episode of possible hematuria in 2016 with heavy exercise and poor hydration resolved after 1 day (related to exercise)     - 10/21/2023 CT Urogram Abdomen/Pelvis:  FINDINGS:  : Kidneys are symmetric in size.  Precontrast imaging shows no stones or hydronephrosis.  Kidneys concentrate and excrete contrast appropriately on postcontrast imaging.  No focal filling defects.  Lobulated margins along the lateral aspect of the left kidney has the appearance of scarring.  Heterogeneously enhancing mass along the dorsal lateral aspect of the urinary bladder on the left estimated at 3.8 x 3.6 cm.  Enlarged left pelvic lymph node measures 1.9 cm in short axis dimension.  Enlarged, lobulated prostate gland bulging into the bladder base, similar compared to prior MRI.  CT:  Lung bases show subsegmental atelectasis or scarring.  Liver is homogeneous.  Gallbladder is unremarkable.  Bile ducts, spleen, pancreas, and adrenal glands are unremarkable.  Stomach and loops of bowel are normal in caliber.  No free fluid in the pelvis.  Regional skeleton shows degenerative change.  Impression:  Heterogeneously enhancing mass along the dorsal lateral aspect of the urinary bladder on the left most concerning for primary bladder malignancy.  Enlarged left pelvic lymph node most concerning for metastatic disease. Negative for obstructive uropathy.  Prostatomegaly with the prostate bulging into the bladder base, similar compared to prior MRI.  This report was flagged in Epic as abnormal.     - 10/26/2023  TURBT:  Pathology:  1. Bladder, tumor, superficial resection:   Small cell carcinoma.   Muscularis propria is present and is positive for carcinoma.     2. Bladder, base of tumor, resection:Small cell carcinoma.   Muscularis propria is present and is positive for carcinoma.   Malignant cells are positive for AE1/AE3, TTF1, Synaptophysin, and Chromogranin (rare positive cells) immunostains, and negative for GATA3, CK7, CK20, and p63 immunostains. Ki67 immunostain shows a proliferative index of >95%. The morphology and immunophenotype are compatible with small cell carcinoma.      Prior evaluation for elevated PSA- dx with BPH  - 3/3/34750 MRI Prostate:  FINDINGS:  Previous biopsy: TRUS PBx on 1/26/15 - negative  PSA: 5.8 ng/mL 09/14/2021 (previously 5.9 on 07/07/2021, 3.1 on 05/12/2017, 5.9 on 12/11/2015)  Prior therapy: None  Prostate: 5.1 x 3.8 x 7.2 cm corresponding to a computed volume of 65 cc.  Peripheral zone: 1 focal area of signal abnormality as described below:  Lesion (ANDERSON) #3  Location: Side: Right; Region: Base; Zone: posterior peripheral zone laterally  Greatest dimension: 1.2 cm  T2-WI: T2 SI: Heterogeneous signal intensity or noncircumscribed, rounded, moderate hypointensity--score 3  DWI/ADC: DWI: Focal mild/moderate hypointense on ADC and isointense/mild hyperintense on high b-value DWI--score 3  DCE: No early enhancement  Extraprostatic extension: Abuts the adjacent right posterolateral capsular margin.  PI-RADS assessment category: 3  Transitional zone:  TZ abnormalities: Benign prostatic hyperplasia with at least 2 focal areas of signal abnormality do not completely conform to a BPH nodule.  The 2 lesions are detailed below:  Lesion (ANDERSON) #1  Location: left; region: mid; zone: anterior (more medial compared to lesion (ANDERSON) #2.)  Greatest dimension: 1.3 cm  T2-WI/SI: Heterogeneous signal intensity with obscured margins; score 3.  DWI/ADC: Focal mildly/moderately hypointense on ADC and  isointense/mildly hyperintense on high b-value DWI; score 3  DCE: Positive  Extraprostatic extension: No  PI-RADS assessment category: 3  Lesion (ANDERSON) #2  Location: Left; region: Mid; zone: Anterior (lateral and more posterior compared to lesion (ANDERSON) #1  Greatest dimension: 1.1 cm  T2-WI/SI: Heterogeneous signal intensity with obscured margins; score 3.  DWI/ADC: Focal mildly/moderately hypointense on ADC and isointense/mildly hyperintense on high b-value DWI; score 3.  DCE: Positive  Extraprostatic extension: Abuts the adjacent left lateral capsular margin.  PI-RADS assessment category: 3  Neurovascular bundle: Unremarkable  Seminal vesicles:  SV invasion: Negative.  Adjacent Organ Involvement: Prominent median lobe of the prostate protruding into the base the bladder.  No focal bladder wall thickening.  No rectal involvement.  Lymphadenopathy: No pathologically enlarged pelvic lymph nodes.  Other Findings: None  Impression:  1. There are three PI-RADS 3 lesions, 1 within the right posterolateral peripheral zone and 2 within the left anterior transition zone of the mid to base of the prostate.  Two lesions abut the capsular margin, as described above, without involvement of the seminal vesicles.  No pathologically enlarged pelvic lymph nodes.  2. BPH with a very prominent median lobe protruding into the base of the bladder.  Overall Assessment:  PIRADS 3 (the presence of clinically significant cancer is equivocal)  Number of targets created for potential MR/US fusion biopsy  Peripheral zone: 1  Transition zone: 2     - 5/2/2022 TRUS and biopsies:  Pathology:  RELIAPATH DIAGNOSIS:   A.   PROSTATE, LEFT APEX, NEEDLE BIOPSY:          Benign prostatic tissue with chronic inflammation, see comment.   (R97.20)   B.   PROSTATE, LEFT MID, NEEDLE BIOPSY:         Benign prostatic tissue with chronic inflammation, see comment.   (R97.20)   C.   PROSTATE, LEFT BASE, NEEDLE BIOPSY:         Benign prostatic tissue.  (R97.20)    D.   PROSTATE, RIGHT APEX, NEEDLE BIOPSY:          Benign prostatic tissue with chronic inflammation, see comment.   (R97.20)   E.   PROSTATE, RIGHT MID, NEEDLE BIOPSY:          Benign prostatic tissue with chronic inflammation, see comment.   (R97.20)   F.   PROSTATE, RIGHT BASE, NEEDLE BIOPSY:          Benign prostatic tissue with chronic inflammation, see comment.   (R97.20)   G.   TISSUE1          PROSTATE, TARGET LESION #1, BIOPSY:          -Benign prostatic tissue with chronic inflammation, see comment.   (R97.20)   H.   TISSUE2          PROSTATE, TARGET LESION #2, BIOPSY:          -Benign prostatic tissue .  (R97.20)   I.     TISSUE3          PROSTATE, TARGET LESION #3, BIOPSY:          -Benign prostatic tissue with chronic inflammation .  (R97.20)     - 11/9/2023 Bone scan:  FINDINGS:  There is physiologic distribution of the radiopharmaceutical throughout the skeleton.  There is normal uptake in the bilateral kidneys and soft tissues.  Bladder is significantly distended with abnormal contour, likely due to known bladder mass.  Impression:  No scintigraphic evidence of osteoblastic metastatic disease.     - 11/7/2023 CT Chest:  FINDINGS:  Chest wall and lower neck: No axillary or supraclavicular lymphadenopathy.  Lungs and pleura: Biapical pleuroparenchymal thickening compatible with scar.  Focal air cystic spaces noted in the right upper lobe (image 154 sequence 4) suggestive of scar.  Multiple sub pleural bandlike scars are noted in the upper and lower lobes.  No pleural effusion or airspace consolidation is noted.  Mediastinum and christy: Right hilar node measuring 15 mm (short axis).  No mediastinal lymphadenopathy is noted.  Heart and pericardium: Heart size is normal.No pericardial effusion.  Vessels: Mild atheromatous disease is seen in the aorta.   The coronary arteries show mild atheromatous disease.  Upper abdomen: Cortical irregularity noted in the left kidney compatible with scars.  Both kidneys  are excreting contrast during imaging acquisition.  Bones: Biomechanical changes are noted in the thoracic spine with osteophyte formations.  Schmorl nodes are noted in the thoracic spine.  Impression:  1. Multifocal bilateral pulmonary scars likely representing sequela prior infectious process to correlate with patient history.  No honeycombing is seen.  2. Right hilar node within maximum normal limits, nonspecific.  3. Left renal scar.  4. Additional findings.     - 11/7/2023 MRI Brain:  FINDINGS:  There is no midline shift, hydrocephalus or mass effect.  No acute infarction or acute intracranial hemorrhage.  A few small foci of T2/FLAIR signal in the supratentorial white matter while nonspecific most likely mild chronic microvascular ischemic changes.  Small focus of bright T2 signal adjacent to the posteromedial temporal lobe likely an incidental choroidal fissure cyst.  No enhancing lesions to indicate intracranial metastatic disease.  Small (1 cm) focus of increased FLAIR signal in the posterior fossa near the foramen of Magendie.  This demonstrates gradient signal presumably relating to calcifications with no abnormal enhancement.  No significant mass effect.  No evidence of associated hydrocephalus.  No abnormal extra-axial fluid collections.  The major skull base flow voids present.  Diffuse patchy mucosal membrane thickening in the paranasal sinuses with aerated secretions and small air-fluid levels.  Trace mastoid fluid on the left.  Impression:  No acute intracranial abnormalities, specifically no evidence of intracranial metastatic disease.  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  No associated hydrocephalus at this time.  Recommend surveillance to ensure stability.  Paranasal sinus disease.  This report was flagged in Epic as abnormal.     - initiated chemo with Cis/Etoposide on 12/4/2023  Completed 4 cycles on 2/7/2024    PMH:  Covid pneumonia-  recovered  BPH  Arthritis- hips, low back, shoulders, knees- prior PT and dry needling helped significantly  Deviated septum surgery  Anemia     FH:  Dad-  of Multiple Myeloma at age 60 yo  Sister- breast cancer survivor (dx in her mid 70s)  Mother lived until age 92 yo-  from natural causes, dementia     SH:  Retired   [employer] management  Prior chemical and environmental exposures at work  + tobacco- last 20-25 years 1/2 pack in a weekend   Dips tobacco     + social EtOH    3 children    Review of Systems   Constitutional:  Negative for activity change, appetite change, chills, fatigue, fever and unexpected weight change.   HENT:  Positive for hearing loss (stable). Negative for nasal congestion, dental problem, postnasal drip, rhinorrhea, sinus pressure/congestion and trouble swallowing.         Hoarseness noted   Eyes:  Negative for visual disturbance (wears bifocals).   Respiratory:  Positive for cough (with swallowing). Negative for shortness of breath and wheezing.    Cardiovascular:  Negative for chest pain, palpitations and leg swelling.   Gastrointestinal:  Negative for abdominal distention, abdominal pain, change in bowel habit, constipation, diarrhea, nausea, vomiting and reflux.   Genitourinary:  Negative for decreased urine volume, difficulty urinating, dysuria, frequency, hematuria and urgency.   Musculoskeletal:  Negative for arthralgias, back pain, gait problem, myalgias and joint deformity.   Integumentary:  Negative for rash and wound.   Neurological:  Positive for numbness. Negative for dizziness, speech difficulty, weakness, headaches and coordination difficulties.   Hematological:  Negative for adenopathy. Does not bruise/bleed easily.   Psychiatric/Behavioral:  Negative for agitation, behavioral problems, confusion, dysphoric mood and sleep disturbance. The patient is not nervous/anxious.           Objective     Physical Exam  Vitals and nursing note reviewed.   Constitutional:        General: He is not in acute distress.     Appearance: Normal appearance. He is well-developed and normal weight. He is not ill-appearing.      Comments: Presents with his wife and closest friend and son  Very pleasant  ECOG= 0   HENT:      Head: Normocephalic and atraumatic.      Comments: Chemo alopecia  Eyes:      General: No scleral icterus.     Extraocular Movements: Extraocular movements intact.      Conjunctiva/sclera: Conjunctivae normal.      Pupils: Pupils are equal, round, and reactive to light.   Neck:      Thyroid: No thyromegaly.      Trachea: No tracheal deviation.   Cardiovascular:      Rate and Rhythm: Normal rate and regular rhythm.      Heart sounds: Normal heart sounds. No murmur heard.     No friction rub. No gallop.   Pulmonary:      Effort: Pulmonary effort is normal. No respiratory distress.      Breath sounds: Normal breath sounds. No wheezing, rhonchi or rales.   Chest:      Chest wall: No tenderness.      Comments: RCW port  Abdominal:      General: Abdomen is flat. Bowel sounds are normal. There is no distension.      Palpations: Abdomen is soft. There is no mass.      Tenderness: There is no abdominal tenderness. There is no guarding or rebound.      Comments: No organomegaly   Musculoskeletal:         General: No swelling, tenderness or deformity. Normal range of motion.      Cervical back: Normal range of motion and neck supple. No rigidity or tenderness.      Right lower leg: No edema.      Left lower leg: No edema.   Lymphadenopathy:      Cervical: No cervical adenopathy.   Skin:     General: Skin is warm and dry.      Coloration: Skin is not jaundiced or pale.      Findings: No erythema, lesion or rash.   Neurological:      General: No focal deficit present.      Mental Status: He is alert and oriented to person, place, and time. Mental status is at baseline.      Sensory: Sensory deficit present.      Motor: No weakness or abnormal muscle tone.      Coordination: Coordination  normal.      Gait: Gait normal.      Deep Tendon Reflexes: Reflexes are normal and symmetric. Reflexes normal.   Psychiatric:         Mood and Affect: Mood normal.         Behavior: Behavior normal.         Thought Content: Thought content normal.         Judgment: Judgment normal.          Assessment and Plan     1. Malignant neoplasm of dome of urinary bladder    2. Hoarseness    3. Neuropathy    Reviewed final pathology  We will discuss with MD Sidhu as he has an unusual variant to discuss adjuvant strategies.    Hoarseness-- likely from intubated  ENT referral to assess vocal cord    Neuropathy  Increase Neurontin to 300 mg tid and reassess  Counseled on foot care    30 minutes total time    Route Chart for Scheduling    Med Onc Chart Routing  Urgent    Follow up with physician . ENT referral asap, Virtual in 1 week   Follow up with [facility]    Infusion scheduling note    Injection scheduling note    Labs    Imaging    Pharmacy appointment    Other referrals            Treatment Plan Information   OP CISPLATIN 75MG/M2 ETOPOSIDE 100MG/M2 Q3W   Mary Spicer MD   Upcoming Treatment Dates - OP CISPLATIN 75MG/M2 ETOPOSIDE 100MG/M2 Q3W    2/28/2024       Pre-Medications       diphenhydrAMINE injection 25 mg       famotidine (PF) injection 20 mg       hydrocortisone sodium succinate injection 100 mg       Chemotherapy       CISplatin (Platinol) 75 mg/m2 = 158 mg in sodium chloride 0.9% 658 mL chemo infusion       etoposide (VEPESID) 100 mg/m2 = 212 mg in sodium chloride 0.9% 510.6 mL chemo infusion       Pre-Hydration       sodium chloride 0.9% 1,000 mL with magnesium sulfate 1 g, potassium chloride 20 mEq infusion       Post-Hydration       sodium chloride 0.9% 1,000 mL with magnesium sulfate 1 g, potassium chloride 20 mEq infusion       Antiemetics       aprepitant (CINVANTI) injection 130 mg       palonosetron (ALOXI) 0.25 mg with Dexamethasone (DECADRON) 12 mg in NS 50 mL IVPB  2/29/2024       Pre-Medications        diphenhydrAMINE injection 25 mg       famotidine (PF) injection 20 mg       hydrocortisone sodium succinate injection 100 mg       Chemotherapy       etoposide (VEPESID) 100 mg/m2 = 212 mg in sodium chloride 0.9% 510.6 mL chemo infusion  3/1/2024       Pre-Medications       diphenhydrAMINE injection 25 mg       famotidine (PF) injection 20 mg       hydrocortisone sodium succinate injection 100 mg       Chemotherapy       etoposide (VEPESID) 100 mg/m2 = 212 mg in sodium chloride 0.9% 510.6 mL chemo infusion  3/20/2024       Pre-Medications       diphenhydrAMINE injection 25 mg       famotidine (PF) injection 20 mg       hydrocortisone sodium succinate injection 100 mg       Chemotherapy       CISplatin (Platinol) 75 mg/m2 = 158 mg in sodium chloride 0.9% 658 mL chemo infusion       etoposide (VEPESID) 100 mg/m2 = 212 mg in sodium chloride 0.9% 510.6 mL chemo infusion       Pre-Hydration       sodium chloride 0.9% 1,000 mL with magnesium sulfate 1 g, potassium chloride 20 mEq infusion       Post-Hydration       sodium chloride 0.9% 1,000 mL with magnesium sulfate 1 g, potassium chloride 20 mEq infusion       Antiemetics       aprepitant (CINVANTI) injection 130 mg       palonosetron (ALOXI) 0.25 mg with Dexamethasone (DECADRON) 12 mg in NS 50 mL IVPB    [id number]  Request Saved  Thank you Mary Spicer,  You have been sent this email to confirm that we have received your [id number] Consultation and that your e-mail address is correct.  Your request for review and discussion has been received on 4/10/24 4:12:02 PM. Please refer to case number [id number] when contacting [facility]. You can expect a response within the next 2-3 business days.  Email provided for additional documentation.  Email to [email or web address]

## 2024-04-10 ENCOUNTER — PATIENT MESSAGE (OUTPATIENT)
Dept: HEMATOLOGY/ONCOLOGY | Facility: CLINIC | Age: 69
End: 2024-04-10
Payer: MEDICARE

## 2024-04-10 DIAGNOSIS — C67.1 MALIGNANT NEOPLASM OF DOME OF URINARY BLADDER: Primary | ICD-10-CM

## 2024-04-10 PROBLEM — R49.0 HOARSENESS: Status: ACTIVE | Noted: 2024-04-10

## 2024-04-10 PROBLEM — G62.9 NEUROPATHY: Status: ACTIVE | Noted: 2024-04-10

## 2024-04-10 RX ORDER — GABAPENTIN 300 MG/1
300 CAPSULE ORAL 3 TIMES DAILY
Qty: 90 CAPSULE | Refills: 11 | Status: SHIPPED | OUTPATIENT
Start: 2024-04-10 | End: 2024-05-28 | Stop reason: SDUPTHER

## 2024-04-11 ENCOUNTER — TELEPHONE (OUTPATIENT)
Dept: HEMATOLOGY/ONCOLOGY | Facility: CLINIC | Age: 69
End: 2024-04-11
Payer: MEDICARE

## 2024-04-11 ENCOUNTER — DOCUMENTATION ONLY (OUTPATIENT)
Dept: HEMATOLOGY/ONCOLOGY | Facility: CLINIC | Age: 69
End: 2024-04-11
Payer: MEDICARE

## 2024-04-11 NOTE — TELEPHONE ENCOUNTER
----- Message from Gretchen Lyn RN sent at 4/11/2024 11:03 AM CDT -----  Call and let him know virtual 430 next Thursday 4/18. Tell him to log in at 445..       Thankss    ----- Message -----  From: Gretchen Lora  Sent: 4/11/2024  10:05 AM CDT  To: Gretchen Lyn RN    Please schedule this pt for 1 week virtual follow up per Leona ROSARIO thanksssss :)

## 2024-04-18 ENCOUNTER — OFFICE VISIT (OUTPATIENT)
Dept: HEMATOLOGY/ONCOLOGY | Facility: CLINIC | Age: 69
End: 2024-04-18
Payer: MEDICARE

## 2024-04-18 ENCOUNTER — PATIENT MESSAGE (OUTPATIENT)
Dept: UROLOGY | Facility: CLINIC | Age: 69
End: 2024-04-18
Payer: MEDICARE

## 2024-04-18 DIAGNOSIS — C67.9 MALIGNANT NEOPLASM OF URINARY BLADDER, UNSPECIFIED SITE: Primary | ICD-10-CM

## 2024-04-18 DIAGNOSIS — C67.1 MALIGNANT NEOPLASM OF DOME OF URINARY BLADDER: Primary | ICD-10-CM

## 2024-04-18 PROCEDURE — 1111F DSCHRG MED/CURRENT MED MERGE: CPT | Mod: CPTII,95,, | Performed by: INTERNAL MEDICINE

## 2024-04-18 PROCEDURE — 99213 OFFICE O/P EST LOW 20 MIN: CPT | Mod: 95,,, | Performed by: INTERNAL MEDICINE

## 2024-04-19 ENCOUNTER — EXTERNAL HOME HEALTH (OUTPATIENT)
Dept: HOME HEALTH SERVICES | Facility: HOSPITAL | Age: 69
End: 2024-04-19
Payer: MEDICARE

## 2024-04-19 NOTE — PROGRESS NOTES
Subjective     Patient ID: Med Palma is a 68 y.o. male.    Chief Complaint: No chief complaint on file.    HPI    The patient location is: home  The chief complaint leading to consultation is: follow up  Visit type: audiovisual  Face to Face time with patient: 10 minutes  20 minutes of total time spent on the encounter, which includes face to face time and non-face to face time preparing to see the patient (eg, review of tests), Obtaining and/or reviewing separately obtained history, Documenting clinical information in the electronic or other health record, Independently interpreting results (not separately reported) and communicating results to the patient/family/caregiver, or Care coordination (not separately reported).   Each patient to whom he or she provides medical services by telemedicine is:  (1) informed of the relationship between the physician and patient and the respective role of any other health care provider with respect to management of the patient; and (2) notified that he or she may decline to receive medical services by telemedicine and may withdraw from such care at any time.    Notes:   Reviewed [facility]    Review of Systems  See recent  Feels great     Objective     Physical Exam  See recent     Assessment and Plan     1. Malignant neoplasm of dome of urinary bladder      Reviewed [facility] around additional chemo (AI), immunotherapy versus monitoring  He is also hesitant to consider more therapy that could have toxicity  Does note neuropathy improving    We will [place] back next week post tumor board    Route Chart for Scheduling    Med Onc Chart Routing      Follow up with physician 1 week. virtual   Follow up with [facility]    Infusion scheduling note    Injection scheduling note    Labs    Imaging    Pharmacy appointment    Other referrals            Treatment Plan Information   OP CISPLATIN 75MG/M2 ETOPOSIDE 100MG/M2 Q3W   Mary Spicer MD   Upcoming Treatment Dates - OP CISPLATIN 75MG/M2  ETOPOSIDE 100MG/M2 Q3W    2/28/2024       Pre-Medications       diphenhydrAMINE injection 25 mg       famotidine (PF) injection 20 mg       hydrocortisone sodium succinate injection 100 mg       Chemotherapy       CISplatin (Platinol) 75 mg/m2 = 158 mg in sodium chloride 0.9% 658 mL chemo infusion       etoposide (VEPESID) 100 mg/m2 = 212 mg in sodium chloride 0.9% 510.6 mL chemo infusion       Pre-Hydration       sodium chloride 0.9% 1,000 mL with magnesium sulfate 1 g, potassium chloride 20 mEq infusion       Post-Hydration       sodium chloride 0.9% 1,000 mL with magnesium sulfate 1 g, potassium chloride 20 mEq infusion       Antiemetics       aprepitant (CINVANTI) injection 130 mg       palonosetron (ALOXI) 0.25 mg with Dexamethasone (DECADRON) 12 mg in NS 50 mL IVPB  2/29/2024       Pre-Medications       diphenhydrAMINE injection 25 mg       famotidine (PF) injection 20 mg       hydrocortisone sodium succinate injection 100 mg       Chemotherapy       etoposide (VEPESID) 100 mg/m2 = 212 mg in sodium chloride 0.9% 510.6 mL chemo infusion  3/1/2024       Pre-Medications       diphenhydrAMINE injection 25 mg       famotidine (PF) injection 20 mg       hydrocortisone sodium succinate injection 100 mg       Chemotherapy       etoposide (VEPESID) 100 mg/m2 = 212 mg in sodium chloride 0.9% 510.6 mL chemo infusion  3/20/2024       Pre-Medications       diphenhydrAMINE injection 25 mg       famotidine (PF) injection 20 mg       hydrocortisone sodium succinate injection 100 mg       Chemotherapy       CISplatin (Platinol) 75 mg/m2 = 158 mg in sodium chloride 0.9% 658 mL chemo infusion       etoposide (VEPESID) 100 mg/m2 = 212 mg in sodium chloride 0.9% 510.6 mL chemo infusion       Pre-Hydration       sodium chloride 0.9% 1,000 mL with magnesium sulfate 1 g, potassium chloride 20 mEq infusion       Post-Hydration       sodium chloride 0.9% 1,000 mL with magnesium sulfate 1 g, potassium chloride 20 mEq infusion        Antiemetics       aprepitant (CINVANTI) injection 130 mg       palonosetron (ALOXI) 0.25 mg with Dexamethasone (DECADRON) 12 mg in NS 50 mL IVPB               No follow-ups on file.

## 2024-05-07 ENCOUNTER — CLINICAL SUPPORT (OUTPATIENT)
Dept: UROLOGY | Facility: CLINIC | Age: 69
End: 2024-05-07
Payer: MEDICARE

## 2024-05-07 DIAGNOSIS — C67.9 MALIGNANT NEOPLASM OF URINARY BLADDER, UNSPECIFIED SITE: ICD-10-CM

## 2024-05-07 PROCEDURE — 87086 URINE CULTURE/COLONY COUNT: CPT | Mod: HCNC | Performed by: NURSE PRACTITIONER

## 2024-05-08 LAB
BACTERIA UR CULT: NORMAL
BACTERIA UR CULT: NORMAL

## 2024-05-09 ENCOUNTER — PATIENT MESSAGE (OUTPATIENT)
Dept: UROLOGY | Facility: CLINIC | Age: 69
End: 2024-05-09
Payer: MEDICARE

## 2024-05-09 DIAGNOSIS — R82.90 ABNORMAL URINALYSIS: Primary | ICD-10-CM

## 2024-05-09 RX ORDER — NITROFURANTOIN 25; 75 MG/1; MG/1
100 CAPSULE ORAL 2 TIMES DAILY
Qty: 6 CAPSULE | Refills: 0 | Status: SHIPPED | OUTPATIENT
Start: 2024-05-09 | End: 2024-05-12

## 2024-05-14 ENCOUNTER — TELEPHONE (OUTPATIENT)
Dept: UROLOGY | Facility: CLINIC | Age: 69
End: 2024-05-14

## 2024-05-14 ENCOUNTER — PROCEDURE VISIT (OUTPATIENT)
Dept: UROLOGY | Facility: CLINIC | Age: 69
End: 2024-05-14
Payer: MEDICARE

## 2024-05-14 ENCOUNTER — HOSPITAL ENCOUNTER (OUTPATIENT)
Dept: RADIOLOGY | Facility: OTHER | Age: 69
Discharge: HOME | End: 2024-05-14
Attending: UROLOGY
Payer: MEDICARE

## 2024-05-14 ENCOUNTER — PATIENT MESSAGE (OUTPATIENT)
Dept: UROLOGY | Facility: CLINIC | Age: 69
End: 2024-05-14

## 2024-05-14 VITALS
RESPIRATION RATE: 16 BRPM | SYSTOLIC BLOOD PRESSURE: 118 MMHG | DIASTOLIC BLOOD PRESSURE: 82 MMHG | HEART RATE: 69 BPM | OXYGEN SATURATION: 97 %

## 2024-05-14 DIAGNOSIS — C67.9 MALIGNANT NEOPLASM OF URINARY BLADDER, UNSPECIFIED SITE: ICD-10-CM

## 2024-05-14 DIAGNOSIS — Z96.0 URETERAL STENT PRESENT: Primary | ICD-10-CM

## 2024-05-14 LAB
BACTERIA #/AREA URNS AUTO: ABNORMAL /HPF
BILIRUB UR QL STRIP: NEGATIVE
CLARITY UR REFRACT.AUTO: ABNORMAL
COLOR UR AUTO: YELLOW
GLUCOSE UR QL STRIP: NEGATIVE
HGB UR QL STRIP: ABNORMAL
KETONES UR QL STRIP: NEGATIVE
LEUKOCYTE ESTERASE UR QL STRIP: ABNORMAL
MICROSCOPIC COMMENT: ABNORMAL
NITRITE UR QL STRIP: POSITIVE
PH UR STRIP: 7 [PH] (ref 5–8)
PROT UR QL STRIP: ABNORMAL
RBC #/AREA URNS AUTO: 22 /HPF (ref 0–4)
SP GR UR STRIP: 1.01 (ref 1–1.03)
URN SPEC COLLECT METH UR: ABNORMAL
WBC #/AREA URNS AUTO: >100 /HPF (ref 0–5)
YEAST UR QL AUTO: ABNORMAL

## 2024-05-14 PROCEDURE — 87086 URINE CULTURE/COLONY COUNT: CPT | Mod: HCNC | Performed by: UROLOGY

## 2024-05-14 PROCEDURE — 87186 SC STD MICRODIL/AGAR DIL: CPT | Mod: HCNC | Performed by: UROLOGY

## 2024-05-14 PROCEDURE — 74018 RADEX ABDOMEN 1 VIEW: CPT | Mod: 26,HCNC,, | Performed by: STUDENT IN AN ORGANIZED HEALTH CARE EDUCATION/TRAINING PROGRAM

## 2024-05-14 PROCEDURE — 87077 CULTURE AEROBIC IDENTIFY: CPT | Mod: HCNC | Performed by: UROLOGY

## 2024-05-14 PROCEDURE — 96372 THER/PROPH/DIAG INJ SC/IM: CPT | Mod: HCNC,59,S$GLB, | Performed by: UROLOGY

## 2024-05-14 PROCEDURE — 81001 URINALYSIS AUTO W/SCOPE: CPT | Mod: HCNC | Performed by: UROLOGY

## 2024-05-14 PROCEDURE — 87088 URINE BACTERIA CULTURE: CPT | Mod: HCNC | Performed by: UROLOGY

## 2024-05-14 PROCEDURE — 74018 RADEX ABDOMEN 1 VIEW: CPT | Mod: TC,HCNC,FY

## 2024-05-14 PROCEDURE — 52310 CYSTOSCOPY AND TREATMENT: CPT | Mod: HCNC,S$GLB,, | Performed by: UROLOGY

## 2024-05-14 RX ORDER — NITROFURANTOIN 25; 75 MG/1; MG/1
100 CAPSULE ORAL 2 TIMES DAILY
Qty: 6 CAPSULE | Refills: 0 | Status: SHIPPED | OUTPATIENT
Start: 2024-05-14 | End: 2024-05-17

## 2024-05-14 RX ORDER — CEFTRIAXONE 1 G/1
1 INJECTION, POWDER, FOR SOLUTION INTRAMUSCULAR; INTRAVENOUS
Status: COMPLETED | OUTPATIENT
Start: 2024-05-14 | End: 2024-05-14

## 2024-05-14 RX ORDER — LIDOCAINE HYDROCHLORIDE 20 MG/ML
JELLY TOPICAL
Status: COMPLETED | OUTPATIENT
Start: 2024-05-14 | End: 2024-05-14

## 2024-05-14 RX ADMIN — LIDOCAINE HYDROCHLORIDE 10 ML: 20 JELLY TOPICAL at 11:05

## 2024-05-14 RX ADMIN — CEFTRIAXONE 1 G: 1 INJECTION, POWDER, FOR SOLUTION INTRAMUSCULAR; INTRAVENOUS at 11:05

## 2024-05-14 NOTE — TELEPHONE ENCOUNTER
Staff scheduled patient for 6 month f/u and labs. Left patient message in portal with rescheduling instructions if time and date work.

## 2024-05-14 NOTE — PROCEDURES
Cystoscopy    Date/Time: 5/14/2024 11:06 AM    Performed by: Yinka Jimenez MD  Authorized by: Belle Ghosh NP    Consent Done?:  Yes (Written)  Timeout: prior to procedure the correct patient, procedure, and site was verified    Prep: patient was prepped and draped in usual sterile fashion    Local anesthesia used?: Yes    Anesthesia:  Lidocaine jelly  Indications: history bladder cancer    Position:  Supine  Anesthesia:  Lidocaine jelly  Preparation: Patient was prepped and draped in usual sterile fashion    Scope type:  Flexible cystoscope  Stent removed: Yes     patient tolerated the procedure well with no immediate complications  Comments:      UA nitrite + not symptomatic  Rocephin 1g IM given    Bladder ca doing well sp cystectomy  Asymptoamtic bacteruria; will give 3 days po antibitoics  Neuropathy (motor symptoms resolved but still with sensory issues in feet); cont neurontin    See Dr borges, heme onc, in 2 weeks; she will order follow up imaging  See me in 6 months with CT urogram and BMP

## 2024-05-17 LAB — BACTERIA UR CULT: ABNORMAL

## 2024-05-28 DIAGNOSIS — G62.9 NEUROPATHY: ICD-10-CM

## 2024-05-28 RX ORDER — GABAPENTIN 300 MG/1
300 CAPSULE ORAL 3 TIMES DAILY
Qty: 90 CAPSULE | Refills: 11 | Status: SHIPPED | OUTPATIENT
Start: 2024-05-28 | End: 2025-05-28

## 2024-05-30 ENCOUNTER — OFFICE VISIT (OUTPATIENT)
Dept: HEMATOLOGY/ONCOLOGY | Facility: CLINIC | Age: 69
End: 2024-05-30
Payer: MEDICARE

## 2024-05-30 DIAGNOSIS — C67.1 MALIGNANT NEOPLASM OF DOME OF URINARY BLADDER: Primary | ICD-10-CM

## 2024-05-30 DIAGNOSIS — H91.90 HEARING LOSS, UNSPECIFIED HEARING LOSS TYPE, UNSPECIFIED LATERALITY: ICD-10-CM

## 2024-05-30 DIAGNOSIS — G62.9 NEUROPATHY: ICD-10-CM

## 2024-05-30 PROCEDURE — 99213 OFFICE O/P EST LOW 20 MIN: CPT | Mod: HCNC,95,, | Performed by: INTERNAL MEDICINE

## 2024-05-30 PROCEDURE — 1160F RVW MEDS BY RX/DR IN RCRD: CPT | Mod: HCNC,CPTII,95, | Performed by: INTERNAL MEDICINE

## 2024-05-30 PROCEDURE — 1159F MED LIST DOCD IN RCRD: CPT | Mod: HCNC,CPTII,95, | Performed by: INTERNAL MEDICINE

## 2024-05-30 PROCEDURE — G2211 COMPLEX E/M VISIT ADD ON: HCPCS | Mod: HCNC,95,, | Performed by: INTERNAL MEDICINE

## 2024-05-30 NOTE — PROGRESS NOTES
Subjective     Patient ID: Med Palma is a 68 y.o. male.    Chief Complaint: No chief complaint on file.    HPI    The patient location is: home  The chief complaint leading to consultation is: follow up  Visit type: audiovisual  Face to Face time with patient: 10 minutes  15 minutes of total time spent on the encounter, which includes face to face time and non-face to face time preparing to see the patient (eg, review of tests), Obtaining and/or reviewing separately obtained history, Documenting clinical information in the electronic or other health record, Independently interpreting results (not separately reported) and communicating results to the patient/family/caregiver, or Care coordination (not separately reported)  Each patient to whom he or she provides medical services by telemedicine is:  (1) informed of the relationship between the physician and patient and the respective role of any other health care provider with respect to management of the patient; and (2) notified that he or she may decline to receive medical services by telemedicine and may withdraw from such care at any time.    Notes:     Diagnosis: Small cell bladder cancer     He has a history of T3N1M0 small cell bladder cancer and received preoperative chemotherapy and is now s/p robotic cystoprostatectomy, bilateral standard pelvic lymph node dissection, and intracorporal ileal conduit on 3/13/2024.  We discussed [facility] recs at prior call  They acknowledged challenges here with residual disease and no standard of care  He is not interested in further aggressive chemotherapy -still dealing with neuropathy and hearing issues  Immunotherapy data not as compelling in adjuvant setting here and rec was to hold unless progression but we have discussed as an opyion  Surveillance images      Oncology History:  - presented with gross hematuria in September 2023 while traveling in [place] (initially wondered if related to exercise and poor  hydration) and resolved on return home he was due for annual PCP visit and he recommended some additional blood work  That same night he had another episode and again resolved with hydration but after occurred several times over a few weeks he sought further evaluation  Minimal discomfort noted with this (states he had a prior bladder infection and it felt like this)  Over summer months had noted occasional mild dysuria preceding this   --Notes prior episode of possible hematuria in 2016 with heavy exercise and poor hydration resolved after 1 day (related to exercise)     - 10/21/2023 CT Urogram Abdomen/Pelvis:  FINDINGS:  : Kidneys are symmetric in size.  Precontrast imaging shows no stones or hydronephrosis.  Kidneys concentrate and excrete contrast appropriately on postcontrast imaging.  No focal filling defects.  Lobulated margins along the lateral aspect of the left kidney has the appearance of scarring.  Heterogeneously enhancing mass along the dorsal lateral aspect of the urinary bladder on the left estimated at 3.8 x 3.6 cm.  Enlarged left pelvic lymph node measures 1.9 cm in short axis dimension.  Enlarged, lobulated prostate gland bulging into the bladder base, similar compared to prior MRI.  CT:  Lung bases show subsegmental atelectasis or scarring.  Liver is homogeneous.  Gallbladder is unremarkable.  Bile ducts, spleen, pancreas, and adrenal glands are unremarkable.  Stomach and loops of bowel are normal in caliber.  No free fluid in the pelvis.  Regional skeleton shows degenerative change.  Impression:  Heterogeneously enhancing mass along the dorsal lateral aspect of the urinary bladder on the left most concerning for primary bladder malignancy.  Enlarged left pelvic lymph node most concerning for metastatic disease. Negative for obstructive uropathy.  Prostatomegaly with the prostate bulging into the bladder base, similar compared to prior MRI.  This report was flagged in Epic as abnormal.     -  10/26/2023 TURBT:  Pathology:  1. Bladder, tumor, superficial resection:   Small cell carcinoma.   Muscularis propria is present and is positive for carcinoma.     2. Bladder, base of tumor, resection:Small cell carcinoma.   Muscularis propria is present and is positive for carcinoma.   Malignant cells are positive for AE1/AE3, TTF1, Synaptophysin, and Chromogranin (rare positive cells) immunostains, and negative for GATA3, CK7, CK20, and p63 immunostains. Ki67 immunostain shows a proliferative index of >95%. The morphology and immunophenotype are compatible with small cell carcinoma.      Prior evaluation for elevated PSA- dx with BPH  - 3/3/19103 MRI Prostate:  FINDINGS:  Previous biopsy: TRUS PBx on 1/26/15 - negative  PSA: 5.8 ng/mL 09/14/2021 (previously 5.9 on 07/07/2021, 3.1 on 05/12/2017, 5.9 on 12/11/2015)  Prior therapy: None  Prostate: 5.1 x 3.8 x 7.2 cm corresponding to a computed volume of 65 cc.  Peripheral zone: 1 focal area of signal abnormality as described below:  Lesion (ANDERSON) #3  Location: Side: Right; Region: Base; Zone: posterior peripheral zone laterally  Greatest dimension: 1.2 cm  T2-WI: T2 SI: Heterogeneous signal intensity or noncircumscribed, rounded, moderate hypointensity--score 3  DWI/ADC: DWI: Focal mild/moderate hypointense on ADC and isointense/mild hyperintense on high b-value DWI--score 3  DCE: No early enhancement  Extraprostatic extension: Abuts the adjacent right posterolateral capsular margin.  PI-RADS assessment category: 3  Transitional zone:  TZ abnormalities: Benign prostatic hyperplasia with at least 2 focal areas of signal abnormality do not completely conform to a BPH nodule.  The 2 lesions are detailed below:  Lesion (ANDERSON) #1  Location: left; region: mid; zone: anterior (more medial compared to lesion (ANDERSON) #2.)  Greatest dimension: 1.3 cm  T2-WI/SI: Heterogeneous signal intensity with obscured margins; score 3.  DWI/ADC: Focal mildly/moderately hypointense on ADC and  isointense/mildly hyperintense on high b-value DWI; score 3  DCE: Positive  Extraprostatic extension: No  PI-RADS assessment category: 3  Lesion (ANDERSON) #2  Location: Left; region: Mid; zone: Anterior (lateral and more posterior compared to lesion (ANDERSON) #1  Greatest dimension: 1.1 cm  T2-WI/SI: Heterogeneous signal intensity with obscured margins; score 3.  DWI/ADC: Focal mildly/moderately hypointense on ADC and isointense/mildly hyperintense on high b-value DWI; score 3.  DCE: Positive  Extraprostatic extension: Abuts the adjacent left lateral capsular margin.  PI-RADS assessment category: 3  Neurovascular bundle: Unremarkable  Seminal vesicles:  SV invasion: Negative.  Adjacent Organ Involvement: Prominent median lobe of the prostate protruding into the base the bladder.  No focal bladder wall thickening.  No rectal involvement.  Lymphadenopathy: No pathologically enlarged pelvic lymph nodes.  Other Findings: None  Impression:  1. There are three PI-RADS 3 lesions, 1 within the right posterolateral peripheral zone and 2 within the left anterior transition zone of the mid to base of the prostate.  Two lesions abut the capsular margin, as described above, without involvement of the seminal vesicles.  No pathologically enlarged pelvic lymph nodes.  2. BPH with a very prominent median lobe protruding into the base of the bladder.  Overall Assessment:  PIRADS 3 (the presence of clinically significant cancer is equivocal)  Number of targets created for potential MR/US fusion biopsy  Peripheral zone: 1  Transition zone: 2     - 5/2/2022 TRUS and biopsies:  Pathology:  RELIAPATH DIAGNOSIS:   A.   PROSTATE, LEFT APEX, NEEDLE BIOPSY:          Benign prostatic tissue with chronic inflammation, see comment.   (R97.20)   B.   PROSTATE, LEFT MID, NEEDLE BIOPSY:         Benign prostatic tissue with chronic inflammation, see comment.   (R97.20)   C.   PROSTATE, LEFT BASE, NEEDLE BIOPSY:         Benign prostatic tissue.  (R97.20)    D.   PROSTATE, RIGHT APEX, NEEDLE BIOPSY:          Benign prostatic tissue with chronic inflammation, see comment.   (R97.20)   E.   PROSTATE, RIGHT MID, NEEDLE BIOPSY:          Benign prostatic tissue with chronic inflammation, see comment.   (R97.20)   F.   PROSTATE, RIGHT BASE, NEEDLE BIOPSY:          Benign prostatic tissue with chronic inflammation, see comment.   (R97.20)   G.   TISSUE1          PROSTATE, TARGET LESION #1, BIOPSY:          -Benign prostatic tissue with chronic inflammation, see comment.   (R97.20)   H.   TISSUE2          PROSTATE, TARGET LESION #2, BIOPSY:          -Benign prostatic tissue .  (R97.20)   I.     TISSUE3          PROSTATE, TARGET LESION #3, BIOPSY:          -Benign prostatic tissue with chronic inflammation .  (R97.20)      - 11/9/2023 Bone scan:  FINDINGS:  There is physiologic distribution of the radiopharmaceutical throughout the skeleton.  There is normal uptake in the bilateral kidneys and soft tissues.  Bladder is significantly distended with abnormal contour, likely due to known bladder mass.  Impression:  No scintigraphic evidence of osteoblastic metastatic disease.     - 11/7/2023 CT Chest:  FINDINGS:  Chest wall and lower neck: No axillary or supraclavicular lymphadenopathy.  Lungs and pleura: Biapical pleuroparenchymal thickening compatible with scar.  Focal air cystic spaces noted in the right upper lobe (image 154 sequence 4) suggestive of scar.  Multiple sub pleural bandlike scars are noted in the upper and lower lobes.  No pleural effusion or airspace consolidation is noted.  Mediastinum and christy: Right hilar node measuring 15 mm (short axis).  No mediastinal lymphadenopathy is noted.  Heart and pericardium: Heart size is normal.No pericardial effusion.  Vessels: Mild atheromatous disease is seen in the aorta.   The coronary arteries show mild atheromatous disease.  Upper abdomen: Cortical irregularity noted in the left kidney compatible with scars.  Both kidneys  are excreting contrast during imaging acquisition.  Bones: Biomechanical changes are noted in the thoracic spine with osteophyte formations.  Schmorl nodes are noted in the thoracic spine.  Impression:  1. Multifocal bilateral pulmonary scars likely representing sequela prior infectious process to correlate with patient history.  No honeycombing is seen.  2. Right hilar node within maximum normal limits, nonspecific.  3. Left renal scar.  4. Additional findings.     - 11/7/2023 MRI Brain:  FINDINGS:  There is no midline shift, hydrocephalus or mass effect.  No acute infarction or acute intracranial hemorrhage.  A few small foci of T2/FLAIR signal in the supratentorial white matter while nonspecific most likely mild chronic microvascular ischemic changes.  Small focus of bright T2 signal adjacent to the posteromedial temporal lobe likely an incidental choroidal fissure cyst.  No enhancing lesions to indicate intracranial metastatic disease.  Small (1 cm) focus of increased FLAIR signal in the posterior fossa near the foramen of Magendie.  This demonstrates gradient signal presumably relating to calcifications with no abnormal enhancement.  No significant mass effect.  No evidence of associated hydrocephalus.  No abnormal extra-axial fluid collections.  The major skull base flow voids present.  Diffuse patchy mucosal membrane thickening in the paranasal sinuses with aerated secretions and small air-fluid levels.  Trace mastoid fluid on the left.  Impression:  No acute intracranial abnormalities, specifically no evidence of intracranial metastatic disease.  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  No associated hydrocephalus at this time.  Recommend surveillance to ensure stability.  Paranasal sinus disease.  This report was flagged in Epic as abnormal.     - initiated chemo with Cis/Etoposide on 12/4/2023  Completed 4 cycles on 2/7/2024    3/13/2024 Final Pathology:  1.  Left ureter, excision:      - Portion of ureter without significant pathologic change  2. Right ureter, excision:      - Portion of ureter without significant pathologic change  3. Bladder, prostate and left iliac lymph node, radical cystoprostatectomy:      - Small cell neuroendocrine carcinoma      - Tumor size: 1.5 cm      - Resection margins: free of tumor      - Prostate with atrophy and benign prostatic hyperplasia (BPH)      - One of one lymph node, positive for carcinoma (1/)          - Size of Largest Metastatic Deposit: at least 2.0 cm          - Extranodal Extension: not identified  4. Right pelvic lymph nodes, excision:      - Fifteen lymph nodes, negative for carcinoma (0/15)      - No treatment effect seen  5. Left pelvic lymph nodes, excision:      - Ten lymph nodes, negative for carcinoma (0/10)      - No treatment effect seen  Note: Cytokeratin AE1/AE3 on parts 4 and 5 support the above diagnosis. Immunostains performed with appropriate positive and negative controls.    Final staging:  pT3b pN1    PMH:  Covid pneumonia- recovered  BPH  Arthritis- hips, low back, shoulders, knees- prior PT and dry needling helped significantly  Deviated septum surgery  Anemia     FH:  Dad-  of Multiple Myeloma at age 60 yo  Sister- breast cancer survivor (dx in her mid 70s)  Mother lived until age 94 yo-  from natural causes, dementia     SH:  Retired   [employer] management  Prior chemical and environmental exposures at work  + tobacco- last 20-25 years 1/2 pack in a weekend   Dips tobacco     + social EtOH    3 children    Review of Systems   Constitutional:  Negative for activity change, appetite change, chills, fatigue, fever and unexpected weight change.   HENT:  Positive for hearing loss (stable). Negative for nasal congestion, dental problem, postnasal drip, rhinorrhea, sinus pressure/congestion and trouble swallowing.         Hoarseness noted   Eyes:  Negative for visual disturbance (wears  bifocals).   Respiratory:  Negative for cough, shortness of breath and wheezing.    Cardiovascular:  Negative for chest pain, palpitations and leg swelling.   Gastrointestinal:  Negative for abdominal distention, abdominal pain, change in bowel habit, constipation, diarrhea, nausea, vomiting and reflux.   Genitourinary:  Negative for decreased urine volume, difficulty urinating, dysuria, frequency, hematuria and urgency.   Musculoskeletal:  Negative for arthralgias, back pain, gait problem, myalgias and joint deformity.   Integumentary:  Negative for rash and wound.   Neurological:  Positive for numbness. Negative for dizziness, speech difficulty, weakness, headaches and coordination difficulties.   Hematological:  Negative for adenopathy. Does not bruise/bleed easily.   Psychiatric/Behavioral:  Negative for agitation, behavioral problems, confusion, dysphoric mood and sleep disturbance. The patient is not nervous/anxious.           Objective     Physical Exam  Virtual     Assessment and Plan     1. Malignant neoplasm of dome of urinary bladder    2. Neuropathy    3. Hearing loss, unspecified hearing loss type, unspecified laterality      SC Bladder cancer  Scan   See discussion as above    Declines audiology assessment for now    Neuropathy slowly improving- reviewed how to manage     Route Chart for Scheduling    Med Onc Chart Routing  Urgent    Follow up with physician . Scans in later June and follow up post, cbc and cmp   Follow up with [facility]    Infusion scheduling note    Injection scheduling note    Labs    Imaging    Pharmacy appointment    Other referrals            Treatment Plan Information   OP CISPLATIN 75MG/M2 ETOPOSIDE 100MG/M2 Q3W   Mary Spicer MD   Upcoming Treatment Dates - OP CISPLATIN 75MG/M2 ETOPOSIDE 100MG/M2 Q3W    2/28/2024       Pre-Medications       diphenhydrAMINE injection 25 mg       famotidine (PF) injection 20 mg       hydrocortisone sodium succinate injection 100 mg        Chemotherapy       CISplatin (Platinol) 75 mg/m2 = 158 mg in sodium chloride 0.9% 658 mL chemo infusion       etoposide (VEPESID) 100 mg/m2 = 212 mg in sodium chloride 0.9% 510.6 mL chemo infusion       Pre-Hydration       sodium chloride 0.9% 1,000 mL with magnesium sulfate 1 g, potassium chloride 20 mEq infusion       Post-Hydration       sodium chloride 0.9% 1,000 mL with magnesium sulfate 1 g, potassium chloride 20 mEq infusion       Antiemetics       aprepitant (CINVANTI) injection 130 mg       palonosetron (ALOXI) 0.25 mg with Dexamethasone (DECADRON) 12 mg in NS 50 mL IVPB  2/29/2024       Pre-Medications       diphenhydrAMINE injection 25 mg       famotidine (PF) injection 20 mg       hydrocortisone sodium succinate injection 100 mg       Chemotherapy       etoposide (VEPESID) 100 mg/m2 = 212 mg in sodium chloride 0.9% 510.6 mL chemo infusion  3/1/2024       Pre-Medications       diphenhydrAMINE injection 25 mg       famotidine (PF) injection 20 mg       hydrocortisone sodium succinate injection 100 mg       Chemotherapy       etoposide (VEPESID) 100 mg/m2 = 212 mg in sodium chloride 0.9% 510.6 mL chemo infusion  3/20/2024       Pre-Medications       diphenhydrAMINE injection 25 mg       famotidine (PF) injection 20 mg       hydrocortisone sodium succinate injection 100 mg       Chemotherapy       CISplatin (Platinol) 75 mg/m2 = 158 mg in sodium chloride 0.9% 658 mL chemo infusion       etoposide (VEPESID) 100 mg/m2 = 212 mg in sodium chloride 0.9% 510.6 mL chemo infusion       Pre-Hydration       sodium chloride 0.9% 1,000 mL with magnesium sulfate 1 g, potassium chloride 20 mEq infusion       Post-Hydration       sodium chloride 0.9% 1,000 mL with magnesium sulfate 1 g, potassium chloride 20 mEq infusion       Antiemetics       aprepitant (CINVANTI) injection 130 mg       palonosetron (ALOXI) 0.25 mg with Dexamethasone (DECADRON) 12 mg in NS 50 mL IVPB

## 2024-05-31 ENCOUNTER — DOCUMENT SCAN (OUTPATIENT)
Dept: HOME HEALTH SERVICES | Facility: HOSPITAL | Age: 69
End: 2024-05-31
Payer: MEDICARE

## 2024-06-21 ENCOUNTER — PATIENT MESSAGE (OUTPATIENT)
Dept: HEMATOLOGY/ONCOLOGY | Facility: CLINIC | Age: 69
End: 2024-06-21
Payer: MEDICARE

## 2024-06-26 ENCOUNTER — HOSPITAL ENCOUNTER (OUTPATIENT)
Dept: RADIOLOGY | Facility: HOSPITAL | Age: 69
Discharge: HOME | End: 2024-06-26
Attending: INTERNAL MEDICINE
Payer: MEDICARE

## 2024-06-26 DIAGNOSIS — C67.1 MALIGNANT NEOPLASM OF DOME OF URINARY BLADDER: ICD-10-CM

## 2024-06-26 PROCEDURE — A9698 NON-RAD CONTRAST MATERIALNOC: HCPCS | Performed by: INTERNAL MEDICINE

## 2024-06-26 PROCEDURE — 25500020 PHARM REV CODE 255: Performed by: INTERNAL MEDICINE

## 2024-06-26 PROCEDURE — 74177 CT ABD & PELVIS W/CONTRAST: CPT | Mod: 26,,, | Performed by: RADIOLOGY

## 2024-06-26 PROCEDURE — 71260 CT THORAX DX C+: CPT | Mod: 26,,, | Performed by: RADIOLOGY

## 2024-06-26 PROCEDURE — 74177 CT ABD & PELVIS W/CONTRAST: CPT | Mod: TC

## 2024-06-26 RX ADMIN — IOHEXOL 100 ML: 350 INJECTION, SOLUTION INTRAVENOUS at 02:06

## 2024-06-26 RX ADMIN — BARIUM SULFATE 450 ML: 20 SUSPENSION ORAL at 02:06

## 2024-06-27 ENCOUNTER — OFFICE VISIT (OUTPATIENT)
Dept: HEMATOLOGY/ONCOLOGY | Facility: CLINIC | Age: 69
End: 2024-06-27
Payer: MEDICARE

## 2024-06-27 VITALS
HEIGHT: 72 IN | OXYGEN SATURATION: 100 % | HEART RATE: 63 BPM | DIASTOLIC BLOOD PRESSURE: 89 MMHG | SYSTOLIC BLOOD PRESSURE: 163 MMHG | RESPIRATION RATE: 17 BRPM | WEIGHT: 199.94 LBS | TEMPERATURE: 97 F | BODY MASS INDEX: 27.08 KG/M2

## 2024-06-27 DIAGNOSIS — G62.9 NEUROPATHY: ICD-10-CM

## 2024-06-27 DIAGNOSIS — H91.90 HEARING LOSS, UNSPECIFIED HEARING LOSS TYPE, UNSPECIFIED LATERALITY: ICD-10-CM

## 2024-06-27 DIAGNOSIS — C67.1 MALIGNANT NEOPLASM OF DOME OF URINARY BLADDER: Primary | ICD-10-CM

## 2024-06-27 DIAGNOSIS — D64.9 NORMOCYTIC ANEMIA: ICD-10-CM

## 2024-06-27 DIAGNOSIS — R59.1 LYMPHADENOPATHY: ICD-10-CM

## 2024-06-27 PROCEDURE — 99999 PR PBB SHADOW E&M-EST. PATIENT-LVL IV: CPT | Mod: PBBFAC,,, | Performed by: INTERNAL MEDICINE

## 2024-06-27 NOTE — PROGRESS NOTES
Subjective     Patient ID: Med Palma is a 68 y.o. male.    Chief Complaint: Bladder Cancer    HPI    Diagnosis: Small cell bladder cancer     Returns for follow up and scans.    Reviewed below:  - 6/25/2024 CT C/A/P:  FINDINGS:  CHEST:  Neck and thoracic soft tissues: Right-sided chest port with the tip terminating in the cavoatrial junction.  Vasculature: Left sided aortic arch. Thoracic aorta is nonaneurysmal. Minimal atherosclerosis of the thoracic aorta . Main pulmonary artery is unremarkable.  Heart: Normal size. Mild atherosclerosis of the coronary arteries. No pericardial effusion.  Airways: Central airways are clear.  Lungs/Pleura: Scattered areas of bandlike scarring/atelectasis bilaterally similar in appearance to prior.   No focal consolidation. No pleural effusion or pneumothorax.  No new suspicious nodules or masses.  Hilum/Mediastinum: There several small mediastinal and hilar which are stable compared to prior.  Largest node is in the right hilum measuring 1.3 cm (image 72 series 2), previously 1.3 cm.  No new or worsening mediastinal lymph node enlargement.  Esophagus: No significant abnormality.  ABDOMEN/PELVIS:  Liver: Normal size. No focal abnormality.  Gallbladder: No calcified gallstones.  No pericholecystic inflammatory changes.  Bile ducts: No intrahepatic or extrahepatic biliary ductal dilatation.  Spleen: Normal size. No focal abnormality  Pancreas: No mass. No peripancreatic fat stranding.  Adrenals: No significant abnormality  Renal/Ureters: The kidneys are normal in size .  Multiple cortical defects involving the left kidney suggesting scarring.  Subcentimeter hypodensity within the superior pole of the left kidney, too small to fully characterize.  No hydroureteronephrosis. Postoperative changes of cystectomy are bladder cancer resection with ileal conduit creation.  Reproductive: Prostatectomy.  Stomach/Bowel: Stomach is unremarkable.  Small bowel is normal caliber without  obstruction or inflammation. Appendix is unremarkable.  Large bowel is normal caliber without obstruction or inflammation.  Peritoneum: No free fluid. No intraperitoneal free air.  Lymph Nodes: Multiple enlarged lymph nodes/soft tissue masses within the pelvis for example: 2.6 cm right pelvic sidewall node with hypodense center suggesting necrosis (series 3, image 138).  1.9 cm perirectal nodule (series 3, image 149).  1 cm left iliac chain node (series 3, image 137).  2.2 cm right iliac chain node (series 3, image 136).  Vasculature: Abdominal aorta tapers normally.  Mild atherosclerosis of the abdominal aorta and its branches.  Portal vasculature, SMV, and splenic vein are patent.  Bones: Degenerative changes of the spine.  No acute fractures or osseous destructive lesions.  Soft Tissues: No significant abnormality.  Impression:  In this patient with cystoprostatectomy for bladder cancer resection, there are multiple enlarged lymph nodes/soft tissue nodules within the pelvis worrisome for recurrent/metastatic disease.  Stable small mediastinal and hilar lymph nodes.  This report was flagged in Epic as abnormal.    He has a history of T3N1M0 small cell bladder cancer and received preoperative chemotherapy and is s/p robotic cystoprostatectomy, bilateral standard pelvic lymph node dissection, and intracorporal ileal conduit on 3/13/2024.  We discussed [facility] recs at prior call  They acknowledged challenges here with residual disease and no standard of care  He is not interested in further aggressive chemotherapy - as was still dealing with neuropathy and hearing issues  Immunotherapy data not as compelling in adjuvant setting here and rec was to hold unless progression but we have discussed as an opyion  Surveillance images opted for    Reports doing well  Neuropathy 75% better  No other pain  Weight back to baseline and eating well  No fevers, chills, or infectious complaints     Oncology History:  - presented with  gross hematuria in September 2023 while traveling in [place] (initially wondered if related to exercise and poor hydration) and resolved on return home he was due for annual PCP visit and he recommended some additional blood work  That same night he had another episode and again resolved with hydration but after occurred several times over a few weeks he sought further evaluation  Minimal discomfort noted with this (states he had a prior bladder infection and it felt like this)  Over summer months had noted occasional mild dysuria preceding this   --Notes prior episode of possible hematuria in 2016 with heavy exercise and poor hydration resolved after 1 day (related to exercise)     - 10/21/2023 CT Urogram Abdomen/Pelvis:  FINDINGS:  : Kidneys are symmetric in size.  Precontrast imaging shows no stones or hydronephrosis.  Kidneys concentrate and excrete contrast appropriately on postcontrast imaging.  No focal filling defects.  Lobulated margins along the lateral aspect of the left kidney has the appearance of scarring.  Heterogeneously enhancing mass along the dorsal lateral aspect of the urinary bladder on the left estimated at 3.8 x 3.6 cm.  Enlarged left pelvic lymph node measures 1.9 cm in short axis dimension.  Enlarged, lobulated prostate gland bulging into the bladder base, similar compared to prior MRI.  CT:  Lung bases show subsegmental atelectasis or scarring.  Liver is homogeneous.  Gallbladder is unremarkable.  Bile ducts, spleen, pancreas, and adrenal glands are unremarkable.  Stomach and loops of bowel are normal in caliber.  No free fluid in the pelvis.  Regional skeleton shows degenerative change.  Impression:  Heterogeneously enhancing mass along the dorsal lateral aspect of the urinary bladder on the left most concerning for primary bladder malignancy.  Enlarged left pelvic lymph node most concerning for metastatic disease. Negative for obstructive uropathy.  Prostatomegaly with the prostate  bulging into the bladder base, similar compared to prior MRI.  This report was flagged in Epic as abnormal.     - 10/26/2023 TURBT:  Pathology:  1. Bladder, tumor, superficial resection:   Small cell carcinoma.   Muscularis propria is present and is positive for carcinoma.     2. Bladder, base of tumor, resection:Small cell carcinoma.   Muscularis propria is present and is positive for carcinoma.   Malignant cells are positive for AE1/AE3, TTF1, Synaptophysin, and Chromogranin (rare positive cells) immunostains, and negative for GATA3, CK7, CK20, and p63 immunostains. Ki67 immunostain shows a proliferative index of >95%. The morphology and immunophenotype are compatible with small cell carcinoma.      Prior evaluation for elevated PSA- dx with BPH  - 3/3/02961 MRI Prostate:  FINDINGS:  Previous biopsy: TRUS PBx on 1/26/15 - negative  PSA: 5.8 ng/mL 09/14/2021 (previously 5.9 on 07/07/2021, 3.1 on 05/12/2017, 5.9 on 12/11/2015)  Prior therapy: None  Prostate: 5.1 x 3.8 x 7.2 cm corresponding to a computed volume of 65 cc.  Peripheral zone: 1 focal area of signal abnormality as described below:  Lesion (ANDERSON) #3  Location: Side: Right; Region: Base; Zone: posterior peripheral zone laterally  Greatest dimension: 1.2 cm  T2-WI: T2 SI: Heterogeneous signal intensity or noncircumscribed, rounded, moderate hypointensity--score 3  DWI/ADC: DWI: Focal mild/moderate hypointense on ADC and isointense/mild hyperintense on high b-value DWI--score 3  DCE: No early enhancement  Extraprostatic extension: Abuts the adjacent right posterolateral capsular margin.  PI-RADS assessment category: 3  Transitional zone:  TZ abnormalities: Benign prostatic hyperplasia with at least 2 focal areas of signal abnormality do not completely conform to a BPH nodule.  The 2 lesions are detailed below:  Lesion (ANDERSON) #1  Location: left; region: mid; zone: anterior (more medial compared to lesion (ANDERSON) #2.)  Greatest dimension: 1.3 cm  T2-WI/SI:  Heterogeneous signal intensity with obscured margins; score 3.  DWI/ADC: Focal mildly/moderately hypointense on ADC and isointense/mildly hyperintense on high b-value DWI; score 3  DCE: Positive  Extraprostatic extension: No  PI-RADS assessment category: 3  Lesion (ANDERSON) #2  Location: Left; region: Mid; zone: Anterior (lateral and more posterior compared to lesion (ANDERSON) #1  Greatest dimension: 1.1 cm  T2-WI/SI: Heterogeneous signal intensity with obscured margins; score 3.  DWI/ADC: Focal mildly/moderately hypointense on ADC and isointense/mildly hyperintense on high b-value DWI; score 3.  DCE: Positive  Extraprostatic extension: Abuts the adjacent left lateral capsular margin.  PI-RADS assessment category: 3  Neurovascular bundle: Unremarkable  Seminal vesicles:  SV invasion: Negative.  Adjacent Organ Involvement: Prominent median lobe of the prostate protruding into the base the bladder.  No focal bladder wall thickening.  No rectal involvement.  Lymphadenopathy: No pathologically enlarged pelvic lymph nodes.  Other Findings: None  Impression:  1. There are three PI-RADS 3 lesions, 1 within the right posterolateral peripheral zone and 2 within the left anterior transition zone of the mid to base of the prostate.  Two lesions abut the capsular margin, as described above, without involvement of the seminal vesicles.  No pathologically enlarged pelvic lymph nodes.  2. BPH with a very prominent median lobe protruding into the base of the bladder.  Overall Assessment:  PIRADS 3 (the presence of clinically significant cancer is equivocal)  Number of targets created for potential MR/US fusion biopsy  Peripheral zone: 1  Transition zone: 2     - 5/2/2022 TRUS and biopsies:  Pathology:  RELIAPATH DIAGNOSIS:   A.   PROSTATE, LEFT APEX, NEEDLE BIOPSY:          Benign prostatic tissue with chronic inflammation, see comment.   (R97.20)   B.   PROSTATE, LEFT MID, NEEDLE BIOPSY:         Benign prostatic tissue with chronic  inflammation, see comment.   (R97.20)   C.   PROSTATE, LEFT BASE, NEEDLE BIOPSY:         Benign prostatic tissue.  (R97.20)   D.   PROSTATE, RIGHT APEX, NEEDLE BIOPSY:          Benign prostatic tissue with chronic inflammation, see comment.   (R97.20)   E.   PROSTATE, RIGHT MID, NEEDLE BIOPSY:          Benign prostatic tissue with chronic inflammation, see comment.   (R97.20)   F.   PROSTATE, RIGHT BASE, NEEDLE BIOPSY:          Benign prostatic tissue with chronic inflammation, see comment.   (R97.20)   G.   TISSUE1          PROSTATE, TARGET LESION #1, BIOPSY:          -Benign prostatic tissue with chronic inflammation, see comment.   (R97.20)   H.   TISSUE2          PROSTATE, TARGET LESION #2, BIOPSY:          -Benign prostatic tissue .  (R97.20)   I.     TISSUE3          PROSTATE, TARGET LESION #3, BIOPSY:          -Benign prostatic tissue with chronic inflammation .  (R97.20)      - 11/9/2023 Bone scan:  FINDINGS:  There is physiologic distribution of the radiopharmaceutical throughout the skeleton.  There is normal uptake in the bilateral kidneys and soft tissues.  Bladder is significantly distended with abnormal contour, likely due to known bladder mass.  Impression:  No scintigraphic evidence of osteoblastic metastatic disease.     - 11/7/2023 CT Chest:  FINDINGS:  Chest wall and lower neck: No axillary or supraclavicular lymphadenopathy.  Lungs and pleura: Biapical pleuroparenchymal thickening compatible with scar.  Focal air cystic spaces noted in the right upper lobe (image 154 sequence 4) suggestive of scar.  Multiple sub pleural bandlike scars are noted in the upper and lower lobes.  No pleural effusion or airspace consolidation is noted.  Mediastinum and christy: Right hilar node measuring 15 mm (short axis).  No mediastinal lymphadenopathy is noted.  Heart and pericardium: Heart size is normal.No pericardial effusion.  Vessels: Mild atheromatous disease is seen in the aorta.   The coronary arteries show  mild atheromatous disease.  Upper abdomen: Cortical irregularity noted in the left kidney compatible with scars.  Both kidneys are excreting contrast during imaging acquisition.  Bones: Biomechanical changes are noted in the thoracic spine with osteophyte formations.  Schmorl nodes are noted in the thoracic spine.  Impression:  1. Multifocal bilateral pulmonary scars likely representing sequela prior infectious process to correlate with patient history.  No honeycombing is seen.  2. Right hilar node within maximum normal limits, nonspecific.  3. Left renal scar.  4. Additional findings.     - 11/7/2023 MRI Brain:  FINDINGS:  There is no midline shift, hydrocephalus or mass effect.  No acute infarction or acute intracranial hemorrhage.  A few small foci of T2/FLAIR signal in the supratentorial white matter while nonspecific most likely mild chronic microvascular ischemic changes.  Small focus of bright T2 signal adjacent to the posteromedial temporal lobe likely an incidental choroidal fissure cyst.  No enhancing lesions to indicate intracranial metastatic disease.  Small (1 cm) focus of increased FLAIR signal in the posterior fossa near the foramen of Magendie.  This demonstrates gradient signal presumably relating to calcifications with no abnormal enhancement.  No significant mass effect.  No evidence of associated hydrocephalus.  No abnormal extra-axial fluid collections.  The major skull base flow voids present.  Diffuse patchy mucosal membrane thickening in the paranasal sinuses with aerated secretions and small air-fluid levels.  Trace mastoid fluid on the left.  Impression:  No acute intracranial abnormalities, specifically no evidence of intracranial metastatic disease.  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  No associated hydrocephalus at this time.  Recommend surveillance to ensure stability.  Paranasal sinus disease.  This report was flagged in Epic as  abnormal.     - initiated chemo with Cis/Etoposide on 2023  Completed 4 cycles on 2024     3/13/2024 Final Pathology:  1. Left ureter, excision:      - Portion of ureter without significant pathologic change  2. Right ureter, excision:      - Portion of ureter without significant pathologic change  3. Bladder, prostate and left iliac lymph node, radical cystoprostatectomy:      - Small cell neuroendocrine carcinoma      - Tumor size: 1.5 cm      - Resection margins: free of tumor      - Prostate with atrophy and benign prostatic hyperplasia (BPH)      - One of one lymph node, positive for carcinoma (1/)          - Size of Largest Metastatic Deposit: at least 2.0 cm          - Extranodal Extension: not identified  4. Right pelvic lymph nodes, excision:      - Fifteen lymph nodes, negative for carcinoma (0/15)      - No treatment effect seen  5. Left pelvic lymph nodes, excision:      - Ten lymph nodes, negative for carcinoma (0/10)      - No treatment effect seen  Note: Cytokeratin AE1/AE3 on parts 4 and 5 support the above diagnosis. Immunostains performed with appropriate positive and negative controls.    Final staging:  pT3b pN1     PMH:  Covid pneumonia- recovered  BPH  Arthritis- hips, low back, shoulders, knees- prior PT and dry needling helped significantly  Deviated septum surgery  Anemia     FH:  Dad-  of Multiple Myeloma at age 62 yo  Sister- breast cancer survivor (dx in her mid 70s)  Mother lived until age 92 yo-  from natural causes, dementia     SH:  Retired   [employer] management  Prior chemical and environmental exposures at work  + tobacco- last 20-25 years 1/2 pack in a weekend   Dips tobacco     + social EtOH    3 children    Review of Systems   Constitutional:  Negative for activity change, appetite change, chills, fatigue, fever and unexpected weight change.   HENT:  Positive for hearing loss (stable). Negative for nasal congestion, dental problem, postnasal drip,  rhinorrhea, sinus pressure/congestion and trouble swallowing.         Hoarseness noted   Eyes:  Negative for visual disturbance (wears bifocals).   Respiratory:  Negative for cough, shortness of breath and wheezing.    Cardiovascular:  Negative for chest pain, palpitations and leg swelling.   Gastrointestinal:  Negative for abdominal distention, abdominal pain, change in bowel habit, constipation, diarrhea, nausea, vomiting and reflux.   Genitourinary:  Negative for decreased urine volume, difficulty urinating, dysuria, frequency, hematuria and urgency.   Musculoskeletal:  Negative for arthralgias, back pain, gait problem, myalgias and joint deformity.   Integumentary:  Negative for rash and wound.   Neurological:  Positive for numbness. Negative for dizziness, speech difficulty, weakness, headaches and coordination difficulties.   Hematological:  Negative for adenopathy. Does not bruise/bleed easily.   Psychiatric/Behavioral:  Negative for agitation, behavioral problems, confusion, dysphoric mood and sleep disturbance. The patient is not nervous/anxious.           Objective     Physical Exam  Vitals and nursing note reviewed.   Constitutional:       General: He is not in acute distress.     Appearance: Normal appearance. He is well-developed and normal weight. He is not ill-appearing.      Comments: Presents with his wife and closest friend  Very pleasant  ECOG= 0   HENT:      Head: Normocephalic and atraumatic.      Comments: Chemo alopecia  Eyes:      General: No scleral icterus.     Extraocular Movements: Extraocular movements intact.      Conjunctiva/sclera: Conjunctivae normal.      Pupils: Pupils are equal, round, and reactive to light.   Neck:      Thyroid: No thyromegaly.      Trachea: No tracheal deviation.   Cardiovascular:      Rate and Rhythm: Normal rate and regular rhythm.      Heart sounds: Normal heart sounds. No murmur heard.     No friction rub. No gallop.   Pulmonary:      Effort: Pulmonary  effort is normal. No respiratory distress.      Breath sounds: Normal breath sounds. No wheezing, rhonchi or rales.   Chest:      Chest wall: No tenderness.      Comments: RCW port  Abdominal:      General: Abdomen is flat. Bowel sounds are normal. There is no distension.      Palpations: Abdomen is soft. There is no mass.      Tenderness: There is no abdominal tenderness. There is no guarding or rebound.      Comments: No organomegaly   Musculoskeletal:         General: No swelling, tenderness or deformity. Normal range of motion.      Cervical back: Normal range of motion and neck supple. No rigidity or tenderness.      Right lower leg: No edema.      Left lower leg: No edema.   Lymphadenopathy:      Cervical: No cervical adenopathy.   Skin:     General: Skin is warm and dry.      Coloration: Skin is not jaundiced or pale.      Findings: No erythema, lesion or rash.   Neurological:      General: No focal deficit present.      Mental Status: He is alert and oriented to person, place, and time. Mental status is at baseline.      Sensory: No sensory deficit.      Motor: No weakness or abnormal muscle tone.      Coordination: Coordination normal.      Gait: Gait normal.      Deep Tendon Reflexes: Reflexes are normal and symmetric. Reflexes normal.   Psychiatric:         Mood and Affect: Mood normal.         Behavior: Behavior normal.         Thought Content: Thought content normal.         Judgment: Judgment normal.   Labs- reviewed       Assessment and Plan     1. Malignant neoplasm of dome of urinary bladder    2. Neuropathy    3. Normocytic anemia    4. Hearing loss, unspecified hearing loss type, unspecified laterality    5. Lymphadenopathy        Small cell Bladder cancer  Reviewed new lymphadenopathy on scans  We will further assess with PET and if + we will proceed with immunotherapy    Neuropathy slowly improving- reviewed how to manage     Hearing loss stable, declined audiology assessment    Anemia -  improved    Route Chart for Scheduling    Med Onc Chart Routing      Follow up with physician . PET scan, virtual post   Follow up with [facility]    Infusion scheduling note    Injection scheduling note    Labs    Imaging    Pharmacy appointment    Other referrals            Treatment Plan Information   OP CISPLATIN 75MG/M2 ETOPOSIDE 100MG/M2 Q3W   Mary Spicer MD   Upcoming Treatment Dates - OP CISPLATIN 75MG/M2 ETOPOSIDE 100MG/M2 Q3W    2/28/2024       Pre-Medications       diphenhydrAMINE injection 25 mg       famotidine (PF) injection 20 mg       hydrocortisone sodium succinate injection 100 mg       Chemotherapy       CISplatin (Platinol) 75 mg/m2 = 158 mg in sodium chloride 0.9% 658 mL chemo infusion       etoposide (VEPESID) 100 mg/m2 = 212 mg in sodium chloride 0.9% 510.6 mL chemo infusion       Pre-Hydration       sodium chloride 0.9% 1,000 mL with magnesium sulfate 1 g, potassium chloride 20 mEq infusion       Post-Hydration       sodium chloride 0.9% 1,000 mL with magnesium sulfate 1 g, potassium chloride 20 mEq infusion       Antiemetics       aprepitant (CINVANTI) injection 130 mg       palonosetron (ALOXI) 0.25 mg with Dexamethasone (DECADRON) 12 mg in NS 50 mL IVPB  2/29/2024       Pre-Medications       diphenhydrAMINE injection 25 mg       famotidine (PF) injection 20 mg       hydrocortisone sodium succinate injection 100 mg       Chemotherapy       etoposide (VEPESID) 100 mg/m2 = 212 mg in sodium chloride 0.9% 510.6 mL chemo infusion  3/1/2024       Pre-Medications       diphenhydrAMINE injection 25 mg       famotidine (PF) injection 20 mg       hydrocortisone sodium succinate injection 100 mg       Chemotherapy       etoposide (VEPESID) 100 mg/m2 = 212 mg in sodium chloride 0.9% 510.6 mL chemo infusion  3/20/2024       Pre-Medications       diphenhydrAMINE injection 25 mg       famotidine (PF) injection 20 mg       hydrocortisone sodium succinate injection 100 mg       Chemotherapy       CISplatin  (Platinol) 75 mg/m2 = 158 mg in sodium chloride 0.9% 658 mL chemo infusion       etoposide (VEPESID) 100 mg/m2 = 212 mg in sodium chloride 0.9% 510.6 mL chemo infusion       Pre-Hydration       sodium chloride 0.9% 1,000 mL with magnesium sulfate 1 g, potassium chloride 20 mEq infusion       Post-Hydration       sodium chloride 0.9% 1,000 mL with magnesium sulfate 1 g, potassium chloride 20 mEq infusion       Antiemetics       aprepitant (CINVANTI) injection 130 mg       palonosetron (ALOXI) 0.25 mg with Dexamethasone (DECADRON) 12 mg in NS 50 mL IVPB

## 2024-07-05 ENCOUNTER — PATIENT MESSAGE (OUTPATIENT)
Dept: HEMATOLOGY/ONCOLOGY | Facility: CLINIC | Age: 69
End: 2024-07-05
Payer: MEDICARE

## 2024-07-12 ENCOUNTER — PATIENT MESSAGE (OUTPATIENT)
Dept: HEMATOLOGY/ONCOLOGY | Facility: CLINIC | Age: 69
End: 2024-07-12
Payer: MEDICARE

## 2024-07-17 ENCOUNTER — PATIENT MESSAGE (OUTPATIENT)
Dept: HEMATOLOGY/ONCOLOGY | Facility: CLINIC | Age: 69
End: 2024-07-17
Payer: MEDICARE

## 2024-07-17 ENCOUNTER — HOSPITAL ENCOUNTER (OUTPATIENT)
Dept: RADIOLOGY | Facility: HOSPITAL | Age: 69
Discharge: HOME | End: 2024-07-17
Attending: INTERNAL MEDICINE
Payer: MEDICARE

## 2024-07-17 DIAGNOSIS — C67.1 MALIGNANT NEOPLASM OF DOME OF URINARY BLADDER: ICD-10-CM

## 2024-07-17 LAB — POCT GLUCOSE: 109 MG/DL (ref 70–110)

## 2024-07-17 PROCEDURE — A9552 F18 FDG: HCPCS | Mod: HCNC | Performed by: INTERNAL MEDICINE

## 2024-07-17 PROCEDURE — 78815 PET IMAGE W/CT SKULL-THIGH: CPT | Mod: 26,HCNC,PI, | Performed by: NUCLEAR MEDICINE

## 2024-07-17 PROCEDURE — 78815 PET IMAGE W/CT SKULL-THIGH: CPT | Mod: TC,HCNC

## 2024-07-17 RX ORDER — FLUDEOXYGLUCOSE F18 500 MCI/ML
11 INJECTION INTRAVENOUS
Status: COMPLETED | OUTPATIENT
Start: 2024-07-17 | End: 2024-07-17

## 2024-07-17 RX ADMIN — FLUDEOXYGLUCOSE F-18 11 MILLICURIE: 500 INJECTION INTRAVENOUS at 08:07

## 2024-07-18 DIAGNOSIS — G62.9 NEUROPATHY: ICD-10-CM

## 2024-07-19 RX ORDER — GABAPENTIN 300 MG/1
300 CAPSULE ORAL 3 TIMES DAILY
Qty: 90 CAPSULE | Refills: 11 | Status: CANCELLED | OUTPATIENT
Start: 2024-07-19 | End: 2025-07-19

## 2024-07-19 NOTE — TELEPHONE ENCOUNTER
I called and spoke to the patient explaining that 11 refills are ordered and a new prescription each time is not needed.The patient knows to contact the pharmacy to get the refill in progress. The patient is aware he can message us or call us if any other conflicts come up.     Trish

## 2024-07-23 ENCOUNTER — OFFICE VISIT (OUTPATIENT)
Dept: HEMATOLOGY/ONCOLOGY | Facility: CLINIC | Age: 69
End: 2024-07-23
Payer: MEDICARE

## 2024-07-23 DIAGNOSIS — D64.9 NORMOCYTIC ANEMIA: ICD-10-CM

## 2024-07-23 DIAGNOSIS — R93.89 ABNORMAL FINDINGS ON DIAGNOSTIC IMAGING OF OTHER SPECIFIED BODY STRUCTURES: ICD-10-CM

## 2024-07-23 DIAGNOSIS — C67.1 MALIGNANT NEOPLASM OF DOME OF URINARY BLADDER: Primary | ICD-10-CM

## 2024-07-23 PROCEDURE — 99999 PR PBB SHADOW E&M-EST. PATIENT-LVL II: CPT | Mod: PBBFAC,HCNC,, | Performed by: INTERNAL MEDICINE

## 2024-07-23 NOTE — PROGRESS NOTES
Subjective     Patient ID: Med Palma is a 68 y.o. male.    Chief Complaint: Results    HPI    Returns to discuss progression of disease  Today we reviewed again the rarity of small cell bladder cancer, the aggressiveness and why this typically recurs.  He is feeling well and hopeful for more treatment options.    Diagnosis: Small cell bladder cancer- progressing     He has a history of T3N1M0 small cell bladder cancer and received preoperative chemotherapy and is now s/p robotic cystoprostatectomy, bilateral standard pelvic lymph node dissection, and intracorporal ileal conduit on 3/13/2024.  We discussed [facility] recs at prior call  They acknowledged challenges here with residual disease and no standard of care  He is not interested in further aggressive chemotherapy -still dealing with neuropathy and hearing issues  Immunotherapy data not as compelling in adjuvant setting here and rec was to hold unless progression but we have discussed as an opyion  Surveillance images      Oncology History:  - presented with gross hematuria in September 2023 while traveling in [place] (initially wondered if related to exercise and poor hydration) and resolved on return home he was due for annual PCP visit and he recommended some additional blood work  That same night he had another episode and again resolved with hydration but after occurred several times over a few weeks he sought further evaluation  Minimal discomfort noted with this (states he had a prior bladder infection and it felt like this)  Over summer months had noted occasional mild dysuria preceding this   --Notes prior episode of possible hematuria in 2016 with heavy exercise and poor hydration resolved after 1 day (related to exercise)     - 10/21/2023 CT Urogram Abdomen/Pelvis:  FINDINGS:  : Kidneys are symmetric in size.  Precontrast imaging shows no stones or hydronephrosis.  Kidneys concentrate and excrete contrast appropriately on postcontrast imaging.   No focal filling defects.  Lobulated margins along the lateral aspect of the left kidney has the appearance of scarring.  Heterogeneously enhancing mass along the dorsal lateral aspect of the urinary bladder on the left estimated at 3.8 x 3.6 cm.  Enlarged left pelvic lymph node measures 1.9 cm in short axis dimension.  Enlarged, lobulated prostate gland bulging into the bladder base, similar compared to prior MRI.  CT:  Lung bases show subsegmental atelectasis or scarring.  Liver is homogeneous.  Gallbladder is unremarkable.  Bile ducts, spleen, pancreas, and adrenal glands are unremarkable.  Stomach and loops of bowel are normal in caliber.  No free fluid in the pelvis.  Regional skeleton shows degenerative change.  Impression:  Heterogeneously enhancing mass along the dorsal lateral aspect of the urinary bladder on the left most concerning for primary bladder malignancy.  Enlarged left pelvic lymph node most concerning for metastatic disease. Negative for obstructive uropathy.  Prostatomegaly with the prostate bulging into the bladder base, similar compared to prior MRI.  This report was flagged in Epic as abnormal.     - 10/26/2023 TURBT:  Pathology:  1. Bladder, tumor, superficial resection:   Small cell carcinoma.   Muscularis propria is present and is positive for carcinoma.     2. Bladder, base of tumor, resection:Small cell carcinoma.   Muscularis propria is present and is positive for carcinoma.   Malignant cells are positive for AE1/AE3, TTF1, Synaptophysin, and Chromogranin (rare positive cells) immunostains, and negative for GATA3, CK7, CK20, and p63 immunostains. Ki67 immunostain shows a proliferative index of >95%. The morphology and immunophenotype are compatible with small cell carcinoma.      Prior evaluation for elevated PSA- dx with BPH  - 3/3/12766 MRI Prostate:  FINDINGS:  Previous biopsy: TRUS PBx on 1/26/15 - negative  PSA: 5.8 ng/mL 09/14/2021 (previously 5.9 on 07/07/2021, 3.1 on  05/12/2017, 5.9 on 12/11/2015)  Prior therapy: None  Prostate: 5.1 x 3.8 x 7.2 cm corresponding to a computed volume of 65 cc.  Peripheral zone: 1 focal area of signal abnormality as described below:  Lesion (ANDERSON) #3  Location: Side: Right; Region: Base; Zone: posterior peripheral zone laterally  Greatest dimension: 1.2 cm  T2-WI: T2 SI: Heterogeneous signal intensity or noncircumscribed, rounded, moderate hypointensity--score 3  DWI/ADC: DWI: Focal mild/moderate hypointense on ADC and isointense/mild hyperintense on high b-value DWI--score 3  DCE: No early enhancement  Extraprostatic extension: Abuts the adjacent right posterolateral capsular margin.  PI-RADS assessment category: 3  Transitional zone:  TZ abnormalities: Benign prostatic hyperplasia with at least 2 focal areas of signal abnormality do not completely conform to a BPH nodule.  The 2 lesions are detailed below:  Lesion (ANDERSON) #1  Location: left; region: mid; zone: anterior (more medial compared to lesion (ANDERSON) #2.)  Greatest dimension: 1.3 cm  T2-WI/SI: Heterogeneous signal intensity with obscured margins; score 3.  DWI/ADC: Focal mildly/moderately hypointense on ADC and isointense/mildly hyperintense on high b-value DWI; score 3  DCE: Positive  Extraprostatic extension: No  PI-RADS assessment category: 3  Lesion (ANDERSON) #2  Location: Left; region: Mid; zone: Anterior (lateral and more posterior compared to lesion (ANDERSON) #1  Greatest dimension: 1.1 cm  T2-WI/SI: Heterogeneous signal intensity with obscured margins; score 3.  DWI/ADC: Focal mildly/moderately hypointense on ADC and isointense/mildly hyperintense on high b-value DWI; score 3.  DCE: Positive  Extraprostatic extension: Abuts the adjacent left lateral capsular margin.  PI-RADS assessment category: 3  Neurovascular bundle: Unremarkable  Seminal vesicles:  SV invasion: Negative.  Adjacent Organ Involvement: Prominent median lobe of the prostate protruding into the base the bladder.  No focal  bladder wall thickening.  No rectal involvement.  Lymphadenopathy: No pathologically enlarged pelvic lymph nodes.  Other Findings: None  Impression:  1. There are three PI-RADS 3 lesions, 1 within the right posterolateral peripheral zone and 2 within the left anterior transition zone of the mid to base of the prostate.  Two lesions abut the capsular margin, as described above, without involvement of the seminal vesicles.  No pathologically enlarged pelvic lymph nodes.  2. BPH with a very prominent median lobe protruding into the base of the bladder.  Overall Assessment:  PIRADS 3 (the presence of clinically significant cancer is equivocal)  Number of targets created for potential MR/US fusion biopsy  Peripheral zone: 1  Transition zone: 2     - 5/2/2022 TRUS and biopsies:  Pathology:  [facility] DIAGNOSIS:   A.   PROSTATE, LEFT APEX, NEEDLE BIOPSY:          Benign prostatic tissue with chronic inflammation, see comment.   (R97.20)   B.   PROSTATE, LEFT MID, NEEDLE BIOPSY:         Benign prostatic tissue with chronic inflammation, see comment.   (R97.20)   C.   PROSTATE, LEFT BASE, NEEDLE BIOPSY:         Benign prostatic tissue.  (R97.20)   D.   PROSTATE, RIGHT APEX, NEEDLE BIOPSY:          Benign prostatic tissue with chronic inflammation, see comment.   (R97.20)   E.   PROSTATE, RIGHT MID, NEEDLE BIOPSY:          Benign prostatic tissue with chronic inflammation, see comment.   (R97.20)   F.   PROSTATE, RIGHT BASE, NEEDLE BIOPSY:          Benign prostatic tissue with chronic inflammation, see comment.   (R97.20)   G.   TISSUE1          PROSTATE, TARGET LESION #1, BIOPSY:          -Benign prostatic tissue with chronic inflammation, see comment.   (R97.20)   H.   TISSUE2          PROSTATE, TARGET LESION #2, BIOPSY:          -Benign prostatic tissue .  (R97.20)   I.     TISSUE3          PROSTATE, TARGET LESION #3, BIOPSY:          -Benign prostatic tissue with chronic inflammation .  (R97.20)      - 11/9/2023 Bone  scan:  FINDINGS:  There is physiologic distribution of the radiopharmaceutical throughout the skeleton.  There is normal uptake in the bilateral kidneys and soft tissues.  Bladder is significantly distended with abnormal contour, likely due to known bladder mass.  Impression:  No scintigraphic evidence of osteoblastic metastatic disease.     - 11/7/2023 CT Chest:  FINDINGS:  Chest wall and lower neck: No axillary or supraclavicular lymphadenopathy.  Lungs and pleura: Biapical pleuroparenchymal thickening compatible with scar.  Focal air cystic spaces noted in the right upper lobe (image 154 sequence 4) suggestive of scar.  Multiple sub pleural bandlike scars are noted in the upper and lower lobes.  No pleural effusion or airspace consolidation is noted.  Mediastinum and christy: Right hilar node measuring 15 mm (short axis).  No mediastinal lymphadenopathy is noted.  Heart and pericardium: Heart size is normal.No pericardial effusion.  Vessels: Mild atheromatous disease is seen in the aorta.   The coronary arteries show mild atheromatous disease.  Upper abdomen: Cortical irregularity noted in the left kidney compatible with scars.  Both kidneys are excreting contrast during imaging acquisition.  Bones: Biomechanical changes are noted in the thoracic spine with osteophyte formations.  Schmorl nodes are noted in the thoracic spine.  Impression:  1. Multifocal bilateral pulmonary scars likely representing sequela prior infectious process to correlate with patient history.  No honeycombing is seen.  2. Right hilar node within maximum normal limits, nonspecific.  3. Left renal scar.  4. Additional findings.     - 11/7/2023 MRI Brain:  FINDINGS:  There is no midline shift, hydrocephalus or mass effect.  No acute infarction or acute intracranial hemorrhage.  A few small foci of T2/FLAIR signal in the supratentorial white matter while nonspecific most likely mild chronic microvascular ischemic changes.  Small focus of bright  T2 signal adjacent to the posteromedial temporal lobe likely an incidental choroidal fissure cyst.  No enhancing lesions to indicate intracranial metastatic disease.  Small (1 cm) focus of increased FLAIR signal in the posterior fossa near the foramen of Magendie.  This demonstrates gradient signal presumably relating to calcifications with no abnormal enhancement.  No significant mass effect.  No evidence of associated hydrocephalus.  No abnormal extra-axial fluid collections.  The major skull base flow voids present.  Diffuse patchy mucosal membrane thickening in the paranasal sinuses with aerated secretions and small air-fluid levels.  Trace mastoid fluid on the left.  Impression:  No acute intracranial abnormalities, specifically no evidence of intracranial metastatic disease.  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  No associated hydrocephalus at this time.  Recommend surveillance to ensure stability.  Paranasal sinus disease.  This report was flagged in Epic as abnormal.     - initiated chemo with Cis/Etoposide on 12/4/2023  Completed 4 cycles on 2/7/2024     3/13/2024 Final Pathology:  1. Left ureter, excision:      - Portion of ureter without significant pathologic change  2. Right ureter, excision:      - Portion of ureter without significant pathologic change  3. Bladder, prostate and left iliac lymph node, radical cystoprostatectomy:      - Small cell neuroendocrine carcinoma      - Tumor size: 1.5 cm      - Resection margins: free of tumor      - Prostate with atrophy and benign prostatic hyperplasia (BPH)      - One of one lymph node, positive for carcinoma (1/1)          - Size of Largest Metastatic Deposit: at least 2.0 cm          - Extranodal Extension: not identified  4. Right pelvic lymph nodes, excision:      - Fifteen lymph nodes, negative for carcinoma (0/15)      - No treatment effect seen  5. Left pelvic lymph nodes, excision:      - Ten lymph  nodes, negative for carcinoma (0/10)      - No treatment effect seen  Note: Cytokeratin AE1/AE3 on parts 4 and 5 support the above diagnosis. Immunostains performed with appropriate positive and negative controls.    Final staging:  pT3b pN1     PMH:  Covid pneumonia- recovered  BPH  Arthritis- hips, low back, shoulders, knees- prior PT and dry needling helped significantly  Deviated septum surgery  Anemia     FH:  Dad-  of Multiple Myeloma at age 62 yo  Sister- breast cancer survivor (dx in her mid 70s)  Mother lived until age 94 yo-  from natural causes, dementia     SH:  Retired   [employer] management  Prior chemical and environmental exposures at work  + tobacco- last 20-25 years 1/2 pack in a weekend   Dips tobacco     + social EtOH    3 children      Review of Systems   Constitutional:  Negative for activity change, appetite change, chills, fatigue, fever and unexpected weight change.   HENT:  Positive for hearing loss (stable). Negative for nasal congestion, dental problem, postnasal drip, rhinorrhea, sinus pressure/congestion and trouble swallowing.         Hoarseness noted   Eyes:  Negative for visual disturbance (wears bifocals).   Respiratory:  Negative for cough, shortness of breath and wheezing.    Cardiovascular:  Negative for chest pain, palpitations and leg swelling.   Gastrointestinal:  Negative for abdominal distention, abdominal pain, change in bowel habit, constipation, diarrhea, nausea, vomiting and reflux.   Genitourinary:  Negative for decreased urine volume, difficulty urinating, dysuria, frequency, hematuria and urgency.   Musculoskeletal:  Negative for arthralgias, back pain, gait problem, myalgias and joint deformity.   Integumentary:  Negative for rash and wound.   Neurological:  Negative for dizziness, speech difficulty, weakness, numbness, headaches and coordination difficulties.   Hematological:  Negative for adenopathy. Does not bruise/bleed easily.    Psychiatric/Behavioral:  Negative for agitation, behavioral problems, confusion, dysphoric mood and sleep disturbance. The patient is nervous/anxious.           Objective     Physical Exam  Vitals and nursing note reviewed.   Constitutional:       Appearance: Normal appearance. He is normal weight.      Comments: Presents with wife   Neurological:      Mental Status: He is alert.        Assessment and Plan     1. Malignant neoplasm of dome of urinary bladder    2. Normocytic anemia    Reviewed progression and options  We discussed chemo prior and he also is concerned about toxicity risk and lack of OS data  We reviewed immunotherapy - we discussed much of the data comes from small cell lung patients but there is emerging data on the small cell bladder front.  We reviewed the PURE-01 study of neoadjuvant pembrolizumab, of which 3% were patients with SCCB, showed a ypT0 rate of 37% in the subset of patients with mixed variant histology.  We reviewed that there was a small subset of small cell bladder cancer in the  IMvigor 210 trials showed that patients with UCB with a neuronal transcriptomic profile had a 100% response rate to atezolizumab.  There is also retrospective data supporting survival for SCCB in the second-line, treatment refractory setting, and these tumors harbor common but not ubiquitous mutations in TP53, RB, and TERT.  He is aware the prospective trials were not large numbers of patients like him and were different treatment settings.    We will pursue single agent Keytruda q 3 weeks  He asks several questions   - if treatments can be given more frequently- we reviewed appropriate dosing and schedule based on data  - of he can take Ivermectin- not advised  - sauna treatments to manage toxins- reviewed his thoughts here    Consent for immunotherapy completed    30 minutes total    Route Chart for Scheduling    Med Onc Chart Routing      Follow up with physician . Start immunotherapy once authorized-  he has already consented   Follow up with [facility] . 3 weeks post first infusion date with cbc, cmp, tsh and t4 and EP day prior and treatment next day   Infusion scheduling note    Injection scheduling note    Labs    Imaging    Pharmacy appointment    Other referrals            Treatment Plan Information   OP CISPLATIN 75MG/M2 ETOPOSIDE 100MG/M2 Q3W   Mary Spicer MD   Upcoming Treatment Dates - OP CISPLATIN 75MG/M2 ETOPOSIDE 100MG/M2 Q3W    2/28/2024       Pre-Medications       diphenhydrAMINE injection 25 mg       famotidine (PF) injection 20 mg       hydrocortisone sodium succinate injection 100 mg       Chemotherapy       CISplatin (Platinol) 75 mg/m2 = 158 mg in sodium chloride 0.9% 658 mL chemo infusion       etoposide (VEPESID) 100 mg/m2 = 212 mg in sodium chloride 0.9% 510.6 mL chemo infusion       Pre-Hydration       sodium chloride 0.9% 1,000 mL with magnesium sulfate 1 g, potassium chloride 20 mEq infusion       Post-Hydration       sodium chloride 0.9% 1,000 mL with magnesium sulfate 1 g, potassium chloride 20 mEq infusion       Antiemetics       aprepitant (CINVANTI) injection 130 mg       palonosetron (ALOXI) 0.25 mg with Dexamethasone (DECADRON) 12 mg in NS 50 mL IVPB  2/29/2024       Pre-Medications       diphenhydrAMINE injection 25 mg       famotidine (PF) injection 20 mg       hydrocortisone sodium succinate injection 100 mg       Chemotherapy       etoposide (VEPESID) 100 mg/m2 = 212 mg in sodium chloride 0.9% 510.6 mL chemo infusion  3/1/2024       Pre-Medications       diphenhydrAMINE injection 25 mg       famotidine (PF) injection 20 mg       hydrocortisone sodium succinate injection 100 mg       Chemotherapy       etoposide (VEPESID) 100 mg/m2 = 212 mg in sodium chloride 0.9% 510.6 mL chemo infusion  3/20/2024       Pre-Medications       diphenhydrAMINE injection 25 mg       famotidine (PF) injection 20 mg       hydrocortisone sodium succinate injection 100 mg       Chemotherapy        CISplatin (Platinol) 75 mg/m2 = 158 mg in sodium chloride 0.9% 658 mL chemo infusion       etoposide (VEPESID) 100 mg/m2 = 212 mg in sodium chloride 0.9% 510.6 mL chemo infusion       Pre-Hydration       sodium chloride 0.9% 1,000 mL with magnesium sulfate 1 g, potassium chloride 20 mEq infusion       Post-Hydration       sodium chloride 0.9% 1,000 mL with magnesium sulfate 1 g, potassium chloride 20 mEq infusion       Antiemetics       aprepitant (CINVANTI) injection 130 mg       palonosetron (ALOXI) 0.25 mg with Dexamethasone (DECADRON) 12 mg in NS 50 mL IVPB

## 2024-07-24 RX ORDER — HEPARIN 100 UNIT/ML
500 SYRINGE INTRAVENOUS
OUTPATIENT
Start: 2024-07-31

## 2024-07-24 RX ORDER — EPINEPHRINE 0.3 MG/.3ML
0.3 INJECTION SUBCUTANEOUS ONCE AS NEEDED
OUTPATIENT
Start: 2024-07-31

## 2024-07-24 RX ORDER — PROCHLORPERAZINE EDISYLATE 5 MG/ML
5 INJECTION INTRAMUSCULAR; INTRAVENOUS ONCE AS NEEDED
OUTPATIENT
Start: 2024-07-31

## 2024-07-24 RX ORDER — SODIUM CHLORIDE 0.9 % (FLUSH) 0.9 %
10 SYRINGE (ML) INJECTION
OUTPATIENT
Start: 2024-07-31

## 2024-07-24 RX ORDER — DIPHENHYDRAMINE HYDROCHLORIDE 50 MG/ML
50 INJECTION INTRAMUSCULAR; INTRAVENOUS ONCE AS NEEDED
OUTPATIENT
Start: 2024-07-31

## 2024-07-29 ENCOUNTER — PATIENT OUTREACH (OUTPATIENT)
Dept: ADMINISTRATIVE | Facility: HOSPITAL | Age: 69
End: 2024-07-29
Payer: MEDICARE

## 2024-07-29 ENCOUNTER — PATIENT MESSAGE (OUTPATIENT)
Dept: HEMATOLOGY/ONCOLOGY | Facility: CLINIC | Age: 69
End: 2024-07-29
Payer: MEDICARE

## 2024-07-29 NOTE — PROGRESS NOTES
Health Maintenance Due   Topic Date Due    Pneumococcal Vaccines (Age 65+) (1 of 2 - PCV) Never done    RSV Vaccine (Age 60+ and Pregnant patients) (1 - 1-dose 60+ series) Never done    Abdominal Aortic Aneurysm Screening  Never done    COVID-19 Vaccine (2 - Moderna risk series) 11/22/2021    TETANUS VACCINE  11/16/2023    Shingles Vaccine (2 of 2) 01/04/2024   Health Maintenance reviewed, updated and links triggered. Annual Exam due Portal message sent for scheduling  ([facility]) 7/29/24

## 2024-07-30 ENCOUNTER — PATIENT MESSAGE (OUTPATIENT)
Dept: HEMATOLOGY/ONCOLOGY | Facility: CLINIC | Age: 69
End: 2024-07-30
Payer: MEDICARE

## 2024-07-31 ENCOUNTER — PATIENT MESSAGE (OUTPATIENT)
Dept: HEMATOLOGY/ONCOLOGY | Facility: CLINIC | Age: 69
End: 2024-07-31
Payer: MEDICARE

## 2024-08-01 ENCOUNTER — TELEPHONE (OUTPATIENT)
Dept: HEMATOLOGY/ONCOLOGY | Facility: CLINIC | Age: 69
End: 2024-08-01
Payer: MEDICARE

## 2024-08-02 ENCOUNTER — HOSPITAL ENCOUNTER (INPATIENT)
Facility: HOSPITAL | Age: 69
LOS: 1 days | Discharge: HOME | DRG: 694 | End: 2024-08-04
Attending: STUDENT IN AN ORGANIZED HEALTH CARE EDUCATION/TRAINING PROGRAM | Admitting: FAMILY MEDICINE
Payer: MEDICARE

## 2024-08-02 ENCOUNTER — OFFICE VISIT (OUTPATIENT)
Dept: HEMATOLOGY/ONCOLOGY | Facility: CLINIC | Age: 69
End: 2024-08-02
Payer: MEDICARE

## 2024-08-02 ENCOUNTER — PATIENT MESSAGE (OUTPATIENT)
Dept: HEMATOLOGY/ONCOLOGY | Facility: CLINIC | Age: 69
End: 2024-08-02

## 2024-08-02 ENCOUNTER — TELEPHONE (OUTPATIENT)
Dept: HEMATOLOGY/ONCOLOGY | Facility: CLINIC | Age: 69
End: 2024-08-02

## 2024-08-02 VITALS
DIASTOLIC BLOOD PRESSURE: 82 MMHG | BODY MASS INDEX: 27.62 KG/M2 | RESPIRATION RATE: 15 BRPM | OXYGEN SATURATION: 97 % | SYSTOLIC BLOOD PRESSURE: 129 MMHG | WEIGHT: 203.94 LBS | TEMPERATURE: 98 F | HEART RATE: 73 BPM | HEIGHT: 72 IN

## 2024-08-02 DIAGNOSIS — Z90.6 H/O TOTAL CYSTECTOMY: ICD-10-CM

## 2024-08-02 DIAGNOSIS — E03.9 HYPOTHYROIDISM, UNSPECIFIED TYPE: ICD-10-CM

## 2024-08-02 DIAGNOSIS — C67.1 MALIGNANT NEOPLASM OF DOME OF URINARY BLADDER: Primary | ICD-10-CM

## 2024-08-02 DIAGNOSIS — C79.9 METASTATIC MALIGNANT NEOPLASM, UNSPECIFIED SITE: ICD-10-CM

## 2024-08-02 DIAGNOSIS — R89.9 ABNORMAL LABORATORY TEST: ICD-10-CM

## 2024-08-02 DIAGNOSIS — G89.3 NEOPLASM RELATED PAIN (ACUTE) (CHRONIC): ICD-10-CM

## 2024-08-02 DIAGNOSIS — R79.89 ELEVATED SERUM CREATININE: Primary | ICD-10-CM

## 2024-08-02 DIAGNOSIS — C78.7 METASTATIC SMALL CELL CARCINOMA TO LIVER: ICD-10-CM

## 2024-08-02 DIAGNOSIS — R07.9 CHEST PAIN: ICD-10-CM

## 2024-08-02 DIAGNOSIS — C67.9 BLADDER CANCER METASTASIZED TO INTRAPELVIC LYMPH NODES: ICD-10-CM

## 2024-08-02 DIAGNOSIS — C77.5 BLADDER CANCER METASTASIZED TO INTRAPELVIC LYMPH NODES: ICD-10-CM

## 2024-08-02 PROBLEM — N13.30 HYDROURETERONEPHROSIS: Status: ACTIVE | Noted: 2024-08-02

## 2024-08-02 PROBLEM — E87.8 HYPERCHLOREMIA: Status: ACTIVE | Noted: 2024-08-02

## 2024-08-02 PROBLEM — E87.5 HYPERKALEMIA: Status: ACTIVE | Noted: 2024-08-02

## 2024-08-02 PROBLEM — N17.9 AKI (ACUTE KIDNEY INJURY): Status: ACTIVE | Noted: 2024-08-02

## 2024-08-02 LAB
ANION GAP SERPL CALC-SCNC: 11 MMOL/L (ref 8–16)
BACTERIA #/AREA URNS AUTO: NORMAL /HPF
BILIRUB UR QL STRIP: NEGATIVE
BUN SERPL-MCNC: 25 MG/DL (ref 8–23)
CALCIUM SERPL-MCNC: 9.1 MG/DL (ref 8.7–10.5)
CHLORIDE SERPL-SCNC: 109 MMOL/L (ref 95–110)
CLARITY UR REFRACT.AUTO: CLEAR
CO2 SERPL-SCNC: 20 MMOL/L (ref 23–29)
COLOR UR AUTO: COLORLESS
CREAT SERPL-MCNC: 1.9 MG/DL (ref 0.5–1.4)
EST. GFR  (NO RACE VARIABLE): 37.9 ML/MIN/1.73 M^2
GLUCOSE SERPL-MCNC: 133 MG/DL (ref 70–110)
GLUCOSE UR QL STRIP: NEGATIVE
HGB UR QL STRIP: NEGATIVE
KETONES UR QL STRIP: NEGATIVE
LEUKOCYTE ESTERASE UR QL STRIP: ABNORMAL
MICROSCOPIC COMMENT: NORMAL
NITRITE UR QL STRIP: POSITIVE
OHS QRS DURATION: 86 MS
OHS QTC CALCULATION: 447 MS
PH UR STRIP: 7 [PH] (ref 5–8)
POTASSIUM SERPL-SCNC: 3.1 MMOL/L (ref 3.5–5.1)
POTASSIUM SERPL-SCNC: 3.1 MMOL/L (ref 3.5–5.1)
PROT UR QL STRIP: NEGATIVE
RBC #/AREA URNS AUTO: 2 /HPF (ref 0–4)
SODIUM SERPL-SCNC: 140 MMOL/L (ref 136–145)
SP GR UR STRIP: 1.01 (ref 1–1.03)
URN SPEC COLLECT METH UR: ABNORMAL
WBC #/AREA URNS AUTO: 1 /HPF (ref 0–5)

## 2024-08-02 PROCEDURE — 25000003 PHARM REV CODE 250: Mod: HCNC | Performed by: INTERNAL MEDICINE

## 2024-08-02 PROCEDURE — 93010 ELECTROCARDIOGRAM REPORT: CPT | Mod: HCNC,,, | Performed by: INTERNAL MEDICINE

## 2024-08-02 PROCEDURE — 87086 URINE CULTURE/COLONY COUNT: CPT | Mod: HCNC | Performed by: STUDENT IN AN ORGANIZED HEALTH CARE EDUCATION/TRAINING PROGRAM

## 2024-08-02 PROCEDURE — 87088 URINE BACTERIA CULTURE: CPT | Mod: HCNC | Performed by: STUDENT IN AN ORGANIZED HEALTH CARE EDUCATION/TRAINING PROGRAM

## 2024-08-02 PROCEDURE — 96361 HYDRATE IV INFUSION ADD-ON: CPT | Mod: HCNC

## 2024-08-02 PROCEDURE — 80048 BASIC METABOLIC PNL TOTAL CA: CPT | Mod: 91,HCNC,XB

## 2024-08-02 PROCEDURE — 81001 URINALYSIS AUTO W/SCOPE: CPT | Mod: HCNC | Performed by: STUDENT IN AN ORGANIZED HEALTH CARE EDUCATION/TRAINING PROGRAM

## 2024-08-02 PROCEDURE — 25000003 PHARM REV CODE 250: Mod: HCNC | Performed by: STUDENT IN AN ORGANIZED HEALTH CARE EDUCATION/TRAINING PROGRAM

## 2024-08-02 PROCEDURE — 93005 ELECTROCARDIOGRAM TRACING: CPT | Mod: HCNC

## 2024-08-02 PROCEDURE — 99999 PR PBB SHADOW E&M-EST. PATIENT-LVL III: CPT | Mod: PBBFAC,HCNC,, | Performed by: NURSE PRACTITIONER

## 2024-08-02 PROCEDURE — 87186 SC STD MICRODIL/AGAR DIL: CPT | Mod: HCNC | Performed by: STUDENT IN AN ORGANIZED HEALTH CARE EDUCATION/TRAINING PROGRAM

## 2024-08-02 PROCEDURE — G0378 HOSPITAL OBSERVATION PER HR: HCPCS | Mod: HCNC

## 2024-08-02 PROCEDURE — 25000242 PHARM REV CODE 250 ALT 637 W/ HCPCS: Mod: HCNC

## 2024-08-02 PROCEDURE — 99285 EMERGENCY DEPT VISIT HI MDM: CPT | Mod: 25,HCNC

## 2024-08-02 PROCEDURE — 25000003 PHARM REV CODE 250: Mod: HCNC

## 2024-08-02 PROCEDURE — 96365 THER/PROPH/DIAG IV INF INIT: CPT | Mod: HCNC

## 2024-08-02 PROCEDURE — 94640 AIRWAY INHALATION TREATMENT: CPT | Mod: HCNC

## 2024-08-02 PROCEDURE — 36415 COLL VENOUS BLD VENIPUNCTURE: CPT | Mod: HCNC

## 2024-08-02 PROCEDURE — 63600175 PHARM REV CODE 636 W HCPCS: Mod: HCNC | Performed by: STUDENT IN AN ORGANIZED HEALTH CARE EDUCATION/TRAINING PROGRAM

## 2024-08-02 RX ORDER — IBUPROFEN 200 MG
24 TABLET ORAL
Status: DISCONTINUED | OUTPATIENT
Start: 2024-08-02 | End: 2024-08-04 | Stop reason: HOSPADM

## 2024-08-02 RX ORDER — POLYETHYLENE GLYCOL 3350 17 G/17G
17 POWDER, FOR SOLUTION ORAL DAILY
Status: DISCONTINUED | OUTPATIENT
Start: 2024-08-03 | End: 2024-08-02

## 2024-08-02 RX ORDER — TALC
6 POWDER (GRAM) TOPICAL NIGHTLY PRN
Status: DISCONTINUED | OUTPATIENT
Start: 2024-08-02 | End: 2024-08-04 | Stop reason: HOSPADM

## 2024-08-02 RX ORDER — IBUPROFEN 200 MG
16 TABLET ORAL
Status: DISCONTINUED | OUTPATIENT
Start: 2024-08-02 | End: 2024-08-04 | Stop reason: HOSPADM

## 2024-08-02 RX ORDER — POLYETHYLENE GLYCOL 3350 17 G/17G
17 POWDER, FOR SOLUTION ORAL DAILY PRN
Status: DISCONTINUED | OUTPATIENT
Start: 2024-08-02 | End: 2024-08-04

## 2024-08-02 RX ORDER — SODIUM CHLORIDE 0.9 % (FLUSH) 0.9 %
10 SYRINGE (ML) INJECTION EVERY 12 HOURS PRN
Status: DISCONTINUED | OUTPATIENT
Start: 2024-08-02 | End: 2024-08-04 | Stop reason: HOSPADM

## 2024-08-02 RX ORDER — HEPARIN SODIUM 5000 [USP'U]/ML
5000 INJECTION, SOLUTION INTRAVENOUS; SUBCUTANEOUS EVERY 8 HOURS
Status: DISCONTINUED | OUTPATIENT
Start: 2024-08-02 | End: 2024-08-02

## 2024-08-02 RX ORDER — HYDROMORPHONE HYDROCHLORIDE 1 MG/ML
0.5 INJECTION, SOLUTION INTRAMUSCULAR; INTRAVENOUS; SUBCUTANEOUS EVERY 6 HOURS PRN
Status: DISCONTINUED | OUTPATIENT
Start: 2024-08-02 | End: 2024-08-03

## 2024-08-02 RX ORDER — GABAPENTIN 300 MG/1
300 CAPSULE ORAL 3 TIMES DAILY
Status: DISCONTINUED | OUTPATIENT
Start: 2024-08-02 | End: 2024-08-04 | Stop reason: HOSPADM

## 2024-08-02 RX ORDER — HYDROCODONE BITARTRATE AND ACETAMINOPHEN 5; 325 MG/1; MG/1
1 TABLET ORAL EVERY 6 HOURS PRN
Status: DISCONTINUED | OUTPATIENT
Start: 2024-08-02 | End: 2024-08-02

## 2024-08-02 RX ORDER — TRAMADOL HYDROCHLORIDE 50 MG/1
TABLET ORAL
Qty: 90 TABLET | Refills: 0 | Status: ON HOLD | OUTPATIENT
Start: 2024-08-02

## 2024-08-02 RX ORDER — ALBUTEROL SULFATE 2.5 MG/.5ML
10 SOLUTION RESPIRATORY (INHALATION) ONCE
Status: COMPLETED | OUTPATIENT
Start: 2024-08-02 | End: 2024-08-02

## 2024-08-02 RX ORDER — AMOXICILLIN 250 MG
1 CAPSULE ORAL 2 TIMES DAILY
Status: DISCONTINUED | OUTPATIENT
Start: 2024-08-02 | End: 2024-08-02

## 2024-08-02 RX ORDER — ACETAMINOPHEN 325 MG/1
650 TABLET ORAL EVERY 6 HOURS PRN
Status: DISCONTINUED | OUTPATIENT
Start: 2024-08-02 | End: 2024-08-04 | Stop reason: HOSPADM

## 2024-08-02 RX ORDER — AMOXICILLIN 250 MG
1 CAPSULE ORAL DAILY PRN
Status: DISCONTINUED | OUTPATIENT
Start: 2024-08-02 | End: 2024-08-04

## 2024-08-02 RX ORDER — OXYCODONE HYDROCHLORIDE 5 MG/1
5 TABLET ORAL EVERY 6 HOURS PRN
Status: DISCONTINUED | OUTPATIENT
Start: 2024-08-02 | End: 2024-08-03

## 2024-08-02 RX ORDER — TRAMADOL HYDROCHLORIDE 50 MG/1
50 TABLET ORAL EVERY 6 HOURS PRN
Status: DISCONTINUED | OUTPATIENT
Start: 2024-08-02 | End: 2024-08-03

## 2024-08-02 RX ORDER — NALOXONE HCL 0.4 MG/ML
0.02 VIAL (ML) INJECTION
Status: DISCONTINUED | OUTPATIENT
Start: 2024-08-02 | End: 2024-08-04 | Stop reason: HOSPADM

## 2024-08-02 RX ORDER — GLUCAGON 1 MG
1 KIT INJECTION
Status: DISCONTINUED | OUTPATIENT
Start: 2024-08-02 | End: 2024-08-04 | Stop reason: HOSPADM

## 2024-08-02 RX ADMIN — SODIUM CHLORIDE, POTASSIUM CHLORIDE, SODIUM LACTATE AND CALCIUM CHLORIDE 1000 ML: 600; 310; 30; 20 INJECTION, SOLUTION INTRAVENOUS at 02:08

## 2024-08-02 RX ADMIN — ALBUTEROL SULFATE 10 MG: 2.5 SOLUTION RESPIRATORY (INHALATION) at 06:08

## 2024-08-02 RX ADMIN — TRAMADOL HYDROCHLORIDE 50 MG: 50 TABLET, COATED ORAL at 08:08

## 2024-08-02 RX ADMIN — CEFTRIAXONE 1 G: 1 INJECTION, POWDER, FOR SOLUTION INTRAMUSCULAR; INTRAVENOUS at 05:08

## 2024-08-02 RX ADMIN — GABAPENTIN 300 MG: 300 CAPSULE ORAL at 09:08

## 2024-08-02 NOTE — PROGRESS NOTES
"Subjective     Patient ID: Med Palma is a 69 y.o. male.    Chief Complaint: Malignant neoplasm of dome of urinary bladder    HPI    69 y.o. male, patient of Dr. Spicer, to clinic for urgent care visit for new abdominal mass that has appeared since his last clinic visit. It is located under the left anterior ribcage. Patient reports 8-10 at night with no minimal relief from tylenol. Up during the nights can't find good position from bed to floor, etc. Described as hard dull ache that has progressively gotten worse since he last saw Dr. Spicer. During the day, pain is still present. Took 1g of tylenol this morning and pain decreased from 8 to 3. Feeling lots of "pressure" it his pelvis and thinking he may need to have a bowel movement when he doesn't.    Oncology History   Malignant neoplasm of dome of urinary bladder   10/27/2023 Initial Diagnosis    Malignant neoplasm of dome of urinary bladder     12/4/2023 - 2/8/2024 Chemotherapy    Treatment Summary   Plan Name: OP CISPLATIN 75MG/M2 ETOPOSIDE 100MG/M2 Q3W  Treatment Goal: Curative  Status: Inactive  Start Date: 12/4/2023  End Date: 2/8/2024  Provider: Mary Spicer MD  Chemotherapy: CISplatin (Platinol) 75 mg/m2 = 158 mg in sodium chloride 0.9% 658 mL chemo infusion, 75 mg/m2 = 158 mg, Intravenous, Clinic/HOD 1 time, 4 of 6 cycles  Administration: 158 mg (12/4/2023), 158 mg (12/27/2023), 158 mg (2/6/2024), 158 mg (1/16/2024)  etoposide (VEPESID) 100 mg/m2 = 212 mg in sodium chloride 0.9% 575.6 mL chemo infusion, 100 mg/m2 = 212 mg, Intravenous, Clinic/HOD 1 time, 4 of 6 cycles  Administration: 212 mg (12/4/2023), 212 mg (12/5/2023), 212 mg (12/6/2023), 212 mg (12/27/2023), 212 mg (12/28/2023), 212 mg (12/29/2023), 212 mg (2/6/2024), 212 mg (1/16/2024), 212 mg (1/17/2024), 212 mg (1/18/2024), 212 mg (2/7/2024), 212 mg (2/8/2024)     1/16/2024 Cancer Staged    Staging form: Urinary Bladder, AJCC 8th Edition  - Clinical stage from 1/16/2024: cT2, cN2, cM1   "   7/31/2024 -  Chemotherapy    Treatment Summary   Plan Name: OP pembrolizumab 200mg Q3W  Treatment Goal: Palliative  Status: Active  Start Date: 7/31/2024 (Planned)  End Date: 7/15/2026 (Planned)  Provider: Mary Spicer MD  Chemotherapy: [No matching medication found in this treatment plan]            He has a history of T3N1M0 small cell bladder cancer and received preoperative chemotherapy and is now s/p robotic cystoprostatectomy, bilateral standard pelvic lymph node dissection, and intracorporal ileal conduit on 3/13/2024.  We discussed [facility] recs at prior call  They acknowledged challenges here with residual disease and no standard of care  He is not interested in further aggressive chemotherapy -still dealing with neuropathy and hearing issues  Immunotherapy data not as compelling in adjuvant setting here and rec was to hold unless progression but we have discussed as an opyion  Surveillance images      Oncology History:  - presented with gross hematuria in September 2023 while traveling in [place] (initially wondered if related to exercise and poor hydration) and resolved on return home he was due for annual PCP visit and he recommended some additional blood work  That same night he had another episode and again resolved with hydration but after occurred several times over a few weeks he sought further evaluation  Minimal discomfort noted with this (states he had a prior bladder infection and it felt like this)  Over summer months had noted occasional mild dysuria preceding this   --Notes prior episode of possible hematuria in 2016 with heavy exercise and poor hydration resolved after 1 day (related to exercise)     - 10/21/2023 CT Urogram Abdomen/Pelvis:  FINDINGS:  : Kidneys are symmetric in size.  Precontrast imaging shows no stones or hydronephrosis.  Kidneys concentrate and excrete contrast appropriately on postcontrast imaging.  No focal filling defects.  Lobulated margins along the lateral aspect  of the left kidney has the appearance of scarring.  Heterogeneously enhancing mass along the dorsal lateral aspect of the urinary bladder on the left estimated at 3.8 x 3.6 cm.  Enlarged left pelvic lymph node measures 1.9 cm in short axis dimension.  Enlarged, lobulated prostate gland bulging into the bladder base, similar compared to prior MRI.  CT:  Lung bases show subsegmental atelectasis or scarring.  Liver is homogeneous.  Gallbladder is unremarkable.  Bile ducts, spleen, pancreas, and adrenal glands are unremarkable.  Stomach and loops of bowel are normal in caliber.  No free fluid in the pelvis.  Regional skeleton shows degenerative change.  Impression:  Heterogeneously enhancing mass along the dorsal lateral aspect of the urinary bladder on the left most concerning for primary bladder malignancy.  Enlarged left pelvic lymph node most concerning for metastatic disease. Negative for obstructive uropathy.  Prostatomegaly with the prostate bulging into the bladder base, similar compared to prior MRI.  This report was flagged in Epic as abnormal.     - 10/26/2023 TURBT:  Pathology:  1. Bladder, tumor, superficial resection:   Small cell carcinoma.   Muscularis propria is present and is positive for carcinoma.     2. Bladder, base of tumor, resection:Small cell carcinoma.   Muscularis propria is present and is positive for carcinoma.   Malignant cells are positive for AE1/AE3, TTF1, Synaptophysin, and Chromogranin (rare positive cells) immunostains, and negative for GATA3, CK7, CK20, and p63 immunostains. Ki67 immunostain shows a proliferative index of >95%. The morphology and immunophenotype are compatible with small cell carcinoma.      Prior evaluation for elevated PSA- dx with BPH  - 3/3/16490 MRI Prostate:  FINDINGS:  Previous biopsy: TRUS PBx on 1/26/15 - negative  PSA: 5.8 ng/mL 09/14/2021 (previously 5.9 on 07/07/2021, 3.1 on 05/12/2017, 5.9 on 12/11/2015)  Prior therapy: None  Prostate: 5.1 x 3.8 x 7.2  cm corresponding to a computed volume of 65 cc.  Peripheral zone: 1 focal area of signal abnormality as described below:  Lesion (ANDERSON) #3  Location: Side: Right; Region: Base; Zone: posterior peripheral zone laterally  Greatest dimension: 1.2 cm  T2-WI: T2 SI: Heterogeneous signal intensity or noncircumscribed, rounded, moderate hypointensity--score 3  DWI/ADC: DWI: Focal mild/moderate hypointense on ADC and isointense/mild hyperintense on high b-value DWI--score 3  DCE: No early enhancement  Extraprostatic extension: Abuts the adjacent right posterolateral capsular margin.  PI-RADS assessment category: 3  Transitional zone:  TZ abnormalities: Benign prostatic hyperplasia with at least 2 focal areas of signal abnormality do not completely conform to a BPH nodule.  The 2 lesions are detailed below:  Lesion (ANDERSON) #1  Location: left; region: mid; zone: anterior (more medial compared to lesion (ANDERSON) #2.)  Greatest dimension: 1.3 cm  T2-WI/SI: Heterogeneous signal intensity with obscured margins; score 3.  DWI/ADC: Focal mildly/moderately hypointense on ADC and isointense/mildly hyperintense on high b-value DWI; score 3  DCE: Positive  Extraprostatic extension: No  PI-RADS assessment category: 3  Lesion (ANDERSON) #2  Location: Left; region: Mid; zone: Anterior (lateral and more posterior compared to lesion (ANDERSON) #1  Greatest dimension: 1.1 cm  T2-WI/SI: Heterogeneous signal intensity with obscured margins; score 3.  DWI/ADC: Focal mildly/moderately hypointense on ADC and isointense/mildly hyperintense on high b-value DWI; score 3.  DCE: Positive  Extraprostatic extension: Abuts the adjacent left lateral capsular margin.  PI-RADS assessment category: 3  Neurovascular bundle: Unremarkable  Seminal vesicles:  SV invasion: Negative.  Adjacent Organ Involvement: Prominent median lobe of the prostate protruding into the base the bladder.  No focal bladder wall thickening.  No rectal involvement.  Lymphadenopathy: No pathologically  enlarged pelvic lymph nodes.  Other Findings: None  Impression:  1. There are three PI-RADS 3 lesions, 1 within the right posterolateral peripheral zone and 2 within the left anterior transition zone of the mid to base of the prostate.  Two lesions abut the capsular margin, as described above, without involvement of the seminal vesicles.  No pathologically enlarged pelvic lymph nodes.  2. BPH with a very prominent median lobe protruding into the base of the bladder.  Overall Assessment:  PIRADS 3 (the presence of clinically significant cancer is equivocal)  Number of targets created for potential MR/US fusion biopsy  Peripheral zone: 1  Transition zone: 2     - 5/2/2022 TRUS and biopsies:  Pathology:  [facility] DIAGNOSIS:   A.   PROSTATE, LEFT APEX, NEEDLE BIOPSY:          Benign prostatic tissue with chronic inflammation, see comment.   (R97.20)   B.   PROSTATE, LEFT MID, NEEDLE BIOPSY:         Benign prostatic tissue with chronic inflammation, see comment.   (R97.20)   C.   PROSTATE, LEFT BASE, NEEDLE BIOPSY:         Benign prostatic tissue.  (R97.20)   D.   PROSTATE, RIGHT APEX, NEEDLE BIOPSY:          Benign prostatic tissue with chronic inflammation, see comment.   (R97.20)   E.   PROSTATE, RIGHT MID, NEEDLE BIOPSY:          Benign prostatic tissue with chronic inflammation, see comment.   (R97.20)   F.   PROSTATE, RIGHT BASE, NEEDLE BIOPSY:          Benign prostatic tissue with chronic inflammation, see comment.   (R97.20)   G.   TISSUE1          PROSTATE, TARGET LESION #1, BIOPSY:          -Benign prostatic tissue with chronic inflammation, see comment.   (R97.20)   H.   TISSUE2          PROSTATE, TARGET LESION #2, BIOPSY:          -Benign prostatic tissue .  (R97.20)   I.     TISSUE3          PROSTATE, TARGET LESION #3, BIOPSY:          -Benign prostatic tissue with chronic inflammation .  (R97.20)      - 11/9/2023 Bone scan:  FINDINGS:  There is physiologic distribution of the radiopharmaceutical throughout  the skeleton.  There is normal uptake in the bilateral kidneys and soft tissues.  Bladder is significantly distended with abnormal contour, likely due to known bladder mass.  Impression:  No scintigraphic evidence of osteoblastic metastatic disease.     - 11/7/2023 CT Chest:  FINDINGS:  Chest wall and lower neck: No axillary or supraclavicular lymphadenopathy.  Lungs and pleura: Biapical pleuroparenchymal thickening compatible with scar.  Focal air cystic spaces noted in the right upper lobe (image 154 sequence 4) suggestive of scar.  Multiple sub pleural bandlike scars are noted in the upper and lower lobes.  No pleural effusion or airspace consolidation is noted.  Mediastinum and christy: Right hilar node measuring 15 mm (short axis).  No mediastinal lymphadenopathy is noted.  Heart and pericardium: Heart size is normal.No pericardial effusion.  Vessels: Mild atheromatous disease is seen in the aorta.   The coronary arteries show mild atheromatous disease.  Upper abdomen: Cortical irregularity noted in the left kidney compatible with scars.  Both kidneys are excreting contrast during imaging acquisition.  Bones: Biomechanical changes are noted in the thoracic spine with osteophyte formations.  Schmorl nodes are noted in the thoracic spine.  Impression:  1. Multifocal bilateral pulmonary scars likely representing sequela prior infectious process to correlate with patient history.  No honeycombing is seen.  2. Right hilar node within maximum normal limits, nonspecific.  3. Left renal scar.  4. Additional findings.     - 11/7/2023 MRI Brain:  FINDINGS:  There is no midline shift, hydrocephalus or mass effect.  No acute infarction or acute intracranial hemorrhage.  A few small foci of T2/FLAIR signal in the supratentorial white matter while nonspecific most likely mild chronic microvascular ischemic changes.  Small focus of bright T2 signal adjacent to the posteromedial temporal lobe likely an incidental choroidal  fissure cyst.  No enhancing lesions to indicate intracranial metastatic disease.  Small (1 cm) focus of increased FLAIR signal in the posterior fossa near the foramen of Magendie.  This demonstrates gradient signal presumably relating to calcifications with no abnormal enhancement.  No significant mass effect.  No evidence of associated hydrocephalus.  No abnormal extra-axial fluid collections.  The major skull base flow voids present.  Diffuse patchy mucosal membrane thickening in the paranasal sinuses with aerated secretions and small air-fluid levels.  Trace mastoid fluid on the left.  Impression:  No acute intracranial abnormalities, specifically no evidence of intracranial metastatic disease.  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  No associated hydrocephalus at this time.  Recommend surveillance to ensure stability.  Paranasal sinus disease.  This report was flagged in Epic as abnormal.     - initiated chemo with Cis/Etoposide on 12/4/2023  Completed 4 cycles on 2/7/2024     3/13/2024 Final Pathology:  1. Left ureter, excision:      - Portion of ureter without significant pathologic change  2. Right ureter, excision:      - Portion of ureter without significant pathologic change  3. Bladder, prostate and left iliac lymph node, radical cystoprostatectomy:      - Small cell neuroendocrine carcinoma      - Tumor size: 1.5 cm      - Resection margins: free of tumor      - Prostate with atrophy and benign prostatic hyperplasia (BPH)      - One of one lymph node, positive for carcinoma (1/1)          - Size of Largest Metastatic Deposit: at least 2.0 cm          - Extranodal Extension: not identified  4. Right pelvic lymph nodes, excision:      - Fifteen lymph nodes, negative for carcinoma (0/15)      - No treatment effect seen  5. Left pelvic lymph nodes, excision:      - Ten lymph nodes, negative for carcinoma (0/10)      - No treatment effect seen  Note: Cytokeratin  AE1/AE3 on parts 4 and 5 support the above diagnosis. Immunostains performed with appropriate positive and negative controls.    Final staging:  pT3b pN1     PMH:  Covid pneumonia- recovered  BPH  Arthritis- hips, low back, shoulders, knees- prior PT and dry needling helped significantly  Deviated septum surgery  Anemia     FH:  Dad-  of Multiple Myeloma at age 62 yo  Sister- breast cancer survivor (dx in her mid 70s)  Mother lived until age 94 yo-  from natural causes, dementia     SH:  Retired   [employer] management  Prior chemical and environmental exposures at work  + tobacco- last 20-25 years 1/2 pack in a weekend   Dips tobacco     + social EtOH    3 children      Review of Systems   Constitutional:  Negative for activity change, appetite change, chills, fatigue, fever and unexpected weight change.   HENT:  Positive for hearing loss (stable). Negative for nasal congestion, dental problem, postnasal drip, rhinorrhea, sinus pressure/congestion and trouble swallowing.         Hoarseness noted   Eyes:  Negative for visual disturbance (wears bifocals).   Respiratory:  Negative for cough, shortness of breath and wheezing.    Cardiovascular:  Negative for chest pain, palpitations and leg swelling.   Gastrointestinal:  Negative for abdominal distention, abdominal pain, change in bowel habit, constipation, diarrhea, nausea, vomiting and reflux.   Genitourinary:  Negative for decreased urine volume, difficulty urinating, dysuria, frequency, hematuria and urgency.   Musculoskeletal:  Negative for arthralgias, back pain, gait problem, myalgias and joint deformity.   Integumentary:  Negative for rash and wound.   Neurological:  Negative for dizziness, speech difficulty, weakness, numbness, headaches and coordination difficulties.   Hematological:  Negative for adenopathy. Does not bruise/bleed easily.   Psychiatric/Behavioral:  Negative for agitation, behavioral problems, confusion, dysphoric mood and sleep  disturbance. The patient is nervous/anxious.           Objective     Physical Exam  Vitals and nursing note reviewed.   Constitutional:       Appearance: Normal appearance. He is normal weight.      Comments: Presents with wife   Neurological:      Mental Status: He is alert.       Encounter Diagnoses   Name Primary?    Malignant neoplasm of dome of urinary bladder Yes    H/O total cystectomy     Bladder cancer metastasized to intrapelvic lymph nodes     Metastatic small cell carcinoma to liver     Hypothyroidism, unspecified type     Neoplasm related pain (acute) (chronic)            69 y.o. male, patient of Dr. Spicer, to clinic for urgent care visit for new palpable mass below left anterior ribcage. Patient received neoadjuvant cisplatin chemotherapy prior to surgery. Recent recurrence seen on CT and PET around 6 months from last cisplatin. He is scheduled to begin Keytruda on Monday. Reviewed current images, explained that this non-tender mass likely a LN or soft tissue deposit. With this along with worsening pain, concern for continued disease progression. Will draw labs today and plan to begin keytruda on Monday (previously consented by Dr. Spicer). Patient does not wish for narcotics but I discussed in detail reason and rationale for narcotics in this situation. For his pain, will start Tramadol 1-2 tablets every 6 hours as needed. He is agreeable to trial. Will notify us on Monday if no improvement. Will draw Tempus blood and send for Tempus tissue along with PGx.     RTC 3 weeks with labs the day before (CBC,CMP,TSH), to see Dr. Spicer and Keytruda.    Patient is in agreement with the proposed treatment plan. All questions were answered to the patient's satisfaction. Pt knows to call clinic if anything is needed before the next clinic visit.      Clarissa Vega, MSN, APRN, FNP-C  Hematology and Medical Oncology  Manager- [facility]  Nurse Practitioner/Clinical Investigator,  [facility]  Nurse Practitioner to Dr. Tyler Grullon    Addendum:  Labs resulted and show a creatinine of 2.1 with a BUN of 28. Previous creatinine 1.2. concerned there may be some obstruction from the cancer causing the kidneys not to function as well. Dr. Spicer would like for him to go to the ER for an ultrasound. Voicemail and pt message sent.    Route Chart for Scheduling    Med Onc Chart Routing      Follow up with physician . RTC 3 weeks with labs the day before (CBC,CMP,TSH), to see Dr. Spicer and Keytruda.   Follow up with [facility]    Infusion scheduling note    Injection scheduling note    Labs    Imaging    Pharmacy appointment    Other referrals                Treatment Plan Information   OP pembrolizumab 200mg Q3W   Mary Spicer MD   Upcoming Treatment Dates - OP pembrolizumab 200mg Q3W    7/31/2024       Chemotherapy       pembrolizumab (KEYTRUDA) 200 mg in 0.9% NaCl SolP 108 mL infusion  8/21/2024       Chemotherapy       pembrolizumab (KEYTRUDA) 200 mg in 0.9% NaCl SolP 108 mL infusion  9/11/2024       Chemotherapy       pembrolizumab (KEYTRUDA) 200 mg in 0.9% NaCl SolP 108 mL infusion  10/2/2024       Chemotherapy       pembrolizumab (KEYTRUDA) 200 mg in 0.9% NaCl SolP 108 mL infusion

## 2024-08-02 NOTE — TELEPHONE ENCOUNTER
----- Message from Clarissa Vega NP sent at 8/2/2024 12:37 PM CDT -----  Regarding: RE: Results  Contact: Pt  [phone]  I'm on a meeting, can you let him know that Dr. Spicer wants him to go to ER for evalation and ultrasound.  ----- Message -----  From: Abigail Sidhu MA  Sent: 8/2/2024  12:33 PM CDT  To: Clarissa Vega NP  Subject: FW: Results                                      Patient is returning you call and message from portal with additional question. Please advise.  ----- Message -----  From: Lucille Tai  Sent: 8/2/2024  12:17 PM CDT  To: [facility] Staff  Subject: Results                                                    Caller:  Med      Returning call to:   JUANA Vega      Caller can be reached at: [phone]    Nature of the call:   Requesting a call have a few questing regarding results and message left

## 2024-08-02 NOTE — TELEPHONE ENCOUNTER
Called pt to notify him that Dr. Spicer wants him to go to ER for eval and US - pt was checking in at ER when I called - thanked me for following up

## 2024-08-03 LAB
ALBUMIN SERPL BCP-MCNC: 3.1 G/DL (ref 3.5–5.2)
ALP SERPL-CCNC: 53 U/L (ref 55–135)
ALT SERPL W/O P-5'-P-CCNC: 18 U/L (ref 10–44)
ANION GAP SERPL CALC-SCNC: 7 MMOL/L (ref 8–16)
ANION GAP SERPL CALC-SCNC: 7 MMOL/L (ref 8–16)
ANION GAP SERPL CALC-SCNC: 9 MMOL/L (ref 8–16)
AST SERPL-CCNC: 20 U/L (ref 10–40)
BILIRUB SERPL-MCNC: 0.2 MG/DL (ref 0.1–1)
BUN SERPL-MCNC: 25 MG/DL (ref 8–23)
BUN SERPL-MCNC: 25 MG/DL (ref 8–23)
BUN SERPL-MCNC: 26 MG/DL (ref 8–23)
CALCIUM SERPL-MCNC: 8.9 MG/DL (ref 8.7–10.5)
CALCIUM SERPL-MCNC: 9.2 MG/DL (ref 8.7–10.5)
CALCIUM SERPL-MCNC: 9.2 MG/DL (ref 8.7–10.5)
CHLORIDE SERPL-SCNC: 110 MMOL/L (ref 95–110)
CHLORIDE SERPL-SCNC: 112 MMOL/L (ref 95–110)
CHLORIDE SERPL-SCNC: 112 MMOL/L (ref 95–110)
CO2 SERPL-SCNC: 21 MMOL/L (ref 23–29)
CO2 SERPL-SCNC: 22 MMOL/L (ref 23–29)
CO2 SERPL-SCNC: 22 MMOL/L (ref 23–29)
CREAT SERPL-MCNC: 2 MG/DL (ref 0.5–1.4)
CREAT SERPL-MCNC: 2.1 MG/DL (ref 0.5–1.4)
CREAT SERPL-MCNC: 2.1 MG/DL (ref 0.5–1.4)
ERYTHROCYTE [DISTWIDTH] IN BLOOD BY AUTOMATED COUNT: 13.8 % (ref 11.5–14.5)
EST. GFR  (NO RACE VARIABLE): 33.7 ML/MIN/1.73 M^2
EST. GFR  (NO RACE VARIABLE): 33.7 ML/MIN/1.73 M^2
EST. GFR  (NO RACE VARIABLE): 35.7 ML/MIN/1.73 M^2
GLUCOSE SERPL-MCNC: 142 MG/DL (ref 70–110)
GLUCOSE SERPL-MCNC: 82 MG/DL (ref 70–110)
GLUCOSE SERPL-MCNC: 82 MG/DL (ref 70–110)
HCT VFR BLD AUTO: 30.2 % (ref 40–54)
HGB BLD-MCNC: 9.5 G/DL (ref 14–18)
MAGNESIUM SERPL-MCNC: 2.1 MG/DL (ref 1.6–2.6)
MCH RBC QN AUTO: 29.4 PG (ref 27–31)
MCHC RBC AUTO-ENTMCNC: 31.5 G/DL (ref 32–36)
MCV RBC AUTO: 94 FL (ref 82–98)
PHOSPHATE SERPL-MCNC: 5.5 MG/DL (ref 2.7–4.5)
PLATELET # BLD AUTO: 229 K/UL (ref 150–450)
PMV BLD AUTO: 10.9 FL (ref 9.2–12.9)
POTASSIUM SERPL-SCNC: 3.7 MMOL/L (ref 3.5–5.1)
POTASSIUM SERPL-SCNC: 3.7 MMOL/L (ref 3.5–5.1)
POTASSIUM SERPL-SCNC: 4.9 MMOL/L (ref 3.5–5.1)
PROT SERPL-MCNC: 6.1 G/DL (ref 6–8.4)
RBC # BLD AUTO: 3.23 M/UL (ref 4.6–6.2)
SODIUM SERPL-SCNC: 140 MMOL/L (ref 136–145)
SODIUM SERPL-SCNC: 141 MMOL/L (ref 136–145)
SODIUM SERPL-SCNC: 141 MMOL/L (ref 136–145)
WBC # BLD AUTO: 7.7 K/UL (ref 3.9–12.7)

## 2024-08-03 PROCEDURE — 94761 N-INVAS EAR/PLS OXIMETRY MLT: CPT | Mod: HCNC

## 2024-08-03 PROCEDURE — 25500020 PHARM REV CODE 255: Mod: HCNC | Performed by: FAMILY MEDICINE

## 2024-08-03 PROCEDURE — 80053 COMPREHEN METABOLIC PANEL: CPT | Mod: HCNC | Performed by: INTERNAL MEDICINE

## 2024-08-03 PROCEDURE — 25000003 PHARM REV CODE 250: Mod: HCNC

## 2024-08-03 PROCEDURE — 36415 COLL VENOUS BLD VENIPUNCTURE: CPT | Mod: HCNC | Performed by: INTERNAL MEDICINE

## 2024-08-03 PROCEDURE — 25000003 PHARM REV CODE 250: Mod: HCNC | Performed by: RADIOLOGY

## 2024-08-03 PROCEDURE — 0T9330Z DRAINAGE OF RIGHT KIDNEY PELVIS WITH DRAINAGE DEVICE, PERCUTANEOUS APPROACH: ICD-10-PCS | Performed by: RADIOLOGY

## 2024-08-03 PROCEDURE — 63600175 PHARM REV CODE 636 W HCPCS: Mod: HCNC | Performed by: INTERNAL MEDICINE

## 2024-08-03 PROCEDURE — 21400001 HC TELEMETRY ROOM: Mod: HCNC

## 2024-08-03 PROCEDURE — 84100 ASSAY OF PHOSPHORUS: CPT | Mod: HCNC | Performed by: INTERNAL MEDICINE

## 2024-08-03 PROCEDURE — 63600175 PHARM REV CODE 636 W HCPCS: Mod: HCNC | Performed by: RADIOLOGY

## 2024-08-03 PROCEDURE — 85027 COMPLETE CBC AUTOMATED: CPT | Mod: HCNC | Performed by: INTERNAL MEDICINE

## 2024-08-03 PROCEDURE — 83735 ASSAY OF MAGNESIUM: CPT | Mod: HCNC | Performed by: INTERNAL MEDICINE

## 2024-08-03 PROCEDURE — 0T9430Z DRAINAGE OF LEFT KIDNEY PELVIS WITH DRAINAGE DEVICE, PERCUTANEOUS APPROACH: ICD-10-PCS | Performed by: RADIOLOGY

## 2024-08-03 PROCEDURE — 25000003 PHARM REV CODE 250: Mod: HCNC | Performed by: INTERNAL MEDICINE

## 2024-08-03 RX ORDER — TRAMADOL HYDROCHLORIDE 50 MG/1
50 TABLET ORAL EVERY 4 HOURS PRN
Status: DISCONTINUED | OUTPATIENT
Start: 2024-08-03 | End: 2024-08-04

## 2024-08-03 RX ORDER — HEPARIN SODIUM 5000 [USP'U]/ML
5000 INJECTION, SOLUTION INTRAVENOUS; SUBCUTANEOUS EVERY 8 HOURS
Status: DISCONTINUED | OUTPATIENT
Start: 2024-08-03 | End: 2024-08-04 | Stop reason: HOSPADM

## 2024-08-03 RX ORDER — MORPHINE SULFATE 2 MG/ML
1 INJECTION, SOLUTION INTRAMUSCULAR; INTRAVENOUS EVERY 4 HOURS PRN
Status: DISCONTINUED | OUTPATIENT
Start: 2024-08-03 | End: 2024-08-04

## 2024-08-03 RX ORDER — HYDROCODONE BITARTRATE AND ACETAMINOPHEN 7.5; 325 MG/1; MG/1
1 TABLET ORAL EVERY 4 HOURS PRN
Status: DISCONTINUED | OUTPATIENT
Start: 2024-08-03 | End: 2024-08-04 | Stop reason: HOSPADM

## 2024-08-03 RX ORDER — DIPHENHYDRAMINE HYDROCHLORIDE 50 MG/ML
INJECTION INTRAMUSCULAR; INTRAVENOUS
Status: COMPLETED | OUTPATIENT
Start: 2024-08-03 | End: 2024-08-03

## 2024-08-03 RX ORDER — FENTANYL CITRATE 50 UG/ML
INJECTION, SOLUTION INTRAMUSCULAR; INTRAVENOUS
Status: COMPLETED | OUTPATIENT
Start: 2024-08-03 | End: 2024-08-03

## 2024-08-03 RX ADMIN — DIPHENHYDRAMINE HYDROCHLORIDE 25 MG: 50 INJECTION, SOLUTION INTRAMUSCULAR; INTRAVENOUS at 02:08

## 2024-08-03 RX ADMIN — TRAMADOL HYDROCHLORIDE 50 MG: 50 TABLET, COATED ORAL at 03:08

## 2024-08-03 RX ADMIN — SODIUM ZIRCONIUM CYCLOSILICATE 5 G: 5 POWDER, FOR SUSPENSION ORAL at 09:08

## 2024-08-03 RX ADMIN — IOHEXOL 100 ML: 300 INJECTION, SOLUTION INTRAVENOUS at 02:08

## 2024-08-03 RX ADMIN — GABAPENTIN 300 MG: 300 CAPSULE ORAL at 09:08

## 2024-08-03 RX ADMIN — HEPARIN SODIUM 5000 UNITS: 5000 INJECTION INTRAVENOUS; SUBCUTANEOUS at 10:08

## 2024-08-03 RX ADMIN — HYDROCODONE BITARTRATE AND ACETAMINOPHEN 1 TABLET: 7.5; 325 TABLET ORAL at 05:08

## 2024-08-03 RX ADMIN — GABAPENTIN 300 MG: 300 CAPSULE ORAL at 05:08

## 2024-08-03 RX ADMIN — CEFTRIAXONE SODIUM 1 G: 2 INJECTION, POWDER, FOR SOLUTION INTRAMUSCULAR; INTRAVENOUS at 02:08

## 2024-08-03 RX ADMIN — ACETAMINOPHEN 650 MG: 325 TABLET ORAL at 09:08

## 2024-08-03 RX ADMIN — FENTANYL CITRATE 50 MCG: 50 INJECTION, SOLUTION INTRAMUSCULAR; INTRAVENOUS at 02:08

## 2024-08-03 RX ADMIN — TRAMADOL HYDROCHLORIDE 50 MG: 50 TABLET, COATED ORAL at 07:08

## 2024-08-04 ENCOUNTER — PATIENT MESSAGE (OUTPATIENT)
Dept: HEMATOLOGY/ONCOLOGY | Facility: CLINIC | Age: 69
End: 2024-08-04
Payer: MEDICARE

## 2024-08-04 VITALS
HEART RATE: 73 BPM | HEIGHT: 71 IN | WEIGHT: 203.94 LBS | BODY MASS INDEX: 28.55 KG/M2 | RESPIRATION RATE: 18 BRPM | OXYGEN SATURATION: 97 % | TEMPERATURE: 98 F | DIASTOLIC BLOOD PRESSURE: 81 MMHG | SYSTOLIC BLOOD PRESSURE: 135 MMHG

## 2024-08-04 LAB
ALBUMIN SERPL BCP-MCNC: 2.9 G/DL (ref 3.5–5.2)
ANION GAP SERPL CALC-SCNC: 8 MMOL/L (ref 8–16)
BUN SERPL-MCNC: 21 MG/DL (ref 8–23)
CALCIUM SERPL-MCNC: 9 MG/DL (ref 8.7–10.5)
CHLORIDE SERPL-SCNC: 113 MMOL/L (ref 95–110)
CO2 SERPL-SCNC: 21 MMOL/L (ref 23–29)
CREAT SERPL-MCNC: 1.9 MG/DL (ref 0.5–1.4)
ERYTHROCYTE [DISTWIDTH] IN BLOOD BY AUTOMATED COUNT: 13.6 % (ref 11.5–14.5)
EST. GFR  (NO RACE VARIABLE): 37.9 ML/MIN/1.73 M^2
GLUCOSE SERPL-MCNC: 83 MG/DL (ref 70–110)
HCT VFR BLD AUTO: 30.8 % (ref 40–54)
HGB BLD-MCNC: 9.6 G/DL (ref 14–18)
MAGNESIUM SERPL-MCNC: 2.1 MG/DL (ref 1.6–2.6)
MCH RBC QN AUTO: 29.4 PG (ref 27–31)
MCHC RBC AUTO-ENTMCNC: 31.2 G/DL (ref 32–36)
MCV RBC AUTO: 94 FL (ref 82–98)
PHOSPHATE SERPL-MCNC: 4.3 MG/DL (ref 2.7–4.5)
PLATELET # BLD AUTO: 234 K/UL (ref 150–450)
PMV BLD AUTO: 10.9 FL (ref 9.2–12.9)
POTASSIUM SERPL-SCNC: 4.6 MMOL/L (ref 3.5–5.1)
RBC # BLD AUTO: 3.27 M/UL (ref 4.6–6.2)
SODIUM SERPL-SCNC: 142 MMOL/L (ref 136–145)
WBC # BLD AUTO: 7.24 K/UL (ref 3.9–12.7)

## 2024-08-04 PROCEDURE — 83735 ASSAY OF MAGNESIUM: CPT | Mod: HCNC | Performed by: INTERNAL MEDICINE

## 2024-08-04 PROCEDURE — 80069 RENAL FUNCTION PANEL: CPT | Mod: HCNC | Performed by: INTERNAL MEDICINE

## 2024-08-04 PROCEDURE — 63600175 PHARM REV CODE 636 W HCPCS: Mod: HCNC | Performed by: INTERNAL MEDICINE

## 2024-08-04 PROCEDURE — 85027 COMPLETE CBC AUTOMATED: CPT | Mod: HCNC | Performed by: INTERNAL MEDICINE

## 2024-08-04 PROCEDURE — 36415 COLL VENOUS BLD VENIPUNCTURE: CPT | Mod: HCNC | Performed by: INTERNAL MEDICINE

## 2024-08-04 PROCEDURE — 25000003 PHARM REV CODE 250: Mod: HCNC | Performed by: INTERNAL MEDICINE

## 2024-08-04 PROCEDURE — 25000003 PHARM REV CODE 250: Mod: HCNC

## 2024-08-04 RX ORDER — HYDROCODONE BITARTRATE AND ACETAMINOPHEN 5; 325 MG/1; MG/1
1 TABLET ORAL EVERY 6 HOURS PRN
Status: DISCONTINUED | OUTPATIENT
Start: 2024-08-04 | End: 2024-08-04

## 2024-08-04 RX ORDER — AMOXICILLIN 250 MG
1 CAPSULE ORAL 2 TIMES DAILY PRN
Qty: 30 TABLET | Refills: 0 | Status: SHIPPED | OUTPATIENT
Start: 2024-08-04

## 2024-08-04 RX ORDER — POLYETHYLENE GLYCOL 3350 17 G/17G
17 POWDER, FOR SOLUTION ORAL 2 TIMES DAILY PRN
Qty: 238 G | Refills: 0 | Status: SHIPPED | OUTPATIENT
Start: 2024-08-04

## 2024-08-04 RX ORDER — HYDROCODONE BITARTRATE AND ACETAMINOPHEN 5; 325 MG/1; MG/1
1 TABLET ORAL EVERY 4 HOURS PRN
Status: DISCONTINUED | OUTPATIENT
Start: 2024-08-04 | End: 2024-08-04 | Stop reason: HOSPADM

## 2024-08-04 RX ORDER — HYDROMORPHONE HYDROCHLORIDE 1 MG/ML
1 INJECTION, SOLUTION INTRAMUSCULAR; INTRAVENOUS; SUBCUTANEOUS EVERY 6 HOURS PRN
Status: DISCONTINUED | OUTPATIENT
Start: 2024-08-04 | End: 2024-08-04

## 2024-08-04 RX ORDER — POLYETHYLENE GLYCOL 3350 17 G/17G
17 POWDER, FOR SOLUTION ORAL 2 TIMES DAILY
Status: DISCONTINUED | OUTPATIENT
Start: 2024-08-04 | End: 2024-08-04 | Stop reason: HOSPADM

## 2024-08-04 RX ORDER — AMOXICILLIN 250 MG
1 CAPSULE ORAL 2 TIMES DAILY
Status: DISCONTINUED | OUTPATIENT
Start: 2024-08-04 | End: 2024-08-04 | Stop reason: HOSPADM

## 2024-08-04 RX ORDER — HYDROCODONE BITARTRATE AND ACETAMINOPHEN 7.5; 325 MG/1; MG/1
1 TABLET ORAL EVERY 6 HOURS PRN
Qty: 28 TABLET | Refills: 0 | Status: SHIPPED | OUTPATIENT
Start: 2024-08-04

## 2024-08-04 RX ORDER — SODIUM CHLORIDE, SODIUM LACTATE, POTASSIUM CHLORIDE, CALCIUM CHLORIDE 600; 310; 30; 20 MG/100ML; MG/100ML; MG/100ML; MG/100ML
INJECTION, SOLUTION INTRAVENOUS CONTINUOUS
Status: DISCONTINUED | OUTPATIENT
Start: 2024-08-04 | End: 2024-08-04 | Stop reason: HOSPADM

## 2024-08-04 RX ADMIN — POLYETHYLENE GLYCOL 3350 17 G: 17 POWDER, FOR SOLUTION ORAL at 09:08

## 2024-08-04 RX ADMIN — HYDROCODONE BITARTRATE AND ACETAMINOPHEN 1 TABLET: 5; 325 TABLET ORAL at 09:08

## 2024-08-04 RX ADMIN — HYDROCODONE BITARTRATE AND ACETAMINOPHEN 1 TABLET: 7.5; 325 TABLET ORAL at 02:08

## 2024-08-04 RX ADMIN — SODIUM CHLORIDE, POTASSIUM CHLORIDE, SODIUM LACTATE AND CALCIUM CHLORIDE: 600; 310; 30; 20 INJECTION, SOLUTION INTRAVENOUS at 09:08

## 2024-08-04 RX ADMIN — HEPARIN SODIUM 5000 UNITS: 5000 INJECTION INTRAVENOUS; SUBCUTANEOUS at 05:08

## 2024-08-04 RX ADMIN — GABAPENTIN 300 MG: 300 CAPSULE ORAL at 09:08

## 2024-08-04 RX ADMIN — SENNOSIDES AND DOCUSATE SODIUM 1 TABLET: 50; 8.6 TABLET ORAL at 09:08

## 2024-08-04 RX ADMIN — TRAMADOL HYDROCHLORIDE 50 MG: 50 TABLET, COATED ORAL at 12:08

## 2024-08-05 ENCOUNTER — TELEPHONE (OUTPATIENT)
Dept: UROLOGY | Facility: CLINIC | Age: 69
End: 2024-08-05
Payer: MEDICARE

## 2024-08-05 ENCOUNTER — LAB VISIT (OUTPATIENT)
Dept: LAB | Facility: HOSPITAL | Age: 69
End: 2024-08-05
Payer: MEDICARE

## 2024-08-05 ENCOUNTER — INFUSION (OUTPATIENT)
Dept: INFUSION THERAPY | Facility: HOSPITAL | Age: 69
End: 2024-08-05
Payer: MEDICARE

## 2024-08-05 VITALS
DIASTOLIC BLOOD PRESSURE: 77 MMHG | BODY MASS INDEX: 27.29 KG/M2 | SYSTOLIC BLOOD PRESSURE: 130 MMHG | WEIGHT: 201.5 LBS | HEIGHT: 72 IN | HEART RATE: 63 BPM | TEMPERATURE: 98 F | RESPIRATION RATE: 18 BRPM

## 2024-08-05 DIAGNOSIS — R79.89 ELEVATED SERUM CREATININE: ICD-10-CM

## 2024-08-05 DIAGNOSIS — C67.1 MALIGNANT NEOPLASM OF DOME OF URINARY BLADDER: Primary | ICD-10-CM

## 2024-08-05 LAB
ANION GAP SERPL CALC-SCNC: 10 MMOL/L (ref 8–16)
BACTERIA UR CULT: ABNORMAL
BUN SERPL-MCNC: 20 MG/DL (ref 8–23)
CALCIUM SERPL-MCNC: 9.5 MG/DL (ref 8.7–10.5)
CHLORIDE SERPL-SCNC: 105 MMOL/L (ref 95–110)
CO2 SERPL-SCNC: 23 MMOL/L (ref 23–29)
CREAT SERPL-MCNC: 1.6 MG/DL (ref 0.5–1.4)
EST. GFR  (NO RACE VARIABLE): 46.6 ML/MIN/1.73 M^2
GLUCOSE SERPL-MCNC: 100 MG/DL (ref 70–110)
POTASSIUM SERPL-SCNC: 4.7 MMOL/L (ref 3.5–5.1)
SODIUM SERPL-SCNC: 138 MMOL/L (ref 136–145)

## 2024-08-05 PROCEDURE — 63600175 PHARM REV CODE 636 W HCPCS: Mod: JZ,JG,HCNC | Performed by: INTERNAL MEDICINE

## 2024-08-05 PROCEDURE — 96413 CHEMO IV INFUSION 1 HR: CPT | Mod: HCNC

## 2024-08-05 PROCEDURE — A4216 STERILE WATER/SALINE, 10 ML: HCPCS | Mod: HCNC | Performed by: INTERNAL MEDICINE

## 2024-08-05 PROCEDURE — 36415 COLL VENOUS BLD VENIPUNCTURE: CPT | Mod: HCNC

## 2024-08-05 PROCEDURE — 80048 BASIC METABOLIC PNL TOTAL CA: CPT | Mod: HCNC

## 2024-08-05 PROCEDURE — 25000003 PHARM REV CODE 250: Mod: HCNC | Performed by: INTERNAL MEDICINE

## 2024-08-05 RX ORDER — HEPARIN 100 UNIT/ML
500 SYRINGE INTRAVENOUS
Status: DISCONTINUED | OUTPATIENT
Start: 2024-08-05 | End: 2024-08-05 | Stop reason: HOSPADM

## 2024-08-05 RX ORDER — PROCHLORPERAZINE EDISYLATE 5 MG/ML
5 INJECTION INTRAMUSCULAR; INTRAVENOUS ONCE AS NEEDED
Status: DISCONTINUED | OUTPATIENT
Start: 2024-08-05 | End: 2024-08-05 | Stop reason: HOSPADM

## 2024-08-05 RX ORDER — EPINEPHRINE 0.3 MG/.3ML
0.3 INJECTION SUBCUTANEOUS ONCE AS NEEDED
Status: DISCONTINUED | OUTPATIENT
Start: 2024-08-05 | End: 2024-08-05 | Stop reason: HOSPADM

## 2024-08-05 RX ORDER — SODIUM CHLORIDE 0.9 % (FLUSH) 0.9 %
10 SYRINGE (ML) INJECTION
Status: DISCONTINUED | OUTPATIENT
Start: 2024-08-05 | End: 2024-08-05 | Stop reason: HOSPADM

## 2024-08-05 RX ORDER — DIPHENHYDRAMINE HYDROCHLORIDE 50 MG/ML
50 INJECTION INTRAMUSCULAR; INTRAVENOUS ONCE AS NEEDED
Status: DISCONTINUED | OUTPATIENT
Start: 2024-08-05 | End: 2024-08-05 | Stop reason: HOSPADM

## 2024-08-05 RX ADMIN — HEPARIN 500 UNITS: 100 SYRINGE at 03:08

## 2024-08-05 RX ADMIN — SODIUM CHLORIDE 200 MG: 9 INJECTION, SOLUTION INTRAVENOUS at 03:08

## 2024-08-05 RX ADMIN — Medication 10 ML: at 03:08

## 2024-08-05 RX ADMIN — SODIUM CHLORIDE: 9 INJECTION, SOLUTION INTRAVENOUS at 02:08

## 2024-08-06 ENCOUNTER — OFFICE VISIT (OUTPATIENT)
Dept: HEMATOLOGY/ONCOLOGY | Facility: CLINIC | Age: 69
End: 2024-08-06
Payer: MEDICARE

## 2024-08-06 ENCOUNTER — PATIENT OUTREACH (OUTPATIENT)
Dept: ADMINISTRATIVE | Facility: CLINIC | Age: 69
End: 2024-08-06
Payer: MEDICARE

## 2024-08-06 VITALS
SYSTOLIC BLOOD PRESSURE: 121 MMHG | TEMPERATURE: 97 F | HEART RATE: 71 BPM | WEIGHT: 199.44 LBS | RESPIRATION RATE: 17 BRPM | HEIGHT: 71 IN | BODY MASS INDEX: 27.92 KG/M2 | OXYGEN SATURATION: 100 % | DIASTOLIC BLOOD PRESSURE: 82 MMHG

## 2024-08-06 DIAGNOSIS — N13.30 HYDROURETERONEPHROSIS: ICD-10-CM

## 2024-08-06 DIAGNOSIS — N17.9 AKI (ACUTE KIDNEY INJURY): ICD-10-CM

## 2024-08-06 DIAGNOSIS — C67.1 MALIGNANT NEOPLASM OF DOME OF URINARY BLADDER: Primary | ICD-10-CM

## 2024-08-06 PROCEDURE — 99999 PR PBB SHADOW E&M-EST. PATIENT-LVL III: CPT | Mod: PBBFAC,HCNC,, | Performed by: INTERNAL MEDICINE

## 2024-08-07 ENCOUNTER — TELEPHONE (OUTPATIENT)
Dept: HEMATOLOGY/ONCOLOGY | Facility: CLINIC | Age: 69
End: 2024-08-07
Payer: MEDICARE

## 2024-08-09 ENCOUNTER — PATIENT MESSAGE (OUTPATIENT)
Dept: HEMATOLOGY/ONCOLOGY | Facility: CLINIC | Age: 69
End: 2024-08-09
Payer: MEDICARE

## 2024-08-09 DIAGNOSIS — K59.00 CONSTIPATION, UNSPECIFIED CONSTIPATION TYPE: Primary | ICD-10-CM

## 2024-08-09 RX ORDER — LACTULOSE 10 G/15ML
15 SOLUTION ORAL EVERY 6 HOURS PRN
Qty: 270 ML | Refills: 1 | Status: SHIPPED | OUTPATIENT
Start: 2024-08-09

## 2024-08-12 ENCOUNTER — PATIENT MESSAGE (OUTPATIENT)
Dept: HEMATOLOGY/ONCOLOGY | Facility: CLINIC | Age: 69
End: 2024-08-12
Payer: MEDICARE

## 2024-08-12 ENCOUNTER — HOSPITAL ENCOUNTER (OUTPATIENT)
Dept: RADIOLOGY | Facility: HOSPITAL | Age: 69
Discharge: HOME | End: 2024-08-12
Attending: INTERNAL MEDICINE
Payer: MEDICARE

## 2024-08-12 DIAGNOSIS — K59.00 CONSTIPATION, UNSPECIFIED CONSTIPATION TYPE: Primary | ICD-10-CM

## 2024-08-12 DIAGNOSIS — K59.00 CONSTIPATION, UNSPECIFIED CONSTIPATION TYPE: ICD-10-CM

## 2024-08-12 PROCEDURE — 74019 RADEX ABDOMEN 2 VIEWS: CPT | Mod: TC,HCNC

## 2024-08-12 PROCEDURE — 74019 RADEX ABDOMEN 2 VIEWS: CPT | Mod: 26,HCNC,, | Performed by: INTERNAL MEDICINE

## 2024-08-13 ENCOUNTER — TELEPHONE (OUTPATIENT)
Dept: HEMATOLOGY/ONCOLOGY | Facility: CLINIC | Age: 69
End: 2024-08-13
Payer: MEDICARE

## 2024-08-13 NOTE — TELEPHONE ENCOUNTER
I called patient per RN's request in Clarissa Vega NP office. Completed the [employer] application with patient on phone, approved for full coverage. No further questions at this time, and email sent to patient for reference.

## 2024-08-16 NOTE — PROGRESS NOTES
Subjective:      Med Palma is a 69 y.o. male who returns today regarding his     OP pembrolizumab 200mg Q3W    Current Cycle Treatment Dates Line of Treatment Treatment Goal Treatment Plan Provider Treatment Department Status Auth Status   2 of 35 cycles 7/31/2024 to 7/15/2026 [facility] Mary Spicer MD [facility] Infusion Active    Protocol   OP pembrolizumab 200mg Q3W - As of 7/24/2024 9:12 AM       Here to check nephrostomy tubes.  Both tubes are functioning well and are dry now    Having trouble passing BMs and significant pelvic pain  No NV    The following portions of the patient's history were reviewed and updated as appropriate: allergies, current medications, past family history, past medical history, past social history, past surgical history and problem list.    Review of Systems  Pertinent items are noted in HPI.  A comprehensive multipoint review of systems was negative except as otherwise stated in the HPI.    Past Medical History:   Diagnosis Date    Allergy     Arthritis     Elevated PSA     Hypertension     Malignant tumor of urinary bladder     per patient removed with last surg     Past Surgical History:   Procedure Laterality Date    CYSTECTOMY, ROBOT-ASSISTED, LAPAROSCOPIC, USING DA SHARI XI, WITH ILEAL CONDUIT CREATION Bilateral 3/13/2024    Procedure: XI ROBOTIC CYSTECTOMY, WITH ILEAL CONDUIT CREATION;  Surgeon: Yinka Jimenez MD;  Location: [facility];  Service: Urology;  Laterality: Bilateral;  6 hrs / SURGERY ADMIT DUE TO INSURANCE    ROBOT-ASSISTED LAPAROSCOPIC PROSTATECTOMY USING DA SHARI XI N/A 3/13/2024    Procedure: XI ROBOTIC PROSTATECTOMY;  Surgeon: Yinka Jimenez MD;  Location: [facility];  Service: Urology;  Laterality: N/A;    ROBOTIC BIOPSY, LYMPH NODE Bilateral 3/13/2024    Procedure: XI ROBOTIC BIOPSY, PELVIC LYMPH NODE;  Surgeon: Yinka Jimenez MD;  Location: [facility] [place], operated by [facility];  Service: Urology;  Laterality: Bilateral;    TURBT (TRANSURETHRAL RESECTION  OF BLADDER TUMOR) N/A 10/26/2023    Procedure: TURBT (TRANSURETHRAL RESECTION OF BLADDER TUMOR);  Surgeon: Yinka Jimenez MD;  Location: [facility];  Service: Urology;  Laterality: N/A;       Review of patient's allergies indicates:  No Known Allergies       Objective:   Vitals: There were no vitals taken for this visit.    Physical Exam   General: alert and oriented, no acute distress  Respiratory: Symmetric expansion, non-labored breathing  Cardiovascular: no peripheral edema  Abdomen: soft, non distended  Skin: normal coloration and turgor, no rashes, no suspicious skin lesions noted  Neuro: no gross deficits  Psych: normal judgment and insight, normal mood/affect, and non-anxious  Both nephrostomy tube sites clean and dry    ELSA hard mass in the pelvis  Rectal vault empty however    Physical Exam    Lab Review   Urinalysis demonstrates   Lab Results   Component Value Date    WBC 7.24 08/04/2024    HGB 9.6 (L) 08/04/2024    HCT 30.8 (L) 08/04/2024    MCV 94 08/04/2024     08/04/2024     Lab Results   Component Value Date    CREATININE 1.6 (H) 08/05/2024    BUN 20 08/05/2024     Final Pathologic Diagnosis 1. Left ureter, excision:      - Portion of ureter without significant pathologic change      2. Right ureter, excision:      - Portion of ureter without significant pathologic change      3. Bladder, prostate and left iliac lymph node, radical cystoprostatectomy:      - Small cell neuroendocrine carcinoma      - Tumor size: 1.5 cm      - Resection margins: free of tumor      - Prostate with atrophy and benign prostatic hyperplasia (BPH)      - One of one lymph node, positive for carcinoma (1/1)          - Size of Largest Metastatic Deposit: at least 2.0 cm          - Extranodal Extension: not identified      4. Right pelvic lymph nodes, excision:      - Fifteen lymph nodes, negative for carcinoma (0/15)      - No treatment effect seen      5. Left pelvic lymph nodes, excision:      - Ten lymph nodes,  negative for carcinoma (0/10)      - No treatment effect seen      Note: Cytokeratin AE1/AE3 on parts 4 and 5 support the above diagnosis. Immunostains performed with appropriate positive and negative controls.        URINARY BLADDER: Cystectomy, Anterior Exenteration:   SPECIMEN     Procedure       Radical cystoprostatectomy    TUMOR     Tumor Site       Dome      Histologic Type       Small cell neuroendocrine carcinoma      Tumor Extension       Tumor invades perivesical soft tissue      Tumor Size       Greatest Dimension (Centimeters) 1.5 cm      Tumor Extent:           Macroscopically (extravesical mass)      Lymphovascular Invasion       Present      Tumor Configuration       Solid / nodule    MARGINS     Margins       Uninvolved by invasive carcinoma    LYMPH NODES   Regional Lymph Node Status:    :    Regional Lymph Node Status:      Tumor present in regional lymph node(s):             Number of Lymph Nodes Involved               1          Size of Largest Gordon Metastatic Deposit:                 At least 2.0 cm          Size of Largest Lymph Node with Tumor:                 2.2 cm            Number of Lymph Nodes Examined           26        TNM Descriptors       y (post-treatment)    pT Category:       pT3b    pN Category:       pN1        Block for potential molecular or ancillary studies:  Tumor block: 3-E, 3-G, 3-I   Comment: Interp By Nicol Gill M.D., Signed on 03/25/2024 at 10:27       Imaging  CT ABDOMEN PELVIS WITHOUT CONTRAST     CLINICAL HISTORY:  Abdominal pain, acute, nonlocalized;     TECHNIQUE:  Low dose axial images, sagittal and coronal reformations were obtained from the lung bases to the pubic symphysis.  Oral contrast was not administered.     COMPARISON:  06/26/2024     FINDINGS:  Images of the lower thorax are remarkable for bilateral dependent atelectasis noting scattered regions of bandlike atelectasis or scarring.  There is bilateral pulmonary emphysematous change.     The liver,  spleen, pancreas, gallbladder and adrenal glands have a grossly unremarkable noncontrast appearance.  There is no biliary dilation.  The stomach is mildly distended with ingested content without wall thickening.  There are a few scattered prominent abdominal lymph nodes particularly in the gastrohepatic region.  There is strand-like fluid in the abdomen as well as mild ascites.     There is bilateral moderate to severe hydroureteronephrosis.  The bilateral ureters are unable to be followed in their entirety to the urostomy noting no definite ureteral calculi seen.  There is surgical change of cystectomy and prostatectomy.  In comparison to the examination 06/26/2024, there has been interval development of multiple bulky soft tissue foci throughout the deep pelvis concerning for malignancy.     The large bowel is grossly unremarkable.  The terminal ileum and appendix are unremarkable.  Surgical changes are noted of urostomy, no findings to suggest small bowel obstruction.  There are several scattered prominent mesenteric lymph nodes as well as periaortic and paracaval lymph nodes.  There is atherosclerotic calcification of the aorta and its branches.     There is osteopenia.  There are degenerative changes of the spine.  There are prominent bilateral inguinal lymph nodes.  There is some degree of body wall anasarca.  There are nodular foci along the left anterior abdominal wall, new since the previous exam concerning for malignancy.  There are multiple prominent cardiophrenic lesions, also enlarged since the prior PET-CT.     Impression:     This report was flagged in Epic as abnormal.     1. There is moderate to severe bilateral hydroureteronephrosis, this has worsened bilaterally in this patient with urostomy/cystoprostatectomy, correlation with output recommended.  Please note, the ureters are unable to be followed in their entirety to the urostomy, extrinsic compression from worsening pelvic masses is of  concern.  2. Worsening masses throughout the abdomen and pelvis concerning for progressive malignancy, this has increased since examination 06/26/2024 and more recent PET-CT 07/17/2024.  Enlarging soft tissue foci are noted along the left anterior abdominal wall.  3. Please see above for several additional findings.        Electronically signed by:Chico Dumont MD  Date:                                            08/02/2024  Time:                                           13:52    Assessment and Plan:   Malignant neoplasm of urinary bladder,  pT3b pN1 cM0 sp cystectomy   Progression on adjuvant Keytruda  Follow up with heme onc/Dr Spicer's team  May need to consider second-line systemic therapy    Bilateral hydronephrosis  Nephrostomy tubes in place  Change Q 3 months with IR      Obstipation  Likely due to pelvic recurrence of urothelial ca  See CRS ASAP

## 2024-08-19 ENCOUNTER — TELEPHONE (OUTPATIENT)
Dept: SURGERY | Facility: CLINIC | Age: 69
End: 2024-08-19
Payer: MEDICARE

## 2024-08-19 ENCOUNTER — OFFICE VISIT (OUTPATIENT)
Dept: UROLOGY | Facility: CLINIC | Age: 69
End: 2024-08-19
Payer: MEDICARE

## 2024-08-19 VITALS
HEIGHT: 71 IN | HEART RATE: 98 BPM | BODY MASS INDEX: 27.93 KG/M2 | OXYGEN SATURATION: 98 % | DIASTOLIC BLOOD PRESSURE: 81 MMHG | SYSTOLIC BLOOD PRESSURE: 120 MMHG | WEIGHT: 199.5 LBS

## 2024-08-19 DIAGNOSIS — C67.9 MALIGNANT NEOPLASM OF URINARY BLADDER, UNSPECIFIED SITE: Primary | ICD-10-CM

## 2024-08-19 NOTE — TELEPHONE ENCOUNTER
Spoke with pt regarding referral per Dr. Jimenez for difficulties with BM d/t pelvic masses. Offered to be seen on 8/23 at 9 AM with Dr. Curry at [facility]. Pt verbally confirmed to appt date, time and location. Denies further questions at this time.

## 2024-08-19 NOTE — Clinical Note
Gisselle, can you or one of your team see Mr Palma?  He is having trouble having BMs which appears related to his pelvic masses.  He has had a cystectomy and ileal conduit.  I'm thinking he may need a diversion at some point.  Thanks,  Yinka  I will see him back in 6 weeks to check his nephrostomy tube.

## 2024-08-21 DIAGNOSIS — G89.3 NEOPLASM RELATED PAIN (ACUTE) (CHRONIC): ICD-10-CM

## 2024-08-21 RX ORDER — TRAMADOL HYDROCHLORIDE 50 MG/1
TABLET ORAL
Qty: 180 TABLET | Refills: 0 | Status: SHIPPED | OUTPATIENT
Start: 2024-08-21

## 2024-08-22 ENCOUNTER — TELEPHONE (OUTPATIENT)
Dept: HEMATOLOGY/ONCOLOGY | Facility: CLINIC | Age: 69
End: 2024-08-22
Payer: MEDICARE

## 2024-08-22 ENCOUNTER — PATIENT MESSAGE (OUTPATIENT)
Dept: UROLOGY | Facility: CLINIC | Age: 69
End: 2024-08-22
Payer: MEDICARE

## 2024-08-22 NOTE — TELEPHONE ENCOUNTER
----- Message from Maggie Luna RN sent at 8/20/2024 10:28 AM CDT -----  Done 1130  ----- Message -----  From: Yosvany Salazar  Sent: 8/20/2024   9:15 AM CDT  To: [facility] Clinical Support Staff    Good morning,    Can we get this pt scheduled for [address] on Monday-8/26/2024 at your location please?                      Thank you!!!  Yosvany

## 2024-08-23 RX ORDER — HEPARIN 100 UNIT/ML
500 SYRINGE INTRAVENOUS
OUTPATIENT
Start: 2024-08-23

## 2024-08-23 RX ORDER — PROCHLORPERAZINE EDISYLATE 5 MG/ML
5 INJECTION INTRAMUSCULAR; INTRAVENOUS ONCE AS NEEDED
OUTPATIENT
Start: 2024-08-23

## 2024-08-23 RX ORDER — EPINEPHRINE 0.3 MG/.3ML
0.3 INJECTION SUBCUTANEOUS ONCE AS NEEDED
OUTPATIENT
Start: 2024-08-23

## 2024-08-23 RX ORDER — SODIUM CHLORIDE 0.9 % (FLUSH) 0.9 %
10 SYRINGE (ML) INJECTION
OUTPATIENT
Start: 2024-08-23

## 2024-08-23 RX ORDER — DIPHENHYDRAMINE HYDROCHLORIDE 50 MG/ML
50 INJECTION INTRAMUSCULAR; INTRAVENOUS ONCE AS NEEDED
OUTPATIENT
Start: 2024-08-23

## 2024-08-23 NOTE — PROGRESS NOTES
CRS Office Visit History and Physical    Referring Md:   Yinka Jimenez Md  [address]  [address]  [place] [address]    SUBJECTIVE:     Chief Complaint: constipation     History of Present Illness:  The patient is a new patient to this practice.   Course is as follows:  Med Palma is a 69 y.o. male presents with constipation in the setting of recurrent urothelial carcinoma.  He has a history of cystectomy with ileal conduit with Dr. Jimenez last year.  Noted to have recurrence in the resection bed on surveillance imaging with progression of disease.  Patient reports severe pelvic pain with bowel movements and sitting.  Was previously having liquid/loose bowel movements.  More constipated recently due to tramadol for pain control.  Referred from Dr. Jimenez for possible palliative fecal diversion      Last Colonoscopy: 2022    Review of patient's allergies indicates:  No Known Allergies    Past Medical History:   Diagnosis Date    Allergy     Arthritis     Elevated PSA     Hypertension     Malignant tumor of urinary bladder     per patient removed with last surg     Past Surgical History:   Procedure Laterality Date    CYSTECTOMY, ROBOT-ASSISTED, LAPAROSCOPIC, USING DA SHARI XI, WITH ILEAL CONDUIT CREATION Bilateral 3/13/2024    Procedure: XI ROBOTIC CYSTECTOMY, WITH ILEAL CONDUIT CREATION;  Surgeon: Yinka Jimenez MD;  Location: [facility];  Service: Urology;  Laterality: Bilateral;  6 hrs / SURGERY ADMIT DUE TO INSURANCE    ROBOT-ASSISTED LAPAROSCOPIC PROSTATECTOMY USING DA SHARI XI N/A 3/13/2024    Procedure: XI ROBOTIC PROSTATECTOMY;  Surgeon: Yinka Jimenez MD;  Location: [facility];  Service: Urology;  Laterality: N/A;    ROBOTIC BIOPSY, LYMPH NODE Bilateral 3/13/2024    Procedure: XI ROBOTIC BIOPSY, PELVIC LYMPH NODE;  Surgeon: Yinka Jimenez MD;  Location: [facility];  Service: Urology;  Laterality: Bilateral;    TURBT (TRANSURETHRAL RESECTION OF BLADDER TUMOR) N/A 10/26/2023    Procedure:  "TURBT (TRANSURETHRAL RESECTION OF BLADDER TUMOR);  Surgeon: Yinka Jimenez MD;  Location: [facility];  Service: Urology;  Laterality: N/A;     Family History   Problem Relation Name Age of Onset    Hypertension Mother      Cancer Father       Social History     Tobacco Use    Smoking status: Former     Types: Cigarettes    Smokeless tobacco: Never   Substance Use Topics    Alcohol use: Yes     Alcohol/week: 3.0 standard drinks of alcohol     Types: 3 Cans of beer per week     Comment: none 24 hr prior to surg    Drug use: No        Review of Systems:  Review of Systems   All other systems reviewed and are negative.    OBJECTIVE:     Vital Signs (Most Recent)  /66 (BP Location: Left arm, Patient Position: Sitting)   Pulse 95   Resp 19   Ht 5' 10.98" (1.803 m)   Wt 91.2 kg (201 lb 1 oz)   SpO2 100%   BMI 28.05 kg/m²     Physical Exam:  General: 69 y.o. male in no distress   Neuro: alert and oriented x 4.  Moves all extremities.     HEENT: normocephalic, atraumatic, PERRL, EOMI   Respiratory: respirations are even and unlabored  Cardiac: regular rate and rhythm  Abdomen: soft, NTND, ileal conduit in RLQ   Extremities: Warm dry and intact  Skin: no rashes      Labs:   Component Ref Range & Units 4 d ago  (8/23/24) 3 wk ago  (8/4/24) 3 wk ago  (8/3/24) 3 wk ago  (8/2/24) 2 mo ago  (6/26/24) 5 mo ago  (3/20/24) 5 mo ago  (3/19/24)   WBC 3.90 - 12.70 K/uL 9.93 7.24 7.70 7.91 7.47 17.53 High  14.56 High    RBC 4.60 - 6.20 M/uL 3.56 Low  3.27 Low  3.23 Low  3.62 Low  3.53 Low  2.98 Low  2.34 Low    Hemoglobin 14.0 - 18.0 g/dL 10.3 Low  9.6 Low  9.5 Low  11.0 Low  10.7 Low  9.4 Low  7.3 Low    Hematocrit 40.0 - 54.0 % 32.1 Low  30.8 Low  30.2 Low  33.9 Low  33.6 Low  28.1 Low  22.8 Low    MCV 82 - 98 fL 90 94 94 94 95 94 97   MCH 27.0 - 31.0 pg 28.9 29.4 29.4 30.4 30.3 31.5 High  31.2 High    MCHC 32.0 - 36.0 g/dL 32.1 31.2 Low  31.5 Low  32.4 31.8 Low  33.5 32.0   RDW 11.5 - 14.5 % 13.2 13.6 13.8 13.6 14.8 High  " 15.3 High  15.3 High    Platelets 150 - 450 K/uL 234 234 229           Imaging:   This report was flagged in Epic as abnormal.     1. There is moderate to severe bilateral hydroureteronephrosis, this has worsened bilaterally in this patient with urostomy/cystoprostatectomy, correlation with output recommended.  Please note, the ureters are unable to be followed in their entirety to the urostomy, extrinsic compression from worsening pelvic masses is of concern.  2. Worsening masses throughout the abdomen and pelvis concerning for progressive malignancy, this has increased since examination 06/26/2024 and more recent PET-CT 07/17/2024.  Enlarging soft tissue foci are noted along the left anterior abdominal wall.  3. Please see above for several additional findings.        Electronically signed by:Chico Dumont MD  Date:                                            08/02/2024  Time:                                           13:52      ASSESSMENT/PLAN:     There are no diagnoses linked to this encounter.    69 y.o. male with recurrent urothelial carcinoma and constipation     - Patient's history, procedures and imaging personally reviewed.  Consistent with recurrent urothelial carcinoma with concern for carcinomatosis.   - We discussed possible palliative fecal diversion due to pain with bowel movements.  However, now that his pain is controlled with tramadol, will try a course of laxatives to relieve constipation.  We discussed that given imaging findings, creating an ostomy may be difficult if there are dense adhesions or large volume of peritoneal implants.  We discussed that this could possibly interrupt or delay treatment of his cancer as well.    - He will continue with immunotherapy for now and will return if he is interested in pursuing fecal diversion.     Gisselle Curry MD  Staff Surgeon  Colon & Rectal Surgery

## 2024-08-26 NOTE — ED NOTES
Body has been released by [facility] . Call back number is [phone]

## 2024-08-26 NOTE — ED PROVIDER NOTES
Encounter Date: 8/26/2024       History     Chief Complaint   Patient presents with    SVT     SVT in field, received 6mg adenosine en route with EMS. Arrives lethargic and bradycardic.      70 yo male presenting by EMS with shortness of breath.     EMS report:  Call for shortness of breath, EMS noted patient was hypoxic on arrival.  EMS concerned patient went into SVT with low blood pressure and received 6 mg of adenosine en route.    History supplemented by patient's wife, Carol, present at bedside.  She states the patient looked pale and was sweating.     History by patient is limited by patient's condition on arrival.    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 The history is limited by the condition of the patient. No  was used.     Review of patient's allergies indicates:  No Known Allergies  Past Medical History:   Diagnosis Date    Allergy     Arthritis     Elevated PSA     Hypertension     Malignant tumor of urinary bladder     per patient removed with last surg     Past Surgical History:   Procedure Laterality Date    CYSTECTOMY, ROBOT-ASSISTED, LAPAROSCOPIC, USING DA SHARI XI, WITH ILEAL CONDUIT CREATION Bilateral 3/13/2024    Procedure: XI ROBOTIC CYSTECTOMY, WITH ILEAL CONDUIT CREATION;  Surgeon: Yinka Jimenez MD;  Location: [facility];  Service: Urology;  Laterality: Bilateral;  6 hrs / SURGERY ADMIT DUE TO INSURANCE    ROBOT-ASSISTED LAPAROSCOPIC PROSTATECTOMY USING DA SHARI XI N/A 3/13/2024    Procedure: XI ROBOTIC PROSTATECTOMY;  Surgeon: Yinka Jimenez MD;  Location: [facility] OR;  Service: Urology;  Laterality: N/A;    ROBOTIC BIOPSY, LYMPH NODE Bilateral 3/13/2024    Procedure: XI ROBOTIC BIOPSY, PELVIC LYMPH NODE;  Surgeon: Yinka Jimenez MD;  Location: [facility] OR;  Service: Urology;  Laterality:  Bilateral;    TURBT (TRANSURETHRAL RESECTION OF BLADDER TUMOR) N/A 10/26/2023    Procedure: TURBT (TRANSURETHRAL RESECTION OF BLADDER TUMOR);  Surgeon: Yinka Jimenez MD;  Location: [facility];  Service: Urology;  Laterality: N/A;     Family History   Problem Relation Name Age of Onset    Hypertension Mother      Cancer Father       Social History     Tobacco Use    Smoking status: Former     Types: Cigarettes    Smokeless tobacco: Never   Substance Use Topics    Alcohol use: Yes     Alcohol/week: 3.0 standard drinks of alcohol     Types: 3 Cans of beer per week     Comment: none 24 hr prior to surg    Drug use: No     Review of Systems   Unable to perform ROS: Patient unresponsive       Physical Exam     Initial Vitals   BP Pulse Resp Temp SpO2   08/26/24 0755 08/26/24 0733 08/26/24 0755 08/26/24 0847 08/26/24 0755   (!) 129/90 84 (!) 5 99.5 °F (37.5 °C) (!) 26 %      MAP       --                Physical Exam    Nursing note and vitals reviewed.  Constitutional: He appears distressed.   HENT:   Head: Normocephalic and atraumatic.   Eyes: Right eye exhibits no discharge. Left eye exhibits no discharge.   Neck: Neck supple. No tracheal deviation present.   Cardiovascular:  Regular rhythm.   Bradycardia present.         Pulmonary/Chest: He is in respiratory distress.   Abdominal: Abdomen is soft.   Ileal conduit bag with yellow urine  B/l nephrostomy tubes with yellow urine    Musculoskeletal:      Cervical back: Neck supple.     Neurological:   Unresponsive, not moving extremities    Skin: Skin is warm. No rash noted.         ED Course   Intubation    Date/Time: 8/26/2024 9:07 AM  Location procedure was performed: ANIL ONTIVEROS ACCESS    Performed by: Jimmy Roger MD  Authorized by: Harman Caldera MD  Assisting provider: Harman Caldera MD  Consent Done: Emergent Situation  Indications: respiratory failure and airway protection  Intubation method: video-assisted  Patient status:  unconscious  Preoxygenation: bag valve mask  Laryngoscope size: Glide 4  Tube size: 7.5 mm  Tube type: cuffed  Number of attempts: 1  Cricoid pressure: no  Cords visualized: yes  Post-procedure assessment: chest rise, ETCO2 monitor and CO2 detector  Breath sounds: clear  Cuff inflated: yes  ETT to lip: 24 cm  ETT to teeth: 23 cm  Tube secured with: ETT gaspar  Comments: Indication for procedure was that the patient was currently being coded for cardiac arrest.        Labs Reviewed   CBC W/ AUTO DIFFERENTIAL - Abnormal       Result Value    WBC 10.03      RBC 3.02 (*)     Hemoglobin 8.9 (*)     Hematocrit 30.1 (*)      (*)     MCH 29.5      MCHC 29.6 (*)     RDW 13.5      Platelets 119 (*)     MPV 12.4      Immature Granulocytes CANCELED      Immature Grans (Abs) CANCELED      Lymph # CANCELED      Mono # CANCELED      Eos # CANCELED      Baso # CANCELED      nRBC 1 (*)     Gran % 33.0 (*)     Lymph % 54.0 (*)     Mono % 3.0 (*)     Eosinophil % 2.0      Basophil % 0.0      Bands 2.0      Metamyelocytes 2.0      Myelocytes 4.0      Platelet Estimate Decreased (*)     Aniso Slight      Poik Slight      Poly Occasional      Hypo Occasional      Ovalocytes Occasional      Differential Method Manual     COMPREHENSIVE METABOLIC PANEL - Abnormal    Sodium 141      Potassium 4.3      Chloride 105      CO2 11 (*)     Glucose 257 (*)     BUN 20      Creatinine 1.9 (*)     Calcium 13.3 (*)     Total Protein 5.3 (*)     Albumin 2.1 (*)     Total Bilirubin 0.3      Alkaline Phosphatase 118       (*)      (*)     eGFR 37.9 (*)     Anion Gap 25 (*)    TROPONIN I - Abnormal    Troponin I 0.226 (*)    APTT - Abnormal    aPTT 38.4 (*)     Narrative:     Draw baseline aPTT prior to starting the heparin bolus or  infusion  (if patient is on warfarin prior to heparin therapy)   ISTAT PROCEDURE - Abnormal    POC Glucose 226 (*)     POC BUN 23      POC Creatinine 1.8 (*)     POC Sodium 138      POC Potassium 4.1       POC Chloride 104      POC TCO2 (MEASURED) 20 (*)     POC Ionized Calcium 1.72 (*)     POC Hematocrit 25 (*)     Sample AYE     ISTAT PROCEDURE - Abnormal    POC Glucose 68 (*)     POC BUN 5 (*)     POC Creatinine 0.3 (*)     POC Sodium 147 (*)     POC Potassium <2.0 (*)     POC Chloride 133 (*)     POC TCO2 (MEASURED) 7 (*)     POC Ionized Calcium 0.62 (*)     POC Hematocrit <15 (*)     Sample AYE     ISTAT PROCEDURE - Abnormal    POC PH 6.957 (*)     POC PCO2 21.5 (*)     POC PO2 35 (*)     POC HCO3 4.8 (*)     POC BE -27 (*)     POC SATURATED O2 39      POC Sodium 148 (*)     POC Potassium <2.0 (*)     POC TCO2 5 (*)     POC Ionized Calcium 0.62 (*)     POC Hematocrit <15 (*)     Sample VENOUS      Site Other      Allens Test N/A     CULTURE, BLOOD   CULTURE, BLOOD   B-TYPE NATRIURETIC PEPTIDE    BNP 23      Narrative:     Draw baseline aPTT prior to starting the heparin bolus or  infusion  (if patient is on warfarin prior to heparin therapy)   PROTIME-INR    Prothrombin Time 12.5      INR 1.2      Narrative:     Draw baseline aPTT prior to starting the heparin bolus or  infusion  (if patient is on warfarin prior to heparin therapy)   SARS-COV-2 RNA AMPLIFICATION, QUAL     EKG Readings: (Independently Interpreted)   Previous EKG: Compared with most recent EKG Previous EKG Date: 8/2/2024. Heart Rate: 45. Conduction: RBBB.   0722:   Junctional bradycardia    Other EKG Interpretations: Repeat EKG at 0728:  Sinus tachycardia, post ROSC, rate 144 - reviewed with Cardiology    Repeat EKG at 0733:  Significant changes, rate 86, a fib, concerning ST changes    Repeat EKG at 0752:  Rate 86, accelerated junctional rhythm vs NSR, ST abnormality    Repeat EKG at 0806:  Rate 93, SR, first degree AV block    Repeat EKG at 0818:  NSR, rate 82, RBBB, ST abnormalities    Repeat EKG at 0832:  Rate 89, NSR, ST abnormalities      ECG Results              EKG 12-lead (Final result)        Collection Time Result Time QRS Duration OHS  QTC Calculation    08/26/24 08:18:50 08/26/24 09:10:42 160 472                     Final result by Interface, Lab In [facility] (08/26/24 09:10:53)                   Narrative:    Test Reason :     Vent. Rate : 082 BPM     Atrial Rate : 082 BPM     P-R Int : 204 ms          QRS Dur : 160 ms      QT Int : 404 ms       P-R-T Axes : 051 -64 017 degrees     QTc Int : 472 ms    Normal sinus rhythm  Left axis deviation  Right bundle branch block  Inferior infarct (cited on or before 26-AUG-2024)vs due to IVCD  Abnormal ECG  When compared with ECG of 26-AUG-2024 08:06,  NC interval has decreased  Minimal criteria for Anterior infarct are no longer Present  Serial changes of Inferior infarct Present  Confirmed by Alirio RIVERA MD ([id number]) on 8/26/2024 9:10:41 AM    Referred By: [email or web address]   SELF           Confirmed By:Alirio RIVERA MD                                     EKG 12-lead (Final result)        Collection Time Result Time QRS Duration OHS QTC Calculation    08/26/24 08:06:45 08/26/24 08:59:27 162 472                     Final result by Interface, Lab In [facility] (08/26/24 08:59:35)                   Narrative:    Test Reason :     Vent. Rate : 093 BPM     Atrial Rate : 093 BPM     P-R Int : 248 ms          QRS Dur : 162 ms      QT Int : 380 ms       P-R-T Axes : 086 -43 030 degrees     QTc Int : 472 ms    Sinus rhythm with 1st degree A-V block  Left axis deviation  Nonspecific intraventricular block  Inferior infarct (cited on or before 26-AUG-2024)VS DUE TO ivcd  Cannot rule out Anterior infarct (cited on or before 02-AUG-2024)  Abnormal ECG  When compared with ECG of 26-AUG-2024 07:52,  Sinus rhythm has replaced Junctional rhythm  QRS duration has increased  Serial changes of Anterior infarct Present  Confirmed by Alirio RIVERA MD ([id number]) on 8/26/2024 8:59:23 AM    Referred By: [email or web address]   SELF           Confirmed By:Alirio RIVERA MD                                     EKG 12-lead (Final result)        Collection Time Result Time  QRS Duration OHS QTC Calculation    08/26/24 07:52:22 08/26/24 09:00:29 88 488                     Final result by Interface, Lab In [facility] (08/26/24 09:00:36)                   Narrative:    Test Reason :     Vent. Rate : 086 BPM     Atrial Rate : 079 BPM     P-R Int : 000 ms          QRS Dur : 088 ms      QT Int : 408 ms       P-R-T Axes : 000 -33 049 degrees     QTc Int : 488 ms    Accelerated Junctional rhythm with retrograde conduction vs NSR but Ps not  well seen  Left axis deviation  Low voltage QRS  Inferior infarct (cited on or before 26-AUG-2024)vs due to IVCD  Cannot rule out Anteroseptal infarct (cited on or before 02-AUG-2024)  Marked ST abnormality, possible lateral subendocardial injury  Abnormal ECG  When compared with ECG of 26-AUG-2024 07:33,  Junctional rhythm has replaced Atrial fibrillation  Right bundle branch block is no longer Present  Confirmed by Alirio RIVERA MD ([id number]) on 8/26/2024 9:00:23 AM    Referred By: [email or web address]   SELF           Confirmed By:Alirio RIVERA MD                                     EKG 12-lead (Final result)        Collection Time Result Time QRS Duration OHS QTC Calculation    08/26/24 07:33:26 08/26/24 09:01:33 166 464                     Final result by Interface, Lab In [facility] (08/26/24 09:01:39)                   Narrative:    Test Reason :     Vent. Rate : 086 BPM     Atrial Rate : 093 BPM     P-R Int : 000 ms          QRS Dur : 166 ms      QT Int : 388 ms       P-R-T Axes : 000 -03 016 degrees     QTc Int : 464 ms    Atrial fibrillation  Indeterminate axis  Right bundle branch block  Inferior infarct ,age undetermined vs due to IVCD- Some RIKY so cannot  exclude STEMI  Anteroseptal infarct (cited on or before 02-AUG-2024)  Abnormal ECG  When compared with ECG of 26-AUG-2024 07:28,  Significant changes have occurred  Confirmed by Alirio RIVERA MD ([id number]) on 8/26/2024 9:01:31 AM    Referred By: [email or web address]   SELF           Confirmed By:Alirio RIVERA MD                                      EKG 12-lead (Final result)        Collection Time Result Time QRS Duration OHS QTC Calculation    08/26/24 07:28:29 08/26/24 08:33:51 106 402                     Final result by Interface, Lab In [facility] (08/26/24 08:33:58)                   Narrative:    Test Reason :     Vent. Rate : 144 BPM     Atrial Rate : 144 BPM     P-R Int : 168 ms          QRS Dur : 106 ms      QT Int : 260 ms       P-R-T Axes : 000 -74 120 degrees     QTc Int : 402 ms    Sinus tachycardia  Left axis deviation  Incomplete right bundle branch block  Anterior infarct ,age undetermined  Inferior injury pattern    ACUTE MI / STEMI    Consider right ventricular involvement in acute inferior infarct  Abnormal ECG  When compared with ECG of 26-AUG-2024 07:22,  No significant change was found    Confirmed by Fabio Bradley MD ([id number]) on 8/26/2024 8:33:45 AM    Referred By: [email or web address]   SELF           Confirmed By:Fabio Bradley MD                                     EKG 12-lead (Final result)        Collection Time Result Time QRS Duration OHS QTC Calculation    08/26/24 07:22:43 08/26/24 08:32:34 144 326                     Final result by Interface, Lab In [facility] (08/26/24 08:32:36)                   Narrative:    Test Reason : I46.9,    Vent. Rate : 045 BPM     Atrial Rate : 050 BPM     P-R Int : 000 ms          QRS Dur : 144 ms      QT Int : 378 ms       P-R-T Axes : 000 027 026 degrees     QTc Int : 326 ms    Junctional bradycardia  Right bundle branch block  Septal infarct ,age undetermined  Possible Inferior infarct ,age undetermined  Abnormal ECG  When compared with ECG of 02-AUG-2024 15:10,  Junctional rhythm has replaced Sinus rhythm  Vent. rate has decreased BY  26 BPM  Confirmed by Fabio Bradley MD ([id number]) on 8/26/2024 8:32:32 AM    Referred By: [email or web address]   SELF           Confirmed By:Fabio Bradley MD                                  Imaging Results              X-Ray Chest AP Portable (Final result)  Result time 08/26/24 09:05:11      Final  result by Alonso Mccollum MD (08/26/24 09:05:11)                   Impression:      As above.      Electronically signed by: Alonso Mccollum  Date:    08/26/2024  Time:    09:05               Narrative:    EXAMINATION:  XR CHEST AP PORTABLE    CLINICAL HISTORY:  s/p intubation;    TECHNIQUE:  Single frontal view of the chest was performed.    COMPARISON:  Chest radiograph 08/08/2021; CT chest 06/26/2024    FINDINGS:  Lines and tubes: Endotracheal tube terminates 6.5 cm above the zuly.  Right chest wall Port-A-Cath terminates at the cavoatrial junction.  Numerous monitoring leads.    Heart and mediastinum: Magnified by AP technique and low lung volumes.    Pleura: No pleural effusion or pneumothorax.  Mild elevation of the right hemidiaphragm.    Lungs: Lung volumes are low, particularly on the right.  There are patchy opacities in both lungs, which could represent aspiration, pneumonia, or interstitial lung disease.    Soft tissue/bone: Unchanged.                                       Medications   tenecteplase (TNKase) 50 mg IV KIT (  Not Given 8/26/24 0800)   alteplase (ACtivase) 100 mg injection (has no administration in time range)   magnesium sulfate 500 mg/mL (50 %) injection (2 g Intravenous Given 8/26/24 0732)   EPINEPHrine 0.1 mg/mL injection (1 mg Intravenous Given 8/26/24 0729)   sodium bicarbonate 8.4 % (1 mEq/mL) injection (50 mEq Intravenous Given 8/26/24 0726)   calcium chloride 100 mg/mL (10 %) injection ( Intravenous Canceled Entry 8/26/24 0830)   atropine injection (1 mg Intravenous Given 8/26/24 0723)   amiodarone injection (300 mg Intravenous Given 8/26/24 0729)   vasopressin (PITRESSIN) 20 unit/mL injection (20 Units  Given 8/26/24 0834)   EPINEPHrine 0.1 mg/mL injection (1 mg Intravenous Given 8/26/24 0726)   sodium bicarbonate 8.4 % (1 mEq/mL) injection (50 mEq Intravenous Given 8/26/24 0726)   atropine injection (1 mg Intravenous Given 8/26/24 0816)   EPINEPHrine 0.1 mg/mL injection (1 mg  Intravenous Given 8/26/24 0816)   sodium bicarbonate 8.4 % (1 mEq/mL) injection (50 mEq Intravenous Given 8/26/24 0810)   calcium chloride 100 mg/mL (10 %) injection (1 g Intravenous Given 8/26/24 0816)     Medical Decision Making  Emergent evaluation of a 69-year-old male arriving by EMS.   Patient emergently placed into ED bed, noted to be not responsive, no palpable pulse.  Initiated ACLS, placed on pads and cardiac monitor.       Differential including, but not limited to:  Hypoglycemia, hypovolemia, STEMI, PE, cardiac arrest, hypoxic respiratory arrest     Discussed goals of care with patient's wife, requested intubation and full code.  ACLS performed, initially bradycardic, subsequently in v tach, v fib, followed by PEA.  Intubated for airway protection.  Received calcium, bicarb, epi, amiodarone, multiple episodes of ROCS.  EKGs concerning for ischemia and bedside US concerning for RV strain.      Case discussed with Cardiology, who evaluated the patient at the bedside.  Given patient's instability, he would not be a candidate for transfer to the cath lab for intervention at this time.  He had multiple brief periods of ROSC, after which he would become bradycardic and lose pulses again. The patient did receive tPA during his arrest out of concern for PE given hypoxic arrest and RV strain on bedside ECHO.  Risks/benefits of tpa discussed with wife, agreed to proceed. ROSC obtained after TPA, patient required multiple pressors for BP support, continued amiodarone.     Additional family arrived and able to visit.  Given prolonged arrest time, discussed with family the chance of meaningful recovery is low.  Initially discussed Inpatient Hospice, Palliative care consulted,  at bedside for family support.  On subsequent conversation with family, requested patient received last rights and be transitioned to comfort measures with compassionate extubation. CT's and heparin originally ordered, these were  discontinued at patient's wife's request.      Patient was extubated. On re-exam, found to have no pulse.  Family wishes to discontinue resuscitative efforts at this time.  TOD called 1201.      Amount and/or Complexity of Data Reviewed  External Data Reviewed: notes.     Details: Discharge summary from earlier this month reviewed:  Urology & IR consulted; s/p BL nephrostomy tubes placed on 08/03.  Also had ARIELLA on admission which has improved after relief of urinary obstruction.  Labs: ordered.     Details: No hypoglycemia, hypercalcemia, CTNI elevated, no leukocytosis   Radiology: ordered and independent interpretation performed.     Details: Patchy opacities, ETT noted above zuly   ECG/medicine tests: ordered and independent interpretation performed.    Risk  Prescription drug management.              Attending Attestation:   Physician Attestation Statement for Resident:  As the supervising MD       I was personally present during the entire procedure.          Attending Critical Care:   Critical Care Times:   Direct Patient Care (initial evaluation, reassessments, and time considering the case)................................................................42 minutes.   Additional History from reviewing old medical records or taking additional history from the family, EMS, PCP, etc.......................10 minutes.   Ordering, Reviewing, and Interpreting Diagnostic Studies...............................................................................................................5 minutes.   Documentation..................................................................................................................................................................................6 minutes.   Consultation with other Physicians. .................................................................................................................................................5 minutes.   Consultation with the  patient's family directly relating to the patient's condition, care, and DNR status (when patient unable)......7 minutes.   ==============================================================  Total Critical Care Time - exclusive of procedural time: 75 minutes.  ==============================================================  Critical care was necessary to treat or prevent imminent or life-threatening deterioration of the following conditions: cardiac arrest.   The following critical care procedures were done by me (see procedure notes): airway management.   Critical care was time spent personally by me on the following activities: obtaining history from patient or relative, examination of patient, review of old charts, ordering lab, x-rays, and/or EKG, development of treatment plan with patient or relative, ordering and performing treatments and interventions, evaluation of patient's response to treatment, interpretation of cardiac measurements, discussion with consultants, re-evaluation of patient's conition, ventilator management and end of life discussions.   Critical Care Condition: critical                                  Clinical Impression:  Final diagnoses:  [I46.9] Cardiac arrest  [R79.89] Elevated troponin (Primary)  [R41.89] Unresponsive          ED Disposition Condition                     Harman Caldera MD  [phone]

## 2024-08-26 NOTE — ED NOTES
1mg of atropine administered. Pulse check. Patient has pulse and achieved ROSC. Compressions ended.

## 2024-08-26 NOTE — ED NOTES
Patient identifiers for Med Palma 69 y.o. male checked and correct.  Chief Complaint   Patient presents with    SVT     SVT in field, received 6mg adenosine en route with EMS. Arrives lethargic and bradycardic.      Past Medical History:   Diagnosis Date    Allergy     Arthritis     Elevated PSA     Hypertension     Malignant tumor of urinary bladder     per patient removed with last surg     Allergies reported: Review of patient's allergies indicates:  No Known Allergies      LOC: Patient is unresponsive.   APPEARANCE: Patient in acute distress. Patient is pale and diaphoretic.   HEENT: WDL  SKIN: The skin is pale.   MUSCULOSKELETAL: Patient is not moving any extremities well, no obvious deformities noted. Pulses faint and pt bradycardic, heart rate of 32-46 on EMS monitor. SVT noted on EMS monitor en route  RESPIRATORY: Airway is open and patent. Respirations are spontaneous and labored with decreased effort and rate.  CARDIAC: Patient is bradycardic, 34 on cardiac monitor. No peripheral edema noted.  ABDOMEN: No distention noted. Surgical scars noted. Soft and non-tender upon palpation.  NEUROLOGICAL: pupils 2mm, PERRL. Facial expression is symmetrical. Unable to assess neuro due to decreased mental status.

## 2024-08-26 NOTE — CONSULTS
Consult Note  [facility]  Cardiology      Med Palma  YOB: 1955  Medical Record Number:  [id number]  Attending Physician:  Harman Caldera MD   Date of Admission: 8/26/2024       Hospital Day:  0  Current Principal Problem:  Cardiac Arrest     HISTORY:     69 years old gentleman with h/o bladder cancer with clinical stage from 1/16/2024: cT2, cN2, cM1 who was brought into the ED via EMS for cardiac arrest.  Cardiology was called to the bedside to assist with a stat POCUS when ER team found RV to be dilated and suspected PE. I quickly went to see the patient, CPR had just been stopped as ROSC had been achieved. He was going in and out of ROSC. Upon Echo, I found on PS long axis a massive RV double the size of LV with septum bowing into LV. PSX confirmed it with septal again bowing into LV and A4C also showed RV at leat 2x size of LV. Last echo in 2021 had reported normal RV size and function. EKG also showed concern for anterior and inferior STEMI. Meanwhile, pateint's BP was in MAP 40s on 2 pressors and slowly afterwards lost pulse again. Exam showed dilated b/l pupils with no response to light or touch. Corneal reflex also absent. Patient being supported by pressors and vent.     Medications - Outpatient  Prior to Admission medications    Medication Sig Start Date End Date Taking? Authorizing Provider   fluticasone propionate (FLONASE) 50 mcg/actuation nasal spray 1 spray (50 mcg total) by Each Nostril route as needed for Allergies. 3/8/24   Johnathan, William CAM, DO   gabapentin (NEURONTIN) 300 MG capsule Take 1 capsule (300 mg total) by mouth 3 (three) times daily.  Patient not taking: Reported on 8/19/2024 5/28/24 5/28/25  Mary Spicer MD   HYDROcodone-acetaminophen (NORCO) 7.5-325 mg per tablet Take 1 tablet by mouth every 6 (six) hours as needed for Pain.  Patient not taking: Reported on 8/19/2024 8/4/24   Zach Haynes MD   lactulose (CHRONULAC) 20 gram/30 mL Soln Take 23 mLs (15 g  total) by mouth every 6 (six) hours as needed (for constipation).  Patient not taking: Reported on 8/19/2024 8/9/24   Mary Spicer MD   linaCLOtide (LINZESS) 72 mcg Cap capsule Take 1 capsule (72 mcg total) by mouth before breakfast.  Patient not taking: Reported on 8/19/2024 8/9/24   Mary Spicer MD   polyethylene glycol (GLYCOLAX) 17 gram/dose powder Take 17 g by mouth 2 (two) times daily as needed for Constipation.  Patient not taking: Reported on 8/19/2024 8/4/24   Zach Haynes MD   senna-docusate 8.6-50 mg (PERICOLACE) 8.6-50 mg per tablet Take 1 tablet by mouth 2 (two) times daily as needed for Constipation.  Patient not taking: Reported on 8/19/2024 8/4/24   Zach Haynes MD   SHINGRIX, PF, 50 mcg/0.5 mL injection  11/9/23   Provider, Historical   tadalafiL (CIALIS) 20 MG Tab Take 1 tablet (20 mg total) by mouth daily as needed (ED).  Patient not taking: Reported on 8/19/2024 11/16/23 11/10/24  Yinka Jimenez MD   traMADoL (ULTRAM) 50 mg tablet Take 1-2 tablets every 6 hours as needed for pain. 8/21/24   Clarissa Vega NP         Medications - Current  Scheduled Meds:   NORepinephrine bitartrate-D5W        alteplase        calcium chloride        heparin (PORCINE)  80 Units/kg (Adjusted) Intravenous Once    NORepinephrine bitartrate-D5W        tenecteplase         Continuous Infusions:   heparin (porcine) in D5W  0-40 Units/kg/hr (Adjusted) Intravenous Continuous        vasopressin  0.04 Units/min Intravenous Continuous         PRN Meds:.  Current Facility-Administered Medications:     NORepinephrine bitartrate-D5W, , ,     alteplase, , ,     calcium chloride, , ,     heparin (PORCINE), 60 Units/kg (Adjusted), Intravenous, PRN    heparin (PORCINE), 30 Units/kg (Adjusted), Intravenous, PRN    NORepinephrine bitartrate-D5W, , ,     tenecteplase, , ,       Allergies  Review of patient's allergies indicates:  No Known Allergies      Past Medical History  Past Medical History:   Diagnosis Date     Allergy     Arthritis     Elevated PSA     Hypertension     Malignant tumor of urinary bladder     per patient removed with last surg         Past Surgical History  Past Surgical History:   Procedure Laterality Date    CYSTECTOMY, ROBOT-ASSISTED, LAPAROSCOPIC, USING DA SHARI XI, WITH ILEAL CONDUIT CREATION Bilateral 3/13/2024    Procedure: XI ROBOTIC CYSTECTOMY, WITH ILEAL CONDUIT CREATION;  Surgeon: Yinka Jimenez MD;  Location: [facility];  Service: Urology;  Laterality: Bilateral;  6 hrs / SURGERY ADMIT DUE TO INSURANCE    ROBOT-ASSISTED LAPAROSCOPIC PROSTATECTOMY USING DA SHARI XI N/A 3/13/2024    Procedure: XI ROBOTIC PROSTATECTOMY;  Surgeon: Yinka Jimenez MD;  Location: [facility];  Service: Urology;  Laterality: N/A;    ROBOTIC BIOPSY, LYMPH NODE Bilateral 3/13/2024    Procedure: XI ROBOTIC BIOPSY, PELVIC LYMPH NODE;  Surgeon: Yinka Jimenez MD;  Location: [facility];  Service: Urology;  Laterality: Bilateral;    TURBT (TRANSURETHRAL RESECTION OF BLADDER TUMOR) N/A 10/26/2023    Procedure: TURBT (TRANSURETHRAL RESECTION OF BLADDER TUMOR);  Surgeon: Yinka Jimenez MD;  Location: [facility];  Service: Urology;  Laterality: N/A;         Social History  Social History     Socioeconomic History    Marital status:    Occupational History     Employer: [employer]   Tobacco Use    Smoking status: Former     Types: Cigarettes    Smokeless tobacco: Never   Substance and Sexual Activity    Alcohol use: Yes     Alcohol/week: 3.0 standard drinks of alcohol     Types: 3 Cans of beer per week     Comment: none 24 hr prior to surg    Drug use: No    Sexual activity: Yes     Partners: Female     Social Determinants of Health     Financial Resource Strain: Patient Declined (4/16/2024)    Overall Financial Resource Strain (CARDIA)     Difficulty of Paying Living Expenses: Patient declined   Food Insecurity: Unknown (4/16/2024)    Hunger Vital Sign     Worried About Running Out of Food in the Last  "Year: Never true     Ran Out of Food in the Last Year: Patient declined   Transportation Needs: No Transportation Needs (4/16/2024)    PRAPARE - Transportation     Lack of Transportation (Medical): No     Lack of Transportation (Non-Medical): No   Physical Activity: Insufficiently Active (4/16/2024)    Exercise Vital Sign     Days of Exercise per Week: 4 days     Minutes of Exercise per Session: 30 min   Stress: No Stress Concern Present (4/16/2024)    [employer] - Occupational Stress Questionnaire     Feeling of Stress : Not at all   Housing Stability: Low Risk  (3/14/2024)    Housing Stability Vital Sign     Unable to Pay for Housing in the Last Year: No     Number of Places Lived in the Last Year: 1     Unstable Housing in the Last Year: No         ROS:     Unable to obtain - patient intubated       PHYSICAL EXAM:     Vital Signs  Vitals  Temp: 99.5 °F (37.5 °C)  Temp Source: Rectal  Pulse: 82  Heart Rate Source: Monitor  Resp: (!) 22  SpO2: (!) 18 %  Pulse Oximetry Type: Continuous  Oxygen Concentration (%): 100  BP: 100/61          24 Hour VS Range    Temp:  [99.5 °F (37.5 °C)]   Pulse:  [80-86]   Resp:  [5-22]   BP: (100-129)/(61-90)   SpO2:  [18 %-26 %]   No intake or output data in the 24 hours ending 08/26/24 0905      General: intubated with on and off CPR going on  Chest: defib pads on  Heart:  Sinus sahra on monitor alternating with normal rate  Extremities:  No edema. Normal capillary refill.    Skin:  cold extremities, no cap refil    Neurological / Psychiatric:  intubated, no corneal or pupil light reflex at this time, dilated pupils        DATA:       Recent Labs   Lab 08/23/24  0930 08/26/24  0743 08/26/24  0747 08/26/24  0800   WBC 9.93  --   --   --    HGB 10.3*  --   --   --    HCT 32.1* <15* <15* 25*     --   --   --         No results for input(s): "PROTIME", "INR" in the last 168 hours.     Recent Labs   Lab 08/23/24  0930   *   K 4.2      CO2 " "22*   BUN 23   CREATININE 1.6*   ANIONGAP 12   CALCIUM 9.7        Recent Labs   Lab 08/23/24  0930   PROT 6.9   ALBUMIN 3.0*   BILITOT 0.4   ALKPHOS 79   AST 29   ALT 13        No results for input(s): "TROPONINI" in the last 168 hours.     No results found for: "BNP"    No results for input(s): "LABBLOO" in the last 168 hours.       ASSESSMENT/PLAN:     #Cardiac arrest  -concern for STEMI and massive PE based on EKGs and Echo  -started CPR by EMS into ER arrival, in and out of ROSC  -unfortunately, BP too unstable to get any imaging/CT or cath lab activation  -CPR lasted >40 min to the point of evidence of hypoxia brain injury and absence of pupil/corneal reflexes  -Patient eval in the ED by myself and advanced cath fellow Dr DAKSHA Davis   -100 tpa was given however Echo and EKG didn't show any immediate improvements  -wife at bedside was updated throughout, comfort measure initiated    Signed:  Humza Ordonez  Cardiology Fellow PGY-6  8/26/2024  9:05 AM   [facility] [place] [facility], [place]    "

## 2024-08-26 NOTE — PLAN OF CARE
08/26/24 1002   Post-Acute Status   Post-Acute Authorization Hospice   Hospice Status Referrals Sent   Discharge Delays None known at this time   Discharge Plan   Discharge Plan A Inpatient Hospice     Ref[facility] sent via careport to [facility] for [place] [facility]    SW left voicemail for Jeanette [phone] and Mireya [phone]    Discharge Plan A and Plan B have been determined by review of patient's clinical status, future medical and therapeutic needs, and coverage/benefits for post-acute care in coordination with multidisciplinary team members.\      Clarissa Paez, CORY, MSW, LMSW, RSW   Case Management  [facility]  Email: [email or web address]

## 2024-08-27 LAB
OHS QRS DURATION: 90 MS
OHS QTC CALCULATION: 413 MS

## 2024-09-06 ENCOUNTER — TELEPHONE (OUTPATIENT)
Dept: INTERNAL MEDICINE | Facility: CLINIC | Age: 69
End: 2024-09-06

## 2024-09-09 ENCOUNTER — TELEPHONE (OUTPATIENT)
Dept: INTERNAL MEDICINE | Facility: CLINIC | Age: 69
End: 2024-09-09
Payer: MEDICARE

## 2024-09-09 NOTE — TELEPHONE ENCOUNTER
----- Message from Yeni Cornejo sent at 2024 10:59 AM CDT -----  Regarding: Patient Advice              Name of Who is Calling: Pro with [facility]    Who Left The Message: Pro with [facility]          What is the request in detail:     As per Pro with [facility].  Please advise that Dr Wilson's signature is needed on  the death certificate for cremation of the above patient that is time sensitive and needs to signed by .   Please further advise.   Thank you/2024      Reply by [facility]: NO      [facility]:    [phone] (OFFICE)

## (undated) DEVICE — SEALER VESSEL EXTEND

## (undated) DEVICE — DRAPE ARM DAVINCI XI

## (undated) DEVICE — STAPLER SKIN ROTATING HEAD

## (undated) DEVICE — TAPE MEDIPORE 3 X 10YD

## (undated) DEVICE — SOL POVIDONE SCRUB IODINE 4 OZ

## (undated) DEVICE — SUT MCRYL PLUS 4-0 PS2 27IN

## (undated) DEVICE — SUT STRATFIX 15CM RB-1 3-0

## (undated) DEVICE — SUT ETHIBOND EXCEL 0 CT2 30

## (undated) DEVICE — TROCAR ENDOPATH XCEL 12MM 10CM

## (undated) DEVICE — DRESSING TRANS 4X4 TEGADERM

## (undated) DEVICE — SUT ABS CLIP LAPRA-TY CTD

## (undated) DEVICE — YANKAUER OPEN TIP W/O VENT

## (undated) DEVICE — SUT VICRYL 3-0 27 SH

## (undated) DEVICE — TUBING SUC UNIV W/CONN 12FT

## (undated) DEVICE — JELLY SURGILUBE 5GR

## (undated) DEVICE — EVACUATOR BLADDER UROVAC ADPT

## (undated) DEVICE — LOOP VESSEL YELLOW MAXI

## (undated) DEVICE — Device

## (undated) DEVICE — LOOP CUTTING BPLR 24/26F .30MM

## (undated) DEVICE — IRRIGATOR ENDOSCOPY DISP.

## (undated) DEVICE — GLOVE SENSICARE PI SURG 7.5

## (undated) DEVICE — PORT AIRSEAL 12/120MM LPI

## (undated) DEVICE — SUT VICRYL CTD 2-0 GI 27 SH

## (undated) DEVICE — CATH URTRL OPEN END STR TIP 5F

## (undated) DEVICE — SUT VICRYL 4-0 27 RB-1

## (undated) DEVICE — SET TRI-LUMEN FILTERED TUBE

## (undated) DEVICE — CONNECTOR TUBING STR 5 IN 1

## (undated) DEVICE — TRAY DO THE ROBOT

## (undated) DEVICE — SUT SILK 3-0 BLK BR SH 30IN

## (undated) DEVICE — SUT 1 48IN PDS II VIO MONO

## (undated) DEVICE — STAPLER SUREFORM 60 SPU

## (undated) DEVICE — CLIP HEMO-LOK MLX LARGE LF

## (undated) DEVICE — DRAIN CHANNEL ROUND 15FR

## (undated) DEVICE — SOL ELECTROLUBE ANTI-STIC

## (undated) DEVICE — BAG INZII TISS RETRV 12/15MM

## (undated) DEVICE — TRAY DRY SKIN SCRUB PREP

## (undated) DEVICE — ADHESIVE DERMABOND ADVANCED

## (undated) DEVICE — SET BASIN 48X48IN 6000ML RING

## (undated) DEVICE — BAG POSTOP W/ACCESS CUT TO FIT

## (undated) DEVICE — SYS SEE SHARP SCP ANTIFG LNG

## (undated) DEVICE — TROCAR ENDOPATH XCEL 12X100MM

## (undated) DEVICE — SET Y-TYPE TUR IRRIGATION 96IN

## (undated) DEVICE — BRUSH SCRUB SURGICALW/BETADINE

## (undated) DEVICE — ELECTRODE REM PLYHSV RETURN 9

## (undated) DEVICE — RELOAD PROXIMATE CUT BLUE 75MM

## (undated) DEVICE — SPONGE SURGIFOAM 100 8.5X12X10

## (undated) DEVICE — POUCH SENSURA 1PC POSTOP WOUND

## (undated) DEVICE — SOL IRR SOD CHL .9% POUR

## (undated) DEVICE — BAG TISSUE RETRIEVAL 225ML

## (undated) DEVICE — OBTURATOR BLADELESS 8MM XI CLR

## (undated) DEVICE — RELOAD SUREFORM 60 2.5 WHT 6R

## (undated) DEVICE — DEVICE SNAPSECURE FOL CATH

## (undated) DEVICE — SUT VICRYL+ 27 UR-6 VIOL

## (undated) DEVICE — SUT STRATAFIX 0 PDS+ 22CM 9IN

## (undated) DEVICE — BAG TISS RETRV MONARCH 10MM

## (undated) DEVICE — SOL IRRI STRL WATER 1000ML

## (undated) DEVICE — SYR 50ML CATH TIP

## (undated) DEVICE — GUIDEWIRE AMPLATZ .035IN 145CM

## (undated) DEVICE — DRAPE COLUMN DAVINCI XI

## (undated) DEVICE — NDL INSUFFLATION VERRES 120MM

## (undated) DEVICE — TROCAR ENDOPATH XCEL 5X100MM

## (undated) DEVICE — EVACUATOR WOUND BULB 100CC

## (undated) DEVICE — CATH BARDEX URETHRAL RND 16FR

## (undated) DEVICE — GUIDE WIRE MOTION .035 X 150CM

## (undated) DEVICE — GLOVE SENSICARE PI ORTHO 7.0

## (undated) DEVICE — KIT WING PAD POSITIONING

## (undated) DEVICE — SOL POVIDONE PREP IODINE 4 OZ

## (undated) DEVICE — ELECTRODE PATIENT RETURN DISP

## (undated) DEVICE — CONTAINER SPEC OR STRL 4.5OZ

## (undated) DEVICE — SOL NACL IRR 3000ML

## (undated) DEVICE — SOL BETADINE 5%

## (undated) DEVICE — HEMOSTAT SURGICEL 4X8IN

## (undated) DEVICE — SEAL UNIVERSAL 5MM-8MM XI

## (undated) DEVICE — BAG DRAIN ANTI REFLUX 2000ML

## (undated) DEVICE — SUT SILK 2-0 BLK BR FSL 18

## (undated) DEVICE — SEAL UROLOGY

## (undated) DEVICE — SUT SILK 3-0 RB-1 30IN BLK

## (undated) DEVICE — TOWEL OR DISP STRL BLUE 4/PK